# Patient Record
Sex: MALE | Race: BLACK OR AFRICAN AMERICAN | Employment: OTHER | URBAN - METROPOLITAN AREA
[De-identification: names, ages, dates, MRNs, and addresses within clinical notes are randomized per-mention and may not be internally consistent; named-entity substitution may affect disease eponyms.]

---

## 2021-01-01 ENCOUNTER — OFFICE VISIT (OUTPATIENT)
Dept: ONCOLOGY | Age: 66
End: 2021-01-01
Payer: MEDICARE

## 2021-01-01 ENCOUNTER — HOSPITAL ENCOUNTER (EMERGENCY)
Age: 66
Discharge: HOME | End: 2021-12-06
Attending: EMERGENCY MEDICINE
Payer: MEDICARE

## 2021-01-01 ENCOUNTER — APPOINTMENT (OUTPATIENT)
Dept: INFUSION THERAPY | Age: 66
End: 2021-01-01

## 2021-01-01 ENCOUNTER — TELEPHONE (OUTPATIENT)
Dept: ONCOLOGY | Age: 66
End: 2021-01-01

## 2021-01-01 ENCOUNTER — HOSPITAL ENCOUNTER (OUTPATIENT)
Dept: INFUSION THERAPY | Age: 66
Discharge: HOME | End: 2021-11-19
Payer: MEDICARE

## 2021-01-01 VITALS
SYSTOLIC BLOOD PRESSURE: 180 MMHG | DIASTOLIC BLOOD PRESSURE: 98 MMHG | OXYGEN SATURATION: 97 % | HEART RATE: 73 BPM | WEIGHT: 270 LBS | TEMPERATURE: 98.3 F | BODY MASS INDEX: 40.92 KG/M2 | RESPIRATION RATE: 16 BRPM | HEIGHT: 68 IN

## 2021-01-01 VITALS
BODY MASS INDEX: 42.28 KG/M2 | OXYGEN SATURATION: 97 % | HEART RATE: 75 BPM | DIASTOLIC BLOOD PRESSURE: 60 MMHG | TEMPERATURE: 98.1 F | HEIGHT: 68 IN | SYSTOLIC BLOOD PRESSURE: 131 MMHG | RESPIRATION RATE: 18 BRPM | WEIGHT: 279 LBS

## 2021-01-01 VITALS
BODY MASS INDEX: 42.39 KG/M2 | WEIGHT: 279.7 LBS | DIASTOLIC BLOOD PRESSURE: 60 MMHG | OXYGEN SATURATION: 97 % | RESPIRATION RATE: 18 BRPM | TEMPERATURE: 98.1 F | SYSTOLIC BLOOD PRESSURE: 131 MMHG | HEIGHT: 68 IN | HEART RATE: 75 BPM

## 2021-01-01 VITALS
HEIGHT: 68 IN | WEIGHT: 273 LBS | BODY MASS INDEX: 41.37 KG/M2 | DIASTOLIC BLOOD PRESSURE: 64 MMHG | SYSTOLIC BLOOD PRESSURE: 146 MMHG | TEMPERATURE: 98 F | OXYGEN SATURATION: 96 % | RESPIRATION RATE: 16 BRPM | HEART RATE: 93 BPM

## 2021-01-01 DIAGNOSIS — C61 PROSTATE CANCER (HCC): Primary | ICD-10-CM

## 2021-01-01 DIAGNOSIS — Z79.52 LONG TERM (CURRENT) USE OF SYSTEMIC STEROIDS: ICD-10-CM

## 2021-01-01 DIAGNOSIS — C79.51 METASTASIS TO BONE (HCC): Primary | ICD-10-CM

## 2021-01-01 DIAGNOSIS — C79.51 METASTASIS TO BONE (HCC): ICD-10-CM

## 2021-01-01 DIAGNOSIS — R33.9 URINARY RETENTION: ICD-10-CM

## 2021-01-01 DIAGNOSIS — R33.9 URINARY RETENTION: Primary | ICD-10-CM

## 2021-01-01 DIAGNOSIS — C61 PROSTATE CANCER (HCC): ICD-10-CM

## 2021-01-01 DIAGNOSIS — D64.9 ANEMIA, UNSPECIFIED TYPE: ICD-10-CM

## 2021-01-01 LAB
ALBUMIN SERPL-MCNC: 3 G/DL (ref 3.5–5)
ALBUMIN SERPL-MCNC: 3.2 G/DL (ref 3.5–5)
ALBUMIN/GLOB SERPL: 0.8 {RATIO} (ref 1.1–2.2)
ALBUMIN/GLOB SERPL: 0.8 {RATIO} (ref 1.1–2.2)
ALP SERPL-CCNC: 115 U/L (ref 45–117)
ALP SERPL-CCNC: 122 U/L (ref 45–117)
ALT SERPL-CCNC: 33 U/L (ref 12–78)
ALT SERPL-CCNC: 33 U/L (ref 12–78)
ANION GAP SERPL CALC-SCNC: 4 MMOL/L (ref 5–15)
ANION GAP SERPL CALC-SCNC: 6 MMOL/L (ref 5–15)
APPEARANCE UR: CLEAR
AST SERPL-CCNC: 19 U/L (ref 15–37)
AST SERPL-CCNC: 22 U/L (ref 15–37)
BACTERIA URNS QL MICRO: NEGATIVE /HPF
BASOPHILS # BLD: 0 K/UL (ref 0–0.1)
BASOPHILS # BLD: 0.1 K/UL (ref 0–0.1)
BASOPHILS NFR BLD: 0 % (ref 0–1)
BASOPHILS NFR BLD: 0 % (ref 0–1)
BILIRUB SERPL-MCNC: 0.3 MG/DL (ref 0.2–1)
BILIRUB SERPL-MCNC: 0.5 MG/DL (ref 0.2–1)
BILIRUB UR QL: NEGATIVE
BUN SERPL-MCNC: 31 MG/DL (ref 6–20)
BUN SERPL-MCNC: 37 MG/DL (ref 6–20)
BUN/CREAT SERPL: 19 (ref 12–20)
BUN/CREAT SERPL: 20 (ref 12–20)
CALCIUM SERPL-MCNC: 8.9 MG/DL (ref 8.5–10.1)
CALCIUM SERPL-MCNC: 9.3 MG/DL (ref 8.5–10.1)
CHLORIDE SERPL-SCNC: 109 MMOL/L (ref 97–108)
CHLORIDE SERPL-SCNC: 112 MMOL/L (ref 97–108)
CO2 SERPL-SCNC: 22 MMOL/L (ref 21–32)
CO2 SERPL-SCNC: 26 MMOL/L (ref 21–32)
COLOR UR: ABNORMAL
CREAT SERPL-MCNC: 1.6 MG/DL (ref 0.7–1.3)
CREAT SERPL-MCNC: 1.82 MG/DL (ref 0.7–1.3)
DIFFERENTIAL METHOD BLD: ABNORMAL
DIFFERENTIAL METHOD BLD: ABNORMAL
EOSINOPHIL # BLD: 0 K/UL (ref 0–0.4)
EOSINOPHIL # BLD: 0.1 K/UL (ref 0–0.4)
EOSINOPHIL NFR BLD: 0 % (ref 0–7)
EOSINOPHIL NFR BLD: 1 % (ref 0–7)
EPITH CASTS URNS QL MICRO: ABNORMAL /LPF
ERYTHROCYTE [DISTWIDTH] IN BLOOD BY AUTOMATED COUNT: 15.6 % (ref 11.5–14.5)
ERYTHROCYTE [DISTWIDTH] IN BLOOD BY AUTOMATED COUNT: 16.2 % (ref 11.5–14.5)
GLOBULIN SER CALC-MCNC: 4 G/DL (ref 2–4)
GLOBULIN SER CALC-MCNC: 4.1 G/DL (ref 2–4)
GLUCOSE SERPL-MCNC: 215 MG/DL (ref 65–100)
GLUCOSE SERPL-MCNC: 217 MG/DL (ref 65–100)
GLUCOSE UR STRIP.AUTO-MCNC: NEGATIVE MG/DL
HCT VFR BLD AUTO: 32.6 % (ref 36.6–50.3)
HCT VFR BLD AUTO: 34.8 % (ref 36.6–50.3)
HGB BLD-MCNC: 10.5 G/DL (ref 12.1–17)
HGB BLD-MCNC: 11 G/DL (ref 12.1–17)
HGB UR QL STRIP: ABNORMAL
HYALINE CASTS URNS QL MICRO: ABNORMAL /LPF (ref 0–5)
IMM GRANULOCYTES # BLD AUTO: 0.1 K/UL (ref 0–0.04)
IMM GRANULOCYTES # BLD AUTO: 0.2 K/UL (ref 0–0.04)
IMM GRANULOCYTES NFR BLD AUTO: 1 % (ref 0–0.5)
IMM GRANULOCYTES NFR BLD AUTO: 1 % (ref 0–0.5)
KETONES UR QL STRIP.AUTO: NEGATIVE MG/DL
LEUKOCYTE ESTERASE UR QL STRIP.AUTO: NEGATIVE
LYMPHOCYTES # BLD: 0.8 K/UL (ref 0.8–3.5)
LYMPHOCYTES # BLD: 0.8 K/UL (ref 0.8–3.5)
LYMPHOCYTES NFR BLD: 7 % (ref 12–49)
LYMPHOCYTES NFR BLD: 8 % (ref 12–49)
MCH RBC QN AUTO: 23.9 PG (ref 26–34)
MCH RBC QN AUTO: 24.3 PG (ref 26–34)
MCHC RBC AUTO-ENTMCNC: 31.6 G/DL (ref 30–36.5)
MCHC RBC AUTO-ENTMCNC: 32.2 G/DL (ref 30–36.5)
MCV RBC AUTO: 75.5 FL (ref 80–99)
MCV RBC AUTO: 75.7 FL (ref 80–99)
MONOCYTES # BLD: 0.6 K/UL (ref 0–1)
MONOCYTES # BLD: 0.6 K/UL (ref 0–1)
MONOCYTES NFR BLD: 5 % (ref 5–13)
MONOCYTES NFR BLD: 6 % (ref 5–13)
NEUTS SEG # BLD: 10.8 K/UL (ref 1.8–8)
NEUTS SEG # BLD: 8.7 K/UL (ref 1.8–8)
NEUTS SEG NFR BLD: 84 % (ref 32–75)
NEUTS SEG NFR BLD: 87 % (ref 32–75)
NITRITE UR QL STRIP.AUTO: NEGATIVE
NRBC # BLD: 0 K/UL (ref 0–0.01)
NRBC # BLD: 0 K/UL (ref 0–0.01)
NRBC BLD-RTO: 0 PER 100 WBC
NRBC BLD-RTO: 0 PER 100 WBC
PH UR STRIP: 6 [PH] (ref 5–8)
PLATELET # BLD AUTO: 297 K/UL (ref 150–400)
PLATELET # BLD AUTO: 321 K/UL (ref 150–400)
PMV BLD AUTO: 10.1 FL (ref 8.9–12.9)
PMV BLD AUTO: 10.8 FL (ref 8.9–12.9)
POTASSIUM SERPL-SCNC: 4.3 MMOL/L (ref 3.5–5.1)
POTASSIUM SERPL-SCNC: 4.4 MMOL/L (ref 3.5–5.1)
PROT SERPL-MCNC: 7 G/DL (ref 6.4–8.2)
PROT SERPL-MCNC: 7.3 G/DL (ref 6.4–8.2)
PROT UR STRIP-MCNC: ABNORMAL MG/DL
PSA SERPL-MCNC: 0.5 NG/ML (ref 0.01–4)
RBC # BLD AUTO: 4.32 M/UL (ref 4.1–5.7)
RBC # BLD AUTO: 4.6 M/UL (ref 4.1–5.7)
RBC #/AREA URNS HPF: ABNORMAL /HPF (ref 0–5)
SODIUM SERPL-SCNC: 139 MMOL/L (ref 136–145)
SODIUM SERPL-SCNC: 140 MMOL/L (ref 136–145)
SP GR UR REFRACTOMETRY: 1.01 (ref 1–1.03)
UA: UC IF INDICATED,UAUC: ABNORMAL
UROBILINOGEN UR QL STRIP.AUTO: 0.2 EU/DL (ref 0.2–1)
WBC # BLD AUTO: 10.3 K/UL (ref 4.1–11.1)
WBC # BLD AUTO: 12.4 K/UL (ref 4.1–11.1)
WBC URNS QL MICRO: ABNORMAL /HPF (ref 0–4)

## 2021-01-01 PROCEDURE — G0463 HOSPITAL OUTPT CLINIC VISIT: HCPCS | Performed by: NURSE PRACTITIONER

## 2021-01-01 PROCEDURE — 84153 ASSAY OF PSA TOTAL: CPT

## 2021-01-01 PROCEDURE — G8754 DIAS BP LESS 90: HCPCS | Performed by: INTERNAL MEDICINE

## 2021-01-01 PROCEDURE — 36415 COLL VENOUS BLD VENIPUNCTURE: CPT

## 2021-01-01 PROCEDURE — 96402 CHEMO HORMON ANTINEOPL SQ/IM: CPT

## 2021-01-01 PROCEDURE — G8427 DOCREV CUR MEDS BY ELIG CLIN: HCPCS | Performed by: INTERNAL MEDICINE

## 2021-01-01 PROCEDURE — G8753 SYS BP > OR = 140: HCPCS | Performed by: INTERNAL MEDICINE

## 2021-01-01 PROCEDURE — 3017F COLORECTAL CA SCREEN DOC REV: CPT | Performed by: INTERNAL MEDICINE

## 2021-01-01 PROCEDURE — 74011250636 HC RX REV CODE- 250/636: Performed by: NURSE PRACTITIONER

## 2021-01-01 PROCEDURE — 99215 OFFICE O/P EST HI 40 MIN: CPT | Performed by: INTERNAL MEDICINE

## 2021-01-01 PROCEDURE — G8417 CALC BMI ABV UP PARAM F/U: HCPCS | Performed by: INTERNAL MEDICINE

## 2021-01-01 PROCEDURE — G8432 DEP SCR NOT DOC, RNG: HCPCS | Performed by: INTERNAL MEDICINE

## 2021-01-01 PROCEDURE — 85025 COMPLETE CBC W/AUTO DIFF WBC: CPT

## 2021-01-01 PROCEDURE — 99282 EMERGENCY DEPT VISIT SF MDM: CPT

## 2021-01-01 PROCEDURE — G8752 SYS BP LESS 140: HCPCS | Performed by: INTERNAL MEDICINE

## 2021-01-01 PROCEDURE — G8536 NO DOC ELDER MAL SCRN: HCPCS | Performed by: INTERNAL MEDICINE

## 2021-01-01 PROCEDURE — 51702 INSERT TEMP BLADDER CATH: CPT

## 2021-01-01 PROCEDURE — 80053 COMPREHEN METABOLIC PANEL: CPT

## 2021-01-01 PROCEDURE — 1101F PT FALLS ASSESS-DOCD LE1/YR: CPT | Performed by: INTERNAL MEDICINE

## 2021-01-01 PROCEDURE — 81001 URINALYSIS AUTO W/SCOPE: CPT

## 2021-01-01 RX ORDER — INSULIN LISPRO 100 [IU]/ML
INJECTION, SOLUTION INTRAVENOUS; SUBCUTANEOUS
COMMUNITY
Start: 2021-01-01 | End: 2022-01-01

## 2021-01-01 RX ORDER — SITAGLIPTIN 100 MG/1
100 TABLET, FILM COATED ORAL DAILY
COMMUNITY
Start: 2021-01-01 | End: 2022-01-01

## 2021-01-01 RX ORDER — ABIRATERONE ACETATE 250 MG/1
1000 TABLET ORAL DAILY
Qty: 120 TABLET | Refills: 5 | Status: SHIPPED | OUTPATIENT
Start: 2021-01-01 | End: 2022-01-01

## 2021-01-01 RX ORDER — INSULIN GLARGINE 100 [IU]/ML
23 INJECTION, SOLUTION SUBCUTANEOUS 2 TIMES DAILY
Status: ON HOLD | COMMUNITY
Start: 2021-01-01 | End: 2022-01-01 | Stop reason: SDUPTHER

## 2021-01-01 RX ADMIN — LEUPROLIDE ACETATE 22.5 MG: KIT at 10:44

## 2021-04-16 ENCOUNTER — APPOINTMENT (OUTPATIENT)
Dept: CT IMAGING | Age: 66
DRG: 477 | End: 2021-04-16
Attending: EMERGENCY MEDICINE
Payer: MEDICARE

## 2021-04-16 ENCOUNTER — APPOINTMENT (OUTPATIENT)
Dept: CT IMAGING | Age: 66
DRG: 477 | End: 2021-04-16
Attending: INTERNAL MEDICINE
Payer: MEDICARE

## 2021-04-16 ENCOUNTER — HOSPITAL ENCOUNTER (INPATIENT)
Age: 66
LOS: 6 days | Discharge: HOME | DRG: 477 | End: 2021-04-22
Attending: EMERGENCY MEDICINE | Admitting: INTERNAL MEDICINE
Payer: MEDICARE

## 2021-04-16 ENCOUNTER — APPOINTMENT (OUTPATIENT)
Dept: GENERAL RADIOLOGY | Age: 66
DRG: 477 | End: 2021-04-16
Attending: EMERGENCY MEDICINE
Payer: MEDICARE

## 2021-04-16 DIAGNOSIS — C79.51 MALIGNANT NEOPLASM METASTATIC TO BONE (HCC): ICD-10-CM

## 2021-04-16 DIAGNOSIS — N17.9 AKI (ACUTE KIDNEY INJURY) (HCC): ICD-10-CM

## 2021-04-16 DIAGNOSIS — R73.9 HYPERGLYCEMIA: Primary | ICD-10-CM

## 2021-04-16 DIAGNOSIS — R25.2 JERKING MOVEMENTS OF EXTREMITIES: ICD-10-CM

## 2021-04-16 DIAGNOSIS — E11.00 TYPE 2 DIABETES MELLITUS WITH HYPEROSMOLAR NONKETOTIC HYPERGLYCEMIA (HCC): ICD-10-CM

## 2021-04-16 PROBLEM — E11.65 SEVERE HYPERGLYCEMIA DUE TO DIABETES MELLITUS (HCC): Status: ACTIVE | Noted: 2021-04-16

## 2021-04-16 PROBLEM — R74.8 ELEVATED ALKALINE PHOSPHATASE LEVEL: Status: ACTIVE | Noted: 2021-04-16

## 2021-04-16 PROBLEM — R82.81 PYURIA: Status: ACTIVE | Noted: 2021-04-16

## 2021-04-16 PROBLEM — J01.90 ACUTE SINUSITIS: Status: ACTIVE | Noted: 2021-04-16

## 2021-04-16 LAB
ADMINISTERED INITIALS, ADMINIT: NORMAL
ALBUMIN SERPL-MCNC: 3.3 G/DL (ref 3.5–5)
ALBUMIN/GLOB SERPL: 0.6 {RATIO} (ref 1.1–2.2)
ALP SERPL-CCNC: 774 U/L (ref 45–117)
ALT SERPL-CCNC: 24 U/L (ref 12–78)
ANION GAP SERPL CALC-SCNC: 12 MMOL/L (ref 5–15)
ANION GAP SERPL CALC-SCNC: 7 MMOL/L (ref 5–15)
APPEARANCE UR: CLEAR
AST SERPL-CCNC: 17 U/L (ref 15–37)
BACTERIA URNS QL MICRO: ABNORMAL /HPF
BASOPHILS # BLD: 0.1 K/UL (ref 0–0.1)
BASOPHILS NFR BLD: 1 % (ref 0–1)
BILIRUB SERPL-MCNC: 0.3 MG/DL (ref 0.2–1)
BILIRUB UR QL: NEGATIVE
BUN SERPL-MCNC: 24 MG/DL (ref 6–20)
BUN SERPL-MCNC: 25 MG/DL (ref 6–20)
BUN/CREAT SERPL: 11 (ref 12–20)
BUN/CREAT SERPL: 12 (ref 12–20)
CALCIUM SERPL-MCNC: 8.6 MG/DL (ref 8.5–10.1)
CALCIUM SERPL-MCNC: 8.9 MG/DL (ref 8.5–10.1)
CHLORIDE SERPL-SCNC: 105 MMOL/L (ref 97–108)
CHLORIDE SERPL-SCNC: 94 MMOL/L (ref 97–108)
CO2 SERPL-SCNC: 18 MMOL/L (ref 21–32)
CO2 SERPL-SCNC: 23 MMOL/L (ref 21–32)
COLOR UR: ABNORMAL
COMMENT, HOLDF: NORMAL
CREAT SERPL-MCNC: 1.93 MG/DL (ref 0.7–1.3)
CREAT SERPL-MCNC: 2.35 MG/DL (ref 0.7–1.3)
D50 ADMINISTERED, D50ADM: 0 ML
D50 ORDER, D50ORD: 0 ML
DIFFERENTIAL METHOD BLD: ABNORMAL
EOSINOPHIL # BLD: 0.4 K/UL (ref 0–0.4)
EOSINOPHIL NFR BLD: 3 % (ref 0–7)
EPITH CASTS URNS QL MICRO: ABNORMAL /LPF
ERYTHROCYTE [DISTWIDTH] IN BLOOD BY AUTOMATED COUNT: 13.2 % (ref 11.5–14.5)
EST. AVERAGE GLUCOSE BLD GHB EST-MCNC: 286 MG/DL
GLOBULIN SER CALC-MCNC: 5.9 G/DL (ref 2–4)
GLSCOM COMMENTS: NORMAL
GLUCOSE BLD STRIP.AUTO-MCNC: 231 MG/DL (ref 65–100)
GLUCOSE BLD STRIP.AUTO-MCNC: 285 MG/DL (ref 65–100)
GLUCOSE BLD STRIP.AUTO-MCNC: 464 MG/DL (ref 65–100)
GLUCOSE BLD STRIP.AUTO-MCNC: 559 MG/DL (ref 65–100)
GLUCOSE BLD STRIP.AUTO-MCNC: >600 MG/DL (ref 65–100)
GLUCOSE SERPL-MCNC: 535 MG/DL (ref 65–100)
GLUCOSE SERPL-MCNC: 997 MG/DL (ref 65–100)
GLUCOSE UR STRIP.AUTO-MCNC: >1000 MG/DL
GLUCOSE, GLC: 231 MG/DL
GLUCOSE, GLC: 285 MG/DL
GLUCOSE, GLC: 464 MG/DL
GLUCOSE, GLC: 559 MG/DL
HBA1C MFR BLD: 11.6 % (ref 4–5.6)
HCT VFR BLD AUTO: 37.3 % (ref 36.6–50.3)
HGB BLD-MCNC: 11.5 G/DL (ref 12.1–17)
HGB UR QL STRIP: ABNORMAL
HIGH TARGET, HITG: 300 MG/DL
HYALINE CASTS URNS QL MICRO: ABNORMAL /LPF (ref 0–5)
IMM GRANULOCYTES # BLD AUTO: 0 K/UL (ref 0–0.04)
IMM GRANULOCYTES NFR BLD AUTO: 0 % (ref 0–0.5)
INSULIN ADMINSTERED, INSADM: 10 UNITS/HOUR
INSULIN ADMINSTERED, INSADM: 12.1 UNITS/HOUR
INSULIN ADMINSTERED, INSADM: 5.1 UNITS/HOUR
INSULIN ADMINSTERED, INSADM: 6.8 UNITS/HOUR
INSULIN ORDER, INSORD: 10 UNITS/HOUR
INSULIN ORDER, INSORD: 12.1 UNITS/HOUR
INSULIN ORDER, INSORD: 5.1 UNITS/HOUR
INSULIN ORDER, INSORD: 6.8 UNITS/HOUR
KETONES UR QL STRIP.AUTO: 15 MG/DL
LEUKOCYTE ESTERASE UR QL STRIP.AUTO: NEGATIVE
LOW TARGET, LOT: 200 MG/DL
LYMPHOCYTES # BLD: 1.3 K/UL (ref 0.8–3.5)
LYMPHOCYTES NFR BLD: 12 % (ref 12–49)
MAGNESIUM SERPL-MCNC: 2.3 MG/DL (ref 1.6–2.4)
MCH RBC QN AUTO: 22.8 PG (ref 26–34)
MCHC RBC AUTO-ENTMCNC: 30.8 G/DL (ref 30–36.5)
MCV RBC AUTO: 74 FL (ref 80–99)
MINUTES UNTIL NEXT BG, NBG: 60 MIN
MONOCYTES # BLD: 0.7 K/UL (ref 0–1)
MONOCYTES NFR BLD: 6 % (ref 5–13)
MULTIPLIER, MUL: 0.02
MULTIPLIER, MUL: 0.03
NEUTS SEG # BLD: 8.7 K/UL (ref 1.8–8)
NEUTS SEG NFR BLD: 78 % (ref 32–75)
NITRITE UR QL STRIP.AUTO: NEGATIVE
NRBC # BLD: 0 K/UL (ref 0–0.01)
NRBC BLD-RTO: 0 PER 100 WBC
ORDER INITIALS, ORDINIT: NORMAL
PH UR STRIP: 5.5 [PH] (ref 5–8)
PHOSPHATE SERPL-MCNC: 2.2 MG/DL (ref 2.6–4.7)
PLATELET # BLD AUTO: 456 K/UL (ref 150–400)
PMV BLD AUTO: 11.1 FL (ref 8.9–12.9)
POTASSIUM SERPL-SCNC: 4 MMOL/L (ref 3.5–5.1)
POTASSIUM SERPL-SCNC: 5.1 MMOL/L (ref 3.5–5.1)
PROT SERPL-MCNC: 9.2 G/DL (ref 6.4–8.2)
PROT UR STRIP-MCNC: NEGATIVE MG/DL
RBC # BLD AUTO: 5.04 M/UL (ref 4.1–5.7)
RBC #/AREA URNS HPF: ABNORMAL /HPF (ref 0–5)
SAMPLES BEING HELD,HOLD: NORMAL
SERVICE CMNT-IMP: ABNORMAL
SODIUM SERPL-SCNC: 124 MMOL/L (ref 136–145)
SODIUM SERPL-SCNC: 135 MMOL/L (ref 136–145)
SP GR UR REFRACTOMETRY: 1.03 (ref 1–1.03)
TROPONIN I SERPL-MCNC: <0.05 NG/ML
UROBILINOGEN UR QL STRIP.AUTO: 0.2 EU/DL (ref 0.2–1)
WBC # BLD AUTO: 11.3 K/UL (ref 4.1–11.1)
WBC URNS QL MICRO: ABNORMAL /HPF (ref 0–4)

## 2021-04-16 PROCEDURE — 87086 URINE CULTURE/COLONY COUNT: CPT

## 2021-04-16 PROCEDURE — 36415 COLL VENOUS BLD VENIPUNCTURE: CPT

## 2021-04-16 PROCEDURE — 74011250636 HC RX REV CODE- 250/636: Performed by: EMERGENCY MEDICINE

## 2021-04-16 PROCEDURE — 82962 GLUCOSE BLOOD TEST: CPT

## 2021-04-16 PROCEDURE — 99285 EMERGENCY DEPT VISIT HI MDM: CPT

## 2021-04-16 PROCEDURE — 81001 URINALYSIS AUTO W/SCOPE: CPT

## 2021-04-16 PROCEDURE — 74011250636 HC RX REV CODE- 250/636: Performed by: INTERNAL MEDICINE

## 2021-04-16 PROCEDURE — 87077 CULTURE AEROBIC IDENTIFY: CPT

## 2021-04-16 PROCEDURE — 65660000000 HC RM CCU STEPDOWN

## 2021-04-16 PROCEDURE — 87186 SC STD MICRODIL/AGAR DIL: CPT

## 2021-04-16 PROCEDURE — 71045 X-RAY EXAM CHEST 1 VIEW: CPT

## 2021-04-16 PROCEDURE — 84100 ASSAY OF PHOSPHORUS: CPT

## 2021-04-16 PROCEDURE — 72128 CT CHEST SPINE W/O DYE: CPT

## 2021-04-16 PROCEDURE — 83735 ASSAY OF MAGNESIUM: CPT

## 2021-04-16 PROCEDURE — 70450 CT HEAD/BRAIN W/O DYE: CPT

## 2021-04-16 PROCEDURE — 84484 ASSAY OF TROPONIN QUANT: CPT

## 2021-04-16 PROCEDURE — 74011000258 HC RX REV CODE- 258: Performed by: INTERNAL MEDICINE

## 2021-04-16 PROCEDURE — 74011636637 HC RX REV CODE- 636/637: Performed by: EMERGENCY MEDICINE

## 2021-04-16 PROCEDURE — 96374 THER/PROPH/DIAG INJ IV PUSH: CPT

## 2021-04-16 PROCEDURE — 74011636637 HC RX REV CODE- 636/637: Performed by: INTERNAL MEDICINE

## 2021-04-16 PROCEDURE — 72131 CT LUMBAR SPINE W/O DYE: CPT

## 2021-04-16 PROCEDURE — 83036 HEMOGLOBIN GLYCOSYLATED A1C: CPT

## 2021-04-16 PROCEDURE — 80053 COMPREHEN METABOLIC PANEL: CPT

## 2021-04-16 PROCEDURE — 85025 COMPLETE CBC W/AUTO DIFF WBC: CPT

## 2021-04-16 PROCEDURE — 74011000250 HC RX REV CODE- 250: Performed by: INTERNAL MEDICINE

## 2021-04-16 RX ORDER — ATORVASTATIN CALCIUM 10 MG/1
10 TABLET, FILM COATED ORAL
COMMUNITY
End: 2022-01-01

## 2021-04-16 RX ORDER — SODIUM CHLORIDE 0.9 % (FLUSH) 0.9 %
5-40 SYRINGE (ML) INJECTION EVERY 8 HOURS
Status: DISCONTINUED | OUTPATIENT
Start: 2021-04-16 | End: 2021-04-22 | Stop reason: HOSPADM

## 2021-04-16 RX ORDER — MAGNESIUM SULFATE 100 %
4 CRYSTALS MISCELLANEOUS AS NEEDED
Status: DISCONTINUED | OUTPATIENT
Start: 2021-04-16 | End: 2021-04-18

## 2021-04-16 RX ORDER — ATENOLOL 50 MG/1
50 TABLET ORAL
Status: DISCONTINUED | OUTPATIENT
Start: 2021-04-17 | End: 2021-04-22 | Stop reason: HOSPADM

## 2021-04-16 RX ORDER — POLYETHYLENE GLYCOL 3350 17 G/17G
17 POWDER, FOR SOLUTION ORAL DAILY PRN
Status: DISCONTINUED | OUTPATIENT
Start: 2021-04-16 | End: 2021-04-22 | Stop reason: HOSPADM

## 2021-04-16 RX ORDER — DEXTROSE 50 % IN WATER (D50W) INTRAVENOUS SYRINGE
25-50 AS NEEDED
Status: DISCONTINUED | OUTPATIENT
Start: 2021-04-16 | End: 2021-04-18

## 2021-04-16 RX ORDER — BENAZEPRIL HYDROCHLORIDE 40 MG/1
40 TABLET ORAL DAILY
COMMUNITY
End: 2022-01-01

## 2021-04-16 RX ORDER — GUAIFENESIN 100 MG/5ML
81 LIQUID (ML) ORAL DAILY
Status: DISCONTINUED | OUTPATIENT
Start: 2021-04-17 | End: 2021-04-22 | Stop reason: HOSPADM

## 2021-04-16 RX ORDER — SODIUM CHLORIDE 0.9 % (FLUSH) 0.9 %
5-40 SYRINGE (ML) INJECTION AS NEEDED
Status: DISCONTINUED | OUTPATIENT
Start: 2021-04-16 | End: 2021-04-22 | Stop reason: HOSPADM

## 2021-04-16 RX ORDER — ACETAMINOPHEN 650 MG/1
650 SUPPOSITORY RECTAL
Status: DISCONTINUED | OUTPATIENT
Start: 2021-04-16 | End: 2021-04-22 | Stop reason: HOSPADM

## 2021-04-16 RX ORDER — ACETAMINOPHEN 325 MG/1
650 TABLET ORAL
Status: DISCONTINUED | OUTPATIENT
Start: 2021-04-16 | End: 2021-04-22 | Stop reason: HOSPADM

## 2021-04-16 RX ORDER — HEPARIN SODIUM 5000 [USP'U]/ML
5000 INJECTION, SOLUTION INTRAVENOUS; SUBCUTANEOUS EVERY 8 HOURS
Status: DISCONTINUED | OUTPATIENT
Start: 2021-04-17 | End: 2021-04-19

## 2021-04-16 RX ORDER — INSULIN LISPRO 100 [IU]/ML
INJECTION, SOLUTION INTRAVENOUS; SUBCUTANEOUS
Status: DISCONTINUED | OUTPATIENT
Start: 2021-04-17 | End: 2021-04-18

## 2021-04-16 RX ORDER — METFORMIN HYDROCHLORIDE 500 MG/1
1000 TABLET ORAL
COMMUNITY
End: 2022-01-01

## 2021-04-16 RX ORDER — GLYBURIDE 6 MG/1
6 TABLET ORAL 2 TIMES DAILY WITH MEALS
COMMUNITY
End: 2021-04-22

## 2021-04-16 RX ORDER — TRIAMTERENE/HYDROCHLOROTHIAZID 37.5-25 MG
1 TABLET ORAL DAILY
COMMUNITY
End: 2021-04-22

## 2021-04-16 RX ORDER — PIOGLITAZONEHYDROCHLORIDE 45 MG/1
45 TABLET ORAL DAILY
COMMUNITY
End: 2022-01-01

## 2021-04-16 RX ORDER — SODIUM CHLORIDE 9 MG/ML
150 INJECTION, SOLUTION INTRAVENOUS CONTINUOUS
Status: DISCONTINUED | OUTPATIENT
Start: 2021-04-16 | End: 2021-04-18

## 2021-04-16 RX ORDER — METFORMIN HYDROCHLORIDE 500 MG/1
1500 TABLET ORAL
COMMUNITY
End: 2022-01-01

## 2021-04-16 RX ORDER — ATORVASTATIN CALCIUM 10 MG/1
10 TABLET, FILM COATED ORAL
Status: DISCONTINUED | OUTPATIENT
Start: 2021-04-16 | End: 2021-04-22 | Stop reason: HOSPADM

## 2021-04-16 RX ORDER — GUAIFENESIN 100 MG/5ML
81 LIQUID (ML) ORAL DAILY
COMMUNITY
End: 2022-01-01

## 2021-04-16 RX ORDER — ATENOLOL 50 MG/1
50 TABLET ORAL
COMMUNITY
End: 2022-01-01

## 2021-04-16 RX ADMIN — SODIUM CHLORIDE 150 ML/HR: 9 INJECTION, SOLUTION INTRAVENOUS at 23:37

## 2021-04-16 RX ADMIN — INSULIN HUMAN 10 UNITS: 100 INJECTION, SOLUTION PARENTERAL at 17:30

## 2021-04-16 RX ADMIN — INSULIN HUMAN 10 UNITS: 100 INJECTION, SOLUTION PARENTERAL at 19:42

## 2021-04-16 RX ADMIN — Medication 10 ML: at 23:37

## 2021-04-16 RX ADMIN — SODIUM CHLORIDE 1000 ML: 9 INJECTION, SOLUTION INTRAVENOUS at 16:41

## 2021-04-16 RX ADMIN — SODIUM CHLORIDE 1000 ML: 9 INJECTION, SOLUTION INTRAVENOUS at 17:31

## 2021-04-16 RX ADMIN — WATER 1 G: 1 INJECTION INTRAMUSCULAR; INTRAVENOUS; SUBCUTANEOUS at 23:37

## 2021-04-16 RX ADMIN — SODIUM CHLORIDE 10 UNITS/HR: 9 INJECTION, SOLUTION INTRAVENOUS at 20:39

## 2021-04-16 NOTE — H&P
[facility]  [address], [place] [address]  [phone]    Hospitalist Admission Note      NAME:  Jcarlos Crews   :   1955   MRN:  [id number]     PCP:  Cynthia Khan MD     Date of Service/Time:  2021 7:57 PM         Assessment / Plan:       73 y.o. male with hx of DM, HTN, HLD presenting with back pain, tremors, fatigue, found to have severe hyperglycemia / HHS      Severe hyperglycemia due to diabetes mellitus / HHS: start IV insulin gtt, frequent BMP, monitor K closely. Back pain:  Elevated alkaline phosphatase level concerning for bony process. CT T/L spine checked, found to have T10 compression fracture possibly neoplastic. Check PSA. Ortho consult. Will likely need bone scan and biopsy, etc      MAMADOU (acute kidney injury): presumed IVVD but will check urine 'lytes. Renal US. Follow BMP. Hold diuretics and ACEi      Pyuria: possible UTI. Start IV CTX pending UCx      Acute sinusitis: CTX as above      Hypertension: BP controlled. Resume atenolol tomorrow but hold other antihypertensives. Check EKG for baseline      HLD (hyperlipidemia): resume statin      Code Status: FULL     Surrogate decision maker: daughter    ED notes, lab results, and imaging studies reviewed. Total time spent with patient: 50 Minutes CC  Time spent in the care of this patient included reviewing records, discussing with nursing, obtaining history and examining the patient, and discussing treatment plans, with >50% time spent counseling/coordinating care  Critical Care:  I personally spent 50minutes in providing critical care. The reason for providing this level of medical care for this critically ill patient was due to a critical illness (HHS) that impaired one or more vital organ systems such that there was a high probability of imminent or life threatening deterioration in the patient's condition.  This care involved high complexity decision making to assess, manipulate, and support vital system functions. Risk of deterioration: High                 Care Plan discussed with: ED provider, Patient, Nursing Staff and >50% of time spent in counseling and coordination of care    Discussed:  Care Plan and D/C Planning    Prophylaxis:  Hep SQ    Disposition:   PT, OT, RN                 Subjective:     CHIEF COMPLAINT:     HISTORY OF PRESENT ILLNESS:     Mr. Titi Art is a 73 y.o. male w/ hx of DM, HTN, HLD who presents with constellation of symptoms. Has had back pain for several days, lower back, feels \"like a knot\". Also noting some L arm jerking. Further, since his J&J vaccine on 3/18, has had increased urinary frequency and poor appetite. When I saw the pt in ED, he reported he was feeling better, with resolution of arm jerking however still having back pain. ED workup showed severe hyperglycemia and elevated alk phos. Head CT showed extensive sinus disease. Mr. Titi Art is admitted for further evaluation and management. Past Medical History:   Diagnosis Date    Diabetes ([facility] [address]     Hypertension         No past surgical history on file. Social History     Tobacco Use    Smoking status: No   Substance Use Topics    Alcohol use: No        Family History: family history of DM    Allergies   Allergen Reactions    Erythromycin Other (comments)     lethargic        Prior to Admission medications    Medication Sig Start Date End Date Taking? Authorizing Provider   aspirin 81 mg chewable tablet Take 81 mg by mouth daily. Yes Provider, Historical   atenoloL (TENORMIN) 50 mg tablet Take 50 mg by mouth nightly. Yes Provider, Historical   atorvastatin (LIPITOR) 10 mg tablet Take 10 mg by mouth nightly. Yes Provider, Historical   benazepriL (LOTENSIN) 40 mg tablet Take 40 mg by mouth daily. Yes Provider, Historical   glyBURIDE micronized (GLYNASE) 6 mg tab Take 6 mg by mouth two (2) times daily (with meals).    Yes Provider, Historical   metFORMIN (GLUCOPHAGE) 500 mg tablet Take 1,500 mg by mouth daily (with breakfast). Yes Provider, Historical   metFORMIN (GLUCOPHAGE) 500 mg tablet Take 1,000 mg by mouth daily (with dinner). Yes Provider, Historical   pioglitazone (Actos) 45 mg tablet Take 45 mg by mouth daily. Yes Provider, Historical   triamterene-hydroCHLOROthiazide (MAXZIDE) 37.5-25 mg per tablet Take 1 Tab by mouth daily. Yes Provider, Historical       Review of Systems:  (bold if positive, if negative)    Gen:  fatigueEyes:  ENT:  CVS:  Pulm:  dyspneaGI:  :  ncreased frequencyMS:  Pain, weakness,Skin:  Psych:  Endo:  Hem:  Renal:  Neuro:  tremors          Objective:      VITALS:    Vital signs reviewed; most recent are:    Visit Vitals  /66   Pulse 91   Temp 97.1 °F (36.2 °C)   Resp 23   Ht 5' 9\" (1.753 m)   Wt 117.9 kg (260 lb)   SpO2 95%   BMI 38.40 kg/m²     SpO2 Readings from Last 6 Encounters:   04/16/21 95%        No intake or output data in the 24 hours ending 04/16/21 1957         Exam:     Physical Exam:    Gen:  Chronically ill-appearing. NAD  HEENT:  No scleral icterus, PERRL, hearing intact to voice, moist mucous membranes  Neck:  Supple, without masses. Thyroid non-tender  Resp:  No accessory muscle use. CTAB without wheezing, rales, rhonchi  Card: RRR. Normal S1 and S2 without murmurs, rubs, or gallops. No peripheral lower extremity edema. No JVD. Peripheral pulses in tact. Abd:  Normoactive bowel sounds. Soft, non-tender, non-distended. No rebound, no guarding. No appreciable hepatosplenomegaly   Musc:  No cyanosis or clubbing. Mild paraspinal tenderness low back  Skin:  No rashes or ulcers; turgor intact. Neuro:  Cranial nerves 3-12 in tact, no focal motor weakness, follows commands appropriately  Psych:  Fair insight, normal affect. Alert, oriented x 3.  Answers questions appropriately       Labs:    Recent Labs     04/16/21  1418   WBC 11.3*   HGB 11.5*   HCT 37.3   *     Recent Labs     04/16/21  1418   *   K 5.1 CL 94*   CO2 18*   *   BUN 25*   CREA 2.35*   CA 8.9   ALB 3.3*   ALT 24     No components found for: GLPOC  No results for input(s): PH, PCO2, PO2, HCO3, FIO2 in the last 72 hours. No results for input(s): INR, INREXT in the last 72 hours. No results found for: SDES  No results found for: CULT  All other current labs reviewed in the computer. Imaging/Studies:    Ct Head Wo Cont    Result Date: 4/16/2021  No acute intracranial process identified. Extensive sinus disease as described. Right maxillary air-fluid level. Please clinically exclude acute sinusitis. Xr Chest Port    Result Date: 4/16/2021  1. No radiographic evidence of acute cardiopulmonary disease. Imaging personally reviewed.     ___________________________________________________    Attending Physician: Arelis Byers MD

## 2021-04-16 NOTE — ED TRIAGE NOTES
Pt reports that he has what feels like a knot in his back and approx 2 hours ago his left arm started uncontrollably jerking. Also reports he is having urinary frequency and poor appetite since he had J&J vaccine on 3/18.

## 2021-04-16 NOTE — ED NOTES
Pt seen in triage. Noticed occasional tremor and jerking of left arm which is new and started a few hours ago. Denies numbness or weakness. 2 week hx of back pain, no new pain today, no acute worsening. 2 day hx of dec appetite and fatigue. Has not taken his meds in 2 days- on no psychotropics.     Amanda Lu MD

## 2021-04-16 NOTE — PROGRESS NOTES
[facility]: MED RECONCILIATION      Medications added:   All- no medications were listed prior to admission    Information obtained from: Medication bottles, patient (alert, oriented, reliable), RxQuery (data available)    Allergies: Erythromycin    Prior to Admission Medications:     Medication Documentation Review Audit       Reviewed by William Yang, PHARMD (Pharmacist) on 04/16/21 at 1800      Medication Sig Documenting Provider Last Dose Status Taking?   aspirin 81 mg chewable tablet Take 81 mg by mouth daily. Provider, Historical  Active Yes   atenoloL (TENORMIN) 50 mg tablet Take 50 mg by mouth nightly. Provider, Historical  Active Yes   atorvastatin (LIPITOR) 10 mg tablet Take 10 mg by mouth nightly. Provider, Historical  Active Yes   benazepriL (LOTENSIN) 40 mg tablet Take 40 mg by mouth daily. Provider, Historical  Active Yes   glyBURIDE micronized (GLYNASE) 6 mg tab Take 6 mg by mouth two (2) times daily (with meals). Provider, Historical  Active Yes   metFORMIN (GLUCOPHAGE) 500 mg tablet Take 1,500 mg by mouth daily (with breakfast). Provider, Historical  Active Yes   metFORMIN (GLUCOPHAGE) 500 mg tablet Take 1,000 mg by mouth daily (with dinner). Provider, Historical  Active Yes   pioglitazone (Actos) 45 mg tablet Take 45 mg by mouth daily. Provider, Historical  Active Yes   triamterene-hydroCHLOROthiazide (MAXZIDE) 37.5-25 mg per tablet Take 1 Tab by mouth daily. Provider, Historical  Active Yes                      Karine Garza.  Leeann Rivas

## 2021-04-16 NOTE — ED PROVIDER NOTES
Pt is a 74 yo male with hx of DM (non insulin dependent), HTN who presents with dec appetite and not feeling well since he took his COVID vaccine last month. Pt notes that in the last 2-3 days he has not been taking his meds because he has not been eating. Reports last night, at approx 1 AM he began having spasms, tremor or his left arm. Notes they are intermittent and no changes in sensation. Denies weakness. Writhing motion with spasms witnessed in triage. Pt notes no prior episodes. Past Medical History:   Diagnosis Date    Diabetes ([facility] [address]     Hypertension        No past surgical history on file. No family history on file.     Social History     Socioeconomic History    Marital status:      Spouse name: Not on file    Number of children: Not on file    Years of education: Not on file    Highest education level: Not on file   Occupational History    Not on file   Social Needs    Financial resource strain: Not on file    Food insecurity     Worry: Not on file     Inability: Not on file    Transportation needs     Medical: Not on file     Non-medical: Not on file   Tobacco Use    Smoking status: Not on file   Substance and Sexual Activity    Alcohol use: Not on file    Drug use: Not on file    Sexual activity: Not on file   Lifestyle    Physical activity     Days per week: Not on file     Minutes per session: Not on file    Stress: Not on file   Relationships    Social connections     Talks on phone: Not on file     Gets together: Not on file     Attends Mu-ism service: Not on file     Active member of club or organization: Not on file     Attends meetings of clubs or organizations: Not on file     Relationship status: Not on file    Intimate partner violence     Fear of current or ex partner: Not on file     Emotionally abused: Not on file     Physically abused: Not on file     Forced sexual activity: Not on file   Other Topics Concern    Not on file   Social History Narrative    Not on file         ALLERGIES: Erythromycin    Review of Systems   Constitutional: Positive for appetite change and fatigue. Negative for chills and fever. HENT: Negative for drooling and nosebleeds. Eyes: Negative for pain and itching. Respiratory: Negative for choking and stridor. Cardiovascular: Negative for leg swelling. Gastrointestinal: Negative for abdominal pain and rectal pain. Endocrine: Negative for heat intolerance and polyphagia. Genitourinary: Negative for enuresis and genital sores. Musculoskeletal: Negative for arthralgias and joint swelling. Skin: Negative for color change. Allergic/Immunologic: Negative for immunocompromised state. Neurological: Negative for tremors and speech difficulty. Hematological: Negative for adenopathy. Psychiatric/Behavioral: Negative for dysphoric mood and sleep disturbance. Vitals:    04/16/21 1301 04/16/21 1900   BP: (!) 156/82 122/66   Pulse: 100 91   Resp: 18 23   Temp: 97.1 °F (36.2 °C)    SpO2: 97% 95%   Weight: 117.9 kg (260 lb)    Height: 5' 9\" (1.753 m)             Physical Exam  Vitals signs and nursing note reviewed. Constitutional:       General: He is not in acute distress. Appearance: He is well-developed. He is not ill-appearing, toxic-appearing or diaphoretic. HENT:      Head: Normocephalic. Nose: Nose normal.   Eyes:      Conjunctiva/sclera: Conjunctivae normal.   Neck:      Musculoskeletal: Normal range of motion and neck supple. Cardiovascular:      Rate and Rhythm: Regular rhythm. Heart sounds: Normal heart sounds. Pulmonary:      Effort: Pulmonary effort is normal. No respiratory distress. Breath sounds: Normal breath sounds. Abdominal:      General: There is no distension. Palpations: Abdomen is soft. Tenderness: There is no abdominal tenderness. Musculoskeletal: Normal range of motion. General: No deformity. Skin:     General: Skin is warm and dry. Neurological:      Mental Status: He is alert and oriented to person, place, and time. Cranial Nerves: No cranial nerve deficit. Sensory: No sensory deficit. Motor: No weakness. Coordination: Coordination normal.      Gait: Gait normal.   Psychiatric:         Behavior: Behavior normal.          MDM  Number of Diagnoses or Management Options  MAMADOU (acute kidney injury) ([facility] [address]  Hyperglycemia  Jerking movements of extremities  Diagnosis management comments: No criteria for stroke alert activation. No weakness of extremity, but pt noted to be having jerking movement of left arm. Pt talking through episode which lasts a few seconds. ?partial seizure and will need further work up. Also hyperglycemia, with no evidence of occult infection at this time. Will admit for further management. Procedures    Perfect [facility] Consult for Admission  7:40 PM    ED Room Number: ER12/12  Patient Name and age:  Halle Thibodeaux 73 y.o.  male  Working Diagnosis:   1. Hyperglycemia    2. MAMADOU (acute kidney injury) ([facility] [address]    3. Jerking movements of extremities        COVID-19 Suspicion:  no  Sepsis present:  no  Reassessment needed: no  Code Status:  Full Code  Readmission: no  Isolation Requirements:  no  Recommended Level of Care:  telemetry  Department:[facility] ED - [phone]  Other:  Pt with hyperglycemia, has not taken meds. Non insulin dependent DM. MAMADOU noted which is new per PCP notes. Patient is being admitted to the hospital.  The results of their tests and reasons for their admission have been discussed with them and/or available family. They convey agreement and understanding for the need to be admitted and for their admission diagnosis.

## 2021-04-16 NOTE — ED NOTES
Assumed care of patient at this time. Patient found to be resting comfortably in bed in no apparent distress. Bed in low position with wheels locked and call bell within reach. Patient verbalizes understanding on current plan of care. Will continue to monitor.

## 2021-04-16 NOTE — ED NOTES
Bedside and Verbal shift change report given to [facility] [address] [address] (oncoming nurse) by Lucretia Cruz (offgoing nurse). Report included the following information SBAR, Kardex, ED Summary and MAR.

## 2021-04-17 ENCOUNTER — APPOINTMENT (OUTPATIENT)
Dept: MRI IMAGING | Age: 66
DRG: 477 | End: 2021-04-17
Attending: PHYSICIAN ASSISTANT
Payer: MEDICARE

## 2021-04-17 ENCOUNTER — APPOINTMENT (OUTPATIENT)
Dept: ULTRASOUND IMAGING | Age: 66
DRG: 477 | End: 2021-04-17
Attending: INTERNAL MEDICINE
Payer: MEDICARE

## 2021-04-17 LAB
ADMINISTERED INITIALS, ADMINIT: AC
ADMINISTERED INITIALS, ADMINIT: NORMAL
ALBUMIN SERPL-MCNC: 2.9 G/DL (ref 3.5–5)
ALBUMIN/GLOB SERPL: 0.5 {RATIO} (ref 1.1–2.2)
ALP SERPL-CCNC: 670 U/L (ref 45–117)
ALT SERPL-CCNC: 21 U/L (ref 12–78)
ANION GAP SERPL CALC-SCNC: 5 MMOL/L (ref 5–15)
ANION GAP SERPL CALC-SCNC: 5 MMOL/L (ref 5–15)
ANION GAP SERPL CALC-SCNC: 6 MMOL/L (ref 5–15)
ANION GAP SERPL CALC-SCNC: 7 MMOL/L (ref 5–15)
AST SERPL-CCNC: 16 U/L (ref 15–37)
ATRIAL RATE: 82 BPM
BASOPHILS # BLD: 0.2 K/UL (ref 0–0.1)
BASOPHILS NFR BLD: 1 % (ref 0–1)
BILIRUB SERPL-MCNC: 0.2 MG/DL (ref 0.2–1)
BUN SERPL-MCNC: 16 MG/DL (ref 6–20)
BUN SERPL-MCNC: 19 MG/DL (ref 6–20)
BUN SERPL-MCNC: 20 MG/DL (ref 6–20)
BUN SERPL-MCNC: 22 MG/DL (ref 6–20)
BUN/CREAT SERPL: 11 (ref 12–20)
BUN/CREAT SERPL: 13 (ref 12–20)
BUN/CREAT SERPL: 13 (ref 12–20)
BUN/CREAT SERPL: 14 (ref 12–20)
CALCIUM SERPL-MCNC: 8.5 MG/DL (ref 8.5–10.1)
CALCIUM SERPL-MCNC: 8.9 MG/DL (ref 8.5–10.1)
CALCIUM SERPL-MCNC: 9 MG/DL (ref 8.5–10.1)
CALCIUM SERPL-MCNC: 9.2 MG/DL (ref 8.5–10.1)
CALCULATED P AXIS, ECG09: 63 DEGREES
CALCULATED R AXIS, ECG10: 17 DEGREES
CALCULATED T AXIS, ECG11: 15 DEGREES
CHLORIDE SERPL-SCNC: 109 MMOL/L (ref 97–108)
CHLORIDE SERPL-SCNC: 111 MMOL/L (ref 97–108)
CHLORIDE SERPL-SCNC: 112 MMOL/L (ref 97–108)
CHLORIDE SERPL-SCNC: 114 MMOL/L (ref 97–108)
CO2 SERPL-SCNC: 24 MMOL/L (ref 21–32)
COMMENT, HOLDF: NORMAL
COMMENT, HOLDF: NORMAL
CREAT SERPL-MCNC: 1.42 MG/DL (ref 0.7–1.3)
CREAT SERPL-MCNC: 1.44 MG/DL (ref 0.7–1.3)
CREAT SERPL-MCNC: 1.45 MG/DL (ref 0.7–1.3)
CREAT SERPL-MCNC: 1.64 MG/DL (ref 0.7–1.3)
CREAT UR-MCNC: 39 MG/DL
D50 ADMINISTERED, D50ADM: 0 ML
D50 ORDER, D50ORD: 0 ML
DIAGNOSIS, 93000: NORMAL
DIFFERENTIAL METHOD BLD: ABNORMAL
EOSINOPHIL # BLD: 1 K/UL (ref 0–0.4)
EOSINOPHIL NFR BLD: 9 % (ref 0–7)
ERYTHROCYTE [DISTWIDTH] IN BLOOD BY AUTOMATED COUNT: 12.5 % (ref 11.5–14.5)
GLOBULIN SER CALC-MCNC: 5.3 G/DL (ref 2–4)
GLSCOM COMMENTS: NORMAL
GLUCOSE BLD STRIP.AUTO-MCNC: 128 MG/DL (ref 65–100)
GLUCOSE BLD STRIP.AUTO-MCNC: 150 MG/DL (ref 65–100)
GLUCOSE BLD STRIP.AUTO-MCNC: 171 MG/DL (ref 65–100)
GLUCOSE BLD STRIP.AUTO-MCNC: 186 MG/DL (ref 65–100)
GLUCOSE BLD STRIP.AUTO-MCNC: 195 MG/DL (ref 65–100)
GLUCOSE BLD STRIP.AUTO-MCNC: 198 MG/DL (ref 65–100)
GLUCOSE BLD STRIP.AUTO-MCNC: 199 MG/DL (ref 65–100)
GLUCOSE BLD STRIP.AUTO-MCNC: 200 MG/DL (ref 65–100)
GLUCOSE BLD STRIP.AUTO-MCNC: 204 MG/DL (ref 65–100)
GLUCOSE BLD STRIP.AUTO-MCNC: 228 MG/DL (ref 65–100)
GLUCOSE BLD STRIP.AUTO-MCNC: 263 MG/DL (ref 65–100)
GLUCOSE BLD STRIP.AUTO-MCNC: 270 MG/DL (ref 65–100)
GLUCOSE BLD STRIP.AUTO-MCNC: 281 MG/DL (ref 65–100)
GLUCOSE BLD STRIP.AUTO-MCNC: 290 MG/DL (ref 65–100)
GLUCOSE BLD STRIP.AUTO-MCNC: 295 MG/DL (ref 65–100)
GLUCOSE BLD STRIP.AUTO-MCNC: 304 MG/DL (ref 65–100)
GLUCOSE BLD STRIP.AUTO-MCNC: 320 MG/DL (ref 65–100)
GLUCOSE BLD STRIP.AUTO-MCNC: 320 MG/DL (ref 65–100)
GLUCOSE BLD STRIP.AUTO-MCNC: 354 MG/DL (ref 65–100)
GLUCOSE BLD STRIP.AUTO-MCNC: 366 MG/DL (ref 65–100)
GLUCOSE BLD STRIP.AUTO-MCNC: 519 MG/DL (ref 65–100)
GLUCOSE SERPL-MCNC: 144 MG/DL (ref 65–100)
GLUCOSE SERPL-MCNC: 214 MG/DL (ref 65–100)
GLUCOSE SERPL-MCNC: 264 MG/DL (ref 65–100)
GLUCOSE SERPL-MCNC: 336 MG/DL (ref 65–100)
GLUCOSE, GLC: 128 MG/DL
GLUCOSE, GLC: 150 MG/DL
GLUCOSE, GLC: 171 MG/DL
GLUCOSE, GLC: 186 MG/DL
GLUCOSE, GLC: 195 MG/DL
GLUCOSE, GLC: 198 MG/DL
GLUCOSE, GLC: 199 MG/DL
GLUCOSE, GLC: 200 MG/DL
GLUCOSE, GLC: 204 MG/DL
GLUCOSE, GLC: 228 MG/DL
GLUCOSE, GLC: 263 MG/DL
GLUCOSE, GLC: 270 MG/DL
GLUCOSE, GLC: 281 MG/DL
GLUCOSE, GLC: 290 MG/DL
GLUCOSE, GLC: 295 MG/DL
GLUCOSE, GLC: 304 MG/DL
GLUCOSE, GLC: 320 MG/DL
GLUCOSE, GLC: 320 MG/DL
GLUCOSE, GLC: 354 MG/DL
GLUCOSE, GLC: 366 MG/DL
GLUCOSE, GLC: 519 MG/DL
HCT VFR BLD AUTO: 35.6 % (ref 36.6–50.3)
HGB BLD-MCNC: 10.8 G/DL (ref 12.1–17)
HIGH TARGET, HITG: 300 MG/DL
IMM GRANULOCYTES # BLD AUTO: 0.1 K/UL (ref 0–0.04)
IMM GRANULOCYTES NFR BLD AUTO: 1 % (ref 0–0.5)
INSULIN ADMINSTERED, INSADM: 1.4 UNITS/HOUR
INSULIN ADMINSTERED, INSADM: 10.4 UNITS/HOUR
INSULIN ADMINSTERED, INSADM: 11.1 UNITS/HOUR
INSULIN ADMINSTERED, INSADM: 11.5 UNITS/HOUR
INSULIN ADMINSTERED, INSADM: 13.8 UNITS/HOUR
INSULIN ADMINSTERED, INSADM: 14.7 UNITS/HOUR
INSULIN ADMINSTERED, INSADM: 2 UNITS/HOUR
INSULIN ADMINSTERED, INSADM: 2.1 UNITS/HOUR
INSULIN ADMINSTERED, INSADM: 2.2 UNITS/HOUR
INSULIN ADMINSTERED, INSADM: 2.7 UNITS/HOUR
INSULIN ADMINSTERED, INSADM: 3.8 UNITS/HOUR
INSULIN ADMINSTERED, INSADM: 4.3 UNITS/HOUR
INSULIN ADMINSTERED, INSADM: 5 UNITS/HOUR
INSULIN ADMINSTERED, INSADM: 5.2 UNITS/HOUR
INSULIN ADMINSTERED, INSADM: 5.5 UNITS/HOUR
INSULIN ADMINSTERED, INSADM: 7.1 UNITS/HOUR
INSULIN ADMINSTERED, INSADM: 9.2 UNITS/HOUR
INSULIN ADMINSTERED, INSADM: 9.8 UNITS/HOUR
INSULIN ORDER, INSORD: 1.4 UNITS/HOUR
INSULIN ORDER, INSORD: 10.4 UNITS/HOUR
INSULIN ORDER, INSORD: 11.1 UNITS/HOUR
INSULIN ORDER, INSORD: 11.5 UNITS/HOUR
INSULIN ORDER, INSORD: 13.8 UNITS/HOUR
INSULIN ORDER, INSORD: 14.7 UNITS/HOUR
INSULIN ORDER, INSORD: 2 UNITS/HOUR
INSULIN ORDER, INSORD: 2.1 UNITS/HOUR
INSULIN ORDER, INSORD: 2.2 UNITS/HOUR
INSULIN ORDER, INSORD: 2.7 UNITS/HOUR
INSULIN ORDER, INSORD: 3.8 UNITS/HOUR
INSULIN ORDER, INSORD: 4.3 UNITS/HOUR
INSULIN ORDER, INSORD: 5 UNITS/HOUR
INSULIN ORDER, INSORD: 5.2 UNITS/HOUR
INSULIN ORDER, INSORD: 5.5 UNITS/HOUR
INSULIN ORDER, INSORD: 7.1 UNITS/HOUR
INSULIN ORDER, INSORD: 9.2 UNITS/HOUR
INSULIN ORDER, INSORD: 9.8 UNITS/HOUR
LOW TARGET, LOT: 200 MG/DL
LYMPHOCYTES # BLD: 2.1 K/UL (ref 0.8–3.5)
LYMPHOCYTES NFR BLD: 19 % (ref 12–49)
MAGNESIUM SERPL-MCNC: 1.9 MG/DL (ref 1.6–2.4)
MAGNESIUM SERPL-MCNC: 2.1 MG/DL (ref 1.6–2.4)
MAGNESIUM SERPL-MCNC: 2.4 MG/DL (ref 1.6–2.4)
MAGNESIUM SERPL-MCNC: 2.4 MG/DL (ref 1.6–2.4)
MCH RBC QN AUTO: 22.5 PG (ref 26–34)
MCHC RBC AUTO-ENTMCNC: 30.3 G/DL (ref 30–36.5)
MCV RBC AUTO: 74.3 FL (ref 80–99)
MINUTES UNTIL NEXT BG, NBG: 60 MIN
MONOCYTES # BLD: 1 K/UL (ref 0–1)
MONOCYTES NFR BLD: 8 % (ref 5–13)
MULTIPLIER, MUL: 0.01
MULTIPLIER, MUL: 0.02
MULTIPLIER, MUL: 0.03
MULTIPLIER, MUL: 0.04
MULTIPLIER, MUL: 0.05
NEUTS SEG # BLD: 7 K/UL (ref 1.8–8)
NEUTS SEG NFR BLD: 62 % (ref 32–75)
NRBC # BLD: 0 K/UL (ref 0–0.01)
NRBC BLD-RTO: 0 PER 100 WBC
ORDER INITIALS, ORDINIT: AC
ORDER INITIALS, ORDINIT: NORMAL
P-R INTERVAL, ECG05: 180 MS
PHOSPHATE SERPL-MCNC: 2.9 MG/DL (ref 2.6–4.7)
PLATELET # BLD AUTO: 411 K/UL (ref 150–400)
PMV BLD AUTO: 10.6 FL (ref 8.9–12.9)
POTASSIUM SERPL-SCNC: 3.6 MMOL/L (ref 3.5–5.1)
POTASSIUM SERPL-SCNC: 3.7 MMOL/L (ref 3.5–5.1)
POTASSIUM SERPL-SCNC: 3.9 MMOL/L (ref 3.5–5.1)
POTASSIUM SERPL-SCNC: 4.4 MMOL/L (ref 3.5–5.1)
PROT SERPL-MCNC: 8.2 G/DL (ref 6.4–8.2)
PSA SERPL-MCNC: 153 NG/ML (ref 0.01–4)
Q-T INTERVAL, ECG07: 412 MS
QRS DURATION, ECG06: 72 MS
QTC CALCULATION (BEZET), ECG08: 481 MS
RBC # BLD AUTO: 4.79 M/UL (ref 4.1–5.7)
SAMPLES BEING HELD,HOLD: NORMAL
SAMPLES BEING HELD,HOLD: NORMAL
SERVICE CMNT-IMP: ABNORMAL
SODIUM SERPL-SCNC: 139 MMOL/L (ref 136–145)
SODIUM SERPL-SCNC: 141 MMOL/L (ref 136–145)
SODIUM SERPL-SCNC: 142 MMOL/L (ref 136–145)
SODIUM SERPL-SCNC: 143 MMOL/L (ref 136–145)
SODIUM UR-SCNC: 36 MMOL/L
VENTRICULAR RATE, ECG03: 82 BPM
WBC # BLD AUTO: 11.3 K/UL (ref 4.1–11.1)

## 2021-04-17 PROCEDURE — A9575 INJ GADOTERATE MEGLUMI 0.1ML: HCPCS | Performed by: FAMILY MEDICINE

## 2021-04-17 PROCEDURE — 82962 GLUCOSE BLOOD TEST: CPT

## 2021-04-17 PROCEDURE — 65660000000 HC RM CCU STEPDOWN

## 2021-04-17 PROCEDURE — 83735 ASSAY OF MAGNESIUM: CPT

## 2021-04-17 PROCEDURE — 84100 ASSAY OF PHOSPHORUS: CPT

## 2021-04-17 PROCEDURE — 72157 MRI CHEST SPINE W/O & W/DYE: CPT

## 2021-04-17 PROCEDURE — 93005 ELECTROCARDIOGRAM TRACING: CPT

## 2021-04-17 PROCEDURE — 80048 BASIC METABOLIC PNL TOTAL CA: CPT

## 2021-04-17 PROCEDURE — 74011250636 HC RX REV CODE- 250/636: Performed by: FAMILY MEDICINE

## 2021-04-17 PROCEDURE — 74011636637 HC RX REV CODE- 636/637: Performed by: INTERNAL MEDICINE

## 2021-04-17 PROCEDURE — 72156 MRI NECK SPINE W/O & W/DYE: CPT

## 2021-04-17 PROCEDURE — 82570 ASSAY OF URINE CREATININE: CPT

## 2021-04-17 PROCEDURE — 74011250637 HC RX REV CODE- 250/637: Performed by: INTERNAL MEDICINE

## 2021-04-17 PROCEDURE — 85025 COMPLETE CBC W/AUTO DIFF WBC: CPT

## 2021-04-17 PROCEDURE — 76770 US EXAM ABDO BACK WALL COMP: CPT

## 2021-04-17 PROCEDURE — 74011000258 HC RX REV CODE- 258: Performed by: INTERNAL MEDICINE

## 2021-04-17 PROCEDURE — 76775 US EXAM ABDO BACK WALL LIM: CPT

## 2021-04-17 PROCEDURE — 84300 ASSAY OF URINE SODIUM: CPT

## 2021-04-17 PROCEDURE — 80053 COMPREHEN METABOLIC PANEL: CPT

## 2021-04-17 PROCEDURE — 72158 MRI LUMBAR SPINE W/O & W/DYE: CPT

## 2021-04-17 PROCEDURE — 36415 COLL VENOUS BLD VENIPUNCTURE: CPT

## 2021-04-17 PROCEDURE — 84153 ASSAY OF PSA TOTAL: CPT

## 2021-04-17 PROCEDURE — 74011000250 HC RX REV CODE- 250: Performed by: INTERNAL MEDICINE

## 2021-04-17 PROCEDURE — 74011250636 HC RX REV CODE- 250/636: Performed by: INTERNAL MEDICINE

## 2021-04-17 RX ORDER — GADOTERATE MEGLUMINE 376.9 MG/ML
20 INJECTION INTRAVENOUS
Status: COMPLETED | OUTPATIENT
Start: 2021-04-17 | End: 2021-04-17

## 2021-04-17 RX ORDER — DEXTROSE MONOHYDRATE AND SODIUM CHLORIDE 5; .9 G/100ML; G/100ML
150 INJECTION, SOLUTION INTRAVENOUS CONTINUOUS
Status: DISCONTINUED | OUTPATIENT
Start: 2021-04-17 | End: 2021-04-18

## 2021-04-17 RX ADMIN — INSULIN LISPRO 3 UNITS: 100 INJECTION, SOLUTION INTRAVENOUS; SUBCUTANEOUS at 14:20

## 2021-04-17 RX ADMIN — HEPARIN SODIUM 5000 UNITS: 5000 INJECTION INTRAVENOUS; SUBCUTANEOUS at 17:18

## 2021-04-17 RX ADMIN — HEPARIN SODIUM 5000 UNITS: 5000 INJECTION INTRAVENOUS; SUBCUTANEOUS at 02:45

## 2021-04-17 RX ADMIN — ATORVASTATIN CALCIUM 10 MG: 20 TABLET, FILM COATED ORAL at 20:50

## 2021-04-17 RX ADMIN — SODIUM CHLORIDE 11.1 UNITS/HR: 9 INJECTION, SOLUTION INTRAVENOUS at 17:31

## 2021-04-17 RX ADMIN — INSULIN LISPRO 4 UNITS: 100 INJECTION, SOLUTION INTRAVENOUS; SUBCUTANEOUS at 17:18

## 2021-04-17 RX ADMIN — ATENOLOL 50 MG: 50 TABLET ORAL at 20:50

## 2021-04-17 RX ADMIN — SODIUM CHLORIDE 5.2 UNITS/HR: 9 INJECTION, SOLUTION INTRAVENOUS at 09:16

## 2021-04-17 RX ADMIN — ASPIRIN 81 MG: 81 TABLET, CHEWABLE ORAL at 08:15

## 2021-04-17 RX ADMIN — ATORVASTATIN CALCIUM 10 MG: 20 TABLET, FILM COATED ORAL at 02:45

## 2021-04-17 RX ADMIN — SODIUM CHLORIDE 150 ML/HR: 9 INJECTION, SOLUTION INTRAVENOUS at 15:26

## 2021-04-17 RX ADMIN — Medication 10 ML: at 15:24

## 2021-04-17 RX ADMIN — HEPARIN SODIUM 5000 UNITS: 5000 INJECTION INTRAVENOUS; SUBCUTANEOUS at 08:16

## 2021-04-17 RX ADMIN — WATER 1 G: 1 INJECTION INTRAMUSCULAR; INTRAVENOUS; SUBCUTANEOUS at 20:50

## 2021-04-17 RX ADMIN — GADOTERATE MEGLUMINE 20 ML: 376.9 INJECTION INTRAVENOUS at 13:19

## 2021-04-17 RX ADMIN — DEXTROSE AND SODIUM CHLORIDE 150 ML/HR: 5; 900 INJECTION, SOLUTION INTRAVENOUS at 07:27

## 2021-04-17 RX ADMIN — INSULIN LISPRO 2 UNITS: 100 INJECTION, SOLUTION INTRAVENOUS; SUBCUTANEOUS at 08:43

## 2021-04-17 RX ADMIN — Medication 10 ML: at 05:07

## 2021-04-17 RX ADMIN — DEXTROSE AND SODIUM CHLORIDE 150 ML/HR: 5; 900 INJECTION, SOLUTION INTRAVENOUS at 00:08
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
## 2021-04-17 NOTE — PROGRESS NOTES
Please complete MRI History & Screening Sheet on this patient. Tube ORIGINAL SIGNED form to radiology # [id number]. Thank you.

## 2021-04-17 NOTE — ED NOTES
Bedside and Verbal shift change report given to Darlene Matt (oncoming nurse) by [facility] (offgoing nurse). Report included the following information SBAR, Kardex, ED Summary, Intake/Output, MAR, Recent Results and Cardiac Rhythm NSR.

## 2021-04-17 NOTE — PROGRESS NOTES
4/17/2021  Case Management Note    8:31 AM  Noted consult for discharge planning, will follow for assessment as soon as able.      MELLO Dee

## 2021-04-17 NOTE — PROGRESS NOTES
TRANSFER - IN REPORT:    Verbal report received from [place] RN(name) on Flori Llamas  being received from ED(unit) for routine progression of care      Report consisted of patients Situation, Background, Assessment and   Recommendations(SBAR). Information from the following report(s) SBAR, Kardex, ED Summary, Intake/Output, MAR and Recent Results was reviewed with the receiving nurse. Opportunity for questions and clarification was provided. Assessment completed upon patients arrival to unit and care assumed. 0700: Bedside and Verbal shift change report given to Jami Gu RN (oncoming nurse) by Bishop Lefty MAURICIO (offgoing nurse). Report included the following information SBAR, Kardex, Intake/Output, MAR and Recent Results.

## 2021-04-17 NOTE — PROGRESS NOTES
[address] Adult  Hospitalist Group                                                                                          Hospitalist Progress Note  Salome Parks MD        Date of Service:  2021  NAME:  Brisa Hernandez  :  1955  MRN:  [id number]      Admission Summary:   73 y.o. male with hx of DM, HTN, HLD presenting with back pain, tremors, fatigue, found to have severe hyperglycemia / HHS    Interval history / Subjective:   No complaints     Assessment & Plan:     Severe hyperglycemia due to diabetes mellitus / HHS: start IV insulin gtt, frequent BMP, monitor K closely.      Back pain:  Elevated alkaline phosphatase level concerning for bony process. CT T/L spine checked, found to have T10 compression fracture possibly neoplastic. PSA significantly elevated. Ortho consult. MRI shows metastatic dz at T11, T12, retroperitoneal LAD,     Malignancy: likely prostate. Consult oncology     MAMADOU (acute kidney injury): presumed IVVD but will check urine 'lytes. Renal US ok. Follow BMP. Hold diuretics and ACEi     Pyuria: possible UTI. Start IV CTX pending UCx     Acute sinusitis: CTX as above     Hypertension: BP controlled. Resume atenolol but hold other antihypertensives.     HLD (hyperlipidemia): resume statin    Code status: full  DVT prophylaxis: heparin       Hospital Problems  Date Reviewed: 2021          Codes Class Noted POA    Acute sinusitis ICD-10-CM: J01.90  ICD-9-CM: 461.9  2021 Yes        Pyuria ICD-10-CM: R82.81  ICD-9-CM: 791.9  2021 Yes        Diabetes ([facility] [address] ICD-10-CM: E11.9  ICD-9-CM: 250.00  Unknown Yes        Hypertension ICD-10-CM: I10  ICD-9-CM: 401.9  Unknown Yes        MAMADOU (acute kidney injury) ([facility] [address] ICD-10-CM: N17.9  ICD-9-CM: 275. 9  Unknown Yes        HLD (hyperlipidemia) ICD-10-CM: E78.5  ICD-9-CM: 272.4  Unknown Yes        Severe hyperglycemia due to diabetes mellitus ([facility] [address] ICD-10-CM: E11.65  ICD-9-CM: 250.00  2021 Unknown        Elevated alkaline phosphatase level ICD-10-CM: R74.8  ICD-9-CM: 790.5  4/16/2021 Yes                Review of Systems:   A comprehensive review of systems was negative except for that written in the HPI. Vital Signs:    Last 24hrs VS reviewed since prior progress note. Most recent are:  Visit Vitals  BP (!) 141/71 (BP 1 Location: Left arm, BP Patient Position: At rest)   Pulse 92   Temp 98.4 °F (36.9 °C)   Resp 18   Ht 5' 9\" (1.753 m)   Wt 117.9 kg (260 lb)   SpO2 95%   BMI 38.40 kg/m²         Intake/Output Summary (Last 24 hours) at 4/17/2021 1417  Last data filed at 4/17/2021 1135  Gross per 24 hour   Intake --   Output 150 ml   Net -150 ml        Physical Examination:             Constitutional:  No acute distress, cooperative, pleasant    ENT:  Oral mucosa moist, oropharynx benign. Resp:  CTA bilaterally. No wheezing/rhonchi/rales. No accessory muscle use   CV:  Regular rhythm, normal rate, no murmurs, gallops, rubs    GI:  Soft, non distended, non tender. normoactive bowel sounds, no hepatosplenomegaly     Musculoskeletal:  No edema, warm, 2+ pulses throughout    Neurologic:  Moves all extremities. AAOx3, CN II-XII reviewed     Psych:  Good insight, Not anxious nor agitated. Data Review:    Review and/or order of clinical lab test      Labs:     Recent Labs     04/17/21  0517 04/16/21  1418   WBC 11.3* 11.3*   HGB 10.8* 11.5*   HCT 35.6* 37.3   * 456*     Recent Labs     04/17/21  0923 04/17/21  0517 04/17/21  0125 04/16/21 2015    143 142 135*   K 4.4 3.7 3.6 4.0   * 114* 111* 105   CO2 24 24 24 23   BUN 20 19 22* 24*   CREA 1.44* 1.42* 1.64* 1.93*   * 144* 214* 535*   CA 8.5 9.2 9.0 8.6   MG 2.1 2.4 2.4 2.3   PHOS  --  2.9  --  2.2*     Recent Labs     04/17/21  0517 04/16/21  1418   ALT 21 24   * 774*   TBILI 0.2 0.3   TP 8.2 9.2*   ALB 2.9* 3.3*   GLOB 5.3* 5.9*     No results for input(s): INR, PTP, APTT, INREXT in the last 72 hours.    No results for input(s): FE, TIBC, PSAT, FERR in the last 72 hours. No results found for: FOL, RBCF   No results for input(s): PH, PCO2, PO2 in the last 72 hours.   Recent Labs     04/16/21  1418   TROIQ <0.05     No results found for: CHOL, CHOLX, CHLST, CHOLV, HDL, HDLP, LDL, LDLC, DLDLP, TGLX, TRIGL, TRIGP, CHHD, CHHDX  Lab Results   Component Value Date/Time    Glucose (POC) 320 (H) 04/17/2021 02:01 PM    Glucose (POC) 366 (H) 04/17/2021 11:31 AM    Glucose (POC) 519 (H) 04/17/2021 10:16 AM    Glucose (POC) 320 (H) 04/17/2021 09:14 AM    Glucose (POC) 270 (H) 04/17/2021 08:12 AM     Lab Results   Component Value Date/Time    Color YELLOW/STRAW 04/16/2021 07:01 PM    Appearance CLEAR 04/16/2021 07:01 PM    Specific gravity 1.030 04/16/2021 07:01 PM    pH (UA) 5.5 04/16/2021 07:01 PM    Protein Negative 04/16/2021 07:01 PM    Glucose >1,000 (A) 04/16/2021 07:01 PM    Ketone 15 (A) 04/16/2021 07:01 PM    Bilirubin Negative 04/16/2021 07:01 PM    Urobilinogen 0.2 04/16/2021 07:01 PM    Nitrites Negative 04/16/2021 07:01 PM    Leukocyte Esterase Negative 04/16/2021 07:01 PM    Epithelial cells FEW 04/16/2021 07:01 PM    Bacteria 4+ (A) 04/16/2021 07:01 PM    WBC 20-50 04/16/2021 07:01 PM    RBC 0-5 04/16/2021 07:01 PM         Medications Reviewed:     Current Facility-Administered Medications   Medication Dose Route Frequency    dextrose 5% and 0.9% NaCl infusion  150 mL/hr IntraVENous CONTINUOUS    cefTRIAXone (ROCEPHIN) 1 g in sterile water (preservative free) 10 mL IV syringe  1 g IntraVENous Q24H    sodium chloride (NS) flush 5-40 mL  5-40 mL IntraVENous Q8H    sodium chloride (NS) flush 5-40 mL  5-40 mL IntraVENous PRN    acetaminophen (TYLENOL) tablet 650 mg  650 mg Oral Q6H PRN    Or    acetaminophen (TYLENOL) suppository 650 mg  650 mg Rectal Q6H PRN    polyethylene glycol (MIRALAX) packet 17 g  17 g Oral DAILY PRN    insulin regular (NOVOLIN R, HUMULIN R) 100 Units in 0.9% sodium chloride 100 mL infusion  0-50 Units/hr IntraVENous TITRATE  insulin lispro (HUMALOG) injection   SubCUTAneous TIDAC    glucose chewable tablet 16 g  4 Tab Oral PRN    dextrose (D50W) injection syrg 12.5-25 g  25-50 mL IntraVENous PRN    glucagon (GLUCAGEN) injection 1 mg  1 mg IntraMUSCular PRN    0.9% sodium chloride infusion  150 mL/hr IntraVENous CONTINUOUS    heparin (porcine) injection 5,000 Units  5,000 Units SubCUTAneous Q8H    aspirin chewable tablet 81 mg  81 mg Oral DAILY    atenoloL (TENORMIN) tablet 50 mg  50 mg Oral QHS    atorvastatin (LIPITOR) tablet 10 mg  10 mg Oral QHS     ______________________________________________________________________  EXPECTED LENGTH OF STAY: - - -  ACTUAL LENGTH OF STAY:          1                 Rodolfo Watson MD

## 2021-04-17 NOTE — PROGRESS NOTES
1900 Shift change:     Bedside and Verbal shift change report given to [address] [place] [address] (oncoming nurse) by Kosta Romano and Leticia Renteria (offgoing nurse). Report included the following information SBAR, Kardex, Intake/Output, MAR, and Recent Results. Shift Summary: This patient was assisted with Intentional Toileting every 2 hours during this shift. Documentation of ambulation and output reflected on Flowsheet. 0730 Shift change:     Bedside and Verbal shift change report given to [place] (oncoming nurse) by [address] [place] [address] (offgoing nurse). Report included the following information SBAR, Kardex, Intake/Output, MAR, and Recent Results.

## 2021-04-17 NOTE — PROGRESS NOTES
Bedside shift change report given to Radha Brar RN (oncoming nurse) by Aleksandra Diaz RN (offgoing nurse). Report included the following information SBAR, Kardex, ED Summary, MAR, Recent Results and Cardiac Rhythm NSR. This patient was assisted with Intentional Toileting every 2 hours during this shift. Documentation of ambulation and output reflected on Flowsheet. Bedside shift change report given to Aleksandra Diaz RN (oncoming nurse) by Radha Brar RN (offgoing nurse). Report included the following information SBAR, Kardex, ED Summary, MAR, Recent Results and Cardiac Rhythm NSR.

## 2021-04-17 NOTE — PROGRESS NOTES
1230-Patient in MRI. Insulin drip continued on MRI IV pump. Glucose check delayed due to testing. 1400-Patient returned to unit from MRI.

## 2021-04-17 NOTE — ED NOTES
TRANSFER - OUT REPORT:    Verbal report given to Renuka(name) on Modesta Nissen  being transferred to [facility](unit) for routine progression of care       Report consisted of patients Situation, Background, Assessment and   Recommendations(SBAR). Information from the following report(s) SBAR, Kardex, ED Summary, Intake/Output, MAR, Recent Results and Cardiac Rhythm NS was reviewed with the receiving nurse. Lines:   Peripheral IV 04/16/21 Right Antecubital (Active)   Site Assessment Clean, dry, & intact 04/16/21 1419   Phlebitis Assessment 0 04/16/21 1419   Infiltration Assessment 0 04/16/21 1419   Dressing Status Clean, dry, & intact 04/16/21 1419   Dressing Type Tape;Transparent 04/16/21 1419   Hub Color/Line Status Pink;Flushed;Patent 04/16/21 1419   Action Taken Blood drawn 04/16/21 1419        Opportunity for questions and clarification was provided.       Patient transported with:   Monitor  Registered Nurse

## 2021-04-17 NOTE — PROGRESS NOTES
Physical Therapy  2nd attempt: 1:31 pm  Patient still off the floor. We will re-attempt tomorrow. 11:45 am  Orders received and Chart reviewed. Patient currently off the floor for MRI. We will continue to follow and re-attempt later as able. Thank you.   Theresa Aragon PT,DPT,NCS

## 2021-04-18 LAB
ADMINISTERED INITIALS, ADMINIT: NORMAL
ANION GAP SERPL CALC-SCNC: 3 MMOL/L (ref 5–15)
ANION GAP SERPL CALC-SCNC: 6 MMOL/L (ref 5–15)
BASOPHILS # BLD: 0.1 K/UL (ref 0–0.1)
BASOPHILS NFR BLD: 1 % (ref 0–1)
BUN SERPL-MCNC: 10 MG/DL (ref 6–20)
BUN SERPL-MCNC: 11 MG/DL (ref 6–20)
BUN/CREAT SERPL: 8 (ref 12–20)
BUN/CREAT SERPL: 9 (ref 12–20)
CALCIUM SERPL-MCNC: 8.3 MG/DL (ref 8.5–10.1)
CALCIUM SERPL-MCNC: 8.5 MG/DL (ref 8.5–10.1)
CHLORIDE SERPL-SCNC: 111 MMOL/L (ref 97–108)
CHLORIDE SERPL-SCNC: 114 MMOL/L (ref 97–108)
CO2 SERPL-SCNC: 23 MMOL/L (ref 21–32)
CO2 SERPL-SCNC: 24 MMOL/L (ref 21–32)
CREAT SERPL-MCNC: 1.19 MG/DL (ref 0.7–1.3)
CREAT SERPL-MCNC: 1.21 MG/DL (ref 0.7–1.3)
D50 ADMINISTERED, D50ADM: 0 ML
D50 ORDER, D50ORD: 0 ML
DIFFERENTIAL METHOD BLD: ABNORMAL
EOSINOPHIL # BLD: 1 K/UL (ref 0–0.4)
EOSINOPHIL NFR BLD: 10 % (ref 0–7)
ERYTHROCYTE [DISTWIDTH] IN BLOOD BY AUTOMATED COUNT: 12.9 % (ref 11.5–14.5)
GLSCOM COMMENTS: NORMAL
GLUCOSE BLD STRIP.AUTO-MCNC: 217 MG/DL (ref 65–100)
GLUCOSE BLD STRIP.AUTO-MCNC: 240 MG/DL (ref 65–100)
GLUCOSE BLD STRIP.AUTO-MCNC: 251 MG/DL (ref 65–100)
GLUCOSE BLD STRIP.AUTO-MCNC: 252 MG/DL (ref 65–100)
GLUCOSE BLD STRIP.AUTO-MCNC: 264 MG/DL (ref 65–100)
GLUCOSE BLD STRIP.AUTO-MCNC: 284 MG/DL (ref 65–100)
GLUCOSE BLD STRIP.AUTO-MCNC: 294 MG/DL (ref 65–100)
GLUCOSE BLD STRIP.AUTO-MCNC: 319 MG/DL (ref 65–100)
GLUCOSE BLD STRIP.AUTO-MCNC: 332 MG/DL (ref 65–100)
GLUCOSE BLD STRIP.AUTO-MCNC: 347 MG/DL (ref 65–100)
GLUCOSE BLD STRIP.AUTO-MCNC: 374 MG/DL (ref 65–100)
GLUCOSE BLD STRIP.AUTO-MCNC: 398 MG/DL (ref 65–100)
GLUCOSE SERPL-MCNC: 283 MG/DL (ref 65–100)
GLUCOSE SERPL-MCNC: 286 MG/DL (ref 65–100)
GLUCOSE, GLC: 217 MG/DL
GLUCOSE, GLC: 240 MG/DL
GLUCOSE, GLC: 251 MG/DL
GLUCOSE, GLC: 252 MG/DL
GLUCOSE, GLC: 264 MG/DL
GLUCOSE, GLC: 284 MG/DL
GLUCOSE, GLC: 294 MG/DL
GLUCOSE, GLC: 374 MG/DL
GLUCOSE, GLC: 398 MG/DL
HCT VFR BLD AUTO: 31.4 % (ref 36.6–50.3)
HGB BLD-MCNC: 9.6 G/DL (ref 12.1–17)
HIGH TARGET, HITG: 300 MG/DL
IMM GRANULOCYTES # BLD AUTO: 0.1 K/UL (ref 0–0.04)
IMM GRANULOCYTES NFR BLD AUTO: 1 % (ref 0–0.5)
INSULIN ADMINSTERED, INSADM: 1.9 UNITS/HOUR
INSULIN ADMINSTERED, INSADM: 3.1 UNITS/HOUR
INSULIN ADMINSTERED, INSADM: 3.6 UNITS/HOUR
INSULIN ADMINSTERED, INSADM: 3.8 UNITS/HOUR
INSULIN ADMINSTERED, INSADM: 4.1 UNITS/HOUR
INSULIN ADMINSTERED, INSADM: 4.5 UNITS/HOUR
INSULIN ADMINSTERED, INSADM: 4.7 UNITS/HOUR
INSULIN ADMINSTERED, INSADM: 6.3 UNITS/HOUR
INSULIN ADMINSTERED, INSADM: 6.8 UNITS/HOUR
INSULIN ORDER, INSORD: 1.9 UNITS/HOUR
INSULIN ORDER, INSORD: 3.1 UNITS/HOUR
INSULIN ORDER, INSORD: 3.6 UNITS/HOUR
INSULIN ORDER, INSORD: 3.8 UNITS/HOUR
INSULIN ORDER, INSORD: 4.1 UNITS/HOUR
INSULIN ORDER, INSORD: 4.5 UNITS/HOUR
INSULIN ORDER, INSORD: 4.7 UNITS/HOUR
INSULIN ORDER, INSORD: 6.3 UNITS/HOUR
INSULIN ORDER, INSORD: 6.8 UNITS/HOUR
LOW TARGET, LOT: 200 MG/DL
LYMPHOCYTES # BLD: 2.2 K/UL (ref 0.8–3.5)
LYMPHOCYTES NFR BLD: 21 % (ref 12–49)
MAGNESIUM SERPL-MCNC: 1.7 MG/DL (ref 1.6–2.4)
MAGNESIUM SERPL-MCNC: 1.7 MG/DL (ref 1.6–2.4)
MCH RBC QN AUTO: 23.2 PG (ref 26–34)
MCHC RBC AUTO-ENTMCNC: 30.6 G/DL (ref 30–36.5)
MCV RBC AUTO: 75.8 FL (ref 80–99)
MINUTES UNTIL NEXT BG, NBG: 120 MIN
MINUTES UNTIL NEXT BG, NBG: 60 MIN
MONOCYTES # BLD: 0.6 K/UL (ref 0–1)
MONOCYTES NFR BLD: 5 % (ref 5–13)
MULTIPLIER, MUL: 0.01
MULTIPLIER, MUL: 0.02
NEUTS SEG # BLD: 6.5 K/UL (ref 1.8–8)
NEUTS SEG NFR BLD: 62 % (ref 32–75)
NRBC # BLD: 0 K/UL (ref 0–0.01)
NRBC BLD-RTO: 0 PER 100 WBC
ORDER INITIALS, ORDINIT: AC
ORDER INITIALS, ORDINIT: NORMAL
PLATELET # BLD AUTO: 361 K/UL (ref 150–400)
PMV BLD AUTO: 11.4 FL (ref 8.9–12.9)
POTASSIUM SERPL-SCNC: 3.7 MMOL/L (ref 3.5–5.1)
POTASSIUM SERPL-SCNC: 4 MMOL/L (ref 3.5–5.1)
RBC # BLD AUTO: 4.14 M/UL (ref 4.1–5.7)
SERVICE CMNT-IMP: ABNORMAL
SODIUM SERPL-SCNC: 140 MMOL/L (ref 136–145)
SODIUM SERPL-SCNC: 141 MMOL/L (ref 136–145)
WBC # BLD AUTO: 10.3 K/UL (ref 4.1–11.1)

## 2021-04-18 PROCEDURE — 74011250637 HC RX REV CODE- 250/637: Performed by: INTERNAL MEDICINE

## 2021-04-18 PROCEDURE — 83735 ASSAY OF MAGNESIUM: CPT

## 2021-04-18 PROCEDURE — 97530 THERAPEUTIC ACTIVITIES: CPT

## 2021-04-18 PROCEDURE — 97116 GAIT TRAINING THERAPY: CPT

## 2021-04-18 PROCEDURE — 74011636637 HC RX REV CODE- 636/637: Performed by: FAMILY MEDICINE

## 2021-04-18 PROCEDURE — 85025 COMPLETE CBC W/AUTO DIFF WBC: CPT

## 2021-04-18 PROCEDURE — 74011250637 HC RX REV CODE- 250/637: Performed by: FAMILY MEDICINE

## 2021-04-18 PROCEDURE — 74011636637 HC RX REV CODE- 636/637: Performed by: INTERNAL MEDICINE

## 2021-04-18 PROCEDURE — 97161 PT EVAL LOW COMPLEX 20 MIN: CPT

## 2021-04-18 PROCEDURE — 74011000250 HC RX REV CODE- 250: Performed by: INTERNAL MEDICINE

## 2021-04-18 PROCEDURE — 65660000000 HC RM CCU STEPDOWN

## 2021-04-18 PROCEDURE — 36415 COLL VENOUS BLD VENIPUNCTURE: CPT

## 2021-04-18 PROCEDURE — 74011250636 HC RX REV CODE- 250/636: Performed by: INTERNAL MEDICINE

## 2021-04-18 PROCEDURE — 80048 BASIC METABOLIC PNL TOTAL CA: CPT

## 2021-04-18 PROCEDURE — 99223 1ST HOSP IP/OBS HIGH 75: CPT | Performed by: STUDENT IN AN ORGANIZED HEALTH CARE EDUCATION/TRAINING PROGRAM

## 2021-04-18 PROCEDURE — 74011000258 HC RX REV CODE- 258: Performed by: INTERNAL MEDICINE

## 2021-04-18 PROCEDURE — 82962 GLUCOSE BLOOD TEST: CPT

## 2021-04-18 RX ORDER — GLYBURIDE 6 MG/1
6 TABLET ORAL 2 TIMES DAILY WITH MEALS
Status: DISCONTINUED | OUTPATIENT
Start: 2021-04-18 | End: 2021-04-21

## 2021-04-18 RX ORDER — DEXTROSE 50 % IN WATER (D50W) INTRAVENOUS SYRINGE
25-50 AS NEEDED
Status: DISCONTINUED | OUTPATIENT
Start: 2021-04-18 | End: 2021-04-22 | Stop reason: SDUPTHER

## 2021-04-18 RX ORDER — METFORMIN HYDROCHLORIDE 500 MG/1
1000 TABLET ORAL
Status: DISCONTINUED | OUTPATIENT
Start: 2021-04-18 | End: 2021-04-19

## 2021-04-18 RX ORDER — MAGNESIUM SULFATE 100 %
4 CRYSTALS MISCELLANEOUS AS NEEDED
Status: DISCONTINUED | OUTPATIENT
Start: 2021-04-18 | End: 2021-04-22 | Stop reason: SDUPTHER

## 2021-04-18 RX ORDER — INSULIN LISPRO 100 [IU]/ML
INJECTION, SOLUTION INTRAVENOUS; SUBCUTANEOUS
Status: DISCONTINUED | OUTPATIENT
Start: 2021-04-18 | End: 2021-04-21

## 2021-04-18 RX ORDER — METFORMIN HYDROCHLORIDE 500 MG/1
1500 TABLET ORAL
Status: DISCONTINUED | OUTPATIENT
Start: 2021-04-19 | End: 2021-04-19

## 2021-04-18 RX ADMIN — PIOGLITAZONE 45 MG: 15 TABLET ORAL at 12:35

## 2021-04-18 RX ADMIN — ATENOLOL 50 MG: 50 TABLET ORAL at 21:39

## 2021-04-18 RX ADMIN — WATER 1 G: 1 INJECTION INTRAMUSCULAR; INTRAVENOUS; SUBCUTANEOUS at 21:39

## 2021-04-18 RX ADMIN — INSULIN LISPRO 3 UNITS: 100 INJECTION, SOLUTION INTRAVENOUS; SUBCUTANEOUS at 09:16

## 2021-04-18 RX ADMIN — ASPIRIN 81 MG: 81 TABLET, CHEWABLE ORAL at 08:17

## 2021-04-18 RX ADMIN — METFORMIN HYDROCHLORIDE 1000 MG: 500 TABLET ORAL at 16:38

## 2021-04-18 RX ADMIN — INSULIN LISPRO 4 UNITS: 100 INJECTION, SOLUTION INTRAVENOUS; SUBCUTANEOUS at 12:35

## 2021-04-18 RX ADMIN — HEPARIN SODIUM 5000 UNITS: 5000 INJECTION INTRAVENOUS; SUBCUTANEOUS at 16:38

## 2021-04-18 RX ADMIN — INSULIN LISPRO 4 UNITS: 100 INJECTION, SOLUTION INTRAVENOUS; SUBCUTANEOUS at 21:39

## 2021-04-18 RX ADMIN — HEPARIN SODIUM 5000 UNITS: 5000 INJECTION INTRAVENOUS; SUBCUTANEOUS at 08:17

## 2021-04-18 RX ADMIN — DEXTROSE AND SODIUM CHLORIDE 150 ML/HR: 5; 900 INJECTION, SOLUTION INTRAVENOUS at 01:32

## 2021-04-18 RX ADMIN — ATORVASTATIN CALCIUM 10 MG: 20 TABLET, FILM COATED ORAL at 21:39

## 2021-04-18 RX ADMIN — DEXTROSE AND SODIUM CHLORIDE 150 ML/HR: 5; 900 INJECTION, SOLUTION INTRAVENOUS at 10:09

## 2021-04-18 RX ADMIN — HEPARIN SODIUM 5000 UNITS: 5000 INJECTION INTRAVENOUS; SUBCUTANEOUS at 00:54

## 2021-04-18 RX ADMIN — Medication 10 ML: at 21:39

## 2021-04-18 RX ADMIN — GLYBURIDE 6 MG: 6 TABLET ORAL at 17:00

## 2021-04-18 RX ADMIN — INSULIN LISPRO 7 UNITS: 100 INJECTION, SOLUTION INTRAVENOUS; SUBCUTANEOUS at 16:37

## 2021-04-18 RX ADMIN — Medication 10 ML: at 14:46

## 2021-04-18 NOTE — PROGRESS NOTES
Problem: Mobility Impaired (Adult and Pediatric)  Goal: *Acute Goals and Plan of Care (Insert Text)  Description: FUNCTIONAL STATUS PRIOR TO ADMISSION: Patient was modified independent using a rolling walker and single point cane for functional mobility. HOME SUPPORT PRIOR TO ADMISSION: The patient lived with family but did not require assist.    Physical Therapy Goals  Initiated 4/18/2021  1. Patient will move from supine to sit and sit to supine , scoot up and down, and roll side to side in bed with independence within 7 day(s). 2.  Patient will transfer from bed to chair and chair to bed with modified independence using the least restrictive device within 7 day(s). 3.  Patient will perform sit to stand with modified independence within 7 day(s). 4.  Patient will ambulate with independence for 200 feet with the least restrictive device within 7 day(s). 5  Outcome: Progressing Towards Goal   PHYSICAL THERAPY EVALUATION  Patient: Evie Parker (18 y.o. male)  Date: 4/18/2021  Primary Diagnosis: Severe hyperglycemia due to diabetes mellitus ([facility] [address] [E11.65]        Precautions: TLSO brace  Log roll, Back brace when OOB, may be up to bathroom without the back brace. ASSESSMENT  Based on the objective data described below, the patient presents with generalized weakness. Acknowledge new order to Fit with TLSO when OOB to chair or during ambulation. Communicated with nurse cleared for therapy. Fitted and reviewed back precautions and donning of back brace verbalized understanding. Sit on the edge of bed SBA, sit to stand SBA, ambulate in the room and around the bed multiple times. OOB to chair as tolerated performed some active range of motion exercise on both LE all planes. Educate and instructed patient to continue active range of motion exercise on both legs while up on chair or on bed multiple times. Recommend patient to be up on the chair at least 3 times  day every meal times as tolerated. Communicated with nurse who agreed to monitor patient. Current Level of Function Impacting Discharge (mobility/balance): SBA with transfers and ambulation without assistive device    Functional Outcome Measure: The patient scored 65/100 on the barthel index outcome measure . Other factors to consider for discharge: none     Patient will benefit from skilled therapy intervention to address the above noted impairments. PLAN :  Recommendations and Planned Interventions: bed mobility training, transfer training, gait training, therapeutic exercises, neuromuscular re-education, orthotic/prosthetic training, patient and family training/education, and therapeutic activities      Frequency/Duration: Patient will be followed by physical therapy:  5 times a week to address goals. Recommendation for discharge: (in order for the patient to meet his/her long term goals)  No skilled physical therapy/ follow up rehabilitation needs identified at this time. This discharge recommendation:  Has been made in collaboration with the attending provider and/or case management    IF patient discharges home will need the following DME: brace/splint and none         SUBJECTIVE:   Patient stated ok.     OBJECTIVE DATA SUMMARY:   HISTORY:    Past Medical History:   Diagnosis Date    Diabetes ([facility] [address]     Hypertension    No past surgical history on file.     Personal factors and/or comorbidities impacting plan of care:     Home Situation  Home Environment: Private residence  One/Two Story Residence: One story  Living Alone: Yes  Support Systems: Family member(s)  Patient Expects to be Discharged to[de-identified] Private residence  Current DME Used/Available at Home: None    EXAMINATION/PRESENTATION/DECISION MAKING:   Critical Behavior:  Neurologic State: Alert  Orientation Level: Oriented X4  Cognition: Appropriate decision making, Appropriate for age attention/concentration, Appropriate safety awareness, Follows commands Hearing: Auditory  Auditory Impairment: None    Range Of Motion:  AROM: Within functional limits           PROM: Within functional limits           Strength:    Strength: Within functional limits                    Tone & Sensation:                                  Coordination:  Coordination: Within functional limits  Vision:      Functional Mobility:  Bed Mobility:  Rolling: Stand-by assistance  Supine to Sit: Stand-by assistance  Sit to Supine: Stand-by assistance  Scooting: Stand-by assistance  Transfers:  Sit to Stand: Stand-by assistance  Stand to Sit: Stand-by assistance  Stand Pivot Transfers: Stand-by assistance     Bed to Chair: Stand-by assistance              Balance:   Sitting: Intact  Standing: Intact; Without support  Ambulation/Gait Training:  Distance (ft): 50 Feet (ft)     Ambulation - Level of Assistance: Stand-by assistance     Gait Description (WDL): Exceptions to WDL  Gait Abnormalities: Antalgic        Base of Support: Widened     Speed/Graciela: Pace decreased (<100 feet/min)  Step Length: Right shortened;Left shortened                      Therapeutic Exercises:   Educate and instructed patient to continue active range of motion exercise on both legs while up on chair or on bed multiple times. Recommend patient to be up on the chair at least 3 times  day every meal times as tolerated. Functional Measure:  Barthel Index:    Bathin  Bladder: 0  Bowels: 10  Groomin  Dressing: 10  Feeding: 10  Mobility: 10  Stairs: 0  Toilet Use: 5  Transfer (Bed to Chair and Back): 10  Total: 65/100       The Barthel ADL Index: Guidelines  1. The index should be used as a record of what a patient does, not as a record of what a patient could do. 2. The main aim is to establish degree of independence from any help, physical or verbal, however minor and for whatever reason. 3. The need for supervision renders the patient not independent.   4. A patient's performance should be established using the best available evidence. Asking the patient, friends/relatives and nurses are the usual sources, but direct observation and common sense are also important. However direct testing is not needed. 5. Usually the patient's performance over the preceding 24-48 hours is important, but occasionally longer periods will be relevant. 6. Middle categories imply that the patient supplies over 50 per cent of the effort. 7. Use of aids to be independent is allowed. Jaquelyn Der., Barthel, D.W. ([id number]). Functional evaluation: the Barthel Index. [address] (14)2. Jenlindsey Evangelista gio ANDREW Mcdermott, Sera Mccloud., Cyndi Ennis., [place], [facility] (). Measuring the change indisability after inpatient rehabilitation; comparison of the responsiveness of the Barthel Index and Functional [place] Measure. Journal of Neurology, Neurosurgery, and Psychiatry, 66(4), 131-262. MATILDE Curry, LOUISE Benson, & Lisa Richards M.A. (2004.) Assessment of post-stroke quality of life in cost-effectiveness studies: The usefulness of the Barthel Index and the EuroQoL-5D. Quality of Life Research, 15, 126-58           Physical Therapy Evaluation Charge Determination   History Examination Presentation Decision-Making   LOW Complexity : Zero comorbidities / personal factors that will impact the outcome / POC LOW Complexity : 1-2 Standardized tests and measures addressing body structure, function, activity limitation and / or participation in recreation  LOW Complexity : Stable, uncomplicated  Other outcome measures barthel   LOW       Based on the above components, the patient evaluation is determined to be of the following complexity level: LOW     Pain Ratin/10    Activity Tolerance:   Good    After treatment patient left in no apparent distress:   Sitting in chair, Heels elevated for pressure relief, and Call bell within reach    COMMUNICATION/EDUCATION:   The patients plan of care was discussed with: Registered nurse. Fall prevention education was provided and the patient/caregiver indicated understanding. Thank you for this referral.  Sharda Pritchard PT,WCC.    Time Calculation: 28 mins

## 2021-04-18 NOTE — PROGRESS NOTES
[address] Adult  Hospitalist Group                                                                                          Hospitalist Progress Note  Mavis An MD        Date of Service:  2021  NAME:  Graeme Cifuentes  :  1955  MRN:  [id number]      Admission Summary:   73 y.o. male with hx of DM, HTN, HLD presenting with back pain, tremors, fatigue, found to have severe hyperglycemia / HHS    Interval history / Subjective:   No complaints     Assessment & Plan:     Severe hyperglycemia due to diabetes mellitus / HHS: still on insulin gtt, frequent BMP, monitor K closely.      Back pain:  Elevated alkaline phosphatase level concerning for bony process. CT T/L spine checked, found to have T10 compression fracture possibly neoplastic. PSA significantly elevated. Ortho consult. MRI shows metastatic dz at T10, T11, T12, retroperitoneal LAD     Malignancy: likely prostate. Consult oncology     MAMADOU (acute kidney injury): presumed IVVD but will check urine 'lytes. Renal US ok. Follow BMP. Hold diuretics and ACEi     Pyuria: possible UTI. Start IV CTX pending UCx     Acute sinusitis: CTX as above     Hypertension: BP controlled. Resume atenolol but hold other antihypertensives.     HLD (hyperlipidemia): resume statin    Code status: full  DVT prophylaxis: heparin       Hospital Problems  Date Reviewed: 2021          Codes Class Noted POA    Acute sinusitis ICD-10-CM: J01.90  ICD-9-CM: 461.9  2021 Yes        Pyuria ICD-10-CM: R82.81  ICD-9-CM: 791.9  2021 Yes        Diabetes ([facility] ICD-10-CM: E11.9  ICD-9-CM: 250.00  Unknown Yes        Hypertension ICD-10-CM: I10  ICD-9-CM: 401.9  Unknown Yes        MAMADOU (acute kidney injury) ([facility] ICD-10-CM: N17.9  ICD-9-CM: 988. 9  Unknown Yes        HLD (hyperlipidemia) ICD-10-CM: E78.5  ICD-9-CM: 272.4  Unknown Yes        Severe hyperglycemia due to diabetes mellitus ([facility] ICD-10-CM: E11.65  ICD-9-CM: 250.00  2021 Unknown        Elevated alkaline phosphatase level ICD-10-CM: R74.8  ICD-9-CM: 790.5  4/16/2021 Yes                Review of Systems:   A comprehensive review of systems was negative except for that written in the HPI. Vital Signs:    Last 24hrs VS reviewed since prior progress note. Most recent are:  Visit Vitals  BP (!) 116/53   Pulse 68   Temp 98.2 °F (36.8 °C)   Resp 20   Ht 5' 9\" (1.753 m)   Wt 117.9 kg (260 lb)   SpO2 98%   BMI 38.40 kg/m²         Intake/Output Summary (Last 24 hours) at 4/18/2021 1035  Last data filed at 4/18/2021 0916  Gross per 24 hour   Intake 840 ml   Output 980 ml   Net -140 ml        Physical Examination:             Constitutional:  No acute distress, cooperative, pleasant    ENT:  Oral mucosa moist, oropharynx benign. Resp:  CTA bilaterally. No wheezing/rhonchi/rales. No accessory muscle use   CV:  Regular rhythm, normal rate, no murmurs, gallops, rubs    GI:  Soft, non distended, non tender. normoactive bowel sounds, no hepatosplenomegaly     Musculoskeletal:  No edema, warm, 2+ pulses throughout    Neurologic:  Moves all extremities. AAOx3, CN II-XII reviewed     Psych:  Good insight, Not anxious nor agitated. Data Review:    Review and/or order of clinical lab test      Labs:     Recent Labs     04/18/21 0245 04/17/21  0517   WBC 10.3 11.3*   HGB 9.6* 10.8*   HCT 31.4* 35.6*    411*     Recent Labs     04/18/21  0939 04/18/21  0245 04/17/21  1653 04/17/21  0517 04/17/21  0517 04/16/21 2015 04/16/21 2015    141 141   < > 143   < > 135*   K 4.0 3.7 3.9   < > 3.7   < > 4.0   * 114* 112*   < > 114*   < > 105   CO2 23 24 24   < > 24   < > 23   BUN 10 11 16   < > 19   < > 24*   CREA 1.19 1.21 1.45*   < > 1.42*   < > 1.93*   * 283* 264*   < > 144*   < > 535*   CA 8.3* 8.5 8.9   < > 9.2   < > 8.6   MG 1.7 1.7 1.9   < > 2.4   < > 2.3   PHOS  --   --   --   --  2.9  --  2.2*    < > = values in this interval not displayed.      Recent Labs     04/17/21  0517 04/16/21  1418 ALT 21 24   * 774*   TBILI 0.2 0.3   TP 8.2 9.2*   ALB 2.9* 3.3*   GLOB 5.3* 5.9*     No results for input(s): INR, PTP, APTT, INREXT, INREXT in the last 72 hours. No results for input(s): FE, TIBC, PSAT, FERR in the last 72 hours. No results found for: FOL, RBCF   No results for input(s): PH, PCO2, PO2 in the last 72 hours.   Recent Labs     04/16/21  1418   TROIQ <0.05     No results found for: CHOL, CHOLX, CHLST, CHOLV, HDL, HDLP, LDL, LDLC, DLDLP, TGLX, TRIGL, TRIGP, CHHD, CHHDX  Lab Results   Component Value Date/Time    Glucose (POC) 294 (H) 04/18/2021 10:16 AM    Glucose (POC) 240 (H) 04/18/2021 07:47 AM    Glucose (POC) 217 (H) 04/18/2021 05:48 AM    Glucose (POC) 284 (H) 04/18/2021 04:55 AM    Glucose (POC) 252 (H) 04/18/2021 03:48 AM     Lab Results   Component Value Date/Time    Color YELLOW/STRAW 04/16/2021 07:01 PM    Appearance CLEAR 04/16/2021 07:01 PM    Specific gravity 1.030 04/16/2021 07:01 PM    pH (UA) 5.5 04/16/2021 07:01 PM    Protein Negative 04/16/2021 07:01 PM    Glucose >1,000 (A) 04/16/2021 07:01 PM    Ketone 15 (A) 04/16/2021 07:01 PM    Bilirubin Negative 04/16/2021 07:01 PM    Urobilinogen 0.2 04/16/2021 07:01 PM    Nitrites Negative 04/16/2021 07:01 PM    Leukocyte Esterase Negative 04/16/2021 07:01 PM    Epithelial cells FEW 04/16/2021 07:01 PM    Bacteria 4+ (A) 04/16/2021 07:01 PM    WBC 20-50 04/16/2021 07:01 PM    RBC 0-5 04/16/2021 07:01 PM         Medications Reviewed:     Current Facility-Administered Medications   Medication Dose Route Frequency    dextrose 5% and 0.9% NaCl infusion  150 mL/hr IntraVENous CONTINUOUS    cefTRIAXone (ROCEPHIN) 1 g in sterile water (preservative free) 10 mL IV syringe  1 g IntraVENous Q24H    sodium chloride (NS) flush 5-40 mL  5-40 mL IntraVENous Q8H    sodium chloride (NS) flush 5-40 mL  5-40 mL IntraVENous PRN    acetaminophen (TYLENOL) tablet 650 mg  650 mg Oral Q6H PRN    Or    acetaminophen (TYLENOL) suppository 650 mg 650 mg Rectal Q6H PRN    polyethylene glycol (MIRALAX) packet 17 g  17 g Oral DAILY PRN    insulin regular (NOVOLIN R, HUMULIN R) 100 Units in 0.9% sodium chloride 100 mL infusion  0-50 Units/hr IntraVENous TITRATE    insulin lispro (HUMALOG) injection   SubCUTAneous TIDAC    glucose chewable tablet 16 g  4 Tab Oral PRN    dextrose (D50W) injection syrg 12.5-25 g  25-50 mL IntraVENous PRN    glucagon (GLUCAGEN) injection 1 mg  1 mg IntraMUSCular PRN    0.9% sodium chloride infusion  150 mL/hr IntraVENous CONTINUOUS    heparin (porcine) injection 5,000 Units  5,000 Units SubCUTAneous Q8H    aspirin chewable tablet 81 mg  81 mg Oral DAILY    atenoloL (TENORMIN) tablet 50 mg  50 mg Oral QHS    atorvastatin (LIPITOR) tablet 10 mg  10 mg Oral QHS     ______________________________________________________________________  EXPECTED LENGTH OF STAY: - - -  ACTUAL LENGTH OF STAY:          2                 Kalpana Fair MD

## 2021-04-18 NOTE — PROGRESS NOTES
Bedside shift change report given to Padmini Horton RN (oncoming nurse) by Yaron Fletcher RN (offgoing nurse). Report included the following information SBAR, Kardex, ED Summary, MAR, Recent Results and Cardiac Rhythm NSR. This patient was assisted with Intentional Toileting every 2 hours during this shift. Documentation of ambulation and output reflected on Flowsheet. Bedside shift change report given to Yaron Fletcher RN (oncoming nurse) by Padmini Horton RN (offgoing nurse). Report included the following information SBAR, Kardex, ED Summary, MAR, Recent Results and Cardiac Rhythm NSR.

## 2021-04-18 NOTE — PROGRESS NOTES
Orthopaedic Progress Note    April 18, 2021 10:54 AM     Patient: Jocelyn Bruno MRN: [id number]  SSN: [id number]    YOB: 1955  Age: 73 y.o. Sex: male      Admit date:  4/16/2021  Admitting Physician:  Raffy Watkins MD   Consulting Physician(s): Treatment Team: Attending Provider: Henry Rose MD; Consulting Provider: Greta Cortes MD; Consulting Provider: NEDRA Jean; Utilization Review: Farnaz Childress RN; Consulting Provider: Robles Lester MD    SUBJECTIVE:     Jocelyn Bruno is a 73 y.o. male is resting in bed. He is in his TLSO. He reports he understands his diagnosis and is willing to continue workup while inpatient. Plan for T10 biopsy tomorrow. No complaints of nausea, vomiting, dizziness, lightheadedness, chest pain, or shortness of breath. OBJECTIVE:       Physical Exam:  General: Alert, cooperative, no distress. Respiratory: Respirations unlabored  Neurological:  Neurovascular exam within normal limits. Motor: + DF/PF. Musculoskeletal: Calves soft, supple, non-tender upon palpation. LLE:   PF: 5/5  DF: 5/5  EFL: 5/5  EHL: 5/5  QS: 5/5  HS: 5/5  Negative ankle clonus  Knee jerk 3+     RLE:   LLE:   PF: 5/5  DF: 5/5  EFL: 5/5  EHL: 5/5  QS: 5/5  HS: 5/5  Negative ankle clonus  Knee jerk 3+    LUE:   : 5/5  WF/WE: 5/5  Bicep: 4/5  Tricep: 4/5  Bicep reflex 2+  Negative Hoffmans.     RUE:  : 5/5  WF/WE: 5/5  Bicep: 4/5  Tricep: 4/5  Bicep reflex 2+  Negative Hoffmans. FINDINGS:  Acute vertebral compression deformity at T10 with approximately 50% loss  vertebral body height. There is mild cortical retropulsion. There is mild cord  compression posterior to the T10 vertebral body. Minimal cord edema. Marrow  signal abnormality extends to the posterior elements at T10. There is abnormal  marrow signal intensity and mild compression deformity along the superior  endplate of M29.  There is a small focus of abnormal marrow signal intensity  inferiorly at T12 without associated fracture. . Mild reversal of cervical  lordosis. There is no pleural effusion. Thoracic aorta is normal in caliber. .  Anterior paraspinal soft tissue mass is demonstrated. .      There is moderate to severe canal stenosis of the thoracic spine posterior to  T10. There is moderate bilateral foraminal stenosis at T10.     IMPRESSION  Aging findings are most consistent with osseous metastatic disease. Pathologic T10 vertebral compression deformity is associated with marrow signal  abnormality . There is approximately 50% loss vertebral body height at T10 with  cortical retropulsion at T10. Mild compression deformity at T11.     There is moderate to severe canal stenosis at T10 with mild cord compression  posterior to the T10 vertebral body. Minimal cord edema without cord  myelomalacia  Abnormal marrow signal intensity at T12 and T11. Lesion at T10 is amenable to percutaneous sampling. Contrast-enhanced CT scan of the chest, abdomen and pelvis is recommended. FINDINGS:     There is sphenoid sinus disease. There is moderate to severe congenital  narrowing of the cervical canal. There is mild chronic cord thinning at C3-C4. Minimal cord malacia at C3-4. Reversal of cervical lordosis. Vertebral body  heights are maintained. There is no Chiari or syrinx.     The craniocervical junction is intact. There is no fracture. There is no  pathologic intrathecal enhancement.     The paraspinal soft tissues are within normal limits.     C2-C3: No herniation or stenosis.     C3-C4: Moderate protrusion/osteophyte asymmetric to the right. Severe canal and  right foraminal stenosis. Mild cord compression. Chronic cord thinning. Minimal  cord myelomalacia.     C4-C5: Desiccation. Moderate to severe facet arthropathy greater on the left. Disc bulge/osteophyte. Moderate canal stenosis. Foramina are patent.     C5-C6: Facet arthropathy and hypertrophy is greater on the left. Disc  desiccation. Disc bulge/osteophyte. Mild canal and moderate to severe left  foraminal stenosis. Mild right foraminal stenosis.     C6-C7: Disc desiccation. Facet arthropathy. Mild canal stenosis. Mild bilateral  foraminal stenoses. C7-T1: Related protrusion/osteophyte extensors the foramina. Mild canal  stenosis. Moderate to severe bilateral foraminal stenoses. There is no abnormal  intrathecal enhancement. Asymmetry at the base of the tongue is not fully  delineated.     IMPRESSION  No evidence of osseous metastatic disease at the level of the cervical spine.     Extensive multilevel degenerative change of the cervical spine with chronic  severe canal stenosis and right foraminal stenosis at C3-C4. Mild cord  compression at C3-C4 is likely chronic in nature.     Moderate canal stenosis at C4-5. Sphenoid sinus disease. Additional, less severe degenerative findings are as described in detail above. FINDINGS:  T1 hypointense focus in the T12 vertebral body is most likely related to osseous  metastatic disease. T11-intensity also most likely limited to neoplastic  process. Mild T11 vertebral compression. The lumbar spine is without evidence of  fracture or dislocation. . Abdominal aorta is normal in caliber. Proximal common  iliac vessels normal in caliber. Retroperitoneal adenopathy on the left. .   Intervertebral discs are normal. The conus medullaris terminates at L1/L2. Epidural lipomatosis.     T12/L1:  The spinal canal and neuroforamina are widely patent.     L1/2:  The spinal canal and neural foramina are widely patent.     L2/3:  Mild facet arthropathy. Lobulated protrusion/osteophyte. Mild canal  stenosis. Foramina are patent     L3/4:  Mild facet arthropathy. Disc desiccation. Disc bulge/osteophyte. Mild  canal stenosis. Foramina are patent.     L4/5: Mild facet arthropathy. Mild left lateral recess and left foraminal  protrusion/osteophyte. Mild canal stenosis.  Moderate left foraminal stenosis  mild right foraminal stenosis.     L5/S1:  Facet arthropathy is moderate greater on the right. Disc  bulge/osteophyte asymmetric to the right. Canal is patent. Moderate right  foraminal stenosis.     IMPRESSION  Imaging findings with of osseous metastatic disease at the T11 and T12. There is  loss of vertebral body height at T11. Likely retroperitoneal lymphadenopathy on the left. Otherwise multilevel disc and facet degenerative change with congenital  narrowing of the lumbar canal and epidural lipomatosis. Mild to moderate canal stenosis at L3-4. Mild canal moderate left foraminal stenosis at L4-5. Moderate right foraminal stenosis at L5-S1.   Additional, less severe degenerative findings are as described in detail above.       Vital Signs:        Patient Vitals for the past 8 hrs:   BP Temp Pulse Resp SpO2   21 1056 127/60 98.2 °F (36.8 °C) 70 18 96 %   21 0845 -- -- -- -- 99 %   21 0821 (!) 116/53 -- -- -- --   21 0739 (!) 143/69 98.2 °F (36.8 °C) 68 20 98 %   21 0700 -- -- 65 -- --                                          Temp (24hrs), Av.2 °F (36.8 °C), Min:98.1 °F (36.7 °C), Max:98.2 °F (36.8 °C)      Labs:        Recent Labs     21  0245   HCT 31.4*   HGB 9.6*     Lab Results   Component Value Date/Time    Sodium 140 2021 09:39 AM    Potassium 4.0 2021 09:39 AM    Chloride 111 (H) 2021 09:39 AM    CO2 23 2021 09:39 AM    Glucose 286 (H) 2021 09:39 AM    BUN 10 2021 09:39 AM    Creatinine 1.19 2021 09:39 AM    Calcium 8.3 (L) 2021 09:39 AM       PT/OT:                Patient mobility                         ASSESSMENT / PLAN:   Active Problems:    Acute sinusitis (2021)      Pyuria (2021)      Diabetes (HCC) ()      Hypertension ()      MAMADOU (acute kidney injury) ([facility] ()      HLD (hyperlipidemia) ()      Severe hyperglycemia due to diabetes mellitus ([facility] (2021)      Elevated alkaline phosphatase level (4/16/2021)         A: 1. Pathologic T10 compression fracture  2. T10, T11, T12 metastatic disease without epidural involvement    P: 1. Will have Dr. Darshana Mills with spine surgery review imaging tomorrow. Continue with TLSO. No plan for surgical intervention currently. Placed orders for biopsy of T10 by IR tomorrow. Pending Oncology review, but patient will need CT of chest, abdomen, and pelvis with contrast tomorrow. Orthopedics to follow.          Signed By:  Shane Plata, [address] [place] [address]

## 2021-04-18 NOTE — PROGRESS NOTES
1900 Shift change:     Bedside and Verbal shift change report given to [address] [place] [address] (oncoming nurse) by Sameer Barth RN (offgoing nurse). Report included the following information SBAR, Kardex, Intake/Output, MAR, and Recent Results. Shift Summary: This patient was assisted with Intentional Toileting every 2 hours during this shift. Documentation of ambulation and output reflected on Flowsheet. 0730 Shift change:     Bedside and Verbal shift change report given to [place] (oncoming nurse) by [address] [place] [address] (offgoing nurse). Report included the following information SBAR, Kardex, Intake/Output, MAR, and Recent Results.

## 2021-04-18 NOTE — PROGRESS NOTES
1130-Discussed plan to D/C insulin gtt with Dr. Kurt Castano. Dr. Kurt Castano added patient Metformin, Actos, and glyburide meds for glucose control. Patient not on Lantus at home. 1200-Actos was given. 1430-Insulin gtt DC by provider.

## 2021-04-18 NOTE — CONSULTS
[address] [facility] [address] [facility]  W: [phone] F: [phone]      Reason for Visit:   Caitlin Mccurdy is a 73 y.o. male who is seen in consultation at the request of Dr. Alicia Farley for evaluation of suspected metastatic prostate cancer. Hematology / Oncology Treatment History:     Hematological/Oncological Diagnosis: Bone lesions with elevated PSA    Treatment course:   1) Patient presented to ED with hyperglycemia, back pain, UTI, found to have T10 compression fracture with evidence of osseous mets at T11, T12, with a compression fracture at T10 with associated mild cord compression. Lumbar MRI raises concern for retroperitoneal lymphadenopathy on left. Pre-treatment PSA of 153      History of Present Illness:     Very pleasant 73year old  [place] male with medical history significant for poorly controlled DM2, HTN, presented with symptoms of urinary tract infection and back pain. The patient has had about 15 pound unintentional weight loss over the last 3 months. He has some focal bone pain over the lower thoracic spine and in ribs bilaterally. He reports having had a fall x once in November. The patient reports no focal neurological deficits. No leg weakness, no changes in sensation in legs, groin abdomen. He describes his back pain as a \"coal poker\" to his spine. No other sites of focal bone pain reported. The patient reports he is otherwise quite functional, ambulating independently and completing various tasks in his yard and garden. More recently he is attempting to train a very active puppy, which was the cause of his last fall in November. Prior to admission, the patient endorses a high level of functional activity. Family history reviewed, no family history of colon, breast or lung cancers. No personal history of cancer, last colonoscopy 5 years ago and negative per patient. Social history reviewed, non-contributory. Positive ROS findings include: back pain, weight loss. Review of Systems: A complete review of systems was obtained, negative except as described above. Past Medical History:   Diagnosis Date    Diabetes ([address]     Hypertension       No past surgical history on file. Social History     Tobacco Use    Smoking status: Not on file   Substance Use Topics    Alcohol use: Not on file      No family history on file.   Current Facility-Administered Medications   Medication Dose Route Frequency    glyBURIDE micronized (GLYNASE) tablet 6 mg (Patient Supplied)  6 mg Oral BID WITH MEALS    [START ON 4/19/2021] metFORMIN (GLUCOPHAGE) tablet 1,500 mg  1,500 mg Oral DAILY WITH BREAKFAST    metFORMIN (GLUCOPHAGE) tablet 1,000 mg  1,000 mg Oral DAILY WITH DINNER    pioglitazone (ACTOS) tablet 45 mg  45 mg Oral DAILY    dextrose 5% and 0.9% NaCl infusion  150 mL/hr IntraVENous CONTINUOUS    cefTRIAXone (ROCEPHIN) 1 g in sterile water (preservative free) 10 mL IV syringe  1 g IntraVENous Q24H    sodium chloride (NS) flush 5-40 mL  5-40 mL IntraVENous Q8H    sodium chloride (NS) flush 5-40 mL  5-40 mL IntraVENous PRN    acetaminophen (TYLENOL) tablet 650 mg  650 mg Oral Q6H PRN    Or    acetaminophen (TYLENOL) suppository 650 mg  650 mg Rectal Q6H PRN    polyethylene glycol (MIRALAX) packet 17 g  17 g Oral DAILY PRN    insulin regular (NOVOLIN R, HUMULIN R) 100 Units in 0.9% sodium chloride 100 mL infusion  0-50 Units/hr IntraVENous TITRATE    insulin lispro (HUMALOG) injection   SubCUTAneous TIDAC    glucose chewable tablet 16 g  4 Tab Oral PRN    dextrose (D50W) injection syrg 12.5-25 g  25-50 mL IntraVENous PRN    glucagon (GLUCAGEN) injection 1 mg  1 mg IntraMUSCular PRN    0.9% sodium chloride infusion  150 mL/hr IntraVENous CONTINUOUS    heparin (porcine) injection 5,000 Units  5,000 Units SubCUTAneous Q8H    aspirin chewable tablet 81 mg  81 mg Oral DAILY    atenoloL (TENORMIN) tablet 50 mg  50 mg Oral QHS    atorvastatin (LIPITOR) tablet 10 mg  10 mg Oral QHS      Allergies   Allergen Reactions    Erythromycin Other (comments)     lethargic            Physical Exam:     Visit Vitals  /60 (BP 1 Location: Left upper arm, BP Patient Position: At rest)   Pulse 70   Temp 98.2 °F (36.8 °C)   Resp 18   Ht 5' 9\" (1.753 m)   Wt 260 lb (117.9 kg)   SpO2 96%   BMI 38.40 kg/m²     ECOG PS: 1 as a consequence of back pain  General: No distress  Eyes: PERRLA, anicteric sclerae  HENT: Atraumatic with normal appearance of ears and nose; OP clear  Neck: Supple; no thyromegaly   Lymphatic: No cervical or supraclavicular lymphadenopathy  Respiratory: CTAB, normal respiratory effort  CV: Normal rate, regular rhythm, no murmurs, no peripheral edema  GI: Soft, nontender, nondistended bowel sounds intact. MS: Patient wearing TLSO brace Digits without clubbing or cyanosis. Skin: No rashes, ecchymoses, or petechiae. Normal temperature, turgor, and texture. Neuro/Psych: Alert, oriented, appropriate affect, normal judgment/insight      Results:     Lab Results   Component Value Date/Time    WBC 10.3 04/18/2021 02:45 AM    HGB 9.6 (L) 04/18/2021 02:45 AM    HCT 31.4 (L) 04/18/2021 02:45 AM    PLATELET 937 86/68/0599 02:45 AM    MCV 75.8 (L) 04/18/2021 02:45 AM    ABS.  NEUTROPHILS 6.5 04/18/2021 02:45 AM     Lab Results   Component Value Date/Time    Sodium 140 04/18/2021 09:39 AM    Potassium 4.0 04/18/2021 09:39 AM    Chloride 111 (H) 04/18/2021 09:39 AM    CO2 23 04/18/2021 09:39 AM    Glucose 286 (H) 04/18/2021 09:39 AM    BUN 10 04/18/2021 09:39 AM    Creatinine 1.19 04/18/2021 09:39 AM    GFR est AA >60 04/18/2021 09:39 AM    GFR est non-AA >60 04/18/2021 09:39 AM    Calcium 8.3 (L) 04/18/2021 09:39 AM    Glucose (POC) 398 (H) 04/18/2021 12:34 PM     Lab Results   Component Value Date/Time    Bilirubin, total 0.2 04/17/2021 05:17 AM    ALT (SGPT) 21 04/17/2021 05:17 AM Alk. phosphatase 670 (H) 04/17/2021 05:17 AM    Protein, total 8.2 04/17/2021 05:17 AM    Albumin 2.9 (L) 04/17/2021 05:17 AM    Globulin 5.3 (H) 04/17/2021 05:17 AM     4/17/21: MRI of C/T/L spine shows evidence of likely disease at T11, T12, concerning fracture at T10 with chronic appearing mild cord compression, mild canal stenosis at L3/4/5. Assessment and Recommendations:     1. Multiple bone lesions in spine with elevated PSA highly concerning for Prostate Adenocarcinoma  - pretreatment   - Today I discussed the patient's imaging with the patient and his daughter who was at bedside. Clinically, the patient's presentation is consistent with metastatic prostate adenocarcinoma. He appears to have had a good performance status prior to hospitalization and would be a good candidate for aggressive treatment. For now, the greatest concern lays with the disease/compression fracture at T10. Orthopedic surgery is on board and indicate that there is no role for emergent surgical intervention. For now, the patient does not have lower extremity weakness or focal neurological deficit. - Agree with plan for biopsy tomorrow. Tissue biopsy required prior to further actionable treatment planning.   - Please obtain staging imaging when kidney function allows for contrast.  He will need CT thorax/abd/pelvis and Bone Scan. - We did briefly discuss treatment options for metastatic prostate cancer, and reviewed that molecular testing would also guide treatment options. The patient and his family were understanding of the current plan and in agreement with treatment course. - The patient does wish to follow with Megha Rivas and wishes to keep his care here at [place] if possible. 2. Microcytic Anemia, unclear etiology   - patient reports normal colonoscopy 6 months ago  - please check iron profile with ferritin to rule out iron deficiency as contributor.    - calcium is 8.3.   - transfuse only for Hg<7 g/dl. Please call with questions or concerns.        Signed By: Annita Pollock MD      Attending Medical Oncologist   [facility]

## 2021-04-19 ENCOUNTER — HOSPITAL ENCOUNTER (OUTPATIENT)
Dept: NUCLEAR MEDICINE | Age: 66
Discharge: HOME | DRG: 477 | End: 2021-04-19
Attending: FAMILY MEDICINE
Payer: MEDICARE

## 2021-04-19 ENCOUNTER — APPOINTMENT (OUTPATIENT)
Dept: NUCLEAR MEDICINE | Age: 66
DRG: 477 | End: 2021-04-19
Attending: FAMILY MEDICINE
Payer: MEDICARE

## 2021-04-19 ENCOUNTER — APPOINTMENT (OUTPATIENT)
Dept: CT IMAGING | Age: 66
DRG: 477 | End: 2021-04-19
Attending: FAMILY MEDICINE
Payer: MEDICARE

## 2021-04-19 LAB
BACTERIA SPEC CULT: ABNORMAL
BASOPHILS # BLD: 0.1 K/UL (ref 0–0.1)
BASOPHILS NFR BLD: 1 % (ref 0–1)
CC UR VC: ABNORMAL
DIFFERENTIAL METHOD BLD: ABNORMAL
EOSINOPHIL # BLD: 0.9 K/UL (ref 0–0.4)
EOSINOPHIL NFR BLD: 8 % (ref 0–7)
ERYTHROCYTE [DISTWIDTH] IN BLOOD BY AUTOMATED COUNT: 13 % (ref 11.5–14.5)
GLUCOSE BLD STRIP.AUTO-MCNC: 336 MG/DL (ref 65–100)
GLUCOSE BLD STRIP.AUTO-MCNC: 361 MG/DL (ref 65–100)
GLUCOSE BLD STRIP.AUTO-MCNC: 438 MG/DL (ref 65–100)
GLUCOSE BLD STRIP.AUTO-MCNC: 462 MG/DL (ref 65–100)
GLUCOSE BLD STRIP.AUTO-MCNC: 467 MG/DL (ref 65–100)
HCT VFR BLD AUTO: 31.1 % (ref 36.6–50.3)
HGB BLD-MCNC: 9.4 G/DL (ref 12.1–17)
IMM GRANULOCYTES # BLD AUTO: 0.1 K/UL (ref 0–0.04)
IMM GRANULOCYTES NFR BLD AUTO: 1 % (ref 0–0.5)
LYMPHOCYTES # BLD: 2.2 K/UL (ref 0.8–3.5)
LYMPHOCYTES NFR BLD: 19 % (ref 12–49)
MCH RBC QN AUTO: 22.8 PG (ref 26–34)
MCHC RBC AUTO-ENTMCNC: 30.2 G/DL (ref 30–36.5)
MCV RBC AUTO: 75.3 FL (ref 80–99)
MONOCYTES # BLD: 1.1 K/UL (ref 0–1)
MONOCYTES NFR BLD: 9 % (ref 5–13)
NEUTS SEG # BLD: 7 K/UL (ref 1.8–8)
NEUTS SEG NFR BLD: 62 % (ref 32–75)
NRBC # BLD: 0 K/UL (ref 0–0.01)
NRBC BLD-RTO: 0 PER 100 WBC
PLATELET # BLD AUTO: 317 K/UL (ref 150–400)
PMV BLD AUTO: 11.3 FL (ref 8.9–12.9)
RBC # BLD AUTO: 4.13 M/UL (ref 4.1–5.7)
SERVICE CMNT-IMP: ABNORMAL
WBC # BLD AUTO: 11.4 K/UL (ref 4.1–11.1)

## 2021-04-19 PROCEDURE — 74011250636 HC RX REV CODE- 250/636: Performed by: PHYSICIAN ASSISTANT

## 2021-04-19 PROCEDURE — 36415 COLL VENOUS BLD VENIPUNCTURE: CPT

## 2021-04-19 PROCEDURE — 78306 BONE IMAGING WHOLE BODY: CPT

## 2021-04-19 PROCEDURE — 74011250636 HC RX REV CODE- 250/636: Performed by: NURSE PRACTITIONER

## 2021-04-19 PROCEDURE — 74011000250 HC RX REV CODE- 250: Performed by: INTERNAL MEDICINE

## 2021-04-19 PROCEDURE — 82962 GLUCOSE BLOOD TEST: CPT

## 2021-04-19 PROCEDURE — 85025 COMPLETE CBC W/AUTO DIFF WBC: CPT

## 2021-04-19 PROCEDURE — 99232 SBSQ HOSP IP/OBS MODERATE 35: CPT | Performed by: INTERNAL MEDICINE

## 2021-04-19 PROCEDURE — 74011636637 HC RX REV CODE- 636/637: Performed by: FAMILY MEDICINE

## 2021-04-19 PROCEDURE — 74011000636 HC RX REV CODE- 636: Performed by: RADIOLOGY

## 2021-04-19 PROCEDURE — 74011250636 HC RX REV CODE- 250/636: Performed by: INTERNAL MEDICINE

## 2021-04-19 PROCEDURE — 74011250637 HC RX REV CODE- 250/637: Performed by: FAMILY MEDICINE

## 2021-04-19 PROCEDURE — 74011250637 HC RX REV CODE- 250/637: Performed by: INTERNAL MEDICINE

## 2021-04-19 PROCEDURE — 71260 CT THORAX DX C+: CPT

## 2021-04-19 PROCEDURE — 74011250636 HC RX REV CODE- 250/636: Performed by: FAMILY MEDICINE

## 2021-04-19 PROCEDURE — 65270000029 HC RM PRIVATE

## 2021-04-19 RX ORDER — DEXAMETHASONE 4 MG/1
4 TABLET ORAL EVERY 6 HOURS
Status: DISCONTINUED | OUTPATIENT
Start: 2021-04-19 | End: 2021-04-22 | Stop reason: HOSPADM

## 2021-04-19 RX ORDER — SODIUM CHLORIDE 9 MG/ML
75 INJECTION, SOLUTION INTRAVENOUS CONTINUOUS
Status: DISCONTINUED | OUTPATIENT
Start: 2021-04-19 | End: 2021-04-22

## 2021-04-19 RX ORDER — PIOGLITAZONEHYDROCHLORIDE 15 MG/1
45 TABLET ORAL DAILY
Status: DISCONTINUED | OUTPATIENT
Start: 2021-04-20 | End: 2021-04-22 | Stop reason: HOSPADM

## 2021-04-19 RX ORDER — HEPARIN SODIUM 5000 [USP'U]/ML
5000 INJECTION, SOLUTION INTRAVENOUS; SUBCUTANEOUS ONCE
Status: COMPLETED | OUTPATIENT
Start: 2021-04-19 | End: 2021-04-19

## 2021-04-19 RX ORDER — INSULIN GLARGINE 100 [IU]/ML
10 INJECTION, SOLUTION SUBCUTANEOUS
Status: DISCONTINUED | OUTPATIENT
Start: 2021-04-19 | End: 2021-04-20

## 2021-04-19 RX ADMIN — ASPIRIN 81 MG: 81 TABLET, CHEWABLE ORAL at 09:18

## 2021-04-19 RX ADMIN — GLYBURIDE 6 MG: 6 TABLET ORAL at 18:29

## 2021-04-19 RX ADMIN — DEXAMETHASONE 4 MG: 4 TABLET ORAL at 18:29

## 2021-04-19 RX ADMIN — INSULIN LISPRO 10 UNITS: 100 INJECTION, SOLUTION INTRAVENOUS; SUBCUTANEOUS at 11:38

## 2021-04-19 RX ADMIN — IOPAMIDOL 100 ML: 755 INJECTION, SOLUTION INTRAVENOUS at 14:20

## 2021-04-19 RX ADMIN — INSULIN LISPRO 7 UNITS: 100 INJECTION, SOLUTION INTRAVENOUS; SUBCUTANEOUS at 18:29

## 2021-04-19 RX ADMIN — DEXAMETHASONE 4 MG: 4 TABLET ORAL at 11:39

## 2021-04-19 RX ADMIN — PIOGLITAZONE 45 MG: 15 TABLET ORAL at 09:18

## 2021-04-19 RX ADMIN — INSULIN LISPRO 10 UNITS: 100 INJECTION, SOLUTION INTRAVENOUS; SUBCUTANEOUS at 09:17

## 2021-04-19 RX ADMIN — METFORMIN HYDROCHLORIDE 1500 MG: 500 TABLET ORAL at 09:17

## 2021-04-19 RX ADMIN — Medication 10 ML: at 22:08

## 2021-04-19 RX ADMIN — WATER 1 G: 1 INJECTION INTRAMUSCULAR; INTRAVENOUS; SUBCUTANEOUS at 19:54

## 2021-04-19 RX ADMIN — ATENOLOL 50 MG: 50 TABLET ORAL at 20:00

## 2021-04-19 RX ADMIN — DEXAMETHASONE 10 MG: 4 TABLET ORAL at 09:18

## 2021-04-19 RX ADMIN — HEPARIN SODIUM 5000 UNITS: 5000 INJECTION INTRAVENOUS; SUBCUTANEOUS at 00:25

## 2021-04-19 RX ADMIN — SODIUM CHLORIDE 75 ML/HR: 9 INJECTION, SOLUTION INTRAVENOUS at 09:28

## 2021-04-19 RX ADMIN — Medication 10 ML: at 05:58

## 2021-04-19 RX ADMIN — IOHEXOL 50 ML: 240 INJECTION, SOLUTION INTRATHECAL; INTRAVASCULAR; INTRAVENOUS; ORAL at 11:51

## 2021-04-19 RX ADMIN — INSULIN LISPRO 15 UNITS: 100 INJECTION, SOLUTION INTRAVENOUS; SUBCUTANEOUS at 21:44

## 2021-04-19 RX ADMIN — ATORVASTATIN CALCIUM 10 MG: 20 TABLET, FILM COATED ORAL at 21:44

## 2021-04-19 RX ADMIN — HEPARIN SODIUM 5000 UNITS: 5000 INJECTION INTRAVENOUS; SUBCUTANEOUS at 15:31

## 2021-04-19 RX ADMIN — HEPARIN SODIUM 5000 UNITS: 5000 INJECTION INTRAVENOUS; SUBCUTANEOUS at 22:09

## 2021-04-19 RX ADMIN — GLYBURIDE 6 MG: 6 TABLET ORAL at 09:17

## 2021-04-19 RX ADMIN — SODIUM CHLORIDE 75 ML/HR: 9 INJECTION, SOLUTION INTRAVENOUS at 22:09

## 2021-04-19 RX ADMIN — INSULIN GLARGINE 10 UNITS: 100 INJECTION, SOLUTION SUBCUTANEOUS at 21:45

## 2021-04-19 NOTE — ROUTINE PROCESS
Spoke with Dr. Chai Pena, plan is for Dr. Christin Byrne to perform T10 and T11 bone biopsy, kyphoplasty with ablation tomorrow, 4/20/21 at 0915.  Dr. Chai Pena to place NPO orders and discontinue heparin SQ.

## 2021-04-19 NOTE — ROUTINE PROCESS
Received order for IR bone biopsy. Dr. Reinhold Hodgkin will investigate to see if bone biopsy warrented. Patient will need to be NPO and all blood thinners held.

## 2021-04-19 NOTE — PROGRESS NOTES
[address] Adult  Hospitalist Group                                                                                          Hospitalist Progress Note  Rodolfo Watson MD        Date of Service:  2021  NAME:  Geno Dolan  :  1955  MRN:  [id number]      Admission Summary:   73 y.o. male with hx of DM, HTN, HLD presenting with back pain, tremors, fatigue, found to have severe hyperglycemia / HHS    Interval history / Subjective:   No complaints     Assessment & Plan:     Severe hyperglycemia due to diabetes mellitus / HHS:   -off insulin gtt  -cont home PO meds, hold metformin today d/t CT with contrast  -will need basal lantus d/t elevated BS     Back pain:     -PSA significantly elevated. MRI shows metastatic dz at T10, T11, T12, retroperitoneal LAD   -planning biopsy tomorrow  -will get CT C/A/P, bone scan for staging  -oncology and ortho following    Malignancy:   -workup as above  -onc following     MAMADOU (acute kidney injury):   -improved with hydration  -monitor after contrast  -cont hydration     Pyuria:   -cont abx  -cx with klebsiella  -plan transition to PO cipro on discharge to complete a 5 day course     Acute sinusitis: CTX as above     Hypertension:   -BP controlled. Resume atenolol but hold other antihypertensives.     HLD (hyperlipidemia): resume statin    Code status: full  DVT prophylaxis: heparin       Hospital Problems  Date Reviewed: 2021          Codes Class Noted POA    Acute sinusitis ICD-10-CM: J01.90  ICD-9-CM: 461.9  2021 Yes        Pyuria ICD-10-CM: R82.81  ICD-9-CM: 791.9  2021 Yes        Diabetes ([facility] [address] ICD-10-CM: E11.9  ICD-9-CM: 250.00  Unknown Yes        Hypertension ICD-10-CM: I10  ICD-9-CM: 401.9  Unknown Yes        MAMADOU (acute kidney injury) ([facility] [address] ICD-10-CM: N17.9  ICD-9-CM: 628. 9  Unknown Yes        HLD (hyperlipidemia) ICD-10-CM: E78.5  ICD-9-CM: 272.4  Unknown Yes        Severe hyperglycemia due to diabetes mellitus ([facility] [address] ICD-10-CM: E11.65  ICD-9-CM: 250.00  4/16/2021 Unknown        Elevated alkaline phosphatase level ICD-10-CM: R74.8  ICD-9-CM: 790.5  4/16/2021 Yes                Review of Systems:   A comprehensive review of systems was negative except for that written in the HPI. Vital Signs:    Last 24hrs VS reviewed since prior progress note. Most recent are:  Visit Vitals  /68 (BP 1 Location: Left upper arm, BP Patient Position: At rest)   Pulse 74   Temp 97.8 °F (36.6 °C)   Resp 20   Ht 5' 9\" (1.753 m)   Wt 117.9 kg (260 lb)   SpO2 97%   BMI 38.40 kg/m²         Intake/Output Summary (Last 24 hours) at 4/19/2021 1413  Last data filed at 4/19/2021 1316  Gross per 24 hour   Intake 480 ml   Output 805 ml   Net -325 ml        Physical Examination:             Constitutional:  No acute distress, cooperative, pleasant    ENT:  Oral mucosa moist, oropharynx benign. Resp:  CTA bilaterally. No wheezing/rhonchi/rales. No accessory muscle use   CV:  Regular rhythm, normal rate, no murmurs, gallops, rubs    GI:  Soft, non distended, non tender. normoactive bowel sounds, no hepatosplenomegaly     Musculoskeletal:  No edema, warm, 2+ pulses throughout    Neurologic:  Moves all extremities. AAOx3, CN II-XII reviewed     Psych:  Good insight, Not anxious nor agitated.        Data Review:    Review and/or order of clinical lab test      Labs:     Recent Labs     04/19/21  0527 04/18/21  0245   WBC 11.4* 10.3   HGB 9.4* 9.6*   HCT 31.1* 31.4*    361     Recent Labs     04/18/21  0939 04/18/21  0245 04/17/21  1653 04/17/21  0517 04/17/21  0517 04/16/21 2015 04/16/21 2015    141 141   < > 143   < > 135*   K 4.0 3.7 3.9   < > 3.7   < > 4.0   * 114* 112*   < > 114*   < > 105   CO2 23 24 24   < > 24   < > 23   BUN 10 11 16   < > 19   < > 24*   CREA 1.19 1.21 1.45*   < > 1.42*   < > 1.93*   * 283* 264*   < > 144*   < > 535*   CA 8.3* 8.5 8.9   < > 9.2   < > 8.6   MG 1.7 1.7 1.9   < > 2.4   < > 2.3   PHOS  --   --   -- --  2.9  --  2.2*    < > = values in this interval not displayed. Recent Labs     04/17/21  0517 04/16/21  1418   ALT 21 24   * 774*   TBILI 0.2 0.3   TP 8.2 9.2*   ALB 2.9* 3.3*   GLOB 5.3* 5.9*     No results for input(s): INR, PTP, APTT, INREXT, INREXT in the last 72 hours. No results for input(s): FE, TIBC, PSAT, FERR in the last 72 hours. No results found for: FOL, RBCF   No results for input(s): PH, PCO2, PO2 in the last 72 hours.   Recent Labs     04/16/21  1418   TROIQ <0.05     No results found for: CHOL, CHOLX, CHLST, CHOLV, HDL, HDLP, LDL, LDLC, DLDLP, TGLX, TRIGL, TRIGP, CHHD, CHHDX  Lab Results   Component Value Date/Time    Glucose (POC) 361 (H) 04/19/2021 11:20 AM    Glucose (POC) 438 (H) 04/19/2021 08:03 AM    Glucose (POC) 347 (H) 04/18/2021 09:19 PM    Glucose (POC) 332 (H) 04/18/2021 03:43 PM    Glucose (POC) 398 (H) 04/18/2021 12:34 PM     Lab Results   Component Value Date/Time    Color YELLOW/STRAW 04/16/2021 07:01 PM    Appearance CLEAR 04/16/2021 07:01 PM    Specific gravity 1.030 04/16/2021 07:01 PM    pH (UA) 5.5 04/16/2021 07:01 PM    Protein Negative 04/16/2021 07:01 PM    Glucose >1,000 (A) 04/16/2021 07:01 PM    Ketone 15 (A) 04/16/2021 07:01 PM    Bilirubin Negative 04/16/2021 07:01 PM    Urobilinogen 0.2 04/16/2021 07:01 PM    Nitrites Negative 04/16/2021 07:01 PM    Leukocyte Esterase Negative 04/16/2021 07:01 PM    Epithelial cells FEW 04/16/2021 07:01 PM    Bacteria 4+ (A) 04/16/2021 07:01 PM    WBC 20-50 04/16/2021 07:01 PM    RBC 0-5 04/16/2021 07:01 PM         Medications Reviewed:     Current Facility-Administered Medications   Medication Dose Route Frequency    dexAMETHasone (DECADRON) tablet 4 mg  4 mg Oral Q6H    0.9% sodium chloride infusion  75 mL/hr IntraVENous CONTINUOUS    iopamidoL (ISOVUE-370) 76 % injection 100 mL  100 mL IntraVENous RAD ONCE    [START ON 4/20/2021] pioglitazone (ACTOS) tablet 45 mg  45 mg Oral DAILY    glyBURIDE micronized (GLYNASE) tablet 6 mg (Patient Supplied)  6 mg Oral BID WITH MEALS    insulin lispro (HUMALOG) injection   SubCUTAneous AC&HS    glucose chewable tablet 16 g  4 Tab Oral PRN    dextrose (D50W) injection syrg 12.5-25 g  25-50 mL IntraVENous PRN    glucagon (GLUCAGEN) injection 1 mg  1 mg IntraMUSCular PRN    cefTRIAXone (ROCEPHIN) 1 g in sterile water (preservative free) 10 mL IV syringe  1 g IntraVENous Q24H    sodium chloride (NS) flush 5-40 mL  5-40 mL IntraVENous Q8H    sodium chloride (NS) flush 5-40 mL  5-40 mL IntraVENous PRN    acetaminophen (TYLENOL) tablet 650 mg  650 mg Oral Q6H PRN    Or    acetaminophen (TYLENOL) suppository 650 mg  650 mg Rectal Q6H PRN    polyethylene glycol (MIRALAX) packet 17 g  17 g Oral DAILY PRN    heparin (porcine) injection 5,000 Units  5,000 Units SubCUTAneous Q8H    aspirin chewable tablet 81 mg  81 mg Oral DAILY    atenoloL (TENORMIN) tablet 50 mg  50 mg Oral QHS    atorvastatin (LIPITOR) tablet 10 mg  10 mg Oral QHS     ______________________________________________________________________  EXPECTED LENGTH OF STAY: 5d 2h  ACTUAL LENGTH OF STAY:          3                 Chely Caro MD

## 2021-04-19 NOTE — ROUTINE PROCESS
Per Dr. Lyubov Talavera who discussed with Dr. Johnson Asencio, patient will need T10 and T11, bone biopsy, kyphoplasty with ablation. Can be performed tomorrow with Dr. Conrado Garza. Patient will need to be NPO and blood thinners held. Will discuss above to Dr. Emi Joya, attending.

## 2021-04-19 NOTE — CONSULTS
RADIATION ONCOLOGY NOTE    Chart review shows 72 yo M with likely metastatic prostate cancer given elevated PSA and several bone lesions and possible RP adenopathy, most prominent being T10 compression fracture with mild cord compression. Has plans for T10/11 biopsy tomorrow along with kyphoplasty and ablation by IR. I will evaluate once tissue confirms metastatic disease for discussion of palliative radiation to the area for further local control. Full consult note to follow at time of evaluation. Thank you.       Denise Fiore MD  [facility]  Radiation Oncology Department  [address] [place] [address] Isi  [phone]

## 2021-04-19 NOTE — PROGRESS NOTES
Bedside shift change report given to Blane Bonilla RN (oncoming nurse) by LULY Chisholm (offgoing nurse). Report included the following information SBAR, Kardex, ED Summary, MAR, Recent Results and Cardiac Rhythm NSR. This patient was assisted with Intentional Toileting every 2 hours during this shift. Documentation of ambulation and output reflected on Flowsheet. 0800- BS was 438 and MD notified. Ordered to give 10 units of insulin. 1100- BS was 361 and MD notified. Ordered to give 10 units of insulin. TRANSFER - OUT REPORT:    Verbal report given to LULY Esparza(name) on Rosalinda López  being transferred to 5th floor(unit) for routine progression of care       Report consisted of patients Situation, Background, Assessment and   Recommendations(SBAR). Information from the following report(s) SBAR, Kardex, ED Summary, MAR, Recent Results and Cardiac Rhythm nsr was reviewed with the receiving nurse. Lines:   Peripheral IV 04/16/21 Right Antecubital (Active)   Site Assessment Clean, dry, & intact 04/19/21 0759   Phlebitis Assessment 0 04/19/21 0759   Infiltration Assessment 0 04/19/21 0759   Dressing Status Clean, dry, & intact 04/19/21 0759   Dressing Type Transparent;Tape 04/19/21 0759   Hub Color/Line Status Pink;Flushed;Capped 04/19/21 0759   Action Taken Open ports on tubing capped 04/19/21 0759   Alcohol Cap Used Yes 04/19/21 0759       Peripheral IV 04/17/21 Anterior;Left;Proximal Forearm (Active)   Site Assessment Clean, dry, & intact 04/19/21 0759   Phlebitis Assessment 0 04/19/21 0759   Infiltration Assessment 0 04/19/21 0759   Dressing Status Clean, dry, & intact 04/19/21 0759   Dressing Type Transparent;Tape 04/19/21 0759   Hub Color/Line Status Pink;Flushed;Capped 04/19/21 0759   Action Taken Open ports on tubing capped 04/19/21 0759   Alcohol Cap Used Yes 04/19/21 0759        Opportunity for questions and clarification was provided.       Patient transported with:   Patient's medications from home  Registered Nurse

## 2021-04-19 NOTE — PROGRESS NOTES
ORTHO PROGRESS NOTE      SUBJECTIVE:  Noman Najera states his back pain is controlled at rest.  Denies arm/leg pain/weakness. Denies loss bowel/bladder control. He is able to ambulate. OBJECTIVE:  Patient Vitals for the past 24 hrs:   Temp Pulse BP   04/19/21 1339 97.8 °F (36.6 °C) 74 129/68   04/19/21 1116 97.6 °F (36.4 °C) 71 133/63   04/19/21 0759 98.1 °F (36.7 °C) 81 135/64   04/19/21 0700 -- 66 --   04/19/21 0535 98.1 °F (36.7 °C) 76 (!) 133/52   04/19/21 0026 98.6 °F (37 °C) 77 (!) 130/50   04/18/21 2313 -- 68 --   04/18/21 1926 98.3 °F (36.8 °C) 80 138/73   04/18/21 1540 98.4 °F (36.9 °C) 70 130/62   04/18/21 1500 -- 72 --       Alert, no distress. Lying in bed. No family present. Respirations unlabored. RUE str 4/5 resisted elbow motion. 5/5 resisted wrist motion and . SILT. Neg hoffmans. LUE str 5/5 resisted motion elbow, wrist, and . SILT. Neg Hoffmans. RLE str 5/5 resisted hip, knee, ankle, and toe motion. SILT. No clonus. LLE str 5/5 resisted hip, knee, ankle, and toe motion. SILT. No clonus. Recent Labs     04/19/21  0527 04/18/21  0939   HGB 9.4*  --    HCT 31.1*  --      --    BUN  --  10   CREA  --  1.19   GFRAA  --  >60   GFRNA  --  >60       PT/OT:   Gait:  Gait  Base of Support: Widened  Speed/Graciela: Pace decreased (<100 feet/min)  Step Length: Right shortened, Left shortened  Gait Abnormalities: Antalgic  Ambulation - Level of Assistance: Stand-by assistance  Distance (ft): 50 Feet (ft)                 ASSESSMENT:  Pathologic compression fracture T10  Thoracic spine metastatic disease    PLAN:  I held heparin and made him NPO this am per IR notes but appears biopsy rescheduled for 4/20. OK to eat now, NPO p MN tonight. OK Heparin today and d/c 01:00 tomorrow. Resume post-procedure heparin/diet per IR protocols. TLSO when OOB. No plans for Ortho intervention at this time. Onc and Rad Onc following.       NEDRA Mendoza  Orthopedic Trauma Service  [facility]

## 2021-04-19 NOTE — ROUTINE PROCESS
Per Dr. Denver Urbina, Dr Virk Persons will reach out to Dr. Jeramy Ambrose to discuss what is best for patient. To discuss possible ablation with bone biopsy.

## 2021-04-19 NOTE — PROGRESS NOTES
[address] [facility] [address], [facility] [address] [facility]  W: [phone] F: [phone]      Reason for Visit:   Chandler Rhodes is a 73 y.o. male who is seen in consultation at the request of Dr. Amrita Palma for evaluation of suspected metastatic prostate cancer. Hematology / Oncology Treatment History:     Hematological/Oncological Diagnosis: Bone lesions with elevated PSA    Treatment course:   1) Patient presented to ED with hyperglycemia, back pain, UTI, found to have T10 compression fracture with evidence of osseous mets at T11, T12, with a compression fracture at T10 with associated mild cord compression. Lumbar MRI raises concern for retroperitoneal lymphadenopathy on left. Pre-treatment PSA of 153      History of Present Illness:     Very pleasant 73year old  [place] male with medical history significant for poorly controlled DM2, HTN, presented with symptoms of urinary tract infection and back pain. The patient has had about 15 pound unintentional weight loss over the last 3 months. He has some focal bone pain over the lower thoracic spine and in ribs bilaterally. He reports having had a fall x once in November. The patient reports no focal neurological deficits. No leg weakness, no changes in sensation in legs, groin abdomen. He describes his back pain as a \"coal poker\" to his spine. No other sites of focal bone pain reported. The patient reports he is otherwise quite functional, ambulating independently and completing various tasks in his yard and garden. More recently he is attempting to train a very active puppy, which was the cause of his last fall in November. Prior to admission, the patient endorses a high level of functional activity. Family history reviewed, no family history of colon, breast or lung cancers. No personal history of cancer, last colonoscopy 5 years ago and negative per patient. Social history reviewed, non-contributory. Positive ROS findings include: back pain, weight loss. Review of Systems: A complete review of systems was obtained, negative except as described above. Interval History:   Feeling pretty good; denies any incontinence of bowel or urine or numbness or tingling to LE. Descibes pain as \"burning\". Has been using the back brace provided. Reports after getting vaccine in March began to lose his appetite. Had Jennifer Michaels and Jennifer Michaels vaccine on 3/18/21    Happily ; 2 children; 1 boy and 1 girl; both live in [place]; 5 grandchildren    Retired from the the [employer] (27 yrs)    Past Medical History:   Diagnosis Date    Diabetes ([facility]     Hypertension       No past surgical history on file. Social History     Tobacco Use    Smoking status: Not on file   Substance Use Topics    Alcohol use: Not on file      No family history on file.   Current Facility-Administered Medications   Medication Dose Route Frequency    dexAMETHasone (DECADRON) tablet 4 mg  4 mg Oral Q6H    0.9% sodium chloride infusion  75 mL/hr IntraVENous CONTINUOUS    [START ON 4/20/2021] pioglitazone (ACTOS) tablet 45 mg  45 mg Oral DAILY    insulin glargine (LANTUS) injection 10 Units  10 Units SubCUTAneous QHS    glyBURIDE micronized (GLYNASE) tablet 6 mg (Patient Supplied)  6 mg Oral BID WITH MEALS    insulin lispro (HUMALOG) injection   SubCUTAneous AC&HS    glucose chewable tablet 16 g  4 Tab Oral PRN    dextrose (D50W) injection syrg 12.5-25 g  25-50 mL IntraVENous PRN    glucagon (GLUCAGEN) injection 1 mg  1 mg IntraMUSCular PRN    cefTRIAXone (ROCEPHIN) 1 g in sterile water (preservative free) 10 mL IV syringe  1 g IntraVENous Q24H    sodium chloride (NS) flush 5-40 mL  5-40 mL IntraVENous Q8H    sodium chloride (NS) flush 5-40 mL  5-40 mL IntraVENous PRN    acetaminophen (TYLENOL) tablet 650 mg  650 mg Oral Q6H PRN    Or    acetaminophen (TYLENOL) suppository 650 mg  650 mg Rectal Q6H PRN    polyethylene glycol (MIRALAX) packet 17 g  17 g Oral DAILY PRN    heparin (porcine) injection 5,000 Units  5,000 Units SubCUTAneous Q8H    aspirin chewable tablet 81 mg  81 mg Oral DAILY    atenoloL (TENORMIN) tablet 50 mg  50 mg Oral QHS    atorvastatin (LIPITOR) tablet 10 mg  10 mg Oral QHS      Allergies   Allergen Reactions    Erythromycin Other (comments)     lethargic            Physical Exam:     Visit Vitals  /68 (BP 1 Location: Left upper arm, BP Patient Position: At rest)   Pulse 74   Temp 97.8 °F (36.6 °C)   Resp 20   Ht 5' 9\" (1.753 m)   Wt 117.9 kg (260 lb)   SpO2 97%   BMI 38.40 kg/m²     ECOG PS: 1 as a consequence of back pain  General: no distress  Eyes: anicteric sclerae  HENT: atraumatic  Neck: supple  Respiratory: normal respiratory effort  CV: no peripheral edema  GI: soft, nontender, nondistended, no masses, no hepatomegaly, no splenomegaly  Skin: no rashes; no ecchymoses; no petechiae  Psych: alert, oriented, appropriate affect, normal judgment/insight      Results:     Lab Results   Component Value Date/Time    WBC 11.4 (H) 04/19/2021 05:27 AM    HGB 9.4 (L) 04/19/2021 05:27 AM    HCT 31.1 (L) 04/19/2021 05:27 AM    PLATELET 931 62/61/7515 05:27 AM    MCV 75.3 (L) 04/19/2021 05:27 AM    ABS. NEUTROPHILS 7.0 04/19/2021 05:27 AM     Lab Results   Component Value Date/Time    Sodium 140 04/18/2021 09:39 AM    Potassium 4.0 04/18/2021 09:39 AM    Chloride 111 (H) 04/18/2021 09:39 AM    CO2 23 04/18/2021 09:39 AM    Glucose 286 (H) 04/18/2021 09:39 AM    BUN 10 04/18/2021 09:39 AM    Creatinine 1.19 04/18/2021 09:39 AM    GFR est AA >60 04/18/2021 09:39 AM    GFR est non-AA >60 04/18/2021 09:39 AM    Calcium 8.3 (L) 04/18/2021 09:39 AM    Glucose (POC) 361 (H) 04/19/2021 11:20 AM     Lab Results   Component Value Date/Time    Bilirubin, total 0.2 04/17/2021 05:17 AM    ALT (SGPT) 21 04/17/2021 05:17 AM    Alk.  phosphatase 670 (H) 04/17/2021 05:17 AM Protein, total 8.2 04/17/2021 05:17 AM    Albumin 2.9 (L) 04/17/2021 05:17 AM    Globulin 5.3 (H) 04/17/2021 05:17 AM     4/17/21: MRI of C/T/L spine shows evidence of likely disease at T11, T12, concerning fracture at T10 with chronic appearing mild cord compression, mild canal stenosis at L3/4/5. Assessment and Recommendations:     1. Multiple bone lesions in spine with elevated PSA highly concerning for Prostate Adenocarcinoma  - pretreatment    Clinically, the patient's presentation is consistent with metastatic prostate adenocarcinoma. He appears to have had a good performance status prior to hospitalization and would be a good candidate for aggressive treatment. For now, the greatest concern lays with the disease/compression fracture at T10. Orthopedic surgery following and indicate that there is no role for emergent surgical intervention. - 4/20 plan for biopsy tomorrow with kyphoplasty and ablation. - 4/19 obtaining staging imaging with  CT chest/abd/pelvis and bone scan  - The patient does wish to follow with Megha Rivas and wishes to keep his care here at [facility] if possible. Will establish follow up in our clinic closer to discharge      2. Microcytic Anemia,   - patient reports normal colonoscopy 6 months ago  - will add iron profile with ferritin ; Vit B12 and folate to eval and reversible cause. - continue to monitor and transfuse only for Hgb <7 g/dl. 3. Compression fx T10  Ortho following  Plan for kyphoplasty and ablation with IR tomorrow    4. Mild cord compression T10  -consult to Rad/Onc  -started on steroids; Dex 10 mg po x1 then Dex 4 every 6 hours.      Plan reviewed with Dr Mynor Bowden      Signed By: Oksana Tipton NP

## 2021-04-19 NOTE — PROGRESS NOTES
Bedside and Verbal shift change report given to Ramakrishna Sequeira RN and Susan Ding RN (oncoming nurse) by Alla Mays RN (offgoing nurse). Report included the following information SBAR, Kardex, Intake/Output, MAR, Accordion and Recent Results.

## 2021-04-20 ENCOUNTER — APPOINTMENT (OUTPATIENT)
Dept: INTERVENTIONAL RADIOLOGY/VASCULAR | Age: 66
DRG: 477 | End: 2021-04-20
Attending: PHYSICIAN ASSISTANT
Payer: MEDICARE

## 2021-04-20 LAB
ANION GAP SERPL CALC-SCNC: 10 MMOL/L (ref 5–15)
BASOPHILS # BLD: 0 K/UL (ref 0–0.1)
BASOPHILS NFR BLD: 0 % (ref 0–1)
BUN SERPL-MCNC: 18 MG/DL (ref 6–20)
BUN/CREAT SERPL: 13 (ref 12–20)
CALCIUM SERPL-MCNC: 9.2 MG/DL (ref 8.5–10.1)
CHLORIDE SERPL-SCNC: 106 MMOL/L (ref 97–108)
CO2 SERPL-SCNC: 18 MMOL/L (ref 21–32)
COMMENT, HOLDF: NORMAL
CREAT SERPL-MCNC: 1.34 MG/DL (ref 0.7–1.3)
DIFFERENTIAL METHOD BLD: ABNORMAL
EOSINOPHIL # BLD: 0 K/UL (ref 0–0.4)
EOSINOPHIL NFR BLD: 0 % (ref 0–7)
ERYTHROCYTE [DISTWIDTH] IN BLOOD BY AUTOMATED COUNT: 13.3 % (ref 11.5–14.5)
FERRITIN SERPL-MCNC: 368 NG/ML (ref 26–388)
FOLATE SERPL-MCNC: 16.8 NG/ML (ref 5–21)
GLUCOSE BLD STRIP.AUTO-MCNC: 347 MG/DL (ref 65–100)
GLUCOSE BLD STRIP.AUTO-MCNC: 351 MG/DL (ref 65–100)
GLUCOSE BLD STRIP.AUTO-MCNC: 371 MG/DL (ref 65–100)
GLUCOSE BLD STRIP.AUTO-MCNC: 381 MG/DL (ref 65–100)
GLUCOSE BLD STRIP.AUTO-MCNC: 388 MG/DL (ref 65–100)
GLUCOSE BLD STRIP.AUTO-MCNC: 389 MG/DL (ref 65–100)
GLUCOSE BLD STRIP.AUTO-MCNC: 399 MG/DL (ref 65–100)
GLUCOSE SERPL-MCNC: 343 MG/DL (ref 65–100)
HCT VFR BLD AUTO: 35.2 % (ref 36.6–50.3)
HGB BLD-MCNC: 10.8 G/DL (ref 12.1–17)
IMM GRANULOCYTES # BLD AUTO: 0.1 K/UL (ref 0–0.04)
IMM GRANULOCYTES NFR BLD AUTO: 1 % (ref 0–0.5)
IRON SATN MFR SERPL: 20 % (ref 20–50)
IRON SERPL-MCNC: 45 UG/DL (ref 35–150)
LYMPHOCYTES # BLD: 1.8 K/UL (ref 0.8–3.5)
LYMPHOCYTES NFR BLD: 10 % (ref 12–49)
MCH RBC QN AUTO: 22.9 PG (ref 26–34)
MCHC RBC AUTO-ENTMCNC: 30.7 G/DL (ref 30–36.5)
MCV RBC AUTO: 74.7 FL (ref 80–99)
MONOCYTES # BLD: 0.4 K/UL (ref 0–1)
MONOCYTES NFR BLD: 2 % (ref 5–13)
NEUTS SEG # BLD: 16.5 K/UL (ref 1.8–8)
NEUTS SEG NFR BLD: 87 % (ref 32–75)
NRBC # BLD: 0 K/UL (ref 0–0.01)
NRBC BLD-RTO: 0 PER 100 WBC
PLATELET # BLD AUTO: 356 K/UL (ref 150–400)
PMV BLD AUTO: 12 FL (ref 8.9–12.9)
POTASSIUM SERPL-SCNC: 4.6 MMOL/L (ref 3.5–5.1)
RBC # BLD AUTO: 4.71 M/UL (ref 4.1–5.7)
SAMPLES BEING HELD,HOLD: NORMAL
SERVICE CMNT-IMP: ABNORMAL
SODIUM SERPL-SCNC: 134 MMOL/L (ref 136–145)
TIBC SERPL-MCNC: 221 UG/DL (ref 250–450)
VIT B12 SERPL-MCNC: 1356 PG/ML (ref 193–986)
WBC # BLD AUTO: 18.8 K/UL (ref 4.1–11.1)

## 2021-04-20 PROCEDURE — 99152 MOD SED SAME PHYS/QHP 5/>YRS: CPT

## 2021-04-20 PROCEDURE — 77030029065 HC DRSG HEMO QCLOT ZMED -B

## 2021-04-20 PROCEDURE — 20220 BONE BIOPSY TROCAR/NDL SUPFC: CPT

## 2021-04-20 PROCEDURE — 65270000029 HC RM PRIVATE

## 2021-04-20 PROCEDURE — 0P543ZZ DESTRUCTION OF THORACIC VERTEBRA, PERCUTANEOUS APPROACH: ICD-10-PCS | Performed by: STUDENT IN AN ORGANIZED HEALTH CARE EDUCATION/TRAINING PROGRAM

## 2021-04-20 PROCEDURE — 20982 ABLATE BONE TUMOR(S) PERQ: CPT

## 2021-04-20 PROCEDURE — 85025 COMPLETE CBC W/AUTO DIFF WBC: CPT

## 2021-04-20 PROCEDURE — 0PU43JZ SUPPLEMENT THORACIC VERTEBRA WITH SYNTHETIC SUBSTITUTE, PERCUTANEOUS APPROACH: ICD-10-PCS | Performed by: STUDENT IN AN ORGANIZED HEALTH CARE EDUCATION/TRAINING PROGRAM

## 2021-04-20 PROCEDURE — 22513 PERQ VERTEBRAL AUGMENTATION: CPT

## 2021-04-20 PROCEDURE — 77030021783 HC SYS CEM DEL MEDT -D

## 2021-04-20 PROCEDURE — 36415 COLL VENOUS BLD VENIPUNCTURE: CPT

## 2021-04-20 PROCEDURE — 74011636637 HC RX REV CODE- 636/637: Performed by: FAMILY MEDICINE

## 2021-04-20 PROCEDURE — 77030021782 HC SYS CEM CART DEL KYPH -C

## 2021-04-20 PROCEDURE — C1713 ANCHOR/SCREW BN/BN,TIS/BN: HCPCS

## 2021-04-20 PROCEDURE — 77030003666 HC NDL SPINAL BD -A

## 2021-04-20 PROCEDURE — 0PB43ZX EXCISION OF THORACIC VERTEBRA, PERCUTANEOUS APPROACH, DIAGNOSTIC: ICD-10-PCS | Performed by: STUDENT IN AN ORGANIZED HEALTH CARE EDUCATION/TRAINING PROGRAM

## 2021-04-20 PROCEDURE — 77030018831 HC SOL IRR H20 BAXT -A

## 2021-04-20 PROCEDURE — 80048 BASIC METABOLIC PNL TOTAL CA: CPT

## 2021-04-20 PROCEDURE — 77030037492 HC KT BN ACCS OSTEOCOOL MEDT -E

## 2021-04-20 PROCEDURE — 74011250636 HC RX REV CODE- 250/636: Performed by: STUDENT IN AN ORGANIZED HEALTH CARE EDUCATION/TRAINING PROGRAM

## 2021-04-20 PROCEDURE — 82746 ASSAY OF FOLIC ACID SERUM: CPT

## 2021-04-20 PROCEDURE — 88341 IMHCHEM/IMCYTCHM EA ADD ANTB: CPT

## 2021-04-20 PROCEDURE — 88311 DECALCIFY TISSUE: CPT

## 2021-04-20 PROCEDURE — 77030034842 HC TAMP SPN BN INFL EXP II KYPH -I

## 2021-04-20 PROCEDURE — 88342 IMHCHEM/IMCYTCHM 1ST ANTB: CPT

## 2021-04-20 PROCEDURE — 74011250636 HC RX REV CODE- 250/636: Performed by: NURSE PRACTITIONER

## 2021-04-20 PROCEDURE — 74011250636 HC RX REV CODE- 250/636: Performed by: INTERNAL MEDICINE

## 2021-04-20 PROCEDURE — 82962 GLUCOSE BLOOD TEST: CPT

## 2021-04-20 PROCEDURE — 74011000250 HC RX REV CODE- 250: Performed by: STUDENT IN AN ORGANIZED HEALTH CARE EDUCATION/TRAINING PROGRAM

## 2021-04-20 PROCEDURE — 99153 MOD SED SAME PHYS/QHP EA: CPT

## 2021-04-20 PROCEDURE — 74011000250 HC RX REV CODE- 250: Performed by: INTERNAL MEDICINE

## 2021-04-20 PROCEDURE — 22515 PERQ VERTEBRAL AUGMENTATION: CPT

## 2021-04-20 PROCEDURE — 83540 ASSAY OF IRON: CPT

## 2021-04-20 PROCEDURE — C1886 CATHETER, ABLATION: HCPCS

## 2021-04-20 PROCEDURE — 88307 TISSUE EXAM BY PATHOLOGIST: CPT

## 2021-04-20 PROCEDURE — 82607 VITAMIN B-12: CPT

## 2021-04-20 PROCEDURE — 0PS43ZZ REPOSITION THORACIC VERTEBRA, PERCUTANEOUS APPROACH: ICD-10-PCS | Performed by: STUDENT IN AN ORGANIZED HEALTH CARE EDUCATION/TRAINING PROGRAM

## 2021-04-20 PROCEDURE — 74011250637 HC RX REV CODE- 250/637: Performed by: INTERNAL MEDICINE

## 2021-04-20 PROCEDURE — 77030034843 HC TAMP SPN BN INFL EXP II KYPH -H

## 2021-04-20 PROCEDURE — 82728 ASSAY OF FERRITIN: CPT

## 2021-04-20 PROCEDURE — 74011250637 HC RX REV CODE- 250/637: Performed by: FAMILY MEDICINE

## 2021-04-20 PROCEDURE — 74011000636 HC RX REV CODE- 636: Performed by: STUDENT IN AN ORGANIZED HEALTH CARE EDUCATION/TRAINING PROGRAM

## 2021-04-20 RX ORDER — INSULIN GLARGINE 100 [IU]/ML
20 INJECTION, SOLUTION SUBCUTANEOUS
Status: DISCONTINUED | OUTPATIENT
Start: 2021-04-20 | End: 2021-04-21

## 2021-04-20 RX ORDER — FENTANYL CITRATE 50 UG/ML
25-100 INJECTION, SOLUTION INTRAMUSCULAR; INTRAVENOUS
Status: DISCONTINUED | OUTPATIENT
Start: 2021-04-20 | End: 2021-04-20 | Stop reason: HOSPADM

## 2021-04-20 RX ORDER — MIDAZOLAM HYDROCHLORIDE 1 MG/ML
1-5 INJECTION, SOLUTION INTRAMUSCULAR; INTRAVENOUS
Status: DISCONTINUED | OUTPATIENT
Start: 2021-04-20 | End: 2021-04-20 | Stop reason: HOSPADM

## 2021-04-20 RX ORDER — BUPIVACAINE HYDROCHLORIDE 7.5 MG/ML
.5-1 INJECTION, SOLUTION EPIDURAL; RETROBULBAR
Status: DISCONTINUED | OUTPATIENT
Start: 2021-04-20 | End: 2021-04-20 | Stop reason: HOSPADM

## 2021-04-20 RX ORDER — INSULIN PUMP SYRINGE, 3 ML
EACH MISCELLANEOUS
Qty: 1 KIT | Refills: 0 | Status: SHIPPED | OUTPATIENT
Start: 2021-04-20 | End: 2021-04-22 | Stop reason: SDUPTHER

## 2021-04-20 RX ORDER — LIDOCAINE HYDROCHLORIDE 10 MG/ML
10-20 INJECTION INFILTRATION; PERINEURAL
Status: DISCONTINUED | OUTPATIENT
Start: 2021-04-20 | End: 2021-04-20 | Stop reason: HOSPADM

## 2021-04-20 RX ADMIN — LIDOCAINE HYDROCHLORIDE 10 ML: 10 INJECTION, SOLUTION INFILTRATION; PERINEURAL at 12:26

## 2021-04-20 RX ADMIN — INSULIN LISPRO 7 UNITS: 100 INJECTION, SOLUTION INTRAVENOUS; SUBCUTANEOUS at 14:48

## 2021-04-20 RX ADMIN — INSULIN LISPRO 12 UNITS: 100 INJECTION, SOLUTION INTRAVENOUS; SUBCUTANEOUS at 23:12

## 2021-04-20 RX ADMIN — BUPIVACAINE HYDROCHLORIDE 75 MG: 7.5 INJECTION, SOLUTION EPIDURAL; RETROBULBAR at 12:31

## 2021-04-20 RX ADMIN — BUPIVACAINE HYDROCHLORIDE 75 MG: 7.5 INJECTION, SOLUTION EPIDURAL; RETROBULBAR at 12:27

## 2021-04-20 RX ADMIN — ACETAMINOPHEN 650 MG: 325 TABLET ORAL at 16:13

## 2021-04-20 RX ADMIN — MIDAZOLAM HYDROCHLORIDE 1 MG: 1 INJECTION, SOLUTION INTRAMUSCULAR; INTRAVENOUS at 12:46

## 2021-04-20 RX ADMIN — FENTANYL CITRATE 25 MCG: 50 INJECTION, SOLUTION INTRAMUSCULAR; INTRAVENOUS at 13:42

## 2021-04-20 RX ADMIN — FENTANYL CITRATE 25 MCG: 50 INJECTION, SOLUTION INTRAMUSCULAR; INTRAVENOUS at 13:12

## 2021-04-20 RX ADMIN — FENTANYL CITRATE 25 MCG: 50 INJECTION, SOLUTION INTRAMUSCULAR; INTRAVENOUS at 12:31

## 2021-04-20 RX ADMIN — Medication 10 ML: at 05:02

## 2021-04-20 RX ADMIN — DEXAMETHASONE 4 MG: 4 TABLET ORAL at 05:02

## 2021-04-20 RX ADMIN — MIDAZOLAM HYDROCHLORIDE 1 MG: 1 INJECTION, SOLUTION INTRAMUSCULAR; INTRAVENOUS at 13:44

## 2021-04-20 RX ADMIN — GLYBURIDE 6 MG: 6 TABLET ORAL at 17:23

## 2021-04-20 RX ADMIN — INSULIN LISPRO 10 UNITS: 100 INJECTION, SOLUTION INTRAVENOUS; SUBCUTANEOUS at 08:02

## 2021-04-20 RX ADMIN — FENTANYL CITRATE 25 MCG: 50 INJECTION, SOLUTION INTRAMUSCULAR; INTRAVENOUS at 12:52

## 2021-04-20 RX ADMIN — MIDAZOLAM HYDROCHLORIDE 1 MG: 1 INJECTION, SOLUTION INTRAMUSCULAR; INTRAVENOUS at 13:05

## 2021-04-20 RX ADMIN — INSULIN LISPRO 12 UNITS: 100 INJECTION, SOLUTION INTRAVENOUS; SUBCUTANEOUS at 17:22

## 2021-04-20 RX ADMIN — MIDAZOLAM HYDROCHLORIDE 1 MG: 1 INJECTION, SOLUTION INTRAMUSCULAR; INTRAVENOUS at 12:26

## 2021-04-20 RX ADMIN — INSULIN GLARGINE 20 UNITS: 100 INJECTION, SOLUTION SUBCUTANEOUS at 23:11

## 2021-04-20 RX ADMIN — MIDAZOLAM HYDROCHLORIDE 1 MG: 1 INJECTION, SOLUTION INTRAMUSCULAR; INTRAVENOUS at 12:23

## 2021-04-20 RX ADMIN — DEXAMETHASONE 4 MG: 4 TABLET ORAL at 17:22

## 2021-04-20 RX ADMIN — DEXAMETHASONE 4 MG: 4 TABLET ORAL at 00:43

## 2021-04-20 RX ADMIN — WATER 1 G: 1 INJECTION INTRAMUSCULAR; INTRAVENOUS; SUBCUTANEOUS at 21:20

## 2021-04-20 RX ADMIN — MIDAZOLAM HYDROCHLORIDE 1 MG: 1 INJECTION, SOLUTION INTRAMUSCULAR; INTRAVENOUS at 12:38

## 2021-04-20 RX ADMIN — DEXAMETHASONE 4 MG: 4 TABLET ORAL at 23:12

## 2021-04-20 RX ADMIN — IOPAMIDOL 30 ML: 408 INJECTION, SOLUTION INTRATHECAL at 13:31

## 2021-04-20 RX ADMIN — ATORVASTATIN CALCIUM 10 MG: 20 TABLET, FILM COATED ORAL at 21:20

## 2021-04-20 RX ADMIN — CEFAZOLIN 2 G: 1 INJECTION, POWDER, FOR SOLUTION INTRAMUSCULAR; INTRAVENOUS at 12:10

## 2021-04-20 RX ADMIN — Medication 10 ML: at 21:20

## 2021-04-20 RX ADMIN — ACETAMINOPHEN 650 MG: 325 TABLET ORAL at 23:12

## 2021-04-20 RX ADMIN — Medication 10 ML: at 16:13

## 2021-04-20 RX ADMIN — PIOGLITAZONE 45 MG: 15 TABLET ORAL at 08:04

## 2021-04-20 RX ADMIN — ATENOLOL 50 MG: 50 TABLET ORAL at 21:20

## 2021-04-20 RX ADMIN — FENTANYL CITRATE 25 MCG: 50 INJECTION, SOLUTION INTRAMUSCULAR; INTRAVENOUS at 12:35

## 2021-04-20 RX ADMIN — MIDAZOLAM HYDROCHLORIDE 1 MG: 1 INJECTION, SOLUTION INTRAMUSCULAR; INTRAVENOUS at 12:31

## 2021-04-20 RX ADMIN — FENTANYL CITRATE 25 MCG: 50 INJECTION, SOLUTION INTRAMUSCULAR; INTRAVENOUS at 12:38

## 2021-04-20 RX ADMIN — ASPIRIN 81 MG: 81 TABLET, CHEWABLE ORAL at 08:02

## 2021-04-20 RX ADMIN — MIDAZOLAM HYDROCHLORIDE 1 MG: 1 INJECTION, SOLUTION INTRAMUSCULAR; INTRAVENOUS at 12:35

## 2021-04-20 RX ADMIN — FENTANYL CITRATE 50 MCG: 50 INJECTION, SOLUTION INTRAMUSCULAR; INTRAVENOUS at 12:23

## 2021-04-20 NOTE — PROGRESS NOTES
TRANSFER - OUT REPORT:    Verbal report given to be given at the bedside(name) on [facility]       Report consisted of patient's Situation, Background, Assessment and   Recommendations(SBAR). Information from the following report(s) Procedure Summary was reviewed with the receiving nurse. Opportunity for questions and clarification was provided.

## 2021-04-20 NOTE — PROGRESS NOTES
Bedside and Verbal shift change report given to  and LULY Foreman and TrinidadRN (oncoming nurse) by Sidney Torres (offgoing nurse). Report included the following information SBAR, Kardex, Intake/Output, MAR, Accordion and Recent Results.

## 2021-04-20 NOTE — PROGRESS NOTES
Notified doctor on call about Patient Blood sugar being 462. Patient is asymptomatic  No new orders at this moment.  Will continue to monitor

## 2021-04-20 NOTE — H&P
Radiology History and Physical    Patient: Tangela Muñoz 73 y.o. male       Chief Complaint: Spasms      History of Present Illness: 73year old male presents with T10 and T11 lesions, with T10 compression fracture. Patient reports some chronic mild back pain, without numbness/weakness of the lower extremities. Recently has \"not been feeling good\", described as a general malaise. Came to the ED, imaging showed these spine lesions. Denies chest pain, abdominal pain, shortness of breath. History:    Past Medical History:   Diagnosis Date    Diabetes ([facility]     Hypertension      No family history on file.   Social History     Socioeconomic History    Marital status:      Spouse name: Not on file    Number of children: Not on file    Years of education: Not on file    Highest education level: Not on file   Occupational History    Not on file   Social Needs    Financial resource strain: Not on file    Food insecurity     Worry: Not on file     Inability: Not on file    Transportation needs     Medical: Not on file     Non-medical: Not on file   Tobacco Use    Smoking status: Not on file   Substance and Sexual Activity    Alcohol use: Not on file    Drug use: Not on file    Sexual activity: Not on file   Lifestyle    Physical activity     Days per week: Not on file     Minutes per session: Not on file    Stress: Not on file   Relationships    Social connections     Talks on phone: Not on file     Gets together: Not on file     Attends [employer] service: Not on file     Active member of club or organization: Not on file     Attends meetings of clubs or organizations: Not on file     Relationship status: Not on file    Intimate partner violence     Fear of current or ex partner: Not on file     Emotionally abused: Not on file     Physically abused: Not on file     Forced sexual activity: Not on file   Other Topics Concern    Not on file   Social History Narrative    Not on file       Allergies: Allergies   Allergen Reactions    Erythromycin Other (comments)     lethargic       Current Medications:  Current Facility-Administered Medications   Medication Dose Route Frequency    dexAMETHasone (DECADRON) tablet 4 mg  4 mg Oral Q6H    0.9% sodium chloride infusion  75 mL/hr IntraVENous CONTINUOUS    pioglitazone (ACTOS) tablet 45 mg  45 mg Oral DAILY    insulin glargine (LANTUS) injection 10 Units  10 Units SubCUTAneous QHS    glyBURIDE micronized (GLYNASE) tablet 6 mg (Patient Supplied)  6 mg Oral BID WITH MEALS    insulin lispro (HUMALOG) injection   SubCUTAneous AC&HS    glucose chewable tablet 16 g  4 Tab Oral PRN    dextrose (D50W) injection syrg 12.5-25 g  25-50 mL IntraVENous PRN    glucagon (GLUCAGEN) injection 1 mg  1 mg IntraMUSCular PRN    cefTRIAXone (ROCEPHIN) 1 g in sterile water (preservative free) 10 mL IV syringe  1 g IntraVENous Q24H    sodium chloride (NS) flush 5-40 mL  5-40 mL IntraVENous Q8H    sodium chloride (NS) flush 5-40 mL  5-40 mL IntraVENous PRN    acetaminophen (TYLENOL) tablet 650 mg  650 mg Oral Q6H PRN    Or    acetaminophen (TYLENOL) suppository 650 mg  650 mg Rectal Q6H PRN    polyethylene glycol (MIRALAX) packet 17 g  17 g Oral DAILY PRN    aspirin chewable tablet 81 mg  81 mg Oral DAILY    atenoloL (TENORMIN) tablet 50 mg  50 mg Oral QHS    atorvastatin (LIPITOR) tablet 10 mg  10 mg Oral QHS        Physical Exam:  Blood pressure 139/60, pulse 66, temperature 98.7 °F (37.1 °C), resp. rate 14, height 5' 9\" (1.753 m), weight 117.9 kg (260 lb), SpO2 98 %.   GENERAL: alert, cooperative, no distress, appears stated age,   LUNG: Nonlabored respiration on room air  HEART: regular rate and rhythm    Alerts:    Hospital Problems  Date Reviewed: 4/16/2021          Codes Class Noted POA    Acute sinusitis ICD-10-CM: J01.90  ICD-9-CM: 461.9  4/16/2021 Yes        Pyuria ICD-10-CM: R82.81  ICD-9-CM: 791.9  4/16/2021 Yes        Diabetes ([facility] ICD-10-CM: E11.9  ICD-9-CM: 250.00  Unknown Yes        Hypertension ICD-10-CM: I10  ICD-9-CM: 401.9  Unknown Yes        MAMADOU (acute kidney injury) ([facility] ICD-10-CM: N17.9  ICD-9-CM: 258. 9  Unknown Yes        HLD (hyperlipidemia) ICD-10-CM: E78.5  ICD-9-CM: 272.4  Unknown Yes        Severe hyperglycemia due to diabetes mellitus ([facility] ICD-10-CM: E11.65  ICD-9-CM: 250.00  4/16/2021 Unknown        Elevated alkaline phosphatase level ICD-10-CM: R74.8  ICD-9-CM: 790.5  4/16/2021 Yes              Laboratory:      Recent Labs     04/20/21  0425   HGB 10.8*   HCT 35.2*   WBC 18.8*      BUN 18   CREA 1.34*   K 4.6         Plan of Care/Planned Procedure:  Risks, benefits, and alternatives reviewed with patient and he agrees to proceed with the procedure. T10 & T11 osteocool ablation with kyphoplasty, and T10 biopsy    Deemed appropriate for moderate sedation with versed and fentanyl.     Isabella Stinson MD  Interventional Radiology  [facility]  8:49 AM, 4/20/2021

## 2021-04-20 NOTE — PROGRESS NOTES
Bedside shift change report given to Eusebia Almazan and Fritz Boateng (oncoming nurse) by Choe Neo (offgoing nurse). Report included the following information SBAR, Kardex, MAR and Recent Results.

## 2021-04-20 NOTE — PROGRESS NOTES
[address] Adult  Hospitalist Group                                                                                          Hospitalist Progress Note  Rodriguez Albarado MD        Date of Service:  2021  NAME:  Modesta Nissen  :  1955  MRN:  [id number]      Admission Summary:   73 y.o. male with hx of DM, HTN, HLD presenting with back pain, tremors, fatigue, found to have severe hyperglycemia / HHS    Interval history / Subjective:   Seen after biopsy and kyphyplasty. C/o back pain. Refused pain meds but will take tylenol. Assessment & Plan:     Severe hyperglycemia due to diabetes mellitus / HHS:   -off insulin gtt  -cont home PO meds, hold metformin today d/t CT with contrast  -increased lantus to 20 units qhs  -uncontrolled BS due to steroids     Back pain:     -PSA significantly elevated. MRI shows metastatic dz at T10, T11, T12, retroperitoneal LAD   -s/p bone biopsy and kyphoplasty with IR  -CT C/A/P, bone scan done for staging  -oncology and ortho following    Malignancy:   -workup as above  -onc following     MAMADOU (acute kidney injury):   -improved with hydration  -monitor after contrast  -cont hydration     Pyuria:   -cont abx  -cx with klebsiella  -plan transition to PO cipro on discharge to complete a 5 day course     Acute sinusitis: CTX as above     Hypertension:   -BP controlled. Resume atenolol but hold other antihypertensives.     HLD (hyperlipidemia): resume statin    Code status: full  DVT prophylaxis: heparin       Hospital Problems  Date Reviewed: 2021          Codes Class Noted POA    Acute sinusitis ICD-10-CM: J01.90  ICD-9-CM: 461.9  2021 Yes        Pyuria ICD-10-CM: R82.81  ICD-9-CM: 791.9  2021 Yes        Diabetes ([facility] ICD-10-CM: E11.9  ICD-9-CM: 250.00  Unknown Yes        Hypertension ICD-10-CM: I10  ICD-9-CM: 401.9  Unknown Yes        MAMADOU (acute kidney injury) ([facility] ICD-10-CM: N17.9  ICD-9-CM: 245. 9  Unknown Yes        HLD (hyperlipidemia) ICD-10-CM: E78.5  ICD-9-CM: 272.4  Unknown Yes        Severe hyperglycemia due to diabetes mellitus ([facility] ICD-10-CM: E11.65  ICD-9-CM: 250.00  4/16/2021 Unknown        Elevated alkaline phosphatase level ICD-10-CM: R74.8  ICD-9-CM: 790.5  4/16/2021 Yes                Review of Systems:   A comprehensive review of systems was negative except for that written in the HPI. Vital Signs:    Last 24hrs VS reviewed since prior progress note. Most recent are:  Visit Vitals  /72 (BP 1 Location: Right upper arm, BP Patient Position: At rest)   Pulse 81   Temp 97.6 °F (36.4 °C)   Resp 20   Ht 5' 9\" (1.753 m)   Wt 117.9 kg (260 lb)   SpO2 100%   BMI 38.40 kg/m²         Intake/Output Summary (Last 24 hours) at 4/20/2021 1721  Last data filed at 4/20/2021 0804  Gross per 24 hour   Intake 998.75 ml   Output 1130 ml   Net -131.25 ml        Physical Examination:             Constitutional:  No acute distress, cooperative, pleasant    ENT:  Oral mucosa moist, oropharynx benign. Resp:  CTA bilaterally. No wheezing/rhonchi/rales. No accessory muscle use   CV:  Regular rhythm, normal rate, no murmurs, gallops, rubs    GI:  Soft, non distended, non tender. normoactive bowel sounds, no hepatosplenomegaly     Musculoskeletal:  No edema, warm, 2+ pulses throughout    Neurologic:  Moves all extremities. AAOx3, CN II-XII reviewed     Psych:  Good insight, Not anxious nor agitated. Data Review:    Review and/or order of clinical lab test      Labs:     Recent Labs     04/20/21 0425 04/19/21  0527   WBC 18.8* 11.4*   HGB 10.8* 9.4*   HCT 35.2* 31.1*    317     Recent Labs     04/20/21  0425 04/18/21  0939 04/18/21  0245   * 140 141   K 4.6 4.0 3.7    111* 114*   CO2 18* 23 24   BUN 18 10 11   CREA 1.34* 1.19 1.21   * 286* 283*   CA 9.2 8.3* 8.5   MG  --  1.7 1.7     No results for input(s): ALT, AP, TBIL, TBILI, TP, ALB, GLOB, GGT, AML, LPSE in the last 72 hours.     No lab exists for component: SGOT, GPT, AMYP, HLPSE  No results for input(s): INR, PTP, APTT, INREXT, INREXT in the last 72 hours. Recent Labs     04/20/21  0545   TIBC 221*   PSAT 20   FERR 368      Lab Results   Component Value Date/Time    Folate 16.8 04/20/2021 05:45 AM      No results for input(s): PH, PCO2, PO2 in the last 72 hours. No results for input(s): CPK, CKNDX, TROIQ in the last 72 hours.     No lab exists for component: CPKMB  No results found for: CHOL, CHOLX, CHLST, CHOLV, HDL, HDLP, LDL, LDLC, DLDLP, TGLX, TRIGL, TRIGP, CHHD, CHHDX  Lab Results   Component Value Date/Time    Glucose (POC) 399 (H) 04/20/2021 04:03 PM    Glucose (POC) 351 (H) 04/20/2021 02:09 PM    Glucose (POC) 371 (H) 04/20/2021 11:50 AM    Glucose (POC) 347 (H) 04/20/2021 10:02 AM    Glucose (POC) 388 (H) 04/20/2021 09:04 AM     Lab Results   Component Value Date/Time    Color YELLOW/STRAW 04/16/2021 07:01 PM    Appearance CLEAR 04/16/2021 07:01 PM    Specific gravity 1.030 04/16/2021 07:01 PM    pH (UA) 5.5 04/16/2021 07:01 PM    Protein Negative 04/16/2021 07:01 PM    Glucose >1,000 (A) 04/16/2021 07:01 PM    Ketone 15 (A) 04/16/2021 07:01 PM    Bilirubin Negative 04/16/2021 07:01 PM    Urobilinogen 0.2 04/16/2021 07:01 PM    Nitrites Negative 04/16/2021 07:01 PM    Leukocyte Esterase Negative 04/16/2021 07:01 PM    Epithelial cells FEW 04/16/2021 07:01 PM    Bacteria 4+ (A) 04/16/2021 07:01 PM    WBC 20-50 04/16/2021 07:01 PM    RBC 0-5 04/16/2021 07:01 PM         Medications Reviewed:     Current Facility-Administered Medications   Medication Dose Route Frequency    insulin glargine (LANTUS) injection 20 Units  20 Units SubCUTAneous QHS    dexAMETHasone (DECADRON) tablet 4 mg  4 mg Oral Q6H    0.9% sodium chloride infusion  75 mL/hr IntraVENous CONTINUOUS    pioglitazone (ACTOS) tablet 45 mg  45 mg Oral DAILY    glyBURIDE micronized (GLYNASE) tablet 6 mg (Patient Supplied)  6 mg Oral BID WITH MEALS    insulin lispro (HUMALOG) injection SubCUTAneous AC&HS    glucose chewable tablet 16 g  4 Tab Oral PRN    dextrose (D50W) injection syrg 12.5-25 g  25-50 mL IntraVENous PRN    glucagon (GLUCAGEN) injection 1 mg  1 mg IntraMUSCular PRN    cefTRIAXone (ROCEPHIN) 1 g in sterile water (preservative free) 10 mL IV syringe  1 g IntraVENous Q24H    sodium chloride (NS) flush 5-40 mL  5-40 mL IntraVENous Q8H    sodium chloride (NS) flush 5-40 mL  5-40 mL IntraVENous PRN    acetaminophen (TYLENOL) tablet 650 mg  650 mg Oral Q6H PRN    Or    acetaminophen (TYLENOL) suppository 650 mg  650 mg Rectal Q6H PRN    polyethylene glycol (MIRALAX) packet 17 g  17 g Oral DAILY PRN    aspirin chewable tablet 81 mg  81 mg Oral DAILY    atenoloL (TENORMIN) tablet 50 mg  50 mg Oral QHS    atorvastatin (LIPITOR) tablet 10 mg  10 mg Oral QHS     ______________________________________________________________________  EXPECTED LENGTH OF STAY: 8d 9h  ACTUAL LENGTH OF STAY:          4                 Christian Kang MD

## 2021-04-20 NOTE — ROUTINE PROCESS
Attempted to call hospitalist, no answer. pts blood sugar 351, giving 7 units insulin with snack until dinner tray gets here, been NPO all day. In procedure area post kypho. Dr. Alta Olivas was aware of blood sugar pre procedure. 3:04 PM  Pt transported back to room. sbar given to charge nurse.

## 2021-04-20 NOTE — PROCEDURES
Interventional Radiology  Procedure Note        4/20/2021 2:25 PM    Patient: Evie Parker     Informed consent obtained    Diagnosis: T10 compression fracture; T10 and T11 osseous lesions    Procedure(s): Fluoro guided T10 bone biopsy; T10 and T11 osteocool RF ablation; T10 and T11 kyphoplasty    Specimens removed:  2x core samples from G59    Complications: Cement extravasated into T10-11 disc space, and anterior paravertebral space, through the anterior wall of T10; no vascular embolism    Primary Physician: Dagoberto Keating MD    Recommendations: N/A    Discharge Disposition: stable; return to floor    Full dictated report to follow    Dagoberto Keating MD  Interventional Radiology  [facility]  2:25 PM, 4/20/2021

## 2021-04-20 NOTE — ROUTINE PROCESS
8:35 AM  Patient arrived. ID and allergies verified verbally with patient. Pt voices understanding of procedure to be performed. Consent obtained. Pt prepped for procedure.

## 2021-04-20 NOTE — PROGRESS NOTES
Problem: Diabetes Self-Management  Goal: *Monitoring blood glucose, interpreting and using results  Description: Identify recommended blood glucose targets  and personal targets. Outcome: Progressing Towards Goal     Problem: Falls - Risk of  Goal: *Absence of Falls  Description: Document Jon Arriaza Fall Risk and appropriate interventions in the flowsheet.   Outcome: Progressing Towards Goal  Note: Fall Risk Interventions:  Mobility Interventions: Bed/chair exit alarm         Medication Interventions: Assess postural VS orthostatic hypotension, Bed/chair exit alarm, Patient to call before getting OOB, Teach patient to arise slowly    Elimination Interventions: Bed/chair exit alarm

## 2021-04-21 PROBLEM — C61 PROSTATE CANCER (HCC): Status: ACTIVE | Noted: 2021-04-21

## 2021-04-21 LAB
ANION GAP SERPL CALC-SCNC: 9 MMOL/L (ref 5–15)
BASOPHILS # BLD: 0 K/UL (ref 0–0.1)
BASOPHILS NFR BLD: 0 % (ref 0–1)
BUN SERPL-MCNC: 26 MG/DL (ref 6–20)
BUN/CREAT SERPL: 22 (ref 12–20)
CALCIUM SERPL-MCNC: 8.4 MG/DL (ref 8.5–10.1)
CHLORIDE SERPL-SCNC: 105 MMOL/L (ref 97–108)
CO2 SERPL-SCNC: 22 MMOL/L (ref 21–32)
CREAT SERPL-MCNC: 1.19 MG/DL (ref 0.7–1.3)
DIFFERENTIAL METHOD BLD: ABNORMAL
EOSINOPHIL # BLD: 0 K/UL (ref 0–0.4)
EOSINOPHIL NFR BLD: 0 % (ref 0–7)
ERYTHROCYTE [DISTWIDTH] IN BLOOD BY AUTOMATED COUNT: 13.3 % (ref 11.5–14.5)
GLUCOSE BLD STRIP.AUTO-MCNC: 348 MG/DL (ref 65–100)
GLUCOSE BLD STRIP.AUTO-MCNC: 384 MG/DL (ref 65–100)
GLUCOSE BLD STRIP.AUTO-MCNC: 422 MG/DL (ref 65–100)
GLUCOSE BLD STRIP.AUTO-MCNC: 466 MG/DL (ref 65–100)
GLUCOSE BLD STRIP.AUTO-MCNC: 468 MG/DL (ref 65–100)
GLUCOSE BLD STRIP.AUTO-MCNC: 478 MG/DL (ref 65–100)
GLUCOSE SERPL-MCNC: 329 MG/DL (ref 65–100)
HCT VFR BLD AUTO: 31 % (ref 36.6–50.3)
HGB BLD-MCNC: 9.8 G/DL (ref 12.1–17)
IMM GRANULOCYTES # BLD AUTO: 0.2 K/UL (ref 0–0.04)
IMM GRANULOCYTES NFR BLD AUTO: 1 % (ref 0–0.5)
LYMPHOCYTES # BLD: 0.9 K/UL (ref 0.8–3.5)
LYMPHOCYTES NFR BLD: 5 % (ref 12–49)
MCH RBC QN AUTO: 23 PG (ref 26–34)
MCHC RBC AUTO-ENTMCNC: 31.6 G/DL (ref 30–36.5)
MCV RBC AUTO: 72.8 FL (ref 80–99)
MONOCYTES # BLD: 0.6 K/UL (ref 0–1)
MONOCYTES NFR BLD: 3 % (ref 5–13)
NEUTS SEG # BLD: 17 K/UL (ref 1.8–8)
NEUTS SEG NFR BLD: 91 % (ref 32–75)
NRBC # BLD: 0 K/UL (ref 0–0.01)
NRBC BLD-RTO: 0 PER 100 WBC
PLATELET # BLD AUTO: 366 K/UL (ref 150–400)
PMV BLD AUTO: 11.7 FL (ref 8.9–12.9)
POTASSIUM SERPL-SCNC: 4.4 MMOL/L (ref 3.5–5.1)
RBC # BLD AUTO: 4.26 M/UL (ref 4.1–5.7)
RBC MORPH BLD: ABNORMAL
SERVICE CMNT-IMP: ABNORMAL
SODIUM SERPL-SCNC: 136 MMOL/L (ref 136–145)
WBC # BLD AUTO: 18.7 K/UL (ref 4.1–11.1)

## 2021-04-21 PROCEDURE — 80048 BASIC METABOLIC PNL TOTAL CA: CPT

## 2021-04-21 PROCEDURE — 85025 COMPLETE CBC W/AUTO DIFF WBC: CPT

## 2021-04-21 PROCEDURE — 65270000029 HC RM PRIVATE

## 2021-04-21 PROCEDURE — 36415 COLL VENOUS BLD VENIPUNCTURE: CPT

## 2021-04-21 PROCEDURE — 74011636637 HC RX REV CODE- 636/637: Performed by: INTERNAL MEDICINE

## 2021-04-21 PROCEDURE — 74011250636 HC RX REV CODE- 250/636: Performed by: INTERNAL MEDICINE

## 2021-04-21 PROCEDURE — 97116 GAIT TRAINING THERAPY: CPT

## 2021-04-21 PROCEDURE — 74011250636 HC RX REV CODE- 250/636: Performed by: FAMILY MEDICINE

## 2021-04-21 PROCEDURE — 99232 SBSQ HOSP IP/OBS MODERATE 35: CPT | Performed by: INTERNAL MEDICINE

## 2021-04-21 PROCEDURE — 74011250637 HC RX REV CODE- 250/637: Performed by: INTERNAL MEDICINE

## 2021-04-21 PROCEDURE — 97530 THERAPEUTIC ACTIVITIES: CPT

## 2021-04-21 PROCEDURE — 74011250636 HC RX REV CODE- 250/636: Performed by: NURSE PRACTITIONER

## 2021-04-21 PROCEDURE — 82962 GLUCOSE BLOOD TEST: CPT

## 2021-04-21 PROCEDURE — 74011250637 HC RX REV CODE- 250/637: Performed by: FAMILY MEDICINE

## 2021-04-21 PROCEDURE — 74011636637 HC RX REV CODE- 636/637: Performed by: FAMILY MEDICINE

## 2021-04-21 RX ORDER — INSULIN GLARGINE 100 [IU]/ML
32 INJECTION, SOLUTION SUBCUTANEOUS
Status: DISCONTINUED | OUTPATIENT
Start: 2021-04-21 | End: 2021-04-21

## 2021-04-21 RX ORDER — DIPHENHYDRAMINE HYDROCHLORIDE 50 MG/ML
25 INJECTION, SOLUTION INTRAMUSCULAR; INTRAVENOUS AS NEEDED
Status: CANCELLED
Start: 2021-04-27

## 2021-04-21 RX ORDER — DEXTROSE 50 % IN WATER (D50W) INTRAVENOUS SYRINGE
12.5-25 AS NEEDED
Status: DISCONTINUED | OUTPATIENT
Start: 2021-04-21 | End: 2021-04-22 | Stop reason: HOSPADM

## 2021-04-21 RX ORDER — ACETAMINOPHEN 325 MG/1
650 TABLET ORAL AS NEEDED
Status: CANCELLED
Start: 2021-04-27

## 2021-04-21 RX ORDER — INSULIN LISPRO 100 [IU]/ML
INJECTION, SOLUTION INTRAVENOUS; SUBCUTANEOUS
Status: DISCONTINUED | OUTPATIENT
Start: 2021-04-21 | End: 2021-04-22 | Stop reason: HOSPADM

## 2021-04-21 RX ORDER — ONDANSETRON 2 MG/ML
8 INJECTION INTRAMUSCULAR; INTRAVENOUS AS NEEDED
Status: CANCELLED | OUTPATIENT
Start: 2021-04-27

## 2021-04-21 RX ORDER — ALBUTEROL SULFATE 0.83 MG/ML
2.5 SOLUTION RESPIRATORY (INHALATION) AS NEEDED
Status: CANCELLED
Start: 2021-04-27

## 2021-04-21 RX ORDER — INSULIN GLARGINE 100 [IU]/ML
35 INJECTION, SOLUTION SUBCUTANEOUS
Status: DISCONTINUED | OUTPATIENT
Start: 2021-04-21 | End: 2021-04-22

## 2021-04-21 RX ORDER — EPINEPHRINE 1 MG/ML
0.3 INJECTION, SOLUTION, CONCENTRATE INTRAVENOUS AS NEEDED
Status: CANCELLED | OUTPATIENT
Start: 2021-04-27

## 2021-04-21 RX ORDER — DIPHENHYDRAMINE HYDROCHLORIDE 50 MG/ML
50 INJECTION, SOLUTION INTRAMUSCULAR; INTRAVENOUS AS NEEDED
Status: CANCELLED
Start: 2021-04-27

## 2021-04-21 RX ORDER — MAGNESIUM SULFATE 100 %
4 CRYSTALS MISCELLANEOUS AS NEEDED
Status: DISCONTINUED | OUTPATIENT
Start: 2021-04-21 | End: 2021-04-22 | Stop reason: HOSPADM

## 2021-04-21 RX ORDER — HEPARIN SODIUM 5000 [USP'U]/ML
5000 INJECTION, SOLUTION INTRAVENOUS; SUBCUTANEOUS EVERY 8 HOURS
Status: DISCONTINUED | OUTPATIENT
Start: 2021-04-21 | End: 2021-04-22 | Stop reason: HOSPADM

## 2021-04-21 RX ORDER — HYDROCORTISONE SODIUM SUCCINATE 100 MG/2ML
100 INJECTION, POWDER, FOR SOLUTION INTRAMUSCULAR; INTRAVENOUS AS NEEDED
Status: CANCELLED | OUTPATIENT
Start: 2021-04-27

## 2021-04-21 RX ADMIN — ATENOLOL 50 MG: 50 TABLET ORAL at 21:46

## 2021-04-21 RX ADMIN — ASPIRIN 81 MG: 81 TABLET, CHEWABLE ORAL at 08:25

## 2021-04-21 RX ADMIN — POLYETHYLENE GLYCOL 3350 17 G: 17 POWDER, FOR SOLUTION ORAL at 21:47

## 2021-04-21 RX ADMIN — DEXAMETHASONE 4 MG: 4 TABLET ORAL at 17:57

## 2021-04-21 RX ADMIN — ACETAMINOPHEN 650 MG: 325 TABLET ORAL at 08:46

## 2021-04-21 RX ADMIN — PIOGLITAZONE 45 MG: 15 TABLET ORAL at 08:25

## 2021-04-21 RX ADMIN — GLYBURIDE 6 MG: 6 TABLET ORAL at 08:25

## 2021-04-21 RX ADMIN — Medication 10 ML: at 15:02

## 2021-04-21 RX ADMIN — INSULIN LISPRO 10 UNITS: 100 INJECTION, SOLUTION INTRAVENOUS; SUBCUTANEOUS at 12:42

## 2021-04-21 RX ADMIN — INSULIN LISPRO 10 UNITS: 100 INJECTION, SOLUTION INTRAVENOUS; SUBCUTANEOUS at 16:54

## 2021-04-21 RX ADMIN — DEXAMETHASONE 4 MG: 4 TABLET ORAL at 23:35

## 2021-04-21 RX ADMIN — Medication 10 ML: at 21:47

## 2021-04-21 RX ADMIN — INSULIN LISPRO 10 UNITS: 100 INJECTION, SOLUTION INTRAVENOUS; SUBCUTANEOUS at 21:47

## 2021-04-21 RX ADMIN — DEXAMETHASONE 4 MG: 4 TABLET ORAL at 06:04

## 2021-04-21 RX ADMIN — HEPARIN SODIUM 5000 UNITS: 5000 INJECTION INTRAVENOUS; SUBCUTANEOUS at 21:47

## 2021-04-21 RX ADMIN — INSULIN LISPRO 7 UNITS: 100 INJECTION, SOLUTION INTRAVENOUS; SUBCUTANEOUS at 08:25

## 2021-04-21 RX ADMIN — DEXAMETHASONE 4 MG: 4 TABLET ORAL at 12:41

## 2021-04-21 RX ADMIN — SODIUM CHLORIDE 75 ML/HR: 9 INJECTION, SOLUTION INTRAVENOUS at 14:59

## 2021-04-21 RX ADMIN — ATORVASTATIN CALCIUM 10 MG: 20 TABLET, FILM COATED ORAL at 21:46

## 2021-04-21 RX ADMIN — Medication 10 ML: at 06:05

## 2021-04-21 RX ADMIN — INSULIN HUMAN 10 UNITS: 100 INJECTION, SUSPENSION SUBCUTANEOUS at 12:42

## 2021-04-21 RX ADMIN — SODIUM CHLORIDE 75 ML/HR: 9 INJECTION, SOLUTION INTRAVENOUS at 14:38

## 2021-04-21 RX ADMIN — INSULIN GLARGINE 35 UNITS: 100 INJECTION, SOLUTION SUBCUTANEOUS at 19:08

## 2021-04-21 NOTE — PROGRESS NOTES
[facility]  [address] [facility], [address] [place] [address]  [phone]      Medical Progress Note      NAME: Justen Zhu   :  1955  MRM:  [id number]    Date/Time of service: 2021  5:29 PM       Subjective:     Chief Complaint:  Patient was personally seen and examined by me during this time period. Chart reviewed. Hyperglycemic. Patient endorses some back pain with movement, no n/v; all other systems reviewed negative. Objective:       Vitals:       Last 24hrs VS reviewed since prior progress note.  Most recent are:    Visit Vitals  /64 (BP 1 Location: Right upper arm, BP Patient Position: At rest)   Pulse (!) 53   Temp 97.2 °F (36.2 °C)   Resp 18   Ht 5' 9\" (1.753 m)   Wt 117.9 kg (260 lb)   SpO2 98%   BMI 38.40 kg/m²     SpO2 Readings from Last 6 Encounters:   21 98%    O2 Flow Rate (L/min): 2 l/min       Intake/Output Summary (Last 24 hours) at 2021 1729  Last data filed at 2021 1612  Gross per 24 hour   Intake 840 ml   Output 670 ml   Net 170 ml        Exam:     Physical Exam:    Gen:   in no acute distress  HEENT:  Pink conjunctivae, PERRL, hearing intact to voice  Resp:  No accessory muscle use, clear breath sounds without wheezes rales or rhonchi  Card:  RRR, No murmurs, normal S1, S2  Abd:  Soft, non-tender, non-distended, normoactive bowel sounds are present  Musc:  No cyanosis or clubbing  Skin:  skin turgor is good  Neuro:  Cranial nerves 3-12 are grossly intact, moves all extremities, follows commands appropriately  Psych:  Oriented to person, place, and time, Alert with good insight      Medications Reviewed: (see below)    Lab Data Reviewed: (see below)    ______________________________________________________________________    Medications:     Current Facility-Administered Medications   Medication Dose Route Frequency    heparin (porcine) injection 5,000 Units  5,000 Units SubCUTAneous Q8H    insulin glargine (LANTUS) injection 35 Units  35 Units SubCUTAneous QHS    insulin lispro (HUMALOG) injection   SubCUTAneous AC&HS    glucose chewable tablet 16 g  4 Tab Oral PRN    dextrose (D50W) injection syrg 12.5-25 g  12.5-25 g IntraVENous PRN    glucagon (GLUCAGEN) injection 1 mg  1 mg IntraMUSCular PRN    dexAMETHasone (DECADRON) tablet 4 mg  4 mg Oral Q6H    0.9% sodium chloride infusion  75 mL/hr IntraVENous CONTINUOUS    pioglitazone (ACTOS) tablet 45 mg  45 mg Oral DAILY    glucose chewable tablet 16 g  4 Tab Oral PRN    dextrose (D50W) injection syrg 12.5-25 g  25-50 mL IntraVENous PRN    glucagon (GLUCAGEN) injection 1 mg  1 mg IntraMUSCular PRN    sodium chloride (NS) flush 5-40 mL  5-40 mL IntraVENous Q8H    sodium chloride (NS) flush 5-40 mL  5-40 mL IntraVENous PRN    acetaminophen (TYLENOL) tablet 650 mg  650 mg Oral Q6H PRN    Or    acetaminophen (TYLENOL) suppository 650 mg  650 mg Rectal Q6H PRN    polyethylene glycol (MIRALAX) packet 17 g  17 g Oral DAILY PRN    aspirin chewable tablet 81 mg  81 mg Oral DAILY    atenoloL (TENORMIN) tablet 50 mg  50 mg Oral QHS    atorvastatin (LIPITOR) tablet 10 mg  10 mg Oral QHS          Lab Review:     Recent Labs     04/21/21  0543 04/20/21  0425 04/19/21  0527   WBC 18.7* 18.8* 11.4*   HGB 9.8* 10.8* 9.4*   HCT 31.0* 35.2* 31.1*    356 317     Recent Labs     04/21/21  0543 04/20/21  0425    134*   K 4.4 4.6    106   CO2 22 18*   * 343*   BUN 26* 18   CREA 1.19 1.34*   CA 8.4* 9.2     Lab Results   Component Value Date/Time    Glucose (POC) 468 (H) 04/21/2021 04:13 PM    Glucose (POC) 466 (H) 04/21/2021 02:36 PM    Glucose (POC) 478 (H) 04/21/2021 11:06 AM    Glucose (POC) 384 (H) 04/21/2021 07:31 AM    Glucose (POC) 389 (H) 04/20/2021 09:26 PM          Assessment / Plan:     Severe hyperglycemia due to diabetes mellitus:   -uncontrolled BS due to steroids  -off insulin gtt but remains difficult to control   -NPHx1, titrate up lantus to 35 units   -increase sliding scale  -resume home metformin in morning   - diabetes education      Back pain:     -PSA significantly elevated. MRI shows metastatic dz at T10, T11, T12, retroperitoneal LAD   -s/p bone biopsy and kyphoplasty with IR  -CT C/A/P, bone scan done for staging  -oncology and ortho following     Malignancy:   -workup as above  -onc following     MAMADOU (acute kidney injury):   -improved with hydration  -monitor after contrast  -cont hydration     Pyuria:   -cont abx  -cx with klebsiella  -plan transition to PO cipro on discharge to complete a 5 day course     Acute sinusitis: CTX as above     Hypertension:   -BP controlled.  Resume atenolol but hold other antihypertensives.     HLD (hyperlipidemia): continue statin    Total time spent with patient: 35 minutes **I personally saw and examined the patient during this time period**                 Care Plan discussed with: Patient and Nursing Staff    Discussed:  Care Plan    Prophylaxis:  Hep SQ    Disposition:  Home w/Family           ___________________________________________________    Attending Physician: Aliyah Razo DO

## 2021-04-21 NOTE — PROGRESS NOTES
Bedside and Verbal shift change report given to Jey[address] [place] (oncoming nurse) by Abbe David (offgoing nurse).  Report included the following information SBAR, Kardex, Intake/Output, MAR, Accordion and Recent Results

## 2021-04-21 NOTE — PROGRESS NOTES
Reason for Admission:                       RUR Score:   15% (low risk)                  Plan for utilizing home health:     Home health ordered, patient has declined     PCP: First and Last name:  Yonatan Weiss MD   Name of Practice: [facility]   Are you a current patient: Yes/No: yes   Approximate date of last visit: 3/18/21   Can you participate in a virtual visit with your PCP: yes                    Current Advanced Directive/Advance Care Plan: Full Code    Healthcare Decision Maker:  subhash Valderrama- [phone] and Ruth Caputo- [phone]                       Transition of Care Plan:    Patient lives alone in a one story house with no entry steps in [facility]. Patient reports having a good support system of family (children, siblings, niece and nephew) that all live nearby and are able to provide assistance as needed. Patient has no prior home health history. Home health order noted and discussed with patient who has declined at this time. Patient lives outside the [facility]. He does not own/use any DME. Patient plans on purchasing a glucometer on his way home from the hospital.  His preferred pharmacy is [facility] or [address] [place] [address] in [place]. 1. Patient to discharge home with family assistance. He has declined home health. 2. Outpatient follow-up  3. Patient's daughter will transport at d/c. Care Management Interventions  PCP Verified by CM:  Yes  Transition of Care Consult (CM Consult): Home Health, Discharge Gautam Juarez [phone] [address]: No  Reason Outside [place]: Patient declined ordered home care/hospice services  Physical Therapy Consult: Yes  Occupational Therapy Consult: Yes  Speech Therapy Consult: No  Current Support Network: Lives Alone, Family Lives Nearby  Confirm Follow Up Transport: Family  The Plan for Transition of Care is Related to the Following Treatment Goals : Severe hyperglycemia due to diabetes mellitus  Discharge Location  Discharge Placement: Home with family assistance     Maciej Duran LCSW

## 2021-04-21 NOTE — PROGRESS NOTES
Problem: Mobility Impaired (Adult and Pediatric)  Goal: *Acute Goals and Plan of Care (Insert Text)  Description: FUNCTIONAL STATUS PRIOR TO ADMISSION: Patient was modified independent using a rolling walker and single point cane for functional mobility. HOME SUPPORT PRIOR TO ADMISSION: The patient lived with family but did not require assist.    Physical Therapy Goals  Initiated 4/18/2021  1. Patient will move from supine to sit and sit to supine , scoot up and down, and roll side to side in bed with independence within 7 day(s). 2.  Patient will transfer from bed to chair and chair to bed with modified independence using the least restrictive device within 7 day(s). 3.  Patient will perform sit to stand with modified independence within 7 day(s). 4.  Patient will ambulate with independence for 200 feet with the least restrictive device within 7 day(s). 5  Note:   PHYSICAL THERAPY TREATMENT  Patient: Ning Eller (89 y.o. male)  Date: 4/21/2021  Diagnosis: Severe hyperglycemia due to diabetes mellitus ([facility] [address] [E11.65] <principal problem not specified>       Precautions:    Chart, physical therapy assessment, plan of care and goals were reviewed. ASSESSMENT  Patient continues with skilled PT services. Pt is SBA supine to sit to stand. Pt donned back brace with min assist and cues. Pt ambulated 140ft with no device SBA. Pt progressing well with mobility. Pt declined follow up PT. Pt is SBA level and should do well at home. Current Level of Function Impacting Discharge (mobility/balance):          PLAN :  Patient continues to benefit from skilled intervention to address the above impairments. Continue treatment per established plan of care. to address goals. Recommendation for discharge: (in order for the patient to meet his/her long term goals)  Pt declined home health PT but should be fine at SBA for mobility.     This discharge recommendation:  Has been made in collaboration with the attending provider and/or case management    IF patient discharges home will need the following DME: none       SUBJECTIVE:     OBJECTIVE DATA SUMMARY:   Critical Behavior:  Neurologic State: Alert  Orientation Level: Appropriate for age, Oriented X4  Cognition: Appropriate decision making, Appropriate for age attention/concentration, Appropriate safety awareness, Decreased attention/concentration, Decreased command following     Functional Mobility Training:  Bed Mobility:     Supine to Sit: Stand-by assistance  Sit to Supine: Stand-by assistance           Transfers:  Sit to Stand: Stand-by assistance  Stand to Sit: Stand-by assistance                             Balance:     Ambulation/Gait Training:  Distance (ft): 140 Feet (ft) N/D     Ambulation - Level of Assistance: Stand-by assistance        Gait Abnormalities: Decreased step clearance        Base of Support: Narrowed     Speed/Graciela: Pace decreased (<100 feet/min)  Step Length: Right shortened;Left shortened                Activity Tolerance:   Good    After treatment patient left in no apparent distress:   Supine in bed    COMMUNICATION/COLLABORATION:   The patients plan of care was discussed with: Physical therapist.     Paulie Santos PTA   Time Calculation: 23 mins

## 2021-04-21 NOTE — PROGRESS NOTES
[address] [facility] at [address] [facility] [address] [facility]  W: [phone] F: [phone]      Reason for Visit:   Franklin Pisano is a 73 y.o. male who is seen in consultation at the request of Dr. Wendie Nugent for evaluation of suspected metastatic prostate cancer. Hematology / Oncology Treatment History:     Hematological/Oncological Diagnosis: Bone lesions with elevated PSA    Treatment course:   1) Patient presented to ED with hyperglycemia, back pain, UTI, found to have T10 compression fracture with evidence of osseous mets at T11, T12, with a compression fracture at T10 with associated mild cord compression. Lumbar MRI raises concern for retroperitoneal lymphadenopathy on left. Pre-treatment PSA of 153  - 4/20  biopsy of T10 ; ablation and kyphoplasty  of T10 and T11    History of Present Illness:     Very pleasant 73year old  [place] male with medical history significant for poorly controlled DM2, HTN, presented with symptoms of urinary tract infection and back pain. The patient has had about 15 pound unintentional weight loss over the last 3 months. He has some focal bone pain over the lower thoracic spine and in ribs bilaterally. He reports having had a fall x once in November. The patient reports no focal neurological deficits. No leg weakness, no changes in sensation in legs, groin abdomen. He describes his back pain as a \"coal poker\" to his spine. No other sites of focal bone pain reported. The patient reports he is otherwise quite functional, ambulating independently and completing various tasks in his yard and garden. More recently he is attempting to train a very active puppy, which was the cause of his last fall in November. Prior to admission, the patient endorses a high level of functional activity. Family history reviewed, no family history of colon, breast or lung cancers.   No personal history of cancer, last colonoscopy 5 years ago and negative per patient. Social history reviewed, non-contributory. Positive ROS findings include: back pain, weight loss. Henry and Jason Lapping vaccine on 3/18/21    Happily ; 2 children; 1 boy and 1 girl; both live in [place]; 5 grandchildren    Retired from the the [employer] (30 yrs)    Request for no blood transfusions is a personal decision; not based on Christian beliefs. Review of Systems: A complete review of systems was obtained, negative except as described above. Interval History:   Feeling well; was up in the chair this am for several hours with the back brace in place. Tylenol is controlling his pain. Eating well; BMs normal. No complaints this am.         Past Medical History:   Diagnosis Date    Diabetes ([facility] [address]     Hypertension       Past Surgical History:   Procedure Laterality Date    IR KYPHOPLASTY THORACIC  4/20/2021      Social History     Tobacco Use    Smoking status: Not on file   Substance Use Topics    Alcohol use: Not on file      No family history on file.   Current Facility-Administered Medications   Medication Dose Route Frequency    insulin glargine (LANTUS) injection 20 Units  20 Units SubCUTAneous QHS    dexAMETHasone (DECADRON) tablet 4 mg  4 mg Oral Q6H    0.9% sodium chloride infusion  75 mL/hr IntraVENous CONTINUOUS    pioglitazone (ACTOS) tablet 45 mg  45 mg Oral DAILY    glyBURIDE micronized (GLYNASE) tablet 6 mg (Patient Supplied)  6 mg Oral BID WITH MEALS    insulin lispro (HUMALOG) injection   SubCUTAneous AC&HS    glucose chewable tablet 16 g  4 Tab Oral PRN    dextrose (D50W) injection syrg 12.5-25 g  25-50 mL IntraVENous PRN    glucagon (GLUCAGEN) injection 1 mg  1 mg IntraMUSCular PRN    sodium chloride (NS) flush 5-40 mL  5-40 mL IntraVENous Q8H    sodium chloride (NS) flush 5-40 mL  5-40 mL IntraVENous PRN    acetaminophen (TYLENOL) tablet 650 mg  650 mg Oral Q6H PRN    Or    acetaminophen (TYLENOL) suppository 650 mg  650 mg Rectal Q6H PRN    polyethylene glycol (MIRALAX) packet 17 g  17 g Oral DAILY PRN    aspirin chewable tablet 81 mg  81 mg Oral DAILY    atenoloL (TENORMIN) tablet 50 mg  50 mg Oral QHS    atorvastatin (LIPITOR) tablet 10 mg  10 mg Oral QHS      Allergies   Allergen Reactions    Erythromycin Other (comments)     lethargic            Physical Exam:     Visit Vitals  /62 (BP 1 Location: Right upper arm, BP Patient Position: Sitting)   Pulse 71   Temp 97.4 °F (36.3 °C)   Resp 19   Ht 5' 9\" (1.753 m)   Wt 117.9 kg (260 lb)   SpO2 96%   BMI 38.40 kg/m²     ECOG PS: 1 as a consequence of back pain  General: no distress  Eyes: anicteric sclerae  HENT: atraumatic  Neck: supple  Respiratory: normal respiratory effort  CV: no peripheral edema  GI: soft, nontender, nondistended, no masses, no hepatomegaly, no splenomegaly  Skin: no rashes; no ecchymoses; no petechiae  Psych: alert, oriented, appropriate affect, normal judgment/insight      Results:     Lab Results   Component Value Date/Time    WBC 18.7 (H) 04/21/2021 05:43 AM    HGB 9.8 (L) 04/21/2021 05:43 AM    HCT 31.0 (L) 04/21/2021 05:43 AM    PLATELET 271 36/39/7016 05:43 AM    MCV 72.8 (L) 04/21/2021 05:43 AM    ABS. NEUTROPHILS 17.0 (H) 04/21/2021 05:43 AM     Lab Results   Component Value Date/Time    Sodium 136 04/21/2021 05:43 AM    Potassium 4.4 04/21/2021 05:43 AM    Chloride 105 04/21/2021 05:43 AM    CO2 22 04/21/2021 05:43 AM    Glucose 329 (H) 04/21/2021 05:43 AM    BUN 26 (H) 04/21/2021 05:43 AM    Creatinine 1.19 04/21/2021 05:43 AM    GFR est AA >60 04/21/2021 05:43 AM    GFR est non-AA >60 04/21/2021 05:43 AM    Calcium 8.4 (L) 04/21/2021 05:43 AM    Glucose (POC) 384 (H) 04/21/2021 07:31 AM     Lab Results   Component Value Date/Time    Bilirubin, total 0.2 04/17/2021 05:17 AM    ALT (SGPT) 21 04/17/2021 05:17 AM    Alk.  phosphatase 670 (H) 04/17/2021 05:17 AM    Protein, total 8.2 04/17/2021 05:17 AM    Albumin 2.9 (L) 04/17/2021 05:17 AM    Globulin 5.3 (H) 04/17/2021 05:17 AM     4/17/21: MRI of C/T/L spine shows evidence of likely disease at T11, T12, concerning fracture at T10 with chronic appearing mild cord compression, mild canal stenosis at L3/4/5.     4/19/21 CT C/A/P w cont  IMPRESSION  1. Sclerosis and lysis in the left 2nd rib. 2. Sclerosis and lysis with expansion of the anterior, right 6th rib with an  associated pathologic fracture laterally. 3. Metastatic disease in T10 with slight compression and extension into the  anterior, superior endplate of K27. There is soft tissue attenuation in the fat  surrounding the T10 vertebral body concerning for soft tissue extension. 4. Lysis in the inferior endplate of N16 is concerning for metastatic disease. 5. Adenopathy in the retroperitoneum, adjacent to the aorta and in the mesentery  in the left lower quadrant. 6. Prostatomegaly with some heterogeneity. Recommend clinical correlation with  PSA.    4/19/21 NM Bone scan  FINDINGS:   Foci of increased tracer activity are seen in the ribs bilaterally, the sternum,  T10-11, right intertrochanteric hip, and in the left distal femur. Degenerative  changes are seen in the ribs bilaterally. Incidental renal imaging shows minimal uptake suggesting possible SuperScan.    IMPRESSION  Osseous metastatic disease as described above. Assessment and Recommendations:     1. Multiple bone lesions in spine with elevated PSA highly concerning for Prostate Adenocarcinoma  - pretreatment    Clinically, the patient's presentation is consistent with metastatic prostate adenocarcinoma. He appears to have had a good performance status prior to hospitalization and would be a good candidate for aggressive treatment.       --Orthopedic surgery consulted; no role for emergent surgical intervention; recommended back brace     - 4/20  biopsy of T10 ; ablation and kyphoplasty  of T10 and T11  -  follow up established  in our clinic and placed in the discharge summary      2. Microcytic Anemia,   - patient reports normal colonoscopy 6 months ago  - No Fe,  Vit B12 or folate def noted  - continue to monitor and transfuse only for Hgb <7 g/dl. 3. Compression fx T10  Ortho following  4/20 T10 and T11 kyphoplasty and ablation    4. Mild cord compression T10  - Rad/Onc consulted: to follow outpatient; appt placed in discharge summary; 4/27 @ 12:00 pm  -need to continue with  Dex 4 every 6 hours at discharge    5.  Hyperglycemia  Likely 2/2 to steroids  Diabetic specialist consulted    Plan reviewed with Dr Ezequiel Agee      Signed By: Madhu Stout NP

## 2021-04-21 NOTE — PROGRESS NOTES
ORTHO PROGRESS NOTE      SUBJECTIVE:  Noman Najera states his back pain is improved after the procedure yesterday. He denies arm/leg pain/weakness. He ambulated to bathroom this am to void and BM. OBJECTIVE:  Patient Vitals for the past 24 hrs:   Temp Pulse BP   04/21/21 0819 97.4 °F (36.3 °C) 71 114/62   04/21/21 0732 98.1 °F (36.7 °C) (!) 59 (!) 147/79   04/21/21 0410 97.4 °F (36.3 °C) (!) 50 121/61   04/20/21 2310 97.8 °F (36.6 °C) 60 (!) 158/83   04/20/21 2030 97.2 °F (36.2 °C) 65 139/69   04/20/21 1543 97.6 °F (36.4 °C) 81 129/72   04/20/21 1445 -- 61 (!) 141/73   04/20/21 1430 -- (!) 59 (!) 153/88   04/20/21 1415 -- (!) 59 (!) 118/57   04/20/21 1405 -- (!) 59 115/61       Alert, no distress. Lying in bed. No family present. Respirations unlabored. Moves BUE spontaneously with str 5/5 resisted elbow, wrist, and . SILT. Neg Hoffmans. Moves BLE spontaneously with str 5/5 resisted hip, knee, ankle, and toe motion. SILT. No clonus. Recent Labs     04/21/21  0543   HGB 9.8*   HCT 31.0*      BUN 26*   CREA 1.19   GFRAA >60   GFRNA >60       PT/OT:   Gait:  Gait  Base of Support: Widened  Speed/Graciela: Pace decreased (<100 feet/min)  Step Length: Right shortened, Left shortened  Gait Abnormalities: Antalgic  Ambulation - Level of Assistance: Stand-by assistance  Distance (ft): 50 Feet (ft)                 ASSESSMENT:  Pathologic compression fracture T10  Thoracic spine metastatic disease  S/p biopsy/kyphoplasty 4/20    PLAN:  Cont. brace when OOB. Remainder of plan per medicine, onc, and rad onc. I discussed DVT proph with medicine yesterday and again today. Defer to primary team to manage. Please call again if Ortho questions. He doesn't need to FU with Ortho unless new complaints.     NEDRA Mendoza  Orthopedic Trauma Service  [facility]

## 2021-04-21 NOTE — PROGRESS NOTES
Comprehensive Nutrition Assessment    Type and Reason for Visit: Initial, RD nutrition re-screen/LOS    Nutrition Recommendations/Plan:   1. Continue consistent carb diet. Nutrition Assessment:      4/21: 72 y/o male admitted with severe hyperglycemia. PMH includes DM. HTN. Per MD note, MRI shows metastatic dz at T10, T11, T12, retroperitoneal LAD. S/p bone biopsy and kyphoplasty 4/20. Pt reports good appetite. Breakfast tray in room 100% consumed. Recorded intakes good, mostly % meals. A1c 11.6. Hgb 9.8. Pt reports his A1c is checked every 3 months and is usually around 6.1. says he checks his BG at home once every 2 weeks or so using his family member's monitor, but says he is going to get his own monitor. He says he when checks his BG it is usually 100-140mg/dL. BG elevated since admission. On steroid. Says he limits sugar and salt in diet at home. Eats 2-3 meals/day. Rarely snacks. Uses sugar substitutes/diet soda instead of sugar or sugar-sweetened beverages. Pt reports ~20# intentional weight loss over the past year. No weight hx in chart to confirm. Dicussed consistent carb diet with pt. BG likely so elevated d/t current situation, will continue to monitor. Malnutrition Assessment:  Malnutrition Status: none    Estimated Daily Nutrient Needs:  Energy (kcal): 7038(5525 x 1.3 AF); Weight Used for Energy Requirements: Current  Protein (g): 117(1-1.2 g/kg);  Weight Used for Protein Requirements: Current  Fluid (ml/day): 2540; Method Used for Fluid Requirements: 1 ml/kcal      Nutrition Related Findings:  last BM not documented      Wounds:    Surgical incision       Current Nutrition Therapies:  DIET DIABETIC CONSISTENT CARB Regular  DIET ONE TIME MESSAGE    Anthropometric Measures:  · Height:  5' 9\" (175.3 cm)  · Current Body Wt:  117.9 kg (260 lb)   · Admission Body Wt:       · Usual Body Wt:        · Ideal Body Wt:  160 lbs:  162.5 %   · Adjusted Body Weight:   ; Weight Adjustment for: No adjustment · Adjusted BMI:       · BMI Category:  Obese class 2 (BMI 35.0-39. 9)       Nutrition Diagnosis:   · Altered nutrition-related lab values related to endocrine dysfunction as evidenced by lab values(A1c 11.6, -[phone])      Nutrition Interventions:   Food and/or Nutrient Delivery: Continue current diet  Nutrition Education and Counseling: Education initiated  Coordination of Nutrition Care: Continue to monitor while inpatient    Goals:  PO intake >75% meals with BG <180mg/dL next 5-7 days       Nutrition Monitoring and Evaluation:   Behavioral-Environmental Outcomes: None identified  Food/Nutrient Intake Outcomes: Food and nutrient intake  Physical Signs/Symptoms Outcomes: Weight, Biochemical data    Discharge Planning:    Continue current diet     Electronically signed by Kevin Valadez RDN on 4/21/2021 at 11:04 AM    Contact: [phone]

## 2021-04-22 LAB
ANION GAP SERPL CALC-SCNC: 6 MMOL/L (ref 5–15)
BASOPHILS # BLD: 0 K/UL (ref 0–0.1)
BASOPHILS NFR BLD: 0 % (ref 0–1)
BUN SERPL-MCNC: 22 MG/DL (ref 6–20)
BUN/CREAT SERPL: 22 (ref 12–20)
CALCIUM SERPL-MCNC: 9 MG/DL (ref 8.5–10.1)
CHLORIDE SERPL-SCNC: 107 MMOL/L (ref 97–108)
CO2 SERPL-SCNC: 24 MMOL/L (ref 21–32)
CREAT SERPL-MCNC: 1.01 MG/DL (ref 0.7–1.3)
DIFFERENTIAL METHOD BLD: ABNORMAL
EOSINOPHIL # BLD: 0 K/UL (ref 0–0.4)
EOSINOPHIL NFR BLD: 0 % (ref 0–7)
ERYTHROCYTE [DISTWIDTH] IN BLOOD BY AUTOMATED COUNT: 13.1 % (ref 11.5–14.5)
GLUCOSE BLD STRIP.AUTO-MCNC: 289 MG/DL (ref 65–100)
GLUCOSE BLD STRIP.AUTO-MCNC: 302 MG/DL (ref 65–100)
GLUCOSE SERPL-MCNC: 266 MG/DL (ref 65–100)
HCT VFR BLD AUTO: 31.1 % (ref 36.6–50.3)
HGB BLD-MCNC: 9.6 G/DL (ref 12.1–17)
IMM GRANULOCYTES # BLD AUTO: 0.1 K/UL (ref 0–0.04)
IMM GRANULOCYTES NFR BLD AUTO: 1 % (ref 0–0.5)
LYMPHOCYTES # BLD: 1.2 K/UL (ref 0.8–3.5)
LYMPHOCYTES NFR BLD: 8 % (ref 12–49)
MCH RBC QN AUTO: 22.7 PG (ref 26–34)
MCHC RBC AUTO-ENTMCNC: 30.9 G/DL (ref 30–36.5)
MCV RBC AUTO: 73.5 FL (ref 80–99)
MONOCYTES # BLD: 0.7 K/UL (ref 0–1)
MONOCYTES NFR BLD: 5 % (ref 5–13)
NEUTS SEG # BLD: 12.6 K/UL (ref 1.8–8)
NEUTS SEG NFR BLD: 86 % (ref 32–75)
NRBC # BLD: 0.03 K/UL (ref 0–0.01)
NRBC BLD-RTO: 0.2 PER 100 WBC
PLATELET # BLD AUTO: 376 K/UL (ref 150–400)
PMV BLD AUTO: 11.6 FL (ref 8.9–12.9)
POTASSIUM SERPL-SCNC: 4.4 MMOL/L (ref 3.5–5.1)
RBC # BLD AUTO: 4.23 M/UL (ref 4.1–5.7)
SERVICE CMNT-IMP: ABNORMAL
SERVICE CMNT-IMP: ABNORMAL
SODIUM SERPL-SCNC: 137 MMOL/L (ref 136–145)
WBC # BLD AUTO: 14.7 K/UL (ref 4.1–11.1)

## 2021-04-22 PROCEDURE — 99233 SBSQ HOSP IP/OBS HIGH 50: CPT | Performed by: CLINICAL NURSE SPECIALIST

## 2021-04-22 PROCEDURE — 74011250637 HC RX REV CODE- 250/637: Performed by: FAMILY MEDICINE

## 2021-04-22 PROCEDURE — 74011250636 HC RX REV CODE- 250/636: Performed by: INTERNAL MEDICINE

## 2021-04-22 PROCEDURE — 85025 COMPLETE CBC W/AUTO DIFF WBC: CPT

## 2021-04-22 PROCEDURE — 74011250636 HC RX REV CODE- 250/636: Performed by: NURSE PRACTITIONER

## 2021-04-22 PROCEDURE — 82962 GLUCOSE BLOOD TEST: CPT

## 2021-04-22 PROCEDURE — 74011250637 HC RX REV CODE- 250/637: Performed by: INTERNAL MEDICINE

## 2021-04-22 PROCEDURE — 80048 BASIC METABOLIC PNL TOTAL CA: CPT

## 2021-04-22 PROCEDURE — 74011636637 HC RX REV CODE- 636/637: Performed by: INTERNAL MEDICINE

## 2021-04-22 RX ORDER — LISINOPRIL 20 MG/1
40 TABLET ORAL DAILY
Status: DISCONTINUED | OUTPATIENT
Start: 2021-04-22 | End: 2021-04-22 | Stop reason: HOSPADM

## 2021-04-22 RX ORDER — IBUPROFEN 200 MG
CAPSULE ORAL
Qty: 90 STRIP | Refills: 0 | Status: SHIPPED | OUTPATIENT
Start: 2021-04-22 | End: 2021-04-22 | Stop reason: SDUPTHER

## 2021-04-22 RX ORDER — INSULIN GLARGINE 100 [IU]/ML
23 INJECTION, SOLUTION SUBCUTANEOUS 2 TIMES DAILY
Qty: 1 VIAL | Refills: 0 | Status: SHIPPED | OUTPATIENT
Start: 2021-04-22 | End: 2021-05-22

## 2021-04-22 RX ORDER — LANCETS
EACH MISCELLANEOUS
Qty: 180 LANCET | Refills: 11 | Status: SHIPPED | OUTPATIENT
Start: 2021-04-22 | End: 2021-04-22 | Stop reason: SDUPTHER

## 2021-04-22 RX ORDER — LANCETS
EACH MISCELLANEOUS
Qty: 200 LANCET | Refills: 11 | Status: SHIPPED | OUTPATIENT
Start: 2021-04-22 | End: 2021-04-22 | Stop reason: SDUPTHER

## 2021-04-22 RX ORDER — INSULIN LISPRO 100 [IU]/ML
INJECTION, SOLUTION INTRAVENOUS; SUBCUTANEOUS
Qty: 1 CARTRIDGE | Refills: 0 | Status: SHIPPED | OUTPATIENT
Start: 2021-04-22 | End: 2021-05-22

## 2021-04-22 RX ORDER — INSULIN GLARGINE 100 [IU]/ML
47 INJECTION, SOLUTION SUBCUTANEOUS
Status: DISCONTINUED | OUTPATIENT
Start: 2021-04-22 | End: 2021-04-22 | Stop reason: HOSPADM

## 2021-04-22 RX ORDER — DEXAMETHASONE 4 MG/1
TABLET ORAL
Qty: 60 TAB | Refills: 0 | Status: SHIPPED | OUTPATIENT
Start: 2021-04-22 | End: 2021-05-26 | Stop reason: ALTCHOICE

## 2021-04-22 RX ORDER — IBUPROFEN 200 MG
CAPSULE ORAL
Qty: 100 STRIP | Refills: 0 | Status: SHIPPED | OUTPATIENT
Start: 2021-04-22 | End: 2021-04-22 | Stop reason: SDUPTHER

## 2021-04-22 RX ORDER — ACETAMINOPHEN 325 MG/1
650 TABLET ORAL
Qty: 60 TAB | Refills: 0 | Status: SHIPPED | OUTPATIENT
Start: 2021-04-22 | End: 2022-01-01

## 2021-04-22 RX ORDER — LANCETS
EACH MISCELLANEOUS
Qty: 200 LANCET | Refills: 11 | Status: SHIPPED | OUTPATIENT
Start: 2021-04-22 | End: 2022-01-01

## 2021-04-22 RX ORDER — INSULIN LISPRO 100 [IU]/ML
INJECTION, SOLUTION INTRAVENOUS; SUBCUTANEOUS
Qty: 1 CARTRIDGE | Refills: 0 | Status: SHIPPED | OUTPATIENT
Start: 2021-04-22 | End: 2021-04-22

## 2021-04-22 RX ORDER — INSULIN PUMP SYRINGE, 3 ML
EACH MISCELLANEOUS
Qty: 1 KIT | Refills: 0 | Status: SHIPPED | OUTPATIENT
Start: 2021-04-22 | End: 2022-01-01

## 2021-04-22 RX ORDER — IBUPROFEN 200 MG
CAPSULE ORAL
Qty: 100 STRIP | Refills: 0 | Status: SHIPPED | OUTPATIENT
Start: 2021-04-22 | End: 2022-01-01

## 2021-04-22 RX ADMIN — DEXAMETHASONE 4 MG: 4 TABLET ORAL at 05:57

## 2021-04-22 RX ADMIN — HEPARIN SODIUM 5000 UNITS: 5000 INJECTION INTRAVENOUS; SUBCUTANEOUS at 05:57

## 2021-04-22 RX ADMIN — INSULIN LISPRO 7 UNITS: 100 INJECTION, SOLUTION INTRAVENOUS; SUBCUTANEOUS at 12:33

## 2021-04-22 RX ADMIN — ASPIRIN 81 MG: 81 TABLET, CHEWABLE ORAL at 08:09

## 2021-04-22 RX ADMIN — DEXAMETHASONE 4 MG: 4 TABLET ORAL at 12:33

## 2021-04-22 RX ADMIN — PIOGLITAZONE 45 MG: 15 TABLET ORAL at 08:09

## 2021-04-22 RX ADMIN — INSULIN LISPRO 10 UNITS: 100 INJECTION, SOLUTION INTRAVENOUS; SUBCUTANEOUS at 08:09

## 2021-04-22 RX ADMIN — Medication 10 ML: at 06:00

## 2021-04-22 NOTE — PROGRESS NOTES
Discharge instructions reviewed with Patient verbalized understanding. Discharge medications reviewed with Patient and prescriptions given to patient. Patient made aware of follow up MD visits. An After Visit Summary was printed and given to the patient. Peripheral IVs were removed. Patient to be transported home with family member.

## 2021-04-22 NOTE — DISCHARGE SUMMARY
Alfredo Ponce [facility] [address]  [address] [facility], [address] [place] [address]  [phone]    Physician Discharge Summary     Patient ID:  Franklin Pisano  [id number]  73 y.o.  1955    Admit date: 4/16/2021    Discharge date and time: 4/22/2021 2:16 PM    Admission Diagnoses: Severe hyperglycemia due to diabetes mellitus ([facility] [E11.65]    Discharge Diagnoses:  Principal Diagnosis <principal problem not specified>                                            Active Problems:    Acute sinusitis (4/16/2021)      Pyuria (4/16/2021)      Diabetes (HCC) ()      Hypertension ()      MAMADOU (acute kidney injury) ([facility] ()      HLD (hyperlipidemia) ()      Severe hyperglycemia due to diabetes mellitus ([facility] (4/16/2021)      Elevated alkaline phosphatase level (4/16/2021)       Hospital Course:     Severe hyperglycemia due to diabetes mellitus:   - present on admission and now worse due to steroids fr cord compression   - on discharge lantus 23 units BID, sliding scale insulin, metformin,actos, stopped glyburide   - received diabete education   - f/u PCP      Back pain:     - likely due to prostate cancer with mets   -PSA significantly elevated and MRI shows metastatic dz at T10, T11, T12, retroperitoneal LAD   -s/p bone biopsy and kyphoplasty with IR  -CT C/A/P, bone scan done for staging  - f/u oncology, radiation oncology and ortho OP      Cord Compression   -steroids on discharge   - f/u radiation oncology and oncology OP      Malignancy:   -workup as above  -onc f/u OP      MAMADOU (acute kidney injury):   -improved with hydration  - ACE resumed     Pyuria:   -cont abx  -cx with klebsiella  -s/p ceftriaxone 5 day course      Acute sinusitis: CTX as above     Hypertension:   -Resume atenolol and ACE on discharge      HLD (hyperlipidemia): continue statin    PCP: Jimmie Calzada MD     Consults: Hematology/Oncology and Orthopedic Surgery    Significant Diagnostic Studies:     CT chest abd pelvis w   .   IMPRESSION  1. Sclerosis and lysis in the left 2nd rib. 2. Sclerosis and lysis with expansion of the anterior, right 6th rib with an  associated pathologic fracture laterally. 3. Metastatic disease in T10 with slight compression and extension into the  anterior, superior endplate of S62. There is soft tissue attenuation in the fat  surrounding the T10 vertebral body concerning for soft tissue extension. 4. Lysis in the inferior endplate of J78 is concerning for metastatic disease. 5. Adenopathy in the retroperitoneum, adjacent to the aorta and in the mesentery  in the left lower quadrant. 6. Prostatomegaly with some heterogeneity. Recommend clinical correlation with  PSA. 7. Incidental findings as above. Discharge Exam:  Physical Exam:    Gen: Well-developed, well-nourished, in no acute distress  HEENT:  Pink conjunctivae, PERRL, hearing intact to voice, moist mucous membranes  Resp: No accessory muscle use, clear breath sounds without wheezes rales or rhonchi  Card: No murmurs, normal S1, S2 without thrills, bruits or peripheral edema  Abd:  Soft, non-tender, non-distended, normoactive bowel sounds are present, no palpable organomegaly and no detectable hernias  Lymph:  No cervical or inguinal adenopathy  Musc: No cyanosis or clubbing  Skin: No rashes or ulcers, skin turgor is good  Neuro:  Cranial nerves are grossly intact, no focal motor weakness, follows commands appropriately  Psych:  Good insight, oriented to person, place and time, alert    Disposition: home  Discharge Condition: Stable    Patient Instructions:   Current Discharge Medication List      START taking these medications    Details   acetaminophen (TYLENOL) 325 mg tablet Take 2 Tabs by mouth every six (6) hours as needed for Pain. Qty: 60 Tab, Refills: 0      dexAMETHasone (DECADRON) 4 mg tablet Please take 1 tablet every 6 hours.   Qty: 60 Tab, Refills: 0      insulin glargine (LANTUS) 100 unit/mL injection 23 Units by SubCUTAneous route two (2) times a day for 30 days. Qty: 1 Vial, Refills: 0      lancets misc Please use as directed. 4 times daily with meals and at bedtime. E11.9  Qty: 200 Lancet, Refills: 11      glucose blood VI test strips (blood glucose test) strip Please check your glucose 4 times daily with meals and at bedtime. E11.9  Qty: 100 Strip, Refills: 0      insulin lispro (HumaLOG U-100 Insulin) 100 unit/mL cartridge SLIDING SCALE WITH MEALS, USE 3 times daily with meals  200-249- 2units Humalog  [id number] Humalog  [id number] Humalog  [id number] Humalog  Over 400- 10units Humalog  Qty: 1 Cartridge, Refills: 0      Blood-Glucose Meter monitoring kit Use as directed by your PCP  Qty: 1 Kit, Refills: 0         CONTINUE these medications which have NOT CHANGED    Details   aspirin 81 mg chewable tablet Take 81 mg by mouth daily. atenoloL (TENORMIN) 50 mg tablet Take 50 mg by mouth nightly. atorvastatin (LIPITOR) 10 mg tablet Take 10 mg by mouth nightly. benazepriL (LOTENSIN) 40 mg tablet Take 40 mg by mouth daily. !! metFORMIN (GLUCOPHAGE) 500 mg tablet Take 1,500 mg by mouth daily (with breakfast). !! metFORMIN (GLUCOPHAGE) 500 mg tablet Take 1,000 mg by mouth daily (with dinner). pioglitazone (Actos) 45 mg tablet Take 45 mg by mouth daily. !! - Potential duplicate medications found. Please discuss with provider. STOP taking these medications       glyBURIDE micronized (GLYNASE) 6 mg tab Comments:   Reason for Stopping:         triamterene-hydroCHLOROthiazide (MAXZIDE) 37.5-25 mg per tablet Comments:   Reason for Stopping:             Activity: Activity as tolerated  Diet: Diabetic Diet  Wound Care: None needed    Follow-up with  Follow-up Information     Follow up With Specialties Details Why Contact Info    Glyburide - Pt's own med : Please send home with the patient upon discharge.  Thank you!!!         Jeana Sheehan MD Radiation Oncology Go on 4/27/2021 Appt at 12:00 pm to meet with radiation oncologist [address] [facility]  [address] [phone]      Ade Luna MD Hematology and Oncology, Internal Medicine, Hematology, Oncology Go on 4/27/2021 appt at 3 pm with oncology provider appt and then initation of first treatment  [address]  [address] [facility]  [phone]      Devon Valdovinos MD Internal Medicine In 3 days [facility] Follow Up  [address] [facility] [address] [facility]  [phone]      Andrew Moyer MD Orthopedic Surgery In 1 week [facility] Follow Up  [address] [facility]  [address]  [address] [facility]  [phone]            Follow-up tests/labs as above.      Signed:  Valdez Greenfield DO  4/22/2021  2:16 PM  **I personally spent 35 min on discharge**

## 2021-04-22 NOTE — PROGRESS NOTES
Occupational therapy note:  Orders received, chart reviewed. Patient received in bed eating lunch. Patient reports increased no current OT needs. He reports he will be discharged home later today. Patient reports up in room, managing brace without difficulty and will have assistance of multiple family members at home if needed. Patient currently reports no acute OT needs, but appreciative of therapist attempt to assist. Will complete orders. Akua Nance MS OTR/L

## 2021-04-22 NOTE — DIABETES MGMT
[facility] SPECIALIST CONSULT NOTE    Presentation   Chandler Rhodes is a 73 y.o. male admitted 4/16/21 with decreased appetite, general malaise. Patient reports symptoms started after getting the COVID vaccine. Patient found to be in [facility] in ED by labs 4/16/21 at 1418 with BG of 997, AG 12, + ketones in urine. HX:   Past Medical History:   Diagnosis Date    Diabetes ([address]     Hypertension        Current clinical course has been complicated by high dose steroids. Diabetes: Patient has known Type 2 diabetes, diagnosed approximately 20 years ago. Home diabetes medication regimen is Glyburide 6 mg twice daily, Metformin 1500 mg with breakfast and 1000 mg with dinner, and Actos 45 mg daily. Admission  and A1c 11.6% (4/16/21) indicate poor diabetes control. Consulted by Provider for advanced diabetes nursing assessment and care, specifically related to     [x] Home management assessment      Diabetes-related medical history  Acute complications  HHNK  Neurological complications  denies  Microvascular disease  denies  Macrovascular disease  denies  Other associated conditions     denies    Diabetes medication history  Drug class Currently in use Discontinued Never used   Biguanide Metformin 1500 mg breakfast  1000 mg dinner     DPP-4 inhibitor       Sulfonylurea Glyburide 6 mg BID     Thiazolidinedione Actos 45 mg daily     GLP-1 RA      SGLT-2 inhibitors      Basal insulin      Bolus insulin      Fixed Dose  Combinations        Subjective   Patient laying in bed, alert and oriented. Patient without complaints at this time. Patient reports that he got the [place] vaccine 3/18/21 and after getting vaccine began to have decreased appetite and general fatigue. Patient reports that he stopped taking diabetes medications because \"I just wasn't eating so I didn't think I needed to take them. \"      This CNS contacted patient's PCP office to request the last 2 A1c results as patient reports \"my A1c is checked every 3 months and it is usually around 6. 1. \"  Awaiting call back from patient's PCP. Patient reports the following home diabetes self-care practices:  Eating pattern  Patient reports that he eats 4-5 small meals per day prior to the decreased appetite started. Patient reports that he will \"open a can of [employer] beans and it will last me a couple days\"  Patient reports \"I stay away from sugar and starch, I try to eat 4-5 small meals a day, I eat when I am hungry, and I sometimes eat a little red meat. \"  Examples patient provided of meals:    Breakfast - eggs, or oatmeal or cream of wheat or lima beans  Lunch - lima beans or sandwich  Dinner - baked chicken    Patient reports that he primarily drinks water but will have 2 diet pepsi's a day. Physical activity pattern  Patient reports that he is \"very active\" with the exception of the last month. Patient reports his exercise is from splitting wood, hunting, walks. Monitoring pattern  Patient reports that he has been \"waiting for a meter. \"  Wasn't able to speak to how long his meter hasn't been working but reports he checks his BG \"about every 2 weeks\" on his sister's meter. Taking medications pattern  [x] Inconsistent administration - patient has not taken diabetes medications over the last month due to decreased appetite and not eating so patient reports he didn't think he still needed to take them if he's not eating. [x] Affordable    Social determinants of health impacting diabetes self-management practices   Patient denies any issues or concerns at this time. Objective   Physical exam  General Alert, oriented and in no acute distress. Conversant and cooperative.    Vital Signs   Visit Vitals  BP (!) 149/71 (BP 1 Location: Left arm, BP Patient Position: At rest)   Pulse (!) 57   Temp 98.3 °F (36.8 °C)   Resp 18   Ht 5' 9\" (1.753 m)   Wt 117.9 kg (260 lb)   SpO2 97%   BMI 38.40 kg/m²     Skin  Warm and dry. No Acanthosis noted along neckline. No lipohypertrophy or lipoatrophy noted at injection sites   Heart   Regular rate and rhythm. No murmurs, rubs or gallops  Lungs  Clear to auscultation without rales or rhonchi  Extremities No foot wounds         Laboratory  Lab Results   Component Value Date/Time    Hemoglobin A1c 11.6 (H) 04/16/2021 02:18 PM     No results found for: LDL, LDLC, DLDLP  Lab Results   Component Value Date/Time    Creatinine 1.01 04/22/2021 06:33 AM     Lab Results   Component Value Date/Time    Sodium 137 04/22/2021 06:33 AM    Potassium 4.4 04/22/2021 06:33 AM    Chloride 107 04/22/2021 06:33 AM    CO2 24 04/22/2021 06:33 AM    Anion gap 6 04/22/2021 06:33 AM    Glucose 266 (H) 04/22/2021 06:33 AM    BUN 22 (H) 04/22/2021 06:33 AM    Creatinine 1.01 04/22/2021 06:33 AM    BUN/Creatinine ratio 22 (H) 04/22/2021 06:33 AM    GFR est AA >60 04/22/2021 06:33 AM    GFR est non-AA >60 04/22/2021 06:33 AM    Calcium 9.0 04/22/2021 06:33 AM    Bilirubin, total 0.2 04/17/2021 05:17 AM    Alk. phosphatase 670 (H) 04/17/2021 05:17 AM    Protein, total 8.2 04/17/2021 05:17 AM    Albumin 2.9 (L) 04/17/2021 05:17 AM    Globulin 5.3 (H) 04/17/2021 05:17 AM    A-G Ratio 0.5 (L) 04/17/2021 05:17 AM    ALT (SGPT) 21 04/17/2021 05:17 AM     Lab Results   Component Value Date/Time    ALT (SGPT) 21 04/17/2021 05:17 AM         Factors affecting BG management  Factor Dose Comments   Nutrition:  Carb-controlled meals   60 grams/meal    Drugs:  Vasopressor load  Steroids    HIV   Epogen  Blood transfusion(s)  Other: n/a   n/a  Decadron 4 mg every 6 hours  n/a  n/a  n/a         A1cs inaccurate  A1cs inaccurate   Pain 0/10    Infection n/a    Other: n/a n/a      Blood glucose pattern          Evaluation   This 73year old male, with Type 2 diabetes, did not achieve diabetes control prior to admission (PTA), as evidenced by admission BG of 997 and A1c of 11.6%.  Based on assessment of diabetes self-care practices, interventions that warrant action while hospitalized include:   [x] Healthy Eating   [x] Problem Solving  [x] Being Active   [x] Healthy Coping  [x] Monitoring   [x] Reducing Risks  [x] Taking Medications    The patient would also benefit from diabetes self-management education and support PALLAV[facility] BRIZUELA [facility]) after discharge. During this hospitalization, the patient has not achieved inpatient blood glucose target of 100-180mg/dl. BG's have ranged 266-468 over the past 24 hours. Factors that have played a role include:    [x] Compromised insulin absorption or delivery  [x] Glucocorticoid use      Basal insulin is in use. Lantus 35 units daily currently ordered. Bolus insulin isn't in use. Patient is eating meals. Corrective insulin is in use. Patient has required 40 units corrective insulin over the past 24 hours. Patient received a one time dose of NPH 10 units 4/21/21 at 1242. Actos 45 mg daily currently ordered. To optimize BG control and support a positive health outcome, would utilize the Subcutaneous Insulin Order set (1786). Impression: It is suspected that the current diabetes medication regimen both during admission and at home is not sufficient to obtain and maintain glycemic control. Patient had an A1c of 11.6% on admission indicating poor control on home regimen. With the addition of high dose steroids, BG's have consistently been over 300 mg/dL. It is likely that patient will require adjustments to basal insulin and also the addition of bolus insulin during admission. For discharge, it is likely that patient will require adjustments in medication regimen to include insulin. Patient has been getting basal insulin and Actos during admission and BG's remain above target. It is suspected that continuing Actos at discharge with the addition of basal insulin will provide adequate BG control and hopefully decrease risk of hypoglycemic events.   It is suspected that if patient were to also continue Glyburide at discharge along with Actos and the initiation of insulin, he could experience hypoglycemia. Assessment and Plan   Nursing Diagnosis Risk for unstable blood glucose pattern   Nursing Intervention Domain 4053 Decision-making Support   Nursing Interventions Examined current inpatient diabetes control   Explored factors facilitating and impeding inpatient management  Identified self-management practices impeding diabetes control  Explored corrective strategies with patient and responsible inpatient provider   Informed patient of rational for insulin strategy while hospitalized       Recommendations   Recommend:    [x] Use of Subcutaneous Insulin Order set (1935)  Insulin Use Recommendation   Basal                                      (Based on weight, BMI & GFR) Addresses basic metabolic needs Increase Lantus to 47 units (0.4 units/kg/day) daily   Nutritional                                      (Based on CHO load) Addresses nutrition interventions Start Humalog 6 units with meals, hold if patient eats < 50% CHO on meal tray. Corrective                                       (Offset gaps in dosing) Useful in adjusting insulin dosing Correctional Scale for Normal Sensitivity    200-249- 2units Humalog  [id number] Humalog  [id number] Humalog  [phone] Humalog  Over 400- 10units Humalog    Do NOT hold for NPO; give in addition to meal time insulin dose. If patient does not eat, -give correction dose only. [x] Discharge Recommendations -    Resume Metformin    Start Lantus 47 units daily (if NPH is needed for discharge vs Lantus dose would be 23 units twice daily.)   Continue Actos 45 mg   Stop Glyburide      [x] Referral to    [x] Diabetes Self-Management Training through [facility] (Phone [phone] to schedule appointment)    Billing Code(s)   Total time spent with patient: 35 Minutes.   I personally reviewed medical record, including notes, data and current medications, and examined the patient at bedside before making care recommendations.        [x] [id number] IP subsequent hospital care - 35 minutes    Sea Lincoln DNP, APRN-BC, [address] [place] [address], [facility]  Diabetes Clinical Nurse Specialist  [facility]  Access via [address] [place] [address]

## 2021-04-22 NOTE — PROGRESS NOTES
CM Note:  Pt to d/c to home today transported by his family. No CM needs. Has an appointment to f/u with his PCP and is to schedule f/u appointments with specialy providers.   LULY Wilson

## 2021-04-22 NOTE — PROGRESS NOTES
Physician Progress Note      PATIENT:               Hailee Morgan  CSN #:                  [id number]  :                       1955  ADMIT DATE:       2021 1:11 PM  [address] DATE:  RESPONDING  PROVIDER #:        LINDA BARBOSA DO          QUERY TEXT:    Dear Hospitalist Team,  Pt admitted with DM2 with HHS hyperglycemia and new found metastatic disease. Pt noted to have Pyuria per progress note on  with positive UC. If possible, please document in the progress notes and discharge summary if you are evaluating and/or treating any of the following: The medical record reflects the following:    Risk Factors: 73 Yr M admitted with DM2 with HHS/hyperglycemia; new found metastatic disease    Clinical Indicators: Patient arrived to the ED with c/o knot in back now with uncontrolled jerking of left arm. Work up in the ED included a UA which revealed glucose: >1,000 Ketone: 15 Blood: trace Bacteria: 4+. A urine culture was sent from UA which returned back positive for KLEBSIELLA PNEUMONIAE. Patient has been receiving NS IVF at 75 ML/HR and Rocephin 1 G IV Q 24 hrs. Treatment: Daily CBC, UA/UC, frequent monitoring/vital signs, NS IVF at 75 ML/HR and Rocephin 1 G IV Q 24 hrs. Thank you,  Asaf Hook RN, [place], [address] [place] [address]  Options provided:  -- Urinary Tract Infection (UTI)  -- Bacteriuria  -- Pyuria  -- Other - I will add my own diagnosis  -- Disagree - Not applicable / Not valid  -- Disagree - Clinically unable to determine / Unknown  -- Refer to Clinical Documentation Reviewer    PROVIDER RESPONSE TEXT:    This patient has a UTI. Query created by:  Peggy Boykin on 2021 3:23 PM      Electronically signed by:  Jordyn King DO 2021 1:44 PM

## 2021-04-22 NOTE — PROGRESS NOTES
Problem: Diabetes Self-Management  Goal: *Monitoring blood glucose, interpreting and using results  Description: Identify recommended blood glucose targets  and personal targets. 4/22/2021 1039 by Nilsa Suh RN  Outcome: Progressing Towards Goal  4/22/2021 1039 by Nilsa Suh, RN  Outcome: Progressing Towards Goal     Problem: Falls - Risk of  Goal: *Absence of Falls  Description: Document Liz Whittington Fall Risk and appropriate interventions in the flowsheet.   Outcome: Progressing Towards Goal  Note: Fall Risk Interventions:  Mobility Interventions: Bed/chair exit alarm         Medication Interventions: Bed/chair exit alarm, Patient to call before getting OOB, Teach patient to arise slowly    Elimination Interventions: Bed/chair exit alarm, Call light in reach, Urinal in reach

## 2021-04-22 NOTE — DISCHARGE INSTRUCTIONS
HOSPITALIST DISCHARGE INSTRUCTIONS  NAME: Alcides Bundy   :  1955   MRN:  [id number]     Date/Time:  2021 1:48 PM    ADMIT DATE: 2021     DISCHARGE DATE: 2021     ADMITTING DIAGNOSIS:  Hyperglycemia    DISCHARGE DIAGNOSIS:  Hyperglycemia  Metastatic bone lesions    Patient Education        Learning About High Blood Sugar  What is high blood sugar? Your body turns the food you eat into glucose (sugar), which it uses for energy. But if your body isn't able to use the sugar right away, it can build up in your blood and lead to high blood sugar. When the amount of sugar in your blood stays too high for too much of the time, you may have diabetes. Diabetes is a disease that can cause serious health problems. The good news is that lifestyle changes may help you get your blood sugar back to normal and avoid or delay diabetes. What causes high blood sugar? Sugar (glucose) can build up in your blood if you:  · Have insulin resistance. · Don't take enough insulin or miss a dose of your diabetes medicine. · Take certain medicines, such as steroids. What are the symptoms? Having high blood sugar may not cause any symptoms at all. Or it may make you feel very thirsty or very hungry. You may also urinate more often than usual, have blurry vision, or lose weight without trying. How is high blood sugar treated? You can take steps to lower your blood sugar level if you understand what makes it get higher. Your doctor may want you to learn how to test your blood sugar level at home. Then you can see how illness, stress, or different kinds of food or medicine raise or lower your blood sugar level. Other tests may be needed to see if you have diabetes. How can you prevent high blood sugar? · Watch your weight. If you're overweight, losing just a small amount of weight may help. Reducing fat around your waist is most important. · Limit the amount of calories, sweets, and unhealthy fat you eat.  Ask your doctor if a dietitian can help you. A registered dietitian can help you create meal plans that fit your lifestyle. · Get at least 30 minutes of exercise on most days of the week. Exercise helps control your blood sugar. It also helps you maintain a healthy weight. Walking is a good choice. You also may want to do other activities, such as running, swimming, cycling, or playing tennis or team sports. · If your doctor prescribed medicines, take them exactly as prescribed. Call your doctor if you think you are having a problem with your medicine. You will get more details on the specific medicines your doctor prescribes. Follow-up care is a key part of your treatment and safety. Be sure to make and go to all appointments, and call your doctor if you are having problems. It's also a good idea to know your test results and keep a list of the medicines you take. Where can you learn more? Go to http://www.gray.com/  Enter O108 in the search box to learn more about \"Learning About High Blood Sugar. \"  Current as of: August 31, 2020               Content Version: 12.8  © 1570-8555 [employer]. Care instructions adapted under license by [employer] (which disclaims liability or warranty for this information). If you have questions about a medical condition or this instruction, always ask your healthcare professional. [address] any warranty or liability for your use of this information. MEDICATIONS:    · It is important that you take the medication exactly as they are prescribed. · Keep your medication in the bottles provided by the pharmacist and keep a list of the medication names, dosages, and times to be taken in your wallet.    · Do not take other medications without consulting your doctor     Pain Management: per above medications    What to do at Home    Recommended diet:  Diabetic Diet    Recommended activity: Activity as tolerated    1) Return to the hospital if you feel worse    2) If you experience any of the following symptoms then please call your primary care physician or return to the emergency room if you cannot get hold of your doctor:  Fever, chills, nausea, vomiting, diarrhea, change in mentation, falling, bleeding, shortness of breath, chest pain, severe headache, severe abdominal pain. Follow Up: Follow-up Information     Follow up With Specialties Details Why Contact Info    Glyburide - Pt's own med : Please send home with the patient upon discharge. Thank you!!!         Angelita Cerrato MD Radiation Oncology Go on 4/27/2021 Appt at 12:00 pm to meet with radiation oncologist [address] [place], [place] [address]r. [phone]      Bryan Webb MD Hematology and Oncology, Internal Medicine, Hematology, Oncology Go on 4/27/2021 appt at 3 pm with oncology provider appt and then initation of first treatment  [address] [place] [address]  [phone]              Information obtained by :  I understand that if any problems occur once I am at home I am to contact my physician. I understand and acknowledge receipt of the instructions indicated above.                                                                                                                                            Physician's or R.N.'s Signature                                                                  Date/Time                                                                                                                                              Patient or Representative Signature                                                          Date/Time

## 2021-04-22 NOTE — PROGRESS NOTES
Pharmacist Discharge Medication Reconciliation    Discharge Provider:  Dr. Andrea Barroso close monitoring of insulin dose while taking dexamethasone. When steroid is discontinued, patient may require significant insulin dose reduction to avoid hypoglycemia. Discharge Medications:      My Medications        START taking these medications        Instructions Each Dose to Equal Morning Noon Evening Bedtime   acetaminophen 325 mg tablet  Commonly known as: TYLENOL      Take 2 Tabs by mouth every six (6) hours as needed for Pain. 650 mg         blood glucose test strip  Generic drug: glucose blood VI test strips      Please check your glucose 4 times daily with meals and at bedtime. E11.9          Blood-Glucose Meter monitoring kit      Please use as directed 40 times a day. E11.9          dexAMETHasone 4 mg tablet  Commonly known as: DECADRON      Please take 1 tablet every 6 hours. HumaLOG U-100 Insulin 100 unit/mL cartridge  Generic drug: insulin lispro      SLIDING SCALE WITH MEALS, USE 3 times daily with meals 200-249- 2units Humalog [id number] Humalog [id number] Humalog [id number] Humalog Over 400- 10units Humalog          insulin glargine 100 unit/mL injection  Commonly known as: LANTUS      23 Units by SubCUTAneous route two (2) times a day for 30 days. 23 Units         lancets Misc      Please use as directed. 4 times daily with meals and at bedtime. E11.9                 CONTINUE taking these medications        Instructions Each Dose to Equal Morning Noon Evening Bedtime   Actos 45 mg tablet  Generic drug: pioglitazone      Take 45 mg by mouth daily. 45 mg         aspirin 81 mg chewable tablet      Take 81 mg by mouth daily. 81 mg         atenoloL 50 mg tablet  Commonly known as: TENORMIN      Take 50 mg by mouth nightly. 50 mg         atorvastatin 10 mg tablet  Commonly known as: LIPITOR      Take 10 mg by mouth nightly.    10 mg         benazepriL 40 mg tablet  Commonly known as: LOTENSIN      Take 40 mg by mouth daily. 40 mg         * metFORMIN 500 mg tablet  Commonly known as: GLUCOPHAGE      Take 1,500 mg by mouth daily (with breakfast). 1,500 mg         * metFORMIN 500 mg tablet  Commonly known as: GLUCOPHAGE      Take 1,000 mg by mouth daily (with dinner). 1,000 mg               * This list has 2 medication(s) that are the same as other medications prescribed for you. Read the directions carefully, and ask your doctor or other care provider to review them with you.                 STOP taking these medications      glyBURIDE micronized 6 mg Tab  Commonly known as: GLYNASE        triamterene-hydroCHLOROthiazide 37.5-25 mg per tablet  Commonly known as: Laurie Arriaza                  Where to Get Your Medications        These medications were sent to [address] [place] [address], [address] [place] [address]      Phone: [phone]   acetaminophen 325 mg tablet  blood glucose test strip  Blood-Glucose Meter monitoring kit  dexAMETHasone 4 mg tablet  HumaLOG U-100 Insulin 100 unit/mL cartridge  insulin glargine 100 unit/mL injection  lancets Misc       The patient's chart, MAR, and AVS were reviewed by   BERNARDA Mckeon Celso Emilio Ferre[address] 23: [phone]

## 2021-04-27 ENCOUNTER — HOSPITAL ENCOUNTER (OUTPATIENT)
Dept: INFUSION THERAPY | Age: 66
Discharge: HOME | End: 2021-04-27
Payer: MEDICARE

## 2021-04-27 ENCOUNTER — HOSPITAL ENCOUNTER (OUTPATIENT)
Dept: RADIATION THERAPY | Age: 66
Discharge: HOME | End: 2021-04-27

## 2021-04-27 ENCOUNTER — OFFICE VISIT (OUTPATIENT)
Dept: ONCOLOGY | Age: 66
End: 2021-04-27
Payer: MEDICARE

## 2021-04-27 VITALS
DIASTOLIC BLOOD PRESSURE: 62 MMHG | OXYGEN SATURATION: 98 % | SYSTOLIC BLOOD PRESSURE: 135 MMHG | TEMPERATURE: 97 F | HEART RATE: 57 BPM | RESPIRATION RATE: 18 BRPM

## 2021-04-27 VITALS — WEIGHT: 259 LBS | BODY MASS INDEX: 39.25 KG/M2 | HEIGHT: 68 IN

## 2021-04-27 VITALS
SYSTOLIC BLOOD PRESSURE: 143 MMHG | RESPIRATION RATE: 16 BRPM | HEIGHT: 68 IN | TEMPERATURE: 96.6 F | HEART RATE: 55 BPM | DIASTOLIC BLOOD PRESSURE: 58 MMHG | WEIGHT: 260 LBS | OXYGEN SATURATION: 99 % | BODY MASS INDEX: 39.4 KG/M2

## 2021-04-27 DIAGNOSIS — C61 PROSTATE CANCER (HCC): Primary | ICD-10-CM

## 2021-04-27 DIAGNOSIS — C61 PROSTATE CANCER (HCC): ICD-10-CM

## 2021-04-27 DIAGNOSIS — C79.51 METASTASIS TO BONE (HCC): ICD-10-CM

## 2021-04-27 DIAGNOSIS — C79.51 METASTASIS TO BONE (HCC): Primary | ICD-10-CM

## 2021-04-27 LAB
ALBUMIN SERPL-MCNC: 2.7 G/DL (ref 3.5–5)
ALBUMIN/GLOB SERPL: 0.6 {RATIO} (ref 1.1–2.2)
ALP SERPL-CCNC: 662 U/L (ref 45–117)
ALT SERPL-CCNC: 21 U/L (ref 12–78)
ANION GAP SERPL CALC-SCNC: 7 MMOL/L (ref 5–15)
AST SERPL-CCNC: 12 U/L (ref 15–37)
BASOPHILS # BLD: 0 K/UL (ref 0–0.1)
BASOPHILS NFR BLD: 0 % (ref 0–1)
BILIRUB SERPL-MCNC: 0.3 MG/DL (ref 0.2–1)
BUN SERPL-MCNC: 52 MG/DL (ref 6–20)
BUN/CREAT SERPL: 31 (ref 12–20)
CALCIUM SERPL-MCNC: 8.7 MG/DL (ref 8.5–10.1)
CHLORIDE SERPL-SCNC: 108 MMOL/L (ref 97–108)
CO2 SERPL-SCNC: 22 MMOL/L (ref 21–32)
CREAT SERPL-MCNC: 1.68 MG/DL (ref 0.7–1.3)
DIFFERENTIAL METHOD BLD: ABNORMAL
EOSINOPHIL # BLD: 0 K/UL (ref 0–0.4)
EOSINOPHIL NFR BLD: 0 % (ref 0–7)
ERYTHROCYTE [DISTWIDTH] IN BLOOD BY AUTOMATED COUNT: 13.9 % (ref 11.5–14.5)
GLOBULIN SER CALC-MCNC: 4.5 G/DL (ref 2–4)
GLUCOSE SERPL-MCNC: 303 MG/DL (ref 65–100)
HCT VFR BLD AUTO: 31.9 % (ref 36.6–50.3)
HGB BLD-MCNC: 10 G/DL (ref 12.1–17)
IMM GRANULOCYTES # BLD AUTO: 0.1 K/UL (ref 0–0.04)
IMM GRANULOCYTES NFR BLD AUTO: 1 % (ref 0–0.5)
LYMPHOCYTES # BLD: 0.8 K/UL (ref 0.8–3.5)
LYMPHOCYTES NFR BLD: 6 % (ref 12–49)
MCH RBC QN AUTO: 22.9 PG (ref 26–34)
MCHC RBC AUTO-ENTMCNC: 31.3 G/DL (ref 30–36.5)
MCV RBC AUTO: 73.2 FL (ref 80–99)
MONOCYTES # BLD: 0.8 K/UL (ref 0–1)
MONOCYTES NFR BLD: 6 % (ref 5–13)
NEUTS SEG # BLD: 11.5 K/UL (ref 1.8–8)
NEUTS SEG NFR BLD: 87 % (ref 32–75)
NRBC # BLD: 0 K/UL (ref 0–0.01)
NRBC BLD-RTO: 0 PER 100 WBC
PLATELET # BLD AUTO: 389 K/UL (ref 150–400)
PMV BLD AUTO: 10.5 FL (ref 8.9–12.9)
POTASSIUM SERPL-SCNC: 4.4 MMOL/L (ref 3.5–5.1)
PROT SERPL-MCNC: 7.2 G/DL (ref 6.4–8.2)
PSA SERPL-MCNC: 104 NG/ML (ref 0.01–4)
RBC # BLD AUTO: 4.36 M/UL (ref 4.1–5.7)
SODIUM SERPL-SCNC: 137 MMOL/L (ref 136–145)
WBC # BLD AUTO: 13.3 K/UL (ref 4.1–11.1)

## 2021-04-27 PROCEDURE — 80053 COMPREHEN METABOLIC PANEL: CPT

## 2021-04-27 PROCEDURE — G8417 CALC BMI ABV UP PARAM F/U: HCPCS | Performed by: INTERNAL MEDICINE

## 2021-04-27 PROCEDURE — 3017F COLORECTAL CA SCREEN DOC REV: CPT | Performed by: INTERNAL MEDICINE

## 2021-04-27 PROCEDURE — 85025 COMPLETE CBC W/AUTO DIFF WBC: CPT

## 2021-04-27 PROCEDURE — 1101F PT FALLS ASSESS-DOCD LE1/YR: CPT | Performed by: INTERNAL MEDICINE

## 2021-04-27 PROCEDURE — G8754 DIAS BP LESS 90: HCPCS | Performed by: INTERNAL MEDICINE

## 2021-04-27 PROCEDURE — 1111F DSCHRG MED/CURRENT MED MERGE: CPT | Performed by: INTERNAL MEDICINE

## 2021-04-27 PROCEDURE — G0463 HOSPITAL OUTPT CLINIC VISIT: HCPCS | Performed by: INTERNAL MEDICINE

## 2021-04-27 PROCEDURE — 84153 ASSAY OF PSA TOTAL: CPT

## 2021-04-27 PROCEDURE — 96402 CHEMO HORMON ANTINEOPL SQ/IM: CPT

## 2021-04-27 PROCEDURE — 36415 COLL VENOUS BLD VENIPUNCTURE: CPT

## 2021-04-27 PROCEDURE — 74011250636 HC RX REV CODE- 250/636: Performed by: NURSE PRACTITIONER

## 2021-04-27 PROCEDURE — 84403 ASSAY OF TOTAL TESTOSTERONE: CPT

## 2021-04-27 PROCEDURE — G8427 DOCREV CUR MEDS BY ELIG CLIN: HCPCS | Performed by: INTERNAL MEDICINE

## 2021-04-27 PROCEDURE — G8432 DEP SCR NOT DOC, RNG: HCPCS | Performed by: INTERNAL MEDICINE

## 2021-04-27 PROCEDURE — G8752 SYS BP LESS 140: HCPCS | Performed by: INTERNAL MEDICINE

## 2021-04-27 PROCEDURE — G8536 NO DOC ELDER MAL SCRN: HCPCS | Performed by: INTERNAL MEDICINE

## 2021-04-27 PROCEDURE — 99215 OFFICE O/P EST HI 40 MIN: CPT | Performed by: INTERNAL MEDICINE

## 2021-04-27 RX ORDER — PREDNISONE 5 MG/1
5 TABLET ORAL DAILY
Qty: 30 TAB | Refills: 5 | Status: SHIPPED | OUTPATIENT
Start: 2021-04-27 | End: 2022-01-01

## 2021-04-27 RX ORDER — ABIRATERONE ACETATE 250 MG/1
1000 TABLET ORAL DAILY
Qty: 120 TAB | Refills: 5 | Status: SHIPPED | OUTPATIENT
Start: 2021-04-27 | End: 2021-01-01 | Stop reason: SDUPTHER

## 2021-04-27 RX ADMIN — DEGARELIX 240 MG: KIT at 16:23

## 2021-04-27 NOTE — PROGRESS NOTES
[facility] at [address] [facility]  Norris Nam: [phone]  F: [phone]      Reason for Visit:   Halle Thibodeaux is a 73 y.o. male who is seen for follow up of prostate cancer. Hematology/Oncology Treatment History:   · CT C/A/P 4/19/2021: Sclerosis and lysis in the left 2nd rib. Sclerosis and lysis with expansion of the anterior, right 6th rib with an associated pathologic fracture laterally. Metastatic disease in T10 with slight compression and extension into the anterior, superior endplate of D50. There is soft tissue attenuation in the fat surrounding the T10 vertebral body concerning for soft tissue extension. Lysis in the inferior endplate of E38 is concerning for metastatic disease. Adenopathy in the retroperitoneum, adjacent to the aorta and in the mesentery in the left lower quadrant. Prostatomegaly with some heterogeneity. · Bone scan 4/19/2021: Osseous metastatic disease   · Biopsy, ablation, and kyphoplasty of T10/11 4/20/2021: metastatic carcinoma, compatible with metastatic adenocarcinoma of prostatic primary  · Stage IV Prostate Cancer    History of Present Illness:   Halle Thibodeaux is a pleasant 73 y.o. male who presents today for follow up of prostate cancer. He was seen by Dr. Cynthia Bustillo in the hospital last week, see inpatient notes for details. His pain is well controlled on tylenol. He met with Dr. Mira Kapoor earlier today and is planning radiation next week. He remains on dexamethasone, tolerating these well so far. Review of systems was obtained and pertinent findings reviewed above. Past medical history, social history, family history, medications, and allergies are located in the electronic medical record.     Physical Exam:     Visit Vitals  BP (!) 143/58 (BP 1 Location: Right arm, BP Patient Position: Sitting)   Pulse (!) 55   Temp (!) 96.6 °F (35.9 °C) (Oral)   Resp 16   Ht 5' 8\" (1.727 m)   Wt 260 lb (117.9 kg)   SpO2 99% BMI 39.53 kg/m²     ECOG PS: 1  General: no distress  Eyes: anicteric sclerae  HENT: oropharynx clear  Neck: supple  Lymphatic: no cervical, supraclavicular, or inguinal adenopathy  Respiratory: normal respiratory effort  CV: no peripheral edema  GI: soft, nontender, nondistended, no masses  Skin: no rashes, no ecchymoses, no petechiae  Wearing a back brace    Results:     Lab Results   Component Value Date/Time    WBC 14.7 (H) 04/22/2021 06:33 AM    HGB 9.6 (L) 04/22/2021 06:33 AM    HCT 31.1 (L) 04/22/2021 06:33 AM    PLATELET 081 23/39/3940 06:33 AM    MCV 73.5 (L) 04/22/2021 06:33 AM    ABS. NEUTROPHILS 12.6 (H) 04/22/2021 06:33 AM     Lab Results   Component Value Date/Time    Sodium 137 04/22/2021 06:33 AM    Potassium 4.4 04/22/2021 06:33 AM    Chloride 107 04/22/2021 06:33 AM    CO2 24 04/22/2021 06:33 AM    Glucose 266 (H) 04/22/2021 06:33 AM    BUN 22 (H) 04/22/2021 06:33 AM    Creatinine 1.01 04/22/2021 06:33 AM    GFR est AA >60 04/22/2021 06:33 AM    GFR est non-AA >60 04/22/2021 06:33 AM    Calcium 9.0 04/22/2021 06:33 AM    Glucose (POC) 289 (H) 04/22/2021 11:37 AM     Lab Results   Component Value Date/Time    Bilirubin, total 0.2 04/17/2021 05:17 AM    ALT (SGPT) 21 04/17/2021 05:17 AM    Alk. phosphatase 670 (H) 04/17/2021 05:17 AM    Protein, total 8.2 04/17/2021 05:17 AM    Albumin 2.9 (L) 04/17/2021 05:17 AM    Globulin 5.3 (H) 04/17/2021 05:17 AM     Lab Results   Component Value Date/Time    Iron % saturation 20 04/20/2021 05:45 AM    TIBC 221 (L) 04/20/2021 05:45 AM    Ferritin 368 04/20/2021 05:45 AM    Vitamin B12 1,356 (H) 04/20/2021 05:45 AM    Folate 16.8 04/20/2021 05:45 AM     Recent Labs     04/17/21  0517   .0*       Date  PSA  04/17/2021 153    Test results above have been reviewed in treatment summary. Assessment:   1) Metastatic castrate sensitive prostate cancer  He has extensive osseous metastatic disease.   Pathology results from bone biopsy, along with elevated PSA, confirm a diagnosis of metastatic prostate cancer. We reviewed his diagnosis, prognosis, and management options at length. His cancer is not curable and management is with palliative intent. I recommend initiation of androgen deprivation therapy. Given his cord compression, we will start today with Nidhi Brandt in order to get a rapid lowering of his testosterone. We will plan to transition to lupron at his next visit. We additionally discussed the option of upfront chemotherapy or antiandrogen therapy. He is not interested in chemotherapy at this time, but he is agreeable to taking abiraterone and prednisone. I will send in this prescription, though I asked him to wait and start after his radiation. The risks and benefits of therapy were discussed today and the patient has consented to receiving treatment. I recommend we obtain [id number] testing today to assess for somatic mutations, and Invitae testing to assess for germline mutations. 2) Osseous metastases, Thoracic cord compression  S/p ablation and kyphoplasty of T10/11 on 4/20/2021. Continue steroids for now, taper after radiation. Proceed with radiation with Dr. Caroline Miller. Consider Xgeva when he becomes castrate resistant. May obtain DEXA once we have him on ADT. 3) Cancer pain  Improving after kyphoplasty/ablation. Hopefully will improve further with radiation and systemic therapy. Currently just needing tylenol. 4) Anemia, microcytic  Uncertain etiology. No iron deficiency. Perhaps anemia of chronic disease? Check hemoglobin fractionation. 5) DM  Managed by his PCP. Of note, abiraterone interacts with his Actos, needs to monitor glucose closely. Prednisone may worsen DM as well.     Plan:     · Guardant 360  · Invitae  · Firmagon today, followed by Lupron next month  · Proceed with radiation with Dr. Caroline Miller  · Continue dexamethasone, taper after radiation  · Begin abiraterone 1000mg PO daily once done with radiation  · Begin prednisone 5mg PO daily once off dexamethasone  ·  to contact patient and provide resources and counseling. · Return to see me in 4 weeks    Signed By: Cesario Lewis MD        Prognosis: Intermediate     This is our best current assessment. Cancers respond differently to treatment. Overall prognosis depends on many factors including other conditions, cancer stage, side effects, and other unforeseen events. Goal of therapy: Palliative    Expected response to treatment:  Good: Anticipate prolonged, long-term control of cancer    Treatment benefits and harms:  We discussed potential short term side effects to include: hot flashes, fatigue, hair loss, gynecomastia, and other side effects listed in provided teaching materials. Long term side effects of treatment:  reproductive/fertility effects and cardiotoxicity and bone thinning, and other side effects listed in provided teaching materials. Quality of life: Quality of life concerns have been addressed. Treatment as outlined is expected to have minimal impact on patients quality of life.

## 2021-04-27 NOTE — PROGRESS NOTES
Outpatient Infusion Center Short Visit Progress Note    4269 Patient admitted to [facility] for labs/Firmagon ambulatory in stable condition. Assessment completed. No new concerns voiced. Covid Screening      1. Do you have any symptoms of COVID-19? SOB, coughing, fever, or generally not feeling well ? no  2. Have you been exposed to COVID-19 recently? no  3. Have you had any recent contact with family/friend that has a pending COVID test? no    Vital Signs:  Visit Vitals  /62   Pulse (!) 57   Temp 97 °F (36.1 °C)   Resp 18   SpO2 98%     Pt had just left MD office-medication reviewed with him and no questions for RN. Lab Results:  Lab results to be reported in [facility] sampling labs obtained and MD office RN picked up. Medications:  Medications Administered     degarelix [facility]) injection 240 mg     Admin Date  04/27/2021 Action  Given Dose  240 mg Route  SubCUTAneous Administered By  Adria Merchant 77 Patient tolerated treatment well. Patient discharged from [facility] 58 ambulatory in no distress at 1645. Patient aware of next appointment.     Future Appointments   Date Time Provider Alhaji Mckeon   5/26/2021 11:00 AM [facility]   5/26/2021 11:15 AM Adrianna Lucero NP [facility] AMB

## 2021-04-28 ENCOUNTER — TELEPHONE (OUTPATIENT)
Dept: ONCOLOGY | Age: 66
End: 2021-04-28

## 2021-04-28 NOTE — TELEPHONE ENCOUNTER
[facility]  [phone]    04/28/21- Invitae detect and [id number] CDx completed in office. Blood samples and test requisitions mailed via [employer], confirmation [id number].

## 2021-04-29 LAB
COMMENT, TESC2: ABNORMAL
TESTOST SERPL-MCNC: 136 NG/DL (ref 264–916)

## 2021-05-07 NOTE — TELEPHONE ENCOUNTER
Aster Calix from [facility] left a voicemail stating that there will be a delay in getting the results, quality checks are being ran. Expect report to be released end next week.  Any questions  [phone] option 1

## 2021-05-11 NOTE — TELEPHONE ENCOUNTER
[facility] at [facility]  [phone]    05/11/21- Phone call placed to [phone] spoke to Brittni Loza [phone]- she reported additional testing needed to be done. ETA 5/18-5/19.

## 2021-05-13 NOTE — TELEPHONE ENCOUNTER
[address] Navya Kurtz at [place]  [phone]    05/13/21- Returned call to [facility] client services, spoke to [place] who requested additional medical information. She questioned if patient has received a blood transfusion in the last 30 days, or has history of an organ or bone marrow transplant. Upon chart review, it does not appear patient has required blood transfusion in last 30 day, and no record of transplants in our system. Domenic roque verbalized understanding and stated we should have results back in 1-2 days. No further needs from our office.

## 2021-05-13 NOTE — TELEPHONE ENCOUNTER
Dony Bruce from [place] [address] called asking if the patient has had any blood transfusion. Please advise and call Dony Bruce back.     # [phone] option 1

## 2021-05-26 ENCOUNTER — HOSPITAL ENCOUNTER (OUTPATIENT)
Dept: INFUSION THERAPY | Age: 66
Discharge: HOME | End: 2021-05-26
Payer: MEDICARE

## 2021-05-26 ENCOUNTER — OFFICE VISIT (OUTPATIENT)
Dept: ONCOLOGY | Age: 66
End: 2021-05-26
Payer: MEDICARE

## 2021-05-26 VITALS
HEIGHT: 69 IN | OXYGEN SATURATION: 96 % | HEART RATE: 61 BPM | BODY MASS INDEX: 38.51 KG/M2 | SYSTOLIC BLOOD PRESSURE: 140 MMHG | RESPIRATION RATE: 18 BRPM | DIASTOLIC BLOOD PRESSURE: 71 MMHG | WEIGHT: 260 LBS | TEMPERATURE: 98 F

## 2021-05-26 VITALS
HEIGHT: 68 IN | TEMPERATURE: 98.1 F | DIASTOLIC BLOOD PRESSURE: 63 MMHG | RESPIRATION RATE: 20 BRPM | HEART RATE: 82 BPM | SYSTOLIC BLOOD PRESSURE: 130 MMHG | BODY MASS INDEX: 40.77 KG/M2 | WEIGHT: 269 LBS | OXYGEN SATURATION: 95 %

## 2021-05-26 DIAGNOSIS — C79.51 METASTASIS TO BONE (HCC): ICD-10-CM

## 2021-05-26 DIAGNOSIS — C79.51 METASTASIS TO BONE (HCC): Primary | ICD-10-CM

## 2021-05-26 DIAGNOSIS — C61 PROSTATE CANCER (HCC): ICD-10-CM

## 2021-05-26 DIAGNOSIS — Z79.52 LONG TERM (CURRENT) USE OF SYSTEMIC STEROIDS: ICD-10-CM

## 2021-05-26 DIAGNOSIS — C61 PROSTATE CANCER (HCC): Primary | ICD-10-CM

## 2021-05-26 LAB
ALBUMIN SERPL-MCNC: 3.1 G/DL (ref 3.5–5)
ALBUMIN/GLOB SERPL: 0.8 {RATIO} (ref 1.1–2.2)
ALP SERPL-CCNC: 220 U/L (ref 45–117)
ALT SERPL-CCNC: 26 U/L (ref 12–78)
ANION GAP SERPL CALC-SCNC: 8 MMOL/L (ref 5–15)
AST SERPL-CCNC: 12 U/L (ref 15–37)
BASOPHILS # BLD: 0.1 K/UL (ref 0–0.1)
BASOPHILS NFR BLD: 1 % (ref 0–1)
BILIRUB SERPL-MCNC: 0.3 MG/DL (ref 0.2–1)
BUN SERPL-MCNC: 21 MG/DL (ref 6–20)
BUN/CREAT SERPL: 19 (ref 12–20)
CALCIUM SERPL-MCNC: 8.6 MG/DL (ref 8.5–10.1)
CHLORIDE SERPL-SCNC: 113 MMOL/L (ref 97–108)
CO2 SERPL-SCNC: 21 MMOL/L (ref 21–32)
CREAT SERPL-MCNC: 1.09 MG/DL (ref 0.7–1.3)
DIFFERENTIAL METHOD BLD: ABNORMAL
EOSINOPHIL # BLD: 0.2 K/UL (ref 0–0.4)
EOSINOPHIL NFR BLD: 2 % (ref 0–7)
ERYTHROCYTE [DISTWIDTH] IN BLOOD BY AUTOMATED COUNT: 17.4 % (ref 11.5–14.5)
GLOBULIN SER CALC-MCNC: 3.7 G/DL (ref 2–4)
GLUCOSE SERPL-MCNC: 163 MG/DL (ref 65–100)
HCT VFR BLD AUTO: 32.3 % (ref 36.6–50.3)
HGB BLD-MCNC: 9.9 G/DL (ref 12.1–17)
IMM GRANULOCYTES # BLD AUTO: 0.2 K/UL (ref 0–0.04)
IMM GRANULOCYTES NFR BLD AUTO: 2 % (ref 0–0.5)
LYMPHOCYTES # BLD: 0.7 K/UL (ref 0.8–3.5)
LYMPHOCYTES NFR BLD: 9 % (ref 12–49)
MCH RBC QN AUTO: 23.7 PG (ref 26–34)
MCHC RBC AUTO-ENTMCNC: 30.7 G/DL (ref 30–36.5)
MCV RBC AUTO: 77.3 FL (ref 80–99)
MONOCYTES # BLD: 0.8 K/UL (ref 0–1)
MONOCYTES NFR BLD: 11 % (ref 5–13)
NEUTS SEG # BLD: 5.6 K/UL (ref 1.8–8)
NEUTS SEG NFR BLD: 75 % (ref 32–75)
NRBC # BLD: 0 K/UL (ref 0–0.01)
NRBC BLD-RTO: 0 PER 100 WBC
PLATELET # BLD AUTO: 338 K/UL (ref 150–400)
PMV BLD AUTO: 9.9 FL (ref 8.9–12.9)
POTASSIUM SERPL-SCNC: 4.3 MMOL/L (ref 3.5–5.1)
PROT SERPL-MCNC: 6.8 G/DL (ref 6.4–8.2)
PSA SERPL-MCNC: 5.3 NG/ML (ref 0.01–4)
RBC # BLD AUTO: 4.18 M/UL (ref 4.1–5.7)
RBC MORPH BLD: ABNORMAL
SODIUM SERPL-SCNC: 142 MMOL/L (ref 136–145)
WBC # BLD AUTO: 7.6 K/UL (ref 4.1–11.1)

## 2021-05-26 PROCEDURE — G8417 CALC BMI ABV UP PARAM F/U: HCPCS | Performed by: INTERNAL MEDICINE

## 2021-05-26 PROCEDURE — G8427 DOCREV CUR MEDS BY ELIG CLIN: HCPCS | Performed by: INTERNAL MEDICINE

## 2021-05-26 PROCEDURE — 1101F PT FALLS ASSESS-DOCD LE1/YR: CPT | Performed by: INTERNAL MEDICINE

## 2021-05-26 PROCEDURE — 80053 COMPREHEN METABOLIC PANEL: CPT

## 2021-05-26 PROCEDURE — G8752 SYS BP LESS 140: HCPCS | Performed by: INTERNAL MEDICINE

## 2021-05-26 PROCEDURE — G8536 NO DOC ELDER MAL SCRN: HCPCS | Performed by: INTERNAL MEDICINE

## 2021-05-26 PROCEDURE — 3017F COLORECTAL CA SCREEN DOC REV: CPT | Performed by: INTERNAL MEDICINE

## 2021-05-26 PROCEDURE — 74011250636 HC RX REV CODE- 250/636: Performed by: NURSE PRACTITIONER

## 2021-05-26 PROCEDURE — 96402 CHEMO HORMON ANTINEOPL SQ/IM: CPT

## 2021-05-26 PROCEDURE — G8754 DIAS BP LESS 90: HCPCS | Performed by: INTERNAL MEDICINE

## 2021-05-26 PROCEDURE — 99215 OFFICE O/P EST HI 40 MIN: CPT | Performed by: INTERNAL MEDICINE

## 2021-05-26 PROCEDURE — 36415 COLL VENOUS BLD VENIPUNCTURE: CPT

## 2021-05-26 PROCEDURE — 85025 COMPLETE CBC W/AUTO DIFF WBC: CPT

## 2021-05-26 PROCEDURE — G8432 DEP SCR NOT DOC, RNG: HCPCS | Performed by: INTERNAL MEDICINE

## 2021-05-26 PROCEDURE — 84153 ASSAY OF PSA TOTAL: CPT

## 2021-05-26 PROCEDURE — 83021 HEMOGLOBIN CHROMOTOGRAPHY: CPT

## 2021-05-26 PROCEDURE — 83020 HEMOGLOBIN ELECTROPHORESIS: CPT

## 2021-05-26 PROCEDURE — G0463 HOSPITAL OUTPT CLINIC VISIT: HCPCS | Performed by: NURSE PRACTITIONER

## 2021-05-26 RX ADMIN — LEUPROLIDE ACETATE 22.5 MG: KIT at 12:49

## 2021-05-26 NOTE — PROGRESS NOTES
Cassandrabravo Eller is a 73 y.o. male follow up for prostate cancer. 1. Have you been to the ER, urgent care clinic since your last visit? Hospitalized since your last visit?no     2. Have you seen or consulted any other health care providers outside of the [address] [place] [address] since your last visit? Include any pap smears or colon screening.  no

## 2021-05-26 NOTE — PROGRESS NOTES
[facility] Progress Note    Date: May 26, 2021    Name: Chandler Rhodes    MRN: [id number]         : 1955    1225:  Mr. Susy English Arrived ambulatory and in no distress for C1D1 Lupron Injection. Assessment was completed, no acute issues at this time, no new complaints voiced. Patient wearing brace for multiple rib fractures. Recent Results (from the past 24 hour(s))   CBC WITH AUTOMATED DIFF    Collection Time: 21 12:34 PM   Result Value Ref Range    WBC 7.6 4.1 - 11.1 K/uL    RBC 4.18 4.10 - 5.70 M/uL    HGB 9.9 (L) 12.1 - 17.0 g/dL    HCT 32.3 (L) 36.6 - 50.3 %    MCV 77.3 (L) 80.0 - 99.0 FL    MCH 23.7 (L) 26.0 - 34.0 PG    MCHC 30.7 30.0 - 36.5 g/dL    RDW 17.4 (H) 11.5 - 14.5 %    PLATELET 299 823 - 602 K/uL    MPV 9.9 8.9 - 12.9 FL    NRBC 0.0 0  WBC    ABSOLUTE NRBC 0.00 0.00 - 0.01 K/uL    NEUTROPHILS 75 32 - 75 %    LYMPHOCYTES 9 (L) 12 - 49 %    MONOCYTES 11 5 - 13 %    EOSINOPHILS 2 0 - 7 %    BASOPHILS 1 0 - 1 %    IMMATURE GRANULOCYTES 2 (H) 0.0 - 0.5 %    ABS. NEUTROPHILS 5.6 1.8 - 8.0 K/UL    ABS. LYMPHOCYTES 0.7 (L) 0.8 - 3.5 K/UL    ABS. MONOCYTES 0.8 0.0 - 1.0 K/UL    ABS. EOSINOPHILS 0.2 0.0 - 0.4 K/UL    ABS. BASOPHILS 0.1 0.0 - 0.1 K/UL    ABS. IMM.  GRANS. 0.2 (H) 0.00 - 0.04 K/UL    DF SMEAR SCANNED      RBC COMMENTS ANISOCYTOSIS  1+        RBC COMMENTS HYPOCHROMIA  1+        RBC COMMENTS MICROCYTOSIS  1+       METABOLIC PANEL, COMPREHENSIVE    Collection Time: 21 12:34 PM   Result Value Ref Range    Sodium 142 136 - 145 mmol/L    Potassium 4.3 3.5 - 5.1 mmol/L    Chloride 113 (H) 97 - 108 mmol/L    CO2 21 21 - 32 mmol/L    Anion gap 8 5 - 15 mmol/L    Glucose 163 (H) 65 - 100 mg/dL    BUN 21 (H) 6 - 20 MG/DL    Creatinine 1.09 0.70 - 1.30 MG/DL    BUN/Creatinine ratio 19 12 - 20      GFR est AA >60 >60 ml/min/1.73m2    GFR est non-AA >60 >60 ml/min/1.73m2    Calcium 8.6 8.5 - 10.1 MG/DL    Bilirubin, total 0.3 0.2 - 1.0 MG/DL    ALT (SGPT) 26 12 - 78 U/L    AST (SGOT) 12 (L) 15 - 37 U/L    Alk. phosphatase 220 (H) 45 - 117 U/L    Protein, total 6.8 6.4 - 8.2 g/dL    Albumin 3.1 (L) 3.5 - 5.0 g/dL    Globulin 3.7 2.0 - 4.0 g/dL    A-G Ratio 0.8 (L) 1.1 - 2.2       Additional labs processing. Medications:  Medications Administered     leuprolide depot (LUPRON 3 MONTH) injection sykt 22.5 mg     Admin Date  05/26/2021 Action  Given Dose  22.5 mg Route  IntraMUSCular Administered By  Alicia Valera RN                Mr. Forrest Rivera tolerated treatment well and was discharged from [place] [address] in stable condition. He is to return on August 25 at 1100 for his next appointment.     Yamile Jackson RN  May 26, 2021

## 2021-05-26 NOTE — PROGRESS NOTES
[facility] [place] [address]  [address] [facility], [address] [facility] 100[address] [facility]  Rema Barrow: [phone]  F: [phone]      Reason for Visit:   Rosalinda López is a 73 y.o. male who is seen for follow up of prostate cancer. Hematology/Oncology Treatment History:   · CT C/A/P 4/19/2021: Sclerosis and lysis in the left 2nd rib. Sclerosis and lysis with expansion of the anterior, right 6th rib with an associated pathologic fracture laterally. Metastatic disease in T10 with slight compression and extension into the anterior, superior endplate of S03. There is soft tissue attenuation in the fat surrounding the T10 vertebral body concerning for soft tissue extension. Lysis in the inferior endplate of W41 is concerning for metastatic disease. Adenopathy in the retroperitoneum, adjacent to the aorta and in the mesentery in the left lower quadrant. Prostatomegaly with some heterogeneity. · Bone scan 4/19/2021: Osseous metastatic disease   · Biopsy, ablation, and kyphoplasty of T10/11 4/20/2021: metastatic carcinoma, compatible with metastatic adenocarcinoma of prostatic primary  · Stage IV Prostate Cancer  · Initiated Nicolas Moulton on 4/27/2021 x 1 dose  · Radiation with Dr. Camelia Canas completed 5/7/2021, x 5 fractions  · Initiated Sofi Lentz with Prednisone on 5/10/2021    History of Present Illness:   Started Firmagon dosing since last visit. Started Zytiga and Prednisone since last visit. No hot flashes, no night sweats. No nausea or vomiting. No change in bowels. No change in urination. Denies pain. Taking Tylenol PRN. Using only when pain persists. Review of systems was obtained and pertinent findings reviewed above. Past medical history, social history, family history, medications, and allergies are located in the electronic medical record.     Physical Exam:     Visit Vitals  /63 (BP 1 Location: Left upper arm, BP Patient Position: Sitting, BP Cuff Size: Large adult)   Pulse 82   Temp 98.1 °F (36.7 °C) (Oral)   Resp 20   Ht 5' 8\" (1.727 m)   Wt 269 lb (122 kg)   SpO2 95%   BMI 40.90 kg/m²     ECOG PS: 1  General: no distress  Eyes: anicteric sclerae  HENT: oropharynx clear  Neck: supple  Lymphatic: no cervical, supraclavicular, or inguinal adenopathy  Respiratory: normal respiratory effort  CV: no peripheral edema  GI: soft, nontender, nondistended, no masses  Skin: no rashes, no ecchymoses, no petechiae  Wearing a back brace    Results:     Lab Results   Component Value Date/Time    WBC 13.3 (H) 04/27/2021 03:32 PM    HGB 10.0 (L) 04/27/2021 03:32 PM    HCT 31.9 (L) 04/27/2021 03:32 PM    PLATELET 514 80/43/2843 03:32 PM    MCV 73.2 (L) 04/27/2021 03:32 PM    ABS. NEUTROPHILS 11.5 (H) 04/27/2021 03:32 PM     Lab Results   Component Value Date/Time    Sodium 137 04/27/2021 03:32 PM    Potassium 4.4 04/27/2021 03:32 PM    Chloride 108 04/27/2021 03:32 PM    CO2 22 04/27/2021 03:32 PM    Glucose 303 (H) 04/27/2021 03:32 PM    BUN 52 (H) 04/27/2021 03:32 PM    Creatinine 1.68 (H) 04/27/2021 03:32 PM    GFR est AA 50 (L) 04/27/2021 03:32 PM    GFR est non-AA 41 (L) 04/27/2021 03:32 PM    Calcium 8.7 04/27/2021 03:32 PM    Glucose (POC) 289 (H) 04/22/2021 11:37 AM     Lab Results   Component Value Date/Time    Bilirubin, total 0.3 04/27/2021 03:32 PM    ALT (SGPT) 21 04/27/2021 03:32 PM    Alk.  phosphatase 662 (H) 04/27/2021 03:32 PM    Protein, total 7.2 04/27/2021 03:32 PM    Albumin 2.7 (L) 04/27/2021 03:32 PM    Globulin 4.5 (H) 04/27/2021 03:32 PM     Lab Results   Component Value Date/Time    Iron % saturation 20 04/20/2021 05:45 AM    TIBC 221 (L) 04/20/2021 05:45 AM    Ferritin 368 04/20/2021 05:45 AM    Vitamin B12 1,356 (H) 04/20/2021 05:45 AM    Folate 16.8 04/20/2021 05:45 AM     Recent Labs     04/27/21  1532 04/17/21  0517   .0* 153.0*       Date  PSA  05/26/2021  First dose Lupron  05/10/2021  Initiated Zytiga and Prednisone  04/27/2021 104 Firmagon first dose  04/17/2021 153    Test results above have been reviewed in treatment summary. Assessment:   1) Metastatic castrate sensitive prostate cancer  He has extensive osseous metastatic disease. Pathology results from bone biopsy, along with elevated PSA, confirm a diagnosis of metastatic prostate cancer. We reviewed his diagnosis, prognosis, and management options at length. His cancer is not curable and management is with palliative intent. I recommend initiation of androgen deprivation therapy. Given his cord compression, we will start today with Kirill Reagin in order to get a rapid lowering of his testosterone. We will plan to transition to lupron at his next visit. We additionally discussed the option of upfront chemotherapy or antiandrogen therapy. He is not interested in chemotherapy at this time, but he is agreeable to taking abiraterone and prednisone. The risks and benefits of therapy were discussed today and the patient has consented to receiving treatment. Tolerating Zytiga and Prednisone as prescribed and tolerating well. Will proceed, checking labs today. The patient will be seen monthly with each cycle of therapy, and labs will be monitored to assess for toxicity from therapy. [id number] to assess for somatic mutations resulted in multiple mutations including MSI-high. [facility] testing for germline mutations negative. 2) Osseous metastases, Thoracic cord compression  S/p ablation and kyphoplasty of T10/11 on 4/20/2021. Completed radiation with Dr. Rios. Consider Xgeva when he becomes castrate resistant. Obtain non-urgent DEXA to evaluate for baseline bone health due to long term steroid therapy. 3) Cancer pain  Improving after kyphoplasty/ablation and radiation. Continue tylenol PRN. 4) Anemia, microcytic  Uncertain etiology. No iron deficiency. Perhaps anemia of chronic disease? Check hemoglobin fractionation today. 5) DM  Managed by his PCP.  Of note, abiraterone interacts with his Actos, needs to monitor glucose closely. Prednisone may worsen DM as well. Reports BS have been stable. Plan:     · Lupron first dose today, then every 12 weeks  · Labs monthly: CBC, CMP, PSA  · Hgb fractionation today  · Continue abiraterone 1000mg PO daily  · Continue prednisone 5mg PO daily  · DEXA  · Return to see me in 4 weeks    I have personally seen and evaluated the patient in conjunction with Belinda Myers NP. I find the patient's history and physical exam are consistent with the NP's documentation. I agree with the above assessment and plan, which I have modified as needed. He is tolerating systemic therapy with manageable toxicity, so we will proceed with treatment. Transition to Lupron today.     Signed By: Mireya Sarmiento MD

## 2021-06-01 LAB
DEPRECATED HGB OTHER BLD-IMP: ABNORMAL %
HEMOGLOBIN A1,HGBE1: 58.7 % (ref 96.4–98.8)
HEMOGLOBIN A2,HGBE2: ABNORMAL % (ref 1.8–3.2)
HEMOGLOBIN C,HGBE5: 0 %
HEMOGLOBIN E,HGBE6: 0 %
HEMOGLOBIN F,HGBE3: 0.3 % (ref 0–2)
HEMOGLOBIN S,HGBE4: 0 %
HGB A MFR BLD: ABNORMAL % (ref 96.4–98.8)
HGB A2 MFR BLD COLUMN CHROM: 1.4 % (ref 1.8–3.2)
HGB C MFR BLD: ABNORMAL %
HGB F MFR BLD: ABNORMAL % (ref 0–2)
HGB FRACT BLD-IMP: ABNORMAL
HGB S BLD QL SOLY: ABNORMAL
HGB S MFR BLD: ABNORMAL %
HGB VARIANT, 121448: 39.6 %
INTERPRETATION, RHGBE9: ABNORMAL

## 2021-06-01 NOTE — PROGRESS NOTES
06/01/21- Attempted to call patient again, did not leave a detailed message because there's no voicemail to indicate this is the patient's number. Requested a return call to review results.

## 2021-06-21 NOTE — PROGRESS NOTES
[facility]  [address] [facility], [address]  Masoud Nailer: [phone]  F: [phone]      Reason for Visit:   Odin Lakhani is a 73 y.o. male who is seen for follow up of prostate cancer. Hematology/Oncology Treatment History:   · CT C/A/P 4/19/2021: Sclerosis and lysis in the left 2nd rib. Sclerosis and lysis with expansion of the anterior, right 6th rib with an associated pathologic fracture laterally. Metastatic disease in T10 with slight compression and extension into the anterior, superior endplate of V55. There is soft tissue attenuation in the fat surrounding the T10 vertebral body concerning for soft tissue extension. Lysis in the inferior endplate of L80 is concerning for metastatic disease. Adenopathy in the retroperitoneum, adjacent to the aorta and in the mesentery in the left lower quadrant. Prostatomegaly with some heterogeneity. · Bone scan 4/19/2021: Osseous metastatic disease   · Biopsy, ablation, and kyphoplasty of T10/11 4/20/2021: metastatic carcinoma, compatible with metastatic adenocarcinoma of prostatic primary  · Stage IV Prostate Cancer  · Nidhi rBandt on 4/27/2021 x 1 dose  · Radiation with Dr. Caroline Miller completed 5/7/2021, x 5 fractions  · Initiated Robbin Route with Prednisone on 5/10/2021  · Initiated Lupron on 5/26/2021    History of Present Illness:   He reports feeling fairly well. Mild pain in his back, relieved with NSAIDs. He reports compliance with abiraterone and prednisone. When he first started, he had some burning in his gums, but this has resolved. No hot flashes. Moving bowels ok. Urinating normally. Review of systems was obtained and pertinent findings reviewed above. Past medical history, social history, family history, medications, and allergies are located in the electronic medical record.     Physical Exam:     Visit Vitals  BP (!) 148/71 (BP 1 Location: Left upper arm, BP Patient Position: Sitting, BP Cuff Size: Large adult) Comment: . Pulse 63   Temp 98 °F (36.7 °C) (Temporal)   Resp 20   Ht 5' 8\" (1.727 m)   Wt 271 lb (122.9 kg)   SpO2 98%   BMI 41.21 kg/m²     ECOG PS: 1  General: no distress  Eyes: anicteric sclerae  HENT: oropharynx clear  Neck: supple  Lymphatic: no cervical, supraclavicular, or inguinal adenopathy  Respiratory: normal respiratory effort  CV: no peripheral edema  GI: soft, nontender, nondistended, no masses  Skin: no rashes, no ecchymoses, no petechiae      Results:     Lab Results   Component Value Date/Time    WBC 7.6 05/26/2021 12:34 PM    HGB 9.9 (L) 05/26/2021 12:34 PM    HCT 32.3 (L) 05/26/2021 12:34 PM    PLATELET 245 12/87/3536 12:34 PM    MCV 77.3 (L) 05/26/2021 12:34 PM    ABS. NEUTROPHILS 5.6 05/26/2021 12:34 PM     Lab Results   Component Value Date/Time    Sodium 142 05/26/2021 12:34 PM    Potassium 4.3 05/26/2021 12:34 PM    Chloride 113 (H) 05/26/2021 12:34 PM    CO2 21 05/26/2021 12:34 PM    Glucose 163 (H) 05/26/2021 12:34 PM    BUN 21 (H) 05/26/2021 12:34 PM    Creatinine 1.09 05/26/2021 12:34 PM    GFR est AA >60 05/26/2021 12:34 PM    GFR est non-AA >60 05/26/2021 12:34 PM    Calcium 8.6 05/26/2021 12:34 PM    Glucose (POC) 289 (H) 04/22/2021 11:37 AM     Lab Results   Component Value Date/Time    Bilirubin, total 0.3 05/26/2021 12:34 PM    ALT (SGPT) 26 05/26/2021 12:34 PM    Alk.  phosphatase 220 (H) 05/26/2021 12:34 PM    Protein, total 6.8 05/26/2021 12:34 PM    Albumin 3.1 (L) 05/26/2021 12:34 PM    Globulin 3.7 05/26/2021 12:34 PM     Lab Results   Component Value Date/Time    Iron % saturation 20 04/20/2021 05:45 AM    TIBC 221 (L) 04/20/2021 05:45 AM    Ferritin 368 04/20/2021 05:45 AM    Vitamin B12 1,356 (H) 04/20/2021 05:45 AM    Folate 16.8 04/20/2021 05:45 AM     Recent Labs     05/26/21  1234 04/27/21  1532 04/17/21  0517   PSA 5.3* 104.0* 153.0*       Date  PSA  05/26/2021 5.3 Lupron started  05/10/2021  Initiated Raymondo Luis Antonio and Prednisone  04/27/2021 104 [employer] given  04/17/2021 153      5/26/2021:  Hemoglobin fractionation: Hgb G-[place]    DEXA 6/23/2021: done today, results pending      Assessment:   1) Metastatic castrate sensitive prostate cancer  He has extensive osseous metastatic disease. Pathology results from bone biopsy, along with elevated PSA, confirm a diagnosis of metastatic prostate cancer. His cancer is not curable and management is with palliative intent. He is currently on ADT along with upfront Abiraterone and Prednisone. He is tolerating therapy well with manageable toxicity. PSA falling on last check, repeat pending today. We will proceed with treatment. The patient will be seen monthly with each cycle of therapy, and labs will be monitored to assess for toxicity from therapy. Repeat imaging with PSA reaches lisa. [id number] to assess for somatic mutations resulted in multiple mutations including MSI-high. [facility] testing for germline mutations negative. 2) Osseous metastases, Thoracic cord compression  S/p ablation and kyphoplasty of T10/11 on 4/20/2021. Completed radiation with Dr. Ga Son. Consider Xgeva when he becomes castrate resistant. I advised he start taking vitamin D. He gets calcium through his diet. DEXA pending from today. 3) Cancer pain  Improved after kyphoplasty/ablation and radiation. 4) Anemia with Hemoglobin G-Philadelpha  Hemoglobin fractionation is consistent with HGB G-[place], likely the cause of his microcytic anemia. Monitor. 5) DM  Managed by his PCP. Of note, abiraterone interacts with his Actos, needs to monitor glucose closely. Prednisone may worsen DM as well. Reports BS have been good, <100.     Plan:     · Continue abiraterone 1000mg PO daily  · Continue prednisone 5mg PO daily  · Continue Lupron every 12 weeks, scheduled for 8/25  · Labs today and monthly: CBC, CMP, PSA  · Return to see me in about 4 weeks    Signed By: Jayden Sepulveda MD

## 2021-06-23 ENCOUNTER — HOSPITAL ENCOUNTER (OUTPATIENT)
Dept: MAMMOGRAPHY | Age: 66
Discharge: HOME | End: 2021-06-23
Attending: INTERNAL MEDICINE
Payer: MEDICARE

## 2021-06-23 ENCOUNTER — OFFICE VISIT (OUTPATIENT)
Dept: ONCOLOGY | Age: 66
End: 2021-06-23
Payer: MEDICARE

## 2021-06-23 VITALS
HEART RATE: 63 BPM | DIASTOLIC BLOOD PRESSURE: 71 MMHG | OXYGEN SATURATION: 98 % | WEIGHT: 271 LBS | HEIGHT: 68 IN | TEMPERATURE: 98 F | SYSTOLIC BLOOD PRESSURE: 148 MMHG | RESPIRATION RATE: 20 BRPM | BODY MASS INDEX: 41.07 KG/M2

## 2021-06-23 DIAGNOSIS — C79.51 METASTASIS TO BONE (HCC): ICD-10-CM

## 2021-06-23 DIAGNOSIS — C61 PROSTATE CANCER (HCC): Primary | ICD-10-CM

## 2021-06-23 DIAGNOSIS — C61 PROSTATE CANCER (HCC): ICD-10-CM

## 2021-06-23 DIAGNOSIS — D58.2 HEMOGLOBINOPATHY (HCC): ICD-10-CM

## 2021-06-23 DIAGNOSIS — D64.9 ANEMIA, UNSPECIFIED TYPE: ICD-10-CM

## 2021-06-23 DIAGNOSIS — Z79.52 LONG TERM (CURRENT) USE OF SYSTEMIC STEROIDS: ICD-10-CM

## 2021-06-23 PROCEDURE — 1101F PT FALLS ASSESS-DOCD LE1/YR: CPT | Performed by: INTERNAL MEDICINE

## 2021-06-23 PROCEDURE — G8754 DIAS BP LESS 90: HCPCS | Performed by: INTERNAL MEDICINE

## 2021-06-23 PROCEDURE — G8427 DOCREV CUR MEDS BY ELIG CLIN: HCPCS | Performed by: INTERNAL MEDICINE

## 2021-06-23 PROCEDURE — G0463 HOSPITAL OUTPT CLINIC VISIT: HCPCS | Performed by: INTERNAL MEDICINE

## 2021-06-23 PROCEDURE — 3017F COLORECTAL CA SCREEN DOC REV: CPT | Performed by: INTERNAL MEDICINE

## 2021-06-23 PROCEDURE — 77080 DXA BONE DENSITY AXIAL: CPT

## 2021-06-23 PROCEDURE — G8417 CALC BMI ABV UP PARAM F/U: HCPCS | Performed by: INTERNAL MEDICINE

## 2021-06-23 PROCEDURE — 99215 OFFICE O/P EST HI 40 MIN: CPT | Performed by: INTERNAL MEDICINE

## 2021-06-23 PROCEDURE — G8536 NO DOC ELDER MAL SCRN: HCPCS | Performed by: INTERNAL MEDICINE

## 2021-06-23 PROCEDURE — G8753 SYS BP > OR = 140: HCPCS | Performed by: INTERNAL MEDICINE

## 2021-06-23 PROCEDURE — G8432 DEP SCR NOT DOC, RNG: HCPCS | Performed by: INTERNAL MEDICINE

## 2021-06-23 NOTE — PROGRESS NOTES
Franklin Aliya is a 73 y.o. male follow up for prostate cancer. 1. Have you been to the ER, urgent care clinic since your last visit? Hospitalized since your last visit?no     2. Have you seen or consulted any other health care providers outside of the [address] [place] [address] since your last visit? Include any pap smears or colon screening.  no

## 2021-06-23 NOTE — PROGRESS NOTES
[address] [facility] at [facility]  [phone]    06/23/21- Informed pt of results he verbalized understanding. No further questions or concerns.

## 2021-07-20 NOTE — PROGRESS NOTES
[facility] [place] [facility] [place]  [address] [facility], [address]  Diamond Gilmore: [phone]  F: [phone]      Reason for Visit:   Gordan Sandhoff is a 73 y.o. male who is seen for follow up of prostate cancer. Hematology/Oncology Treatment History:   · CT C/A/P 2021: Sclerosis and lysis in the left 2nd rib. Sclerosis and lysis with expansion of the anterior, right 6th rib with an associated pathologic fracture laterally. Metastatic disease in T10 with slight compression and extension into the anterior, superior endplate of I34. There is soft tissue attenuation in the fat surrounding the T10 vertebral body concerning for soft tissue extension. Lysis in the inferior endplate of F78 is concerning for metastatic disease. Adenopathy in the retroperitoneum, adjacent to the aorta and in the mesentery in the left lower quadrant. Prostatomegaly with some heterogeneity. · Bone scan 2021: Osseous metastatic disease   · Biopsy, ablation, and kyphoplasty of T10/11 2021: metastatic carcinoma, compatible with metastatic adenocarcinoma of prostatic primary  · Stage IV Prostate Cancer  · Elvia Bhatia on 4/27/2021 x 1 dose  · Radiation with Dr. Blanca Turk completed 2021, x 5 fractions  · Initiated Hershell Ang with Prednisone on 5/10/2021  · Initiated Lupron on 2021    History of Present Illness:   Appetite has been normal. No nausea or vomiting. No change in bowels. No itching or rashes. No change in breathing. No cough. Denies pain. Staying active. Review of systems was obtained and pertinent findings reviewed above. Past medical history, social history, family history, medications, and allergies are located in the electronic medical record. Physical Exam:     Visit Vitals  BP (!) 123/50 (BP 1 Location: Left upper arm, BP Patient Position: Sitting, BP Cuff Size: Large adult) Comment: .    Pulse 69   Temp 97 °F (36.1 °C) (Temporal)   Resp 16   Ht 5' 8\" (1.727 m)   Wt 274 lb (124.3 kg)   SpO2 98%   BMI 41.66 kg/m²     ECOG PS: 1  General: no distress  Eyes: anicteric sclerae  HENT: oropharynx clear  Neck: supple  Lymphatic: no cervical, supraclavicular adenopathy  Respiratory: normal respiratory effort  CV: no peripheral edema  GI: soft, nontender, nondistended, no masses  Skin: no rashes, no ecchymoses, no petechiae      Results:     Lab Results   Component Value Date/Time    WBC 8.9 06/23/2021 10:28 AM    HGB 10.1 (L) 06/23/2021 10:28 AM    HCT 34.1 (L) 06/23/2021 10:28 AM    PLATELET 565 54/53/1580 10:28 AM    MCV 79.9 (L) 06/23/2021 10:28 AM    ABS. NEUTROPHILS 6.2 06/23/2021 10:28 AM     Lab Results   Component Value Date/Time    Sodium 144 06/23/2021 10:28 AM    Potassium 4.7 06/23/2021 10:28 AM    Chloride 113 (H) 06/23/2021 10:28 AM    CO2 25 06/23/2021 10:28 AM    Glucose 66 06/23/2021 10:28 AM    BUN 20 06/23/2021 10:28 AM    Creatinine 1.18 06/23/2021 10:28 AM    GFR est AA >60 06/23/2021 10:28 AM    GFR est non-AA >60 06/23/2021 10:28 AM    Calcium 9.1 06/23/2021 10:28 AM    Glucose (POC) 289 (H) 04/22/2021 11:37 AM     Lab Results   Component Value Date/Time    Bilirubin, total 0.3 06/23/2021 10:28 AM    ALT (SGPT) 33 06/23/2021 10:28 AM    Alk. phosphatase 246 (H) 06/23/2021 10:28 AM    Protein, total 6.6 06/23/2021 10:28 AM    Albumin 3.5 06/23/2021 10:28 AM    Globulin 3.1 06/23/2021 10:28 AM     Lab Results   Component Value Date/Time    Iron % saturation 20 04/20/2021 05:45 AM    TIBC 221 (L) 04/20/2021 05:45 AM    Ferritin 368 04/20/2021 05:45 AM    Vitamin B12 1,356 (H) 04/20/2021 05:45 AM    Folate 16.8 04/20/2021 05:45 AM     Recent Labs     06/23/21  1028 05/26/21  1234 04/27/21  1532 04/17/21  0517   PSA 1.6 5.3* 104.0* 153.0*       Date  PSA  06/23/2021 1.6  05/26/2021 5.3 Lupron started  05/10/2021  Initiated Zytiga and Prednisone  04/27/2021 104 Firmagon given  04/17/2021 153      5/26/2021:  Hemoglobin fractionation: Hgb G-[place]    DEXA 6/23/2021:  This patient is osteopenic using the World Health Organization criteria  10 year probability of major osteoporotic fracture:  8.8%  10 year probability of hip fracture:  1.1%      Assessment:   1) Metastatic castrate sensitive prostate cancer  He has extensive osseous metastatic disease. Pathology results from bone biopsy, along with elevated PSA, confirm a diagnosis of metastatic prostate cancer. His cancer is not curable and management is with palliative intent. He is currently on ADT along with upfront Abiraterone and Prednisone. He is tolerating therapy well with manageable toxicity. PSA responding well to therapy. We will proceed with treatment. The patient will be seen monthly with each cycle of therapy, and labs will be monitored to assess for toxicity from therapy. Repeat imaging with PSA reaches lisa. [id number] to assess for somatic mutations resulted in multiple mutations including MSI-high. [facility] testing for germline mutations negative. 2) Osseous metastases, Thoracic cord compression  S/p ablation and kyphoplasty of T10/11 on 4/20/2021. Completed radiation with Dr. Mikey Miller. Consider Xgeva when he becomes castrate resistant. Continue vitamin D. He gets calcium through his diet. DEXA with osteopenia, repeat in 2 weeks    3) Cancer pain  Improved after kyphoplasty/ablation and radiation. 4) Anemia with Hemoglobin G-Philadelpha  Hemoglobin fractionation is consistent with HGB G-[place], likely the cause of his microcytic anemia. Monitor. 5) DM  Managed by his PCP. Of note, abiraterone interacts with his Actos, needs to monitor glucose closely. Prednisone may worsen DM as well. Reports BS have been good, <100 in the mornings.      Plan:     · Continue abiraterone 1000mg PO daily  · Continue prednisone 5mg PO daily  · Continue Lupron every 12 weeks, scheduled for 8/25  · Labs today and monthly: CBC, CMP, PSA  · Return to see me in about 5 weeks, then move to every 6 week follow ups    I have personally seen and evaluated the patient in conjunction with Rika Myers NP. I find the patient's history and physical exam are consistent with the NP's documentation. I agree with the above assessment and plan, which I have modified as needed. He is tolerating systemic therapy with manageable toxicity, so we will proceed with treatment.       Signed By: Dong Mccurdy MD

## 2021-07-21 ENCOUNTER — OFFICE VISIT (OUTPATIENT)
Dept: ONCOLOGY | Age: 66
End: 2021-07-21
Payer: MEDICARE

## 2021-07-21 VITALS
BODY MASS INDEX: 41.52 KG/M2 | DIASTOLIC BLOOD PRESSURE: 50 MMHG | SYSTOLIC BLOOD PRESSURE: 123 MMHG | HEART RATE: 69 BPM | TEMPERATURE: 97 F | OXYGEN SATURATION: 98 % | HEIGHT: 68 IN | WEIGHT: 274 LBS | RESPIRATION RATE: 16 BRPM

## 2021-07-21 DIAGNOSIS — C79.51 METASTASIS TO BONE (HCC): ICD-10-CM

## 2021-07-21 DIAGNOSIS — C61 PROSTATE CANCER (HCC): Primary | ICD-10-CM

## 2021-07-21 PROCEDURE — G8417 CALC BMI ABV UP PARAM F/U: HCPCS | Performed by: INTERNAL MEDICINE

## 2021-07-21 PROCEDURE — G8754 DIAS BP LESS 90: HCPCS | Performed by: INTERNAL MEDICINE

## 2021-07-21 PROCEDURE — G8427 DOCREV CUR MEDS BY ELIG CLIN: HCPCS | Performed by: INTERNAL MEDICINE

## 2021-07-21 PROCEDURE — G8536 NO DOC ELDER MAL SCRN: HCPCS | Performed by: INTERNAL MEDICINE

## 2021-07-21 PROCEDURE — 99215 OFFICE O/P EST HI 40 MIN: CPT | Performed by: INTERNAL MEDICINE

## 2021-07-21 PROCEDURE — G8752 SYS BP LESS 140: HCPCS | Performed by: INTERNAL MEDICINE

## 2021-07-21 PROCEDURE — 1101F PT FALLS ASSESS-DOCD LE1/YR: CPT | Performed by: INTERNAL MEDICINE

## 2021-07-21 PROCEDURE — 3017F COLORECTAL CA SCREEN DOC REV: CPT | Performed by: INTERNAL MEDICINE

## 2021-07-21 PROCEDURE — G0463 HOSPITAL OUTPT CLINIC VISIT: HCPCS | Performed by: NURSE PRACTITIONER

## 2021-07-21 PROCEDURE — G8432 DEP SCR NOT DOC, RNG: HCPCS | Performed by: INTERNAL MEDICINE

## 2021-07-21 NOTE — PROGRESS NOTES
Erwin Fonseca is a 73 y.o. male follow up for prostate cancer. 1. Have you been to the ER, urgent care clinic since your last visit? Hospitalized since your last visit?no     2. Have you seen or consulted any other health care providers outside of the [address] [place] [address] since your last visit? Include any pap smears or colon screening.  no

## 2021-08-25 ENCOUNTER — HOSPITAL ENCOUNTER (OUTPATIENT)
Dept: INFUSION THERAPY | Age: 66
Discharge: HOME | End: 2021-08-25
Payer: MEDICARE

## 2021-08-25 ENCOUNTER — OFFICE VISIT (OUTPATIENT)
Dept: ONCOLOGY | Age: 66
End: 2021-08-25
Payer: MEDICARE

## 2021-08-25 VITALS
WEIGHT: 273.37 LBS | HEART RATE: 58 BPM | HEIGHT: 68 IN | BODY MASS INDEX: 41.43 KG/M2 | SYSTOLIC BLOOD PRESSURE: 131 MMHG | OXYGEN SATURATION: 98 % | TEMPERATURE: 98.8 F | DIASTOLIC BLOOD PRESSURE: 60 MMHG | RESPIRATION RATE: 18 BRPM

## 2021-08-25 VITALS
SYSTOLIC BLOOD PRESSURE: 143 MMHG | TEMPERATURE: 97.2 F | HEIGHT: 68 IN | BODY MASS INDEX: 41.43 KG/M2 | OXYGEN SATURATION: 98 % | DIASTOLIC BLOOD PRESSURE: 62 MMHG | RESPIRATION RATE: 18 BRPM | WEIGHT: 273.37 LBS | HEART RATE: 67 BPM

## 2021-08-25 DIAGNOSIS — C61 PROSTATE CANCER (HCC): ICD-10-CM

## 2021-08-25 DIAGNOSIS — C79.51 METASTASIS TO BONE (HCC): ICD-10-CM

## 2021-08-25 DIAGNOSIS — C61 PROSTATE CANCER (HCC): Primary | ICD-10-CM

## 2021-08-25 DIAGNOSIS — C79.51 METASTASIS TO BONE (HCC): Primary | ICD-10-CM

## 2021-08-25 LAB
ALBUMIN SERPL-MCNC: 3.2 G/DL (ref 3.5–5)
ALBUMIN/GLOB SERPL: 0.8 {RATIO} (ref 1.1–2.2)
ALP SERPL-CCNC: 183 U/L (ref 45–117)
ALT SERPL-CCNC: 25 U/L (ref 12–78)
ANION GAP SERPL CALC-SCNC: 6 MMOL/L (ref 5–15)
AST SERPL-CCNC: 14 U/L (ref 15–37)
BASOPHILS # BLD: 0.1 K/UL (ref 0–0.1)
BASOPHILS NFR BLD: 1 % (ref 0–1)
BILIRUB SERPL-MCNC: 0.2 MG/DL (ref 0.2–1)
BUN SERPL-MCNC: 21 MG/DL (ref 6–20)
BUN/CREAT SERPL: 17 (ref 12–20)
CALCIUM SERPL-MCNC: 8.8 MG/DL (ref 8.5–10.1)
CHLORIDE SERPL-SCNC: 112 MMOL/L (ref 97–108)
CO2 SERPL-SCNC: 25 MMOL/L (ref 21–32)
CREAT SERPL-MCNC: 1.26 MG/DL (ref 0.7–1.3)
DIFFERENTIAL METHOD BLD: ABNORMAL
EOSINOPHIL # BLD: 1.1 K/UL (ref 0–0.4)
EOSINOPHIL NFR BLD: 15 % (ref 0–7)
ERYTHROCYTE [DISTWIDTH] IN BLOOD BY AUTOMATED COUNT: 12.9 % (ref 11.5–14.5)
GLOBULIN SER CALC-MCNC: 3.9 G/DL (ref 2–4)
GLUCOSE SERPL-MCNC: 81 MG/DL (ref 65–100)
HCT VFR BLD AUTO: 34.7 % (ref 36.6–50.3)
HGB BLD-MCNC: 10.8 G/DL (ref 12.1–17)
IMM GRANULOCYTES # BLD AUTO: 0 K/UL (ref 0–0.04)
IMM GRANULOCYTES NFR BLD AUTO: 0 % (ref 0–0.5)
LYMPHOCYTES # BLD: 1.4 K/UL (ref 0.8–3.5)
LYMPHOCYTES NFR BLD: 18 % (ref 12–49)
MCH RBC QN AUTO: 23.7 PG (ref 26–34)
MCHC RBC AUTO-ENTMCNC: 31.1 G/DL (ref 30–36.5)
MCV RBC AUTO: 76.3 FL (ref 80–99)
MONOCYTES # BLD: 0.8 K/UL (ref 0–1)
MONOCYTES NFR BLD: 10 % (ref 5–13)
NEUTS SEG # BLD: 4.2 K/UL (ref 1.8–8)
NEUTS SEG NFR BLD: 56 % (ref 32–75)
NRBC # BLD: 0 K/UL (ref 0–0.01)
NRBC BLD-RTO: 0 PER 100 WBC
PLATELET # BLD AUTO: 328 K/UL (ref 150–400)
PMV BLD AUTO: 10.2 FL (ref 8.9–12.9)
POTASSIUM SERPL-SCNC: 3.7 MMOL/L (ref 3.5–5.1)
PROT SERPL-MCNC: 7.1 G/DL (ref 6.4–8.2)
PSA SERPL-MCNC: 0.6 NG/ML (ref 0.01–4)
RBC # BLD AUTO: 4.55 M/UL (ref 4.1–5.7)
RBC MORPH BLD: ABNORMAL
RBC MORPH BLD: ABNORMAL
SODIUM SERPL-SCNC: 143 MMOL/L (ref 136–145)
WBC # BLD AUTO: 7.6 K/UL (ref 4.1–11.1)

## 2021-08-25 PROCEDURE — 3017F COLORECTAL CA SCREEN DOC REV: CPT | Performed by: INTERNAL MEDICINE

## 2021-08-25 PROCEDURE — G8536 NO DOC ELDER MAL SCRN: HCPCS | Performed by: INTERNAL MEDICINE

## 2021-08-25 PROCEDURE — G0463 HOSPITAL OUTPT CLINIC VISIT: HCPCS | Performed by: NURSE PRACTITIONER

## 2021-08-25 PROCEDURE — 36415 COLL VENOUS BLD VENIPUNCTURE: CPT

## 2021-08-25 PROCEDURE — 85025 COMPLETE CBC W/AUTO DIFF WBC: CPT

## 2021-08-25 PROCEDURE — 1101F PT FALLS ASSESS-DOCD LE1/YR: CPT | Performed by: INTERNAL MEDICINE

## 2021-08-25 PROCEDURE — G8427 DOCREV CUR MEDS BY ELIG CLIN: HCPCS | Performed by: INTERNAL MEDICINE

## 2021-08-25 PROCEDURE — 74011250636 HC RX REV CODE- 250/636: Performed by: NURSE PRACTITIONER

## 2021-08-25 PROCEDURE — G8417 CALC BMI ABV UP PARAM F/U: HCPCS | Performed by: INTERNAL MEDICINE

## 2021-08-25 PROCEDURE — G8752 SYS BP LESS 140: HCPCS | Performed by: INTERNAL MEDICINE

## 2021-08-25 PROCEDURE — G8432 DEP SCR NOT DOC, RNG: HCPCS | Performed by: INTERNAL MEDICINE

## 2021-08-25 PROCEDURE — 80053 COMPREHEN METABOLIC PANEL: CPT

## 2021-08-25 PROCEDURE — 99215 OFFICE O/P EST HI 40 MIN: CPT | Performed by: INTERNAL MEDICINE

## 2021-08-25 PROCEDURE — 84153 ASSAY OF PSA TOTAL: CPT

## 2021-08-25 PROCEDURE — G8754 DIAS BP LESS 90: HCPCS | Performed by: INTERNAL MEDICINE

## 2021-08-25 PROCEDURE — 96401 CHEMO ANTI-NEOPL SQ/IM: CPT

## 2021-08-25 RX ADMIN — LEUPROLIDE ACETATE 22.5 MG: KIT at 11:41

## 2021-08-25 NOTE — PROGRESS NOTES
Jose Mazariegos is a 73 y.o. male follow up for prostate cancer. 1. Have you been to the ER, urgent care clinic since your last visit? Hospitalized since your last visit?no     2. Have you seen or consulted any other health care providers outside of the [address] [place] [address] since your last visit? Include any pap smears or colon screening.  no

## 2021-08-25 NOTE — PROGRESS NOTES
[facility] [place] at [address] [facility] [address] [facility]  Wade Passer: [phone]  F: [phone]      Reason for Visit:   Sugey Girard is a 73 y.o. male who is seen for follow up of prostate cancer. Hematology/Oncology Treatment History:   · CT C/A/P 4/19/2021: Sclerosis and lysis in the left 2nd rib. Sclerosis and lysis with expansion of the anterior, right 6th rib with an associated pathologic fracture laterally. Metastatic disease in T10 with slight compression and extension into the anterior, superior endplate of H13. There is soft tissue attenuation in the fat surrounding the T10 vertebral body concerning for soft tissue extension. Lysis in the inferior endplate of N88 is concerning for metastatic disease. Adenopathy in the retroperitoneum, adjacent to the aorta and in the mesentery in the left lower quadrant. Prostatomegaly with some heterogeneity. · Bone scan 4/19/2021: Osseous metastatic disease   · Biopsy, ablation, and kyphoplasty of T10/11 4/20/2021: metastatic carcinoma, compatible with metastatic adenocarcinoma of prostatic primary  · Stage IV Prostate Cancer  · Rana Mar on 4/27/2021 x 1 dose  · Radiation with Dr. Juno Elias completed 5/7/2021, x 5 fractions  · Initiated De Santiago Formica with Prednisone on 5/10/2021  · Initiated Lupron on 5/26/2021    History of Present Illness:   Doing well on Zytiga and Prednisone, confirms compliance. No hot flashes or night sweats. Denies pain. Appetite has been stable. No nausea or vomiting. Energy has been good. Review of systems was obtained and pertinent findings reviewed above. Past medical history, social history, family history, medications, and allergies are located in the electronic medical record.     Physical Exam:     Visit Vitals  BP (!) 143/62 (BP 1 Location: Right arm, BP Patient Position: Sitting, BP Cuff Size: Large adult)   Pulse 67   Temp 97.2 °F (36.2 °C) (Temporal)   Resp 18   Ht 5' 8\" (1.727 m)   Wt 273 lb 5.9 oz (124 kg)   SpO2 98%   BMI 41.57 kg/m²     ECOG PS: 1  General: no distress  Eyes: anicteric sclerae  HENT: oropharynx clear  Neck: supple  Lymphatic: no cervical, supraclavicular adenopathy  Respiratory: normal respiratory effort  CV: no peripheral edema  GI: soft, nontender, nondistended, no masses  Skin: no rashes, no ecchymoses, no petechiae      Results:     Lab Results   Component Value Date/Time    WBC 7.6 08/25/2021 11:28 AM    HGB 10.8 (L) 08/25/2021 11:28 AM    HCT 34.7 (L) 08/25/2021 11:28 AM    PLATELET 969 43/95/3396 11:28 AM    MCV 76.3 (L) 08/25/2021 11:28 AM    ABS. NEUTROPHILS 4.2 08/25/2021 11:28 AM     Lab Results   Component Value Date/Time    Sodium 143 08/25/2021 11:28 AM    Potassium 3.7 08/25/2021 11:28 AM    Chloride 112 (H) 08/25/2021 11:28 AM    CO2 25 08/25/2021 11:28 AM    Glucose 81 08/25/2021 11:28 AM    BUN 21 (H) 08/25/2021 11:28 AM    Creatinine 1.26 08/25/2021 11:28 AM    GFR est AA >60 08/25/2021 11:28 AM    GFR est non-AA 57 (L) 08/25/2021 11:28 AM    Calcium 8.8 08/25/2021 11:28 AM    Glucose (POC) 289 (H) 04/22/2021 11:37 AM     Lab Results   Component Value Date/Time    Bilirubin, total 0.2 08/25/2021 11:28 AM    ALT (SGPT) 25 08/25/2021 11:28 AM    Alk.  phosphatase 183 (H) 08/25/2021 11:28 AM    Protein, total 7.1 08/25/2021 11:28 AM    Albumin 3.2 (L) 08/25/2021 11:28 AM    Globulin 3.9 08/25/2021 11:28 AM     Lab Results   Component Value Date/Time    Iron % saturation 20 04/20/2021 05:45 AM    TIBC 221 (L) 04/20/2021 05:45 AM    Ferritin 368 04/20/2021 05:45 AM    Vitamin B12 1,356 (H) 04/20/2021 05:45 AM    Folate 16.8 04/20/2021 05:45 AM     Recent Labs     07/21/21  1115 06/23/21  1028 05/26/21  1234 04/27/21  1532 04/17/21  0517   PSA 0.9 1.6 5.3* 104.0* 153.0*       Date  PSA  07/21/2021 0.9  06/23/2021 1.6  05/26/2021 5.3 Lupron started  05/10/2021  Initiated Zytiga and Prednisone  04/27/2021 104 Firmagon given  04/17/2021 153      5/26/2021:  Hemoglobin fractionation: Hgb G-Hartwell    DEXA 6/23/2021: This patient is osteopenic using the World Health Organization criteria  10 year probability of major osteoporotic fracture:  8.8%  10 year probability of hip fracture:  1.1%      Assessment:   1) Metastatic castrate sensitive prostate cancer  He has extensive osseous metastatic disease. Pathology results from bone biopsy, along with elevated PSA, confirm a diagnosis of metastatic prostate cancer. His cancer is not curable and management is with palliative intent. He is currently on ADT along with upfront Abiraterone and Prednisone. He is tolerating therapy well with manageable toxicity. PSA responding well to therapy. We will proceed with treatment. The patient will be seen monthly with each cycle of therapy, and labs will be monitored to assess for toxicity from therapy. Repeat imaging with PSA reaches lisa. [id number] to assess for somatic mutations resulted in multiple mutations including MSI-high. Inv[facility]ae testing for germline mutations negative. 2) Osseous metastases, Thoracic cord compression  S/p ablation and kyphoplasty of T10/11 on 4/20/2021. Completed radiation with Dr. Blanca Turk. Consider Xgeva when he becomes castrate resistant. Continue vitamin D. He gets calcium through his diet. DEXA with osteopenia, repeat in 6/2023    3) Cancer pain  Improved after kyphoplasty/ablation and radiation. 4) Anemia with Hemoglobin G-Philadelpha  Hemoglobin fractionation is consistent with HGB G-Hartwell, likely the cause of his microcytic anemia. Monitor. 5) DM  Managed by his PCP. Of note, abiraterone interacts with his Actos, needs to monitor glucose closely. Prednisone may worsen DM as well. Reports recent visit with PCP for management.      Plan:     · Continue abiraterone 1000mg PO daily  · Continue prednisone 5mg PO daily  · Continue Lupron every 12 weeks, due today  · Labs today and monthly: CBC, CMP, PSA  · Return to see me in 6 weeks    I have personally seen and evaluated the patient in conjunction with Frank Natarajan NP. I find the patient's history and physical exam are consistent with the NP's documentation. I agree with the above assessment and plan, which I have modified as needed. He is tolerating systemic therapy with manageable toxicity, so we will proceed with treatment.       Signed By: Vianney Cardenas MD

## 2021-08-25 NOTE — PROGRESS NOTES
[facility] Progress Note    Date: 2021    Name: Titi Schafer    MRN: [id number]         : 1955    Mr. Inna Franks Arrived ambulatory and in no distress for Lupron Injection [facility]). Assessment was completed, no acute issues at this time, no new complaints voiced. Peripheral Labs drawn and sent for processing. Chemotherapy Flowsheet 2021   Cycle C2D1   Date 2021   Drug / Regimen Lupron   Notes LGM       Mr. Agnes Mukherjee vitals were reviewed. Visit Vitals  /60 (BP 1 Location: Left arm, BP Patient Position: Sitting)   Pulse (!) 58   Temp 98.8 °F (37.1 °C)   Resp 18   Ht 5' 8\" (1.727 m)   Wt 124 kg (273 lb 5.9 oz)   SpO2 98%   BMI 41.57 kg/m²     Lab results obtained and reviewed. Recent Results (from the past 8 hour(s))   CBC WITH AUTOMATED DIFF    Collection Time: 21 11:28 AM   Result Value Ref Range    WBC 7.6 4.1 - 11.1 K/uL    RBC 4.55 4.10 - 5.70 M/uL    HGB 10.8 (L) 12.1 - 17.0 g/dL    HCT 34.7 (L) 36.6 - 50.3 %    MCV 76.3 (L) 80.0 - 99.0 FL    MCH 23.7 (L) 26.0 - 34.0 PG    MCHC 31.1 30.0 - 36.5 g/dL    RDW 12.9 11.5 - 14.5 %    PLATELET 376 390 - 455 K/uL    MPV 10.2 8.9 - 12.9 FL    NRBC 0.0 0  WBC    ABSOLUTE NRBC 0.00 0.00 - 0.01 K/uL    NEUTROPHILS 56 32 - 75 %    LYMPHOCYTES 18 12 - 49 %    MONOCYTES 10 5 - 13 %    EOSINOPHILS 15 (H) 0 - 7 %    BASOPHILS 1 0 - 1 %    IMMATURE GRANULOCYTES 0 0.0 - 0.5 %    ABS. NEUTROPHILS 4.2 1.8 - 8.0 K/UL    ABS. LYMPHOCYTES 1.4 0.8 - 3.5 K/UL    ABS. MONOCYTES 0.8 0.0 - 1.0 K/UL    ABS. EOSINOPHILS 1.1 (H) 0.0 - 0.4 K/UL    ABS. BASOPHILS 0.1 0.0 - 0.1 K/UL    ABS. IMM.  GRANS. 0.0 0.00 - 0.04 K/UL    DF SMEAR SCANNED      RBC COMMENTS HYPOCHROMIA  1+        RBC COMMENTS MICROCYTOSIS  1+       METABOLIC PANEL, COMPREHENSIVE    Collection Time: 21 11:28 AM   Result Value Ref Range    Sodium 143 136 - 145 mmol/L    Potassium 3.7 3.5 - 5.1 mmol/L    Chloride 112 (H) 97 - 108 mmol/L    CO2 25 21 - 32 mmol/L    Anion gap 6 5 - 15 mmol/L    Glucose 81 65 - 100 mg/dL    BUN 21 (H) 6 - 20 MG/DL    Creatinine 1.26 0.70 - 1.30 MG/DL    BUN/Creatinine ratio 17 12 - 20      GFR est AA >60 >60 ml/min/1.73m2    GFR est non-AA 57 (L) >60 ml/min/1.73m2    Calcium 8.8 8.5 - 10.1 MG/DL    Bilirubin, total 0.2 0.2 - 1.0 MG/DL    ALT (SGPT) 25 12 - 78 U/L    AST (SGOT) 14 (L) 15 - 37 U/L    Alk. phosphatase 183 (H) 45 - 117 U/L    Protein, total 7.1 6.4 - 8.2 g/dL    Albumin 3.2 (L) 3.5 - 5.0 g/dL    Globulin 3.9 2.0 - 4.0 g/dL    A-G Ratio 0.8 (L) 1.1 - 2.2           Medications:  Medications Administered     leuprolide depot (LUPRON 3 MONTH) injection sykt 22.5 mg     Admin Date  08/25/2021 Action  Given Dose  22.5 mg Route  IntraMUSCular Administered By  Diane Bright RN                Mr. Emanuel Fisher tolerated treatment well and was discharged from [place] [address] in stable condition. He is to return on November 17 2021 at 1030am for his next appointment.     Samantha Freitas RN  August 25, 2021

## 2021-10-07 NOTE — PROGRESS NOTES
[facility] [place] at [facility] [address]  [address] [facility], [address] [facility] 100[address] [facility]  Opal Levo: [phone]  F: [phone]      Reason for Visit:   Janiya Wang is a 73 y.o. male who is seen for follow up of prostate cancer. Hematology/Oncology Treatment History:   · CT C/A/P 4/19/2021: Sclerosis and lysis in the left 2nd rib. Sclerosis and lysis with expansion of the anterior, right 6th rib with an associated pathologic fracture laterally. Metastatic disease in T10 with slight compression and extension into the anterior, superior endplate of M63. There is soft tissue attenuation in the fat surrounding the T10 vertebral body concerning for soft tissue extension. Lysis in the inferior endplate of T80 is concerning for metastatic disease. Adenopathy in the retroperitoneum, adjacent to the aorta and in the mesentery in the left lower quadrant. Prostatomegaly with some heterogeneity. · Bone scan 4/19/2021: Osseous metastatic disease   · Biopsy, ablation, and kyphoplasty of T10/11 4/20/2021: metastatic carcinoma, compatible with metastatic adenocarcinoma of prostatic primary  · Stage IV Prostate Cancer  · Rachel Le on 4/27/2021 x 1 dose  · Radiation with Dr. Jeramie Bee completed 5/7/2021, x 5 fractions  · Initiated Cozetta Failing with Prednisone on 5/10/2021  · Initiated Lupron on 5/26/2021    History of Present Illness:   Doing well on Zytiga and Prednisone, confirms compliance. He reports no complaints and states he is doing well. He is accompanied by his daughter today. Review of systems was obtained and pertinent findings reviewed above. Past medical history, social history, family history, medications, and allergies are located in the electronic medical record. Physical Exam:     Visit Vitals  BP (!) 146/64 (BP 1 Location: Left upper arm, BP Patient Position: Sitting, BP Cuff Size: Large adult) Comment: .    Pulse 93   Temp 98 °F (36.7 °C) (Temporal)   Resp 16   Ht 5' 8\" (1.727 m)   Wt 273 lb (123.8 kg)   SpO2 96%   BMI 41.51 kg/m²     ECOG PS: 1  General: no distress  Eyes: anicteric sclerae  HENT: oropharynx clear  Neck: supple  Lymphatic: no cervical, supraclavicular adenopathy  Respiratory: normal respiratory effort  CV: no peripheral edema  GI: soft, nontender, nondistended, no masses  Skin: no rashes, no ecchymoses, no petechiae      Results:     Lab Results   Component Value Date/Time    WBC 7.6 08/25/2021 11:28 AM    HGB 10.8 (L) 08/25/2021 11:28 AM    HCT 34.7 (L) 08/25/2021 11:28 AM    PLATELET 412 36/27/9457 11:28 AM    MCV 76.3 (L) 08/25/2021 11:28 AM    ABS. NEUTROPHILS 4.2 08/25/2021 11:28 AM     Lab Results   Component Value Date/Time    Sodium 143 08/25/2021 11:28 AM    Potassium 3.7 08/25/2021 11:28 AM    Chloride 112 (H) 08/25/2021 11:28 AM    CO2 25 08/25/2021 11:28 AM    Glucose 81 08/25/2021 11:28 AM    BUN 21 (H) 08/25/2021 11:28 AM    Creatinine 1.26 08/25/2021 11:28 AM    GFR est AA >60 08/25/2021 11:28 AM    GFR est non-AA 57 (L) 08/25/2021 11:28 AM    Calcium 8.8 08/25/2021 11:28 AM    Glucose (POC) 289 (H) 04/22/2021 11:37 AM     Lab Results   Component Value Date/Time    Bilirubin, total 0.2 08/25/2021 11:28 AM    ALT (SGPT) 25 08/25/2021 11:28 AM    Alk.  phosphatase 183 (H) 08/25/2021 11:28 AM    Protein, total 7.1 08/25/2021 11:28 AM    Albumin 3.2 (L) 08/25/2021 11:28 AM    Globulin 3.9 08/25/2021 11:28 AM     Lab Results   Component Value Date/Time    Iron % saturation 20 04/20/2021 05:45 AM    TIBC 221 (L) 04/20/2021 05:45 AM    Ferritin 368 04/20/2021 05:45 AM    Vitamin B12 1,356 (H) 04/20/2021 05:45 AM    Folate 16.8 04/20/2021 05:45 AM     Recent Labs     08/25/21  1128 07/21/21  1115 06/23/21  1028 05/26/21  1234 04/27/21  1532 04/17/21  0517   PSA 0.6 0.9 1.6 5.3* 104.0* 153.0*       Date  PSA  08/25/2021 0.6  07/21/2021 0.9  06/23/2021 1.6  05/26/2021 5.3 Lupron started  05/10/2021  Initiated Rell Crandall and Prednisone  04/27/2021 104 Elisabet Mohr given  04/17/2021 153      5/26/2021:  Hemoglobin fractionation: Hgb G-Sherman    DEXA 6/23/2021: This patient is osteopenic using the World Health Organization criteria  10 year probability of major osteoporotic fracture:  8.8%  10 year probability of hip fracture:  1.1%      Assessment:   1) Metastatic castrate sensitive prostate cancer  He has extensive osseous metastatic disease. Pathology results from bone biopsy, along with elevated PSA, confirm a diagnosis of metastatic prostate cancer. His cancer is not curable and management is with palliative intent. He is currently on ADT along with upfront Abiraterone and Prednisone. He is tolerating therapy well with manageable toxicity. PSA responding well to therapy. We will continue with treatment. The patient will be seen monthly with each cycle of therapy, and labs will be monitored to assess for toxicity from therapy. Repeat imaging with PSA reaches lisa. [id number] to assess for somatic mutations resulted in multiple mutations including MSI-high. [facility] testing for germline mutations negative. 2) Osseous metastases, Thoracic cord compression  S/p ablation and kyphoplasty of T10/11 on 4/20/2021. Completed radiation with Dr. Barbara Garduno. Consider Xgeva when he becomes castrate resistant. Continue vitamin D. He gets calcium through his diet. DEXA with osteopenia, repeat in 6/2023    3) Cancer pain  Improved after kyphoplasty/ablation and radiation. 4) Anemia with Hemoglobin G-Philadelpha  Hemoglobin fractionation is consistent with HGB G-Sherman, likely the cause of his microcytic anemia. Monitor. 5) DM  Managed by his PCP. Of note, abiraterone interacts with his Actos, needs to monitor glucose closely. Prednisone may worsen DM as well. Reports recent visit with PCP for management.      Plan:     · Continue abiraterone 1000mg PO daily  · Continue prednisone 5mg PO daily  · Continue Lupron every 12 weeks, due 11/17/2021   · Labs today and monthly: CBC, CMP, PSA  · Return to see me in 6 weeks    I have personally seen and evaluated the patient in conjunction with Shalom West NP. I find the patient's history and physical exam are consistent with the NP's documentation. I agree with the above assessment and plan, which I have modified as needed. He is tolerating systemic therapy with manageable toxicity, so we will proceed with treatment.       Signed By: Debbie Freire MD

## 2021-10-08 NOTE — PROGRESS NOTES
Tha Acevedo is a 73 y.o. male follow up for prostate cancer. 1. Have you been to the ER, urgent care clinic since your last visit? Hospitalized since your last visit?no     2. Have you seen or consulted any other health care providers outside of the [address] [place] [address] since your last visit? Include any pap smears or colon screening.  no

## 2021-11-19 NOTE — PROGRESS NOTES
[facility] Progress Note    Date: 2021    Name: Addie Tidwell    MRN: [id number]         : 1955    Mr. Pa Vigil Arrived ambulatory and in no distress for Lupron Injection (RGM). Assessment was completed, no acute issues at this time, no new complaints voiced. Peripheral Labs drawn from right [facility] and sent for processing. Chemotherapy Flowsheet 2021   Cycle C3D1   Date 2021   Drug / Regimen Lupron   Notes RGM     Mr. Cynthia Schultz vitals were reviewed. Visit Vitals  /60 (BP 1 Location: Right arm, BP Patient Position: Sitting)   Pulse 75   Temp 98.1 °F (36.7 °C)   Resp 18   Ht 5' 8\" (1.727 m)   Wt 126.9 kg (279 lb 11.2 oz)   SpO2 97%   BMI 42.53 kg/m²     Lab results are pending in [facility]. Medications:  Medications Administered     leuprolide depot (LUPRON 3 MONTH) injection sykt 22.5 mg     Admin Date  2021 Action  Given Dose  22.5 mg Route  IntraMUSCular Administered By  Lorne Cochran RN              Mr. Pa Vigil tolerated treatment well and was discharged from [place] [address] in stable condition. He is to return on 2022 at 1030am for his next appointment.     Tracy Rodríguez RN  2021

## 2021-11-19 NOTE — PROGRESS NOTES
[facility] [place] at [place] [address] [facility]  Amalia Mile: [phone]  F: [phone]      Reason for Visit:   Lisa Villarreal is a 73 y.o. male who is seen for follow up of prostate cancer. Hematology/Oncology Treatment History:   · CT C/A/P 4/19/2021: Sclerosis and lysis in the left 2nd rib. Sclerosis and lysis with expansion of the anterior, right 6th rib with an associated pathologic fracture laterally. Metastatic disease in T10 with slight compression and extension into the anterior, superior endplate of E89. There is soft tissue attenuation in the fat surrounding the T10 vertebral body concerning for soft tissue extension. Lysis in the inferior endplate of X33 is concerning for metastatic disease. Adenopathy in the retroperitoneum, adjacent to the aorta and in the mesentery in the left lower quadrant. Prostatomegaly with some heterogeneity. · Bone scan 4/19/2021: Osseous metastatic disease   · Biopsy, ablation, and kyphoplasty of T10/11 4/20/2021: metastatic carcinoma, compatible with metastatic adenocarcinoma of prostatic primary  · Stage IV Prostate Cancer  · Cynthea Mail on 4/27/2021 x 1 dose  · Radiation with Dr. Anaya Breath completed 5/7/2021, x 5 fractions  · Initiated Olivia [place] with Prednisone on 5/10/2021  · Initiated Lupron on 5/26/2021    History of Present Illness:   Doing well on Zytiga and Prednisone, confirms compliance. No new complaints. Appetite has been good. No nausea or vomiting. No change in bowels. Denies pain. No hot flashes or night sweats, sleeping well. He is accompanied by his daughter today. Review of systems was obtained and pertinent findings reviewed above. Past medical history, social history, family history, medications, and allergies are located in the electronic medical record.     Physical Exam:     Visit Vitals  /60   Pulse 75   Temp 98.1 °F (36.7 °C)   Resp 18   Ht 5' 8\" (1.727 m)   Wt 279 lb (126.6 kg)   SpO2 97% BMI 42.42 kg/m²     ECOG PS: 1  General: no distress  Eyes: anicteric sclerae  HENT: oropharynx clear  Neck: supple  Lymphatic: no cervical, supraclavicular adenopathy  Respiratory: normal respiratory effort  CV: no peripheral edema  GI: soft, nontender, nondistended, no masses  Skin: no rashes, no ecchymoses, no petechiae      Results:     Lab Results   Component Value Date/Time    WBC 10.3 11/19/2021 10:40 AM    HGB 10.5 (L) 11/19/2021 10:40 AM    HCT 32.6 (L) 11/19/2021 10:40 AM    PLATELET 204 18/54/8032 10:40 AM    MCV 75.5 (L) 11/19/2021 10:40 AM    ABS. NEUTROPHILS 8.7 (H) 11/19/2021 10:40 AM     Lab Results   Component Value Date/Time    Sodium 140 11/19/2021 10:40 AM    Potassium 4.3 11/19/2021 10:40 AM    Chloride 112 (H) 11/19/2021 10:40 AM    CO2 22 11/19/2021 10:40 AM    Glucose 217 (H) 11/19/2021 10:40 AM    BUN 31 (H) 11/19/2021 10:40 AM    Creatinine 1.60 (H) 11/19/2021 10:40 AM    GFR est AA 53 (L) 11/19/2021 10:40 AM    GFR est non-AA 44 (L) 11/19/2021 10:40 AM    Calcium 8.9 11/19/2021 10:40 AM    Glucose (POC) 289 (H) 04/22/2021 11:37 AM     Lab Results   Component Value Date/Time    Bilirubin, total 0.5 11/19/2021 10:40 AM    ALT (SGPT) 33 11/19/2021 10:40 AM    Alk.  phosphatase 115 11/19/2021 10:40 AM    Protein, total 7.0 11/19/2021 10:40 AM    Albumin 3.0 (L) 11/19/2021 10:40 AM    Globulin 4.0 11/19/2021 10:40 AM     Lab Results   Component Value Date/Time    Iron % saturation 20 04/20/2021 05:45 AM    TIBC 221 (L) 04/20/2021 05:45 AM    Ferritin 368 04/20/2021 05:45 AM    Vitamin B12 1,356 (H) 04/20/2021 05:45 AM    Folate 16.8 04/20/2021 05:45 AM     Recent Labs     11/19/21  1040 10/08/21  1200 08/25/21  1128 07/21/21  1115 06/23/21  1028 05/26/21  1234 04/27/21  1532 04/17/21  0517   PSA 0.5 0.5 0.6 0.9 1.6 5.3* 104.0* 153.0*       Date  PSA  10/08/2021 0.5  08/25/2021 0.6  07/21/2021 0.9  06/23/2021 1.6  05/26/2021 5.3 Lupron started  05/10/2021  Initiated Cozetta Failing and Prednisone  04/27/2021 104 Firmagon given  04/17/2021 153      5/26/2021:  Hemoglobin fractionation: Hgb G-[place]alobusha    DEXA 6/23/2021: This patient is osteopenic using the World Health Organization criteria  10 year probability of major osteoporotic fracture:  8.8%  10 year probability of hip fracture:  1.1%      Assessment:   1) Metastatic castrate sensitive prostate cancer  He has extensive osseous metastatic disease. Pathology results from bone biopsy, along with elevated PSA, confirm a diagnosis of metastatic prostate cancer. His cancer is not curable and management is with palliative intent. He is currently on ADT along with upfront Abiraterone and Prednisone. He is tolerating therapy well with manageable toxicity. PSA responding well to therapy. We will continue with treatment. The patient will be seen monthly with each cycle of therapy, and labs will be monitored to assess for toxicity from therapy. Repeat imaging with PSA reaches lisa, will consider upcoming in the next 1-2 months. [id number] to assess for somatic mutations resulted in multiple mutations including MSI-high. [facility] testing for germline mutations negative. 2) Osseous metastases, Thoracic cord compression  S/p ablation and kyphoplasty of T10/11 on 4/20/2021. Completed radiation with Dr. Jaclyn Carter. Consider Xgeva when he becomes castrate resistant. Continue vitamin D. He gets calcium through his diet. DEXA with osteopenia, repeat in 6/2023    3) Cancer pain  Improved after kyphoplasty/ablation and radiation. 4) Anemia with Hemoglobin G-Philadelpha  Hemoglobin fractionation is consistent with HGB G-[place]alobusha, likely the cause of his microcytic anemia. Monitor. 5) DM  Managed by his PCP. Of note, abiraterone interacts with his Actos, needs to monitor glucose closely. Prednisone may worsen DM as well. Reports recent visit with PCP for management.      Plan:     · Continue abiraterone 1000mg PO daily  · Continue prednisone 5mg PO daily  · Continue Lupron every 12 weeks  · Labs today and monthly: CBC, CMP, PSA  · Return to see me in 8 weeks    I have personally seen and evaluated the patient in conjunction with Kalani Fernández NP. I find the patient's history and physical exam are consistent with the NP's documentation. I agree with the above assessment and plan, which I have modified as needed. He is tolerating systemic therapy with manageable toxicity, so we will proceed with treatment.       Signed By: Zaire Levine MD

## 2021-11-19 NOTE — PROGRESS NOTES
Carl Deleon is a 73 y.o. male follow up for prostate cancer. 1. Have you been to the ER, urgent care clinic since your last visit? Hospitalized since your last visit?no     2. Have you seen or consulted any other health care providers outside of the [address] [place] [address] since your last visit? Include any pap smears or colon screening.  No    Vitals 11/19/2021   Blood Pressure 131/60   Pulse 75   Temp 98.1   Resp 18   Height 5' 8\"   Weight 279 lb 11.2 oz   SpO2 97   BSA 2.47 m2   BMI 42.53 kg/m2

## 2021-12-06 NOTE — TELEPHONE ENCOUNTER
[address] at [facility]  [phone]    12/06/21- Mrs. Funes, patients daughter called to report pt has been at [facility] ER since 10 am and has not been seen yet. Only had labs drawn- she is concerned since his pain is increasing and he has not urinated at all today. Expressed apologies that unfortunately our office can not control ER wait times. Encouraged her to ask to speak to nurse with new concerns/ wait times. Advised her if pt is admitted and  needed then he will see pt in the hospital but other than that ER will evaluate and treat patient with new concerns. She verbalized understanding and denied any further questions or concerns.

## 2021-12-06 NOTE — ED TRIAGE NOTES
Pt to ED for urinary retention since this morning, hx of prostate CA. Pain to pelvic area which pt reports is pressure.  Denies fever, chills or swelling

## 2021-12-06 NOTE — ED NOTES
Patients srivastava changed to a leg bag- patient verbalized understanding of leg bag usage. Family also present at the bedside.

## 2021-12-06 NOTE — ED PROVIDER NOTES
HPI the patient complains of urinary retention since this morning. He feels like his bladder is full/distended at this point. He denies having fever, abdominal pain, back pain, vomiting or other complaints. Past Medical History:   Diagnosis Date    Diabetes ([address]     Hypertension        Past Surgical History:   Procedure Laterality Date    IR KYPHOPLASTY THORACIC  4/20/2021         No family history on file. Social History     Socioeconomic History    Marital status:      Spouse name: Not on file    Number of children: Not on file    Years of education: Not on file    Highest education level: Not on file   Occupational History    Not on file   Tobacco Use    Smoking status: Former Smoker    Smokeless tobacco: Not on file   Vaping Use    Vaping Use: Unknown   Substance and Sexual Activity    Alcohol use: Never    Drug use: Never    Sexual activity: Not Currently   Other Topics Concern    Not on file   Social History Narrative    Not on file     Social Determinants of Health     Financial Resource Strain:     Difficulty of Paying Living Expenses: Not on file   Food Insecurity:     Worried About [address] in the Last Year: Not on file    [address] in the Last Year: Not on file   Transportation Needs:     Lack of Transportation (Medical): Not on file    Lack of Transportation (Non-Medical):  Not on file   Physical Activity:     Days of Exercise per Week: Not on file    Minutes of Exercise per Session: Not on file   Stress:     Feeling of Stress : Not on file   Social Connections:     Frequency of Communication with Friends and Family: Not on file    Frequency of Social Gatherings with Friends and Family: Not on file    Attends Yazidi Services: Not on file    Active Member of Clubs or Organizations: Not on file    Attends Club or Organization Meetings: Not on file    Marital Status: Not on file   Intimate Partner Violence:     Fear of Current or Ex-Partner: Not on file    Emotionally Abused: Not on file    Physically Abused: Not on file    Sexually Abused: Not on file   Housing Stability:     Unable to Pay for Housing in the Last Year: Not on file    Number of Places Lived in the Last Year: Not on file    Unstable Housing in the Last Year: Not on file         ALLERGIES: Erythromycin    Review of Systems   Constitutional: Negative for fever. Genitourinary: Positive for difficulty urinating. Musculoskeletal: Negative for back pain.        Vitals:    12/06/21 1153   BP: (!) 180/98   Pulse: 73   Resp: 16   Temp: 98.3 °F (36.8 °C)   SpO2: 97%   Weight: 122.5 kg (270 lb)   Height: 5' 8\" (1.727 m)            Physical Exam     MDM       Procedures

## 2021-12-07 NOTE — TELEPHONE ENCOUNTER
Patients daughter called and stated this patient was taken to the ER yesterday because he could not urinate. She stated they placed a catheter and advised to patient to leave it in for a couple days. She stated they did not tell her where they need to go to have the catheter removed. Please advise.     Cb # [phone]

## 2021-12-07 NOTE — TELEPHONE ENCOUNTER
[address] at [facility]  [phone]    12/07/21- Patient was seen in the ED yesterday for urinary retention and had a catheters placed. He did not receive discharge instructions regarding removal or follow up care. Discussed with Dr. Dianna Frazier, patient should be evaluated by urology. New referral placed to [facility], appointment scheduled for 12/9 at 1 pm (12:40 arrival) with Dr. Skye Hernandez. Located at [address] [place] [address]. Patient's daughter notified of the above appointment information and verbalized understanding. No further questions or concerns.

## 2022-01-01 ENCOUNTER — HOSPITAL ENCOUNTER (OUTPATIENT)
Dept: PREADMISSION TESTING | Age: 67
Discharge: HOME | End: 2022-03-31
Payer: MEDICARE

## 2022-01-01 ENCOUNTER — HOSPITAL ENCOUNTER (OUTPATIENT)
Dept: PREADMISSION TESTING | Age: 67
Discharge: HOME | End: 2022-04-08

## 2022-01-01 ENCOUNTER — APPOINTMENT (OUTPATIENT)
Dept: CT IMAGING | Age: 67
DRG: 699 | End: 2022-01-01
Attending: HOSPITALIST
Payer: MEDICARE

## 2022-01-01 ENCOUNTER — HOSPITAL ENCOUNTER (OUTPATIENT)
Dept: INFUSION THERAPY | Age: 67
Discharge: HOME | End: 2022-08-04
Payer: MEDICARE

## 2022-01-01 ENCOUNTER — TELEPHONE (OUTPATIENT)
Dept: ONCOLOGY | Age: 67
End: 2022-01-01

## 2022-01-01 ENCOUNTER — APPOINTMENT (OUTPATIENT)
Dept: INTERVENTIONAL RADIOLOGY/VASCULAR | Age: 67
End: 2022-01-01
Attending: EMERGENCY MEDICINE
Payer: MEDICARE

## 2022-01-01 ENCOUNTER — HOSPITAL ENCOUNTER (INPATIENT)
Age: 67
LOS: 2 days | Discharge: HOSPICE | DRG: 982 | End: 2022-07-29
Attending: EMERGENCY MEDICINE | Admitting: INTERNAL MEDICINE
Payer: MEDICARE

## 2022-01-01 ENCOUNTER — HOSPITAL ENCOUNTER (OUTPATIENT)
Dept: GENERAL RADIOLOGY | Age: 67
Discharge: HOME | End: 2022-03-31
Attending: UROLOGY
Payer: MEDICARE

## 2022-01-01 ENCOUNTER — APPOINTMENT (OUTPATIENT)
Dept: CT IMAGING | Age: 67
DRG: 723 | End: 2022-01-01
Attending: EMERGENCY MEDICINE
Payer: MEDICARE

## 2022-01-01 ENCOUNTER — HOSPITAL ENCOUNTER (INPATIENT)
Age: 67
LOS: 2 days | Discharge: HOSPICE | DRG: 723 | End: 2022-08-22
Attending: EMERGENCY MEDICINE | Admitting: FAMILY MEDICINE
Payer: MEDICARE

## 2022-01-01 ENCOUNTER — OFFICE VISIT (OUTPATIENT)
Dept: ONCOLOGY | Age: 67
End: 2022-01-01
Payer: MEDICARE

## 2022-01-01 ENCOUNTER — APPOINTMENT (OUTPATIENT)
Dept: ULTRASOUND IMAGING | Age: 67
End: 2022-01-01
Attending: EMERGENCY MEDICINE
Payer: MEDICARE

## 2022-01-01 ENCOUNTER — HOSPITAL ENCOUNTER (OUTPATIENT)
Age: 67
Setting detail: OUTPATIENT SURGERY
Discharge: HOME | End: 2022-04-11
Attending: UROLOGY | Admitting: UROLOGY
Payer: MEDICARE

## 2022-01-01 ENCOUNTER — APPOINTMENT (OUTPATIENT)
Dept: CT IMAGING | Age: 67
End: 2022-01-01
Attending: EMERGENCY MEDICINE
Payer: MEDICARE

## 2022-01-01 ENCOUNTER — HOSPITAL ENCOUNTER (OUTPATIENT)
Dept: NUCLEAR MEDICINE | Age: 67
Discharge: HOME | End: 2022-05-20
Attending: NURSE PRACTITIONER
Payer: MEDICARE

## 2022-01-01 ENCOUNTER — ANESTHESIA EVENT (OUTPATIENT)
Dept: SURGERY | Age: 67
End: 2022-01-01
Payer: MEDICARE

## 2022-01-01 ENCOUNTER — HOSPITAL ENCOUNTER (INPATIENT)
Age: 67
LOS: 6 days | Discharge: HOME | DRG: 699 | End: 2022-07-20
Attending: EMERGENCY MEDICINE | Admitting: HOSPITALIST
Payer: MEDICARE

## 2022-01-01 ENCOUNTER — HOSPITAL ENCOUNTER (EMERGENCY)
Age: 67
Discharge: HOME | End: 2022-06-20
Attending: EMERGENCY MEDICINE
Payer: MEDICARE

## 2022-01-01 ENCOUNTER — HOSPITAL ENCOUNTER (OUTPATIENT)
Dept: INFUSION THERAPY | Age: 67
Discharge: HOME | End: 2022-02-11
Payer: MEDICARE

## 2022-01-01 ENCOUNTER — HOSPITAL ENCOUNTER (OUTPATIENT)
Dept: INTERVENTIONAL RADIOLOGY/VASCULAR | Age: 67
Discharge: HOME | End: 2022-04-22
Attending: UROLOGY | Admitting: UROLOGY
Payer: MEDICARE

## 2022-01-01 ENCOUNTER — APPOINTMENT (OUTPATIENT)
Dept: INFUSION THERAPY | Age: 67
End: 2022-01-01
Payer: MEDICARE

## 2022-01-01 ENCOUNTER — APPOINTMENT (OUTPATIENT)
Dept: INTERVENTIONAL RADIOLOGY/VASCULAR | Age: 67
DRG: 982 | End: 2022-01-01
Attending: NURSE PRACTITIONER
Payer: MEDICARE

## 2022-01-01 ENCOUNTER — TRANSCRIBE ORDER (OUTPATIENT)
Dept: SCHEDULING | Age: 67
End: 2022-01-01

## 2022-01-01 ENCOUNTER — HOSPITAL ENCOUNTER (OUTPATIENT)
Dept: CT IMAGING | Age: 67
Discharge: HOME | End: 2022-05-20
Attending: NURSE PRACTITIONER
Payer: MEDICARE

## 2022-01-01 ENCOUNTER — HOSPITAL ENCOUNTER (EMERGENCY)
Age: 67
Discharge: HOME | End: 2022-06-22
Attending: EMERGENCY MEDICINE
Payer: MEDICARE

## 2022-01-01 ENCOUNTER — HOSPITAL ENCOUNTER (EMERGENCY)
Age: 67
Discharge: HOME | End: 2022-01-25
Attending: EMERGENCY MEDICINE
Payer: MEDICARE

## 2022-01-01 ENCOUNTER — ANESTHESIA (OUTPATIENT)
Dept: SURGERY | Age: 67
End: 2022-01-01
Payer: MEDICARE

## 2022-01-01 ENCOUNTER — HOSPITAL ENCOUNTER (OUTPATIENT)
Dept: INFUSION THERAPY | Age: 67
Discharge: HOME | End: 2022-05-06
Payer: MEDICARE

## 2022-01-01 ENCOUNTER — HOSPITAL ENCOUNTER (EMERGENCY)
Age: 67
Discharge: HOME | End: 2022-06-23
Attending: EMERGENCY MEDICINE
Payer: MEDICARE

## 2022-01-01 ENCOUNTER — HOSPITAL ENCOUNTER (EMERGENCY)
Age: 67
Discharge: HOME | End: 2022-04-29
Attending: EMERGENCY MEDICINE
Payer: MEDICARE

## 2022-01-01 ENCOUNTER — APPOINTMENT (OUTPATIENT)
Dept: GENERAL RADIOLOGY | Age: 67
DRG: 699 | End: 2022-01-01
Attending: INTERNAL MEDICINE
Payer: MEDICARE

## 2022-01-01 ENCOUNTER — HOSPITAL ENCOUNTER (OUTPATIENT)
Dept: INFUSION THERAPY | Age: 67
Discharge: HOME | End: 2022-07-29

## 2022-01-01 VITALS
BODY MASS INDEX: 33.24 KG/M2 | OXYGEN SATURATION: 99 % | SYSTOLIC BLOOD PRESSURE: 138 MMHG | RESPIRATION RATE: 18 BRPM | WEIGHT: 219.3 LBS | HEART RATE: 87 BPM | TEMPERATURE: 97.9 F | HEIGHT: 68 IN | DIASTOLIC BLOOD PRESSURE: 71 MMHG

## 2022-01-01 VITALS
OXYGEN SATURATION: 96 % | BODY MASS INDEX: 34.1 KG/M2 | DIASTOLIC BLOOD PRESSURE: 61 MMHG | HEART RATE: 83 BPM | RESPIRATION RATE: 14 BRPM | HEIGHT: 68 IN | TEMPERATURE: 98.9 F | SYSTOLIC BLOOD PRESSURE: 127 MMHG | WEIGHT: 225 LBS

## 2022-01-01 VITALS
TEMPERATURE: 97.8 F | SYSTOLIC BLOOD PRESSURE: 146 MMHG | DIASTOLIC BLOOD PRESSURE: 65 MMHG | OXYGEN SATURATION: 98 % | HEIGHT: 68 IN | RESPIRATION RATE: 18 BRPM | HEART RATE: 61 BPM | BODY MASS INDEX: 40.84 KG/M2 | WEIGHT: 269.5 LBS

## 2022-01-01 VITALS
HEIGHT: 68 IN | WEIGHT: 219 LBS | HEART RATE: 87 BPM | DIASTOLIC BLOOD PRESSURE: 71 MMHG | TEMPERATURE: 97.9 F | OXYGEN SATURATION: 99 % | SYSTOLIC BLOOD PRESSURE: 138 MMHG | RESPIRATION RATE: 18 BRPM | BODY MASS INDEX: 33.19 KG/M2

## 2022-01-01 VITALS
DIASTOLIC BLOOD PRESSURE: 71 MMHG | WEIGHT: 249 LBS | OXYGEN SATURATION: 98 % | HEIGHT: 69 IN | SYSTOLIC BLOOD PRESSURE: 167 MMHG | RESPIRATION RATE: 20 BRPM | TEMPERATURE: 97.6 F | HEART RATE: 75 BPM | BODY MASS INDEX: 36.88 KG/M2

## 2022-01-01 VITALS
HEIGHT: 69 IN | BODY MASS INDEX: 36.21 KG/M2 | TEMPERATURE: 97.1 F | WEIGHT: 244.5 LBS | HEART RATE: 64 BPM | SYSTOLIC BLOOD PRESSURE: 159 MMHG | DIASTOLIC BLOOD PRESSURE: 72 MMHG | RESPIRATION RATE: 16 BRPM | OXYGEN SATURATION: 98 %

## 2022-01-01 VITALS
TEMPERATURE: 98 F | BODY MASS INDEX: 29.4 KG/M2 | WEIGHT: 194 LBS | HEART RATE: 67 BPM | OXYGEN SATURATION: 97 % | SYSTOLIC BLOOD PRESSURE: 126 MMHG | RESPIRATION RATE: 18 BRPM | DIASTOLIC BLOOD PRESSURE: 67 MMHG | HEIGHT: 68 IN

## 2022-01-01 VITALS
DIASTOLIC BLOOD PRESSURE: 93 MMHG | BODY MASS INDEX: 40.16 KG/M2 | OXYGEN SATURATION: 98 % | HEIGHT: 68 IN | RESPIRATION RATE: 16 BRPM | TEMPERATURE: 98.2 F | SYSTOLIC BLOOD PRESSURE: 160 MMHG | WEIGHT: 265 LBS | HEART RATE: 92 BPM

## 2022-01-01 VITALS
OXYGEN SATURATION: 100 % | RESPIRATION RATE: 20 BRPM | SYSTOLIC BLOOD PRESSURE: 132 MMHG | HEART RATE: 68 BPM | BODY MASS INDEX: 29.4 KG/M2 | HEIGHT: 68 IN | WEIGHT: 194 LBS | DIASTOLIC BLOOD PRESSURE: 68 MMHG | TEMPERATURE: 99.3 F

## 2022-01-01 VITALS
BODY MASS INDEX: 34.07 KG/M2 | HEART RATE: 73 BPM | TEMPERATURE: 97.7 F | WEIGHT: 230 LBS | OXYGEN SATURATION: 100 % | RESPIRATION RATE: 18 BRPM | HEIGHT: 69 IN | DIASTOLIC BLOOD PRESSURE: 53 MMHG | SYSTOLIC BLOOD PRESSURE: 120 MMHG

## 2022-01-01 VITALS
DIASTOLIC BLOOD PRESSURE: 67 MMHG | RESPIRATION RATE: 18 BRPM | SYSTOLIC BLOOD PRESSURE: 126 MMHG | BODY MASS INDEX: 29.42 KG/M2 | TEMPERATURE: 98 F | WEIGHT: 194.1 LBS | HEART RATE: 67 BPM | HEIGHT: 68 IN | OXYGEN SATURATION: 97 %

## 2022-01-01 VITALS
OXYGEN SATURATION: 98 % | RESPIRATION RATE: 16 BRPM | HEIGHT: 68 IN | SYSTOLIC BLOOD PRESSURE: 127 MMHG | BODY MASS INDEX: 34.86 KG/M2 | HEART RATE: 86 BPM | DIASTOLIC BLOOD PRESSURE: 62 MMHG | TEMPERATURE: 98.1 F | WEIGHT: 230 LBS

## 2022-01-01 VITALS
TEMPERATURE: 98.9 F | WEIGHT: 230 LBS | RESPIRATION RATE: 26 BRPM | BODY MASS INDEX: 34.86 KG/M2 | DIASTOLIC BLOOD PRESSURE: 59 MMHG | HEART RATE: 83 BPM | SYSTOLIC BLOOD PRESSURE: 144 MMHG | OXYGEN SATURATION: 100 % | HEIGHT: 68 IN

## 2022-01-01 VITALS
RESPIRATION RATE: 20 BRPM | TEMPERATURE: 97.4 F | SYSTOLIC BLOOD PRESSURE: 163 MMHG | HEIGHT: 68 IN | OXYGEN SATURATION: 98 % | DIASTOLIC BLOOD PRESSURE: 77 MMHG | WEIGHT: 249 LBS | HEART RATE: 80 BPM | BODY MASS INDEX: 37.74 KG/M2

## 2022-01-01 VITALS
OXYGEN SATURATION: 97 % | SYSTOLIC BLOOD PRESSURE: 162 MMHG | TEMPERATURE: 98.1 F | RESPIRATION RATE: 20 BRPM | HEART RATE: 69 BPM | HEIGHT: 68 IN | BODY MASS INDEX: 40.47 KG/M2 | DIASTOLIC BLOOD PRESSURE: 74 MMHG | WEIGHT: 267 LBS

## 2022-01-01 VITALS
OXYGEN SATURATION: 99 % | BODY MASS INDEX: 33.34 KG/M2 | TEMPERATURE: 98.9 F | RESPIRATION RATE: 18 BRPM | HEIGHT: 68 IN | HEART RATE: 93 BPM | SYSTOLIC BLOOD PRESSURE: 139 MMHG | DIASTOLIC BLOOD PRESSURE: 74 MMHG | WEIGHT: 220 LBS

## 2022-01-01 VITALS
WEIGHT: 231 LBS | SYSTOLIC BLOOD PRESSURE: 132 MMHG | BODY MASS INDEX: 34.21 KG/M2 | OXYGEN SATURATION: 99 % | HEIGHT: 69 IN | HEART RATE: 66 BPM | DIASTOLIC BLOOD PRESSURE: 54 MMHG | RESPIRATION RATE: 17 BRPM | TEMPERATURE: 98.1 F

## 2022-01-01 VITALS
HEART RATE: 59 BPM | HEIGHT: 68 IN | OXYGEN SATURATION: 100 % | DIASTOLIC BLOOD PRESSURE: 60 MMHG | RESPIRATION RATE: 16 BRPM | SYSTOLIC BLOOD PRESSURE: 143 MMHG | TEMPERATURE: 98.7 F | BODY MASS INDEX: 34.86 KG/M2 | WEIGHT: 230 LBS

## 2022-01-01 VITALS
DIASTOLIC BLOOD PRESSURE: 52 MMHG | TEMPERATURE: 97.6 F | HEART RATE: 68 BPM | OXYGEN SATURATION: 100 % | SYSTOLIC BLOOD PRESSURE: 123 MMHG | RESPIRATION RATE: 14 BRPM

## 2022-01-01 DIAGNOSIS — C61 PROSTATE CANCER (HCC): ICD-10-CM

## 2022-01-01 DIAGNOSIS — D50.9 IRON DEFICIENCY ANEMIA, UNSPECIFIED IRON DEFICIENCY ANEMIA TYPE: ICD-10-CM

## 2022-01-01 DIAGNOSIS — C61 PROSTATE CANCER (HCC): Primary | ICD-10-CM

## 2022-01-01 DIAGNOSIS — T83.021A DISPLACEMENT OF FOLEY CATHETER, INITIAL ENCOUNTER (HCC): Primary | ICD-10-CM

## 2022-01-01 DIAGNOSIS — D64.9 ANEMIA, UNSPECIFIED TYPE: Primary | ICD-10-CM

## 2022-01-01 DIAGNOSIS — R39.198 DIFFICULTY URINATING: Primary | ICD-10-CM

## 2022-01-01 DIAGNOSIS — R31.0 GROSS HEMATURIA: Primary | ICD-10-CM

## 2022-01-01 DIAGNOSIS — N39.0 COMPLICATED UTI (URINARY TRACT INFECTION): Primary | ICD-10-CM

## 2022-01-01 DIAGNOSIS — R31.9 URINARY TRACT INFECTION WITH HEMATURIA, SITE UNSPECIFIED: ICD-10-CM

## 2022-01-01 DIAGNOSIS — C79.51 METASTASIS TO BONE (HCC): ICD-10-CM

## 2022-01-01 DIAGNOSIS — R33.9 RETENTION OF URINE, UNSPECIFIED: ICD-10-CM

## 2022-01-01 DIAGNOSIS — N39.0 URINARY TRACT INFECTION WITH HEMATURIA, SITE UNSPECIFIED: ICD-10-CM

## 2022-01-01 DIAGNOSIS — R33.9 RETENTION OF URINE, UNSPECIFIED: Primary | ICD-10-CM

## 2022-01-01 DIAGNOSIS — R31.0 GROSS HEMATURIA: ICD-10-CM

## 2022-01-01 DIAGNOSIS — Z93.59 SUPRAPUBIC CATHETER (HCC): Primary | ICD-10-CM

## 2022-01-01 DIAGNOSIS — C79.51 METASTASIS TO BONE (HCC): Primary | ICD-10-CM

## 2022-01-01 DIAGNOSIS — R33.9 URINARY RETENTION: Primary | ICD-10-CM

## 2022-01-01 DIAGNOSIS — R91.8 LUNG MASS: Primary | ICD-10-CM

## 2022-01-01 DIAGNOSIS — Z43.5 ENCOUNTER FOR SUPRAPUBIC CATHETER CARE (HCC): Primary | ICD-10-CM

## 2022-01-01 DIAGNOSIS — D72.829 LEUKOCYTOSIS, UNSPECIFIED TYPE: ICD-10-CM

## 2022-01-01 DIAGNOSIS — R91.8 LUNG NODULE, MULTIPLE: ICD-10-CM

## 2022-01-01 DIAGNOSIS — T83.9XXA FOLEY CATHETER PROBLEM, INITIAL ENCOUNTER (HCC): ICD-10-CM

## 2022-01-01 DIAGNOSIS — D64.9 CHRONIC ANEMIA: ICD-10-CM

## 2022-01-01 LAB
ABO + RH BLD: NORMAL
ACC. NO. FROM MICRO ORDER, ACCP: ABNORMAL
ACINETOBACTER CALCOACETICUS-BAUMANII COMPLEX, ACBCX: NOT DETECTED
ALBUMIN SERPL-MCNC: 1.5 G/DL (ref 3.5–5)
ALBUMIN SERPL-MCNC: 1.8 G/DL (ref 3.5–5)
ALBUMIN SERPL-MCNC: 1.9 G/DL (ref 3.5–5)
ALBUMIN SERPL-MCNC: 1.9 G/DL (ref 3.5–5)
ALBUMIN SERPL-MCNC: 2 G/DL (ref 3.5–5)
ALBUMIN SERPL-MCNC: 2.4 G/DL (ref 3.5–5)
ALBUMIN SERPL-MCNC: 2.4 G/DL (ref 3.5–5)
ALBUMIN SERPL-MCNC: 2.6 G/DL (ref 3.5–5)
ALBUMIN SERPL-MCNC: 2.9 G/DL (ref 3.5–5)
ALBUMIN SERPL-MCNC: 3.2 G/DL (ref 3.5–5)
ALBUMIN/GLOB SERPL: 0.3 {RATIO} (ref 1.1–2.2)
ALBUMIN/GLOB SERPL: 0.4 (ref 1.1–2.2)
ALBUMIN/GLOB SERPL: 0.4 {RATIO} (ref 1.1–2.2)
ALBUMIN/GLOB SERPL: 0.5 {RATIO} (ref 1.1–2.2)
ALBUMIN/GLOB SERPL: 0.6 {RATIO} (ref 1.1–2.2)
ALBUMIN/GLOB SERPL: 0.7 {RATIO} (ref 1.1–2.2)
ALBUMIN/GLOB SERPL: 0.7 {RATIO} (ref 1.1–2.2)
ALBUMIN/GLOB SERPL: 0.9 {RATIO} (ref 1.1–2.2)
ALP SERPL-CCNC: 122 U/L (ref 45–117)
ALP SERPL-CCNC: 132 U/L (ref 45–117)
ALP SERPL-CCNC: 134 U/L (ref 45–117)
ALP SERPL-CCNC: 138 U/L (ref 45–117)
ALP SERPL-CCNC: 158 U/L (ref 45–117)
ALP SERPL-CCNC: 162 U/L (ref 45–117)
ALP SERPL-CCNC: 171 U/L (ref 45–117)
ALP SERPL-CCNC: 211 U/L (ref 45–117)
ALP SERPL-CCNC: 268 U/L (ref 45–117)
ALP SERPL-CCNC: 389 U/L (ref 45–117)
ALT SERPL-CCNC: 10 U/L (ref 12–78)
ALT SERPL-CCNC: 12 U/L (ref 12–78)
ALT SERPL-CCNC: 13 U/L (ref 12–78)
ALT SERPL-CCNC: 16 U/L (ref 12–78)
ALT SERPL-CCNC: 16 U/L (ref 12–78)
ALT SERPL-CCNC: 18 U/L (ref 12–78)
ALT SERPL-CCNC: 19 U/L (ref 12–78)
ALT SERPL-CCNC: 24 U/L (ref 12–78)
ALT SERPL-CCNC: 25 U/L (ref 12–78)
ALT SERPL-CCNC: 36 U/L (ref 12–78)
AMPHET UR QL SCN: NEGATIVE
ANION GAP SERPL CALC-SCNC: 10 MMOL/L (ref 5–15)
ANION GAP SERPL CALC-SCNC: 11 MMOL/L (ref 5–15)
ANION GAP SERPL CALC-SCNC: 6 MMOL/L (ref 5–15)
ANION GAP SERPL CALC-SCNC: 7 MMOL/L (ref 5–15)
ANION GAP SERPL CALC-SCNC: 8 MMOL/L (ref 5–15)
ANION GAP SERPL CALC-SCNC: 9 MMOL/L (ref 5–15)
APPEARANCE UR: ABNORMAL
AST SERPL-CCNC: 20 U/L (ref 15–37)
AST SERPL-CCNC: 20 U/L (ref 15–37)
AST SERPL-CCNC: 21 U/L (ref 15–37)
AST SERPL-CCNC: 22 U/L (ref 15–37)
AST SERPL-CCNC: 22 U/L (ref 15–37)
AST SERPL-CCNC: 24 U/L (ref 15–37)
AST SERPL-CCNC: 33 U/L (ref 15–37)
AST SERPL-CCNC: 36 U/L (ref 15–37)
AST SERPL-CCNC: 43 U/L (ref 15–37)
AST SERPL-CCNC: 44 U/L (ref 15–37)
ATRIAL RATE: 69 BPM
BACTERIA SPEC CULT: ABNORMAL
BACTERIA SPEC CULT: NORMAL
BACTERIA URNS QL MICRO: ABNORMAL /HPF
BACTEROIDES FRAGILIS, BFRA: NOT DETECTED
BARBITURATES UR QL SCN: NEGATIVE
BASOPHILS # BLD: 0.1 K/UL (ref 0–0.1)
BASOPHILS NFR BLD: 0 % (ref 0–1)
BASOPHILS NFR BLD: 1 % (ref 0–1)
BENZODIAZ UR QL: NEGATIVE
BILIRUB SERPL-MCNC: 0.2 MG/DL (ref 0.2–1)
BILIRUB SERPL-MCNC: 0.3 MG/DL (ref 0.2–1)
BILIRUB SERPL-MCNC: 0.4 MG/DL (ref 0.2–1)
BILIRUB SERPL-MCNC: 0.4 MG/DL (ref 0.2–1)
BILIRUB SERPL-MCNC: 0.6 MG/DL (ref 0.2–1)
BILIRUB UR QL CFM: NEGATIVE
BILIRUB UR QL CFM: NEGATIVE
BILIRUB UR QL CFM: POSITIVE
BILIRUB UR QL CFM: POSITIVE
BILIRUB UR QL: NEGATIVE
BIOFIRE COMMENT, BCIDPF: ABNORMAL
BLD PROD TYP BPU: NORMAL
BLOOD GROUP ANTIBODIES SERPL: NORMAL
BPU ID: NORMAL
BUN SERPL-MCNC: 10 MG/DL (ref 6–20)
BUN SERPL-MCNC: 11 MG/DL (ref 6–20)
BUN SERPL-MCNC: 12 MG/DL (ref 6–20)
BUN SERPL-MCNC: 12 MG/DL (ref 6–20)
BUN SERPL-MCNC: 13 MG/DL (ref 6–20)
BUN SERPL-MCNC: 13 MG/DL (ref 6–20)
BUN SERPL-MCNC: 14 MG/DL (ref 6–20)
BUN SERPL-MCNC: 14 MG/DL (ref 6–20)
BUN SERPL-MCNC: 15 MG/DL (ref 6–20)
BUN SERPL-MCNC: 17 MG/DL (ref 6–20)
BUN SERPL-MCNC: 18 MG/DL (ref 6–20)
BUN SERPL-MCNC: 19 MG/DL (ref 6–20)
BUN SERPL-MCNC: 20 MG/DL (ref 6–20)
BUN SERPL-MCNC: 21 MG/DL (ref 6–20)
BUN SERPL-MCNC: 21 MG/DL (ref 6–20)
BUN SERPL-MCNC: 23 MG/DL (ref 6–20)
BUN SERPL-MCNC: 28 MG/DL (ref 6–20)
BUN SERPL-MCNC: 30 MG/DL (ref 6–20)
BUN/CREAT SERPL: 10 (ref 12–20)
BUN/CREAT SERPL: 11 (ref 12–20)
BUN/CREAT SERPL: 12 (ref 12–20)
BUN/CREAT SERPL: 13 (ref 12–20)
BUN/CREAT SERPL: 14 (ref 12–20)
BUN/CREAT SERPL: 16 (ref 12–20)
BUN/CREAT SERPL: 18 (ref 12–20)
BUN/CREAT SERPL: 20 (ref 12–20)
BUN/CREAT SERPL: 20 (ref 12–20)
C GLABRATA DNA VAG QL NAA+PROBE: NOT DETECTED
CALCIUM SERPL-MCNC: 8.4 MG/DL (ref 8.5–10.1)
CALCIUM SERPL-MCNC: 8.5 MG/DL (ref 8.5–10.1)
CALCIUM SERPL-MCNC: 8.6 MG/DL (ref 8.5–10.1)
CALCIUM SERPL-MCNC: 8.6 MG/DL (ref 8.5–10.1)
CALCIUM SERPL-MCNC: 8.7 MG/DL (ref 8.5–10.1)
CALCIUM SERPL-MCNC: 8.9 MG/DL (ref 8.5–10.1)
CALCIUM SERPL-MCNC: 9 MG/DL (ref 8.5–10.1)
CALCIUM SERPL-MCNC: 9.1 MG/DL (ref 8.5–10.1)
CALCIUM SERPL-MCNC: 9.1 MG/DL (ref 8.5–10.1)
CALCIUM SERPL-MCNC: 9.4 MG/DL (ref 8.5–10.1)
CALCIUM SERPL-MCNC: 9.7 MG/DL (ref 8.5–10.1)
CALCIUM SERPL-MCNC: 9.7 MG/DL (ref 8.5–10.1)
CALCULATED P AXIS, ECG09: 55 DEGREES
CALCULATED R AXIS, ECG10: 28 DEGREES
CALCULATED T AXIS, ECG11: 65 DEGREES
CANDIDA ALBICANS: NOT DETECTED
CANDIDA AURIS, CAAU: NOT DETECTED
CANDIDA KRUSEI, CKRP: NOT DETECTED
CANDIDA PARAPSILOSIS, CPAUP: NOT DETECTED
CANDIDA TROPICALIS, CTROP: NOT DETECTED
CANNABINOIDS UR QL SCN: NEGATIVE
CC UR VC: ABNORMAL
CC UR VC: NORMAL
CC UR VC: NORMAL
CHLORIDE SERPL-SCNC: 106 MMOL/L (ref 97–108)
CHLORIDE SERPL-SCNC: 107 MMOL/L (ref 97–108)
CHLORIDE SERPL-SCNC: 107 MMOL/L (ref 97–108)
CHLORIDE SERPL-SCNC: 108 MMOL/L (ref 97–108)
CHLORIDE SERPL-SCNC: 109 MMOL/L (ref 97–108)
CHLORIDE SERPL-SCNC: 109 MMOL/L (ref 97–108)
CHLORIDE SERPL-SCNC: 111 MMOL/L (ref 97–108)
CHLORIDE SERPL-SCNC: 112 MMOL/L (ref 97–108)
CHLORIDE SERPL-SCNC: 112 MMOL/L (ref 97–108)
CHLORIDE SERPL-SCNC: 113 MMOL/L (ref 97–108)
CHLORIDE SERPL-SCNC: 114 MMOL/L (ref 97–108)
CHLORIDE SERPL-SCNC: 115 MMOL/L (ref 97–108)
CO2 SERPL-SCNC: 19 MMOL/L (ref 21–32)
CO2 SERPL-SCNC: 19 MMOL/L (ref 21–32)
CO2 SERPL-SCNC: 20 MMOL/L (ref 21–32)
CO2 SERPL-SCNC: 21 MMOL/L (ref 21–32)
CO2 SERPL-SCNC: 23 MMOL/L (ref 21–32)
CO2 SERPL-SCNC: 24 MMOL/L (ref 21–32)
CO2 SERPL-SCNC: 25 MMOL/L (ref 21–32)
CO2 SERPL-SCNC: 25 MMOL/L (ref 21–32)
COCAINE UR QL SCN: NEGATIVE
COLOR UR: ABNORMAL
COMMENT, HOLDF: NORMAL
CREAT SERPL-MCNC: 0.82 MG/DL (ref 0.7–1.3)
CREAT SERPL-MCNC: 0.85 MG/DL (ref 0.7–1.3)
CREAT SERPL-MCNC: 0.89 MG/DL (ref 0.7–1.3)
CREAT SERPL-MCNC: 0.93 MG/DL (ref 0.7–1.3)
CREAT SERPL-MCNC: 0.95 MG/DL (ref 0.7–1.3)
CREAT SERPL-MCNC: 0.99 MG/DL (ref 0.7–1.3)
CREAT SERPL-MCNC: 1.05 MG/DL (ref 0.7–1.3)
CREAT SERPL-MCNC: 1.07 MG/DL (ref 0.7–1.3)
CREAT SERPL-MCNC: 1.08 MG/DL (ref 0.7–1.3)
CREAT SERPL-MCNC: 1.12 MG/DL (ref 0.7–1.3)
CREAT SERPL-MCNC: 1.16 MG/DL (ref 0.7–1.3)
CREAT SERPL-MCNC: 1.18 MG/DL (ref 0.7–1.3)
CREAT SERPL-MCNC: 1.19 MG/DL (ref 0.7–1.3)
CREAT SERPL-MCNC: 1.25 MG/DL (ref 0.7–1.3)
CREAT SERPL-MCNC: 1.36 MG/DL (ref 0.7–1.3)
CREAT SERPL-MCNC: 1.46 MG/DL (ref 0.7–1.3)
CREAT SERPL-MCNC: 1.48 MG/DL (ref 0.7–1.3)
CREAT SERPL-MCNC: 2.55 MG/DL (ref 0.7–1.3)
CREAT UR-MCNC: 86 MG/DL
CROSSMATCH RESULT,%XM: NORMAL
CRYPTO NEOFORMANS/GATTII, CRYNEG: NOT DETECTED
DIAGNOSIS, 93000: NORMAL
DIFFERENTIAL METHOD BLD: ABNORMAL
DRUG SCRN COMMENT,DRGCM: NORMAL
ENTEROBACTER CLOACAE COMPLEX, ECCP: NOT DETECTED
ENTEROBACTERALES SP. , ENBLS: NOT DETECTED
ENTEROCOCCUS FAECALIS, ENFA: NOT DETECTED
ENTEROCOCCUS FAECIUM, ENFAM: NOT DETECTED
EOSINOPHIL # BLD: 0.1 K/UL (ref 0–0.4)
EOSINOPHIL # BLD: 0.1 K/UL (ref 0–0.4)
EOSINOPHIL # BLD: 0.2 K/UL (ref 0–0.4)
EOSINOPHIL # BLD: 0.3 K/UL (ref 0–0.4)
EOSINOPHIL # BLD: 0.3 K/UL (ref 0–0.4)
EOSINOPHIL # BLD: 0.4 K/UL (ref 0–0.4)
EOSINOPHIL # BLD: 0.5 K/UL (ref 0–0.4)
EOSINOPHIL # BLD: 0.6 K/UL (ref 0–0.4)
EOSINOPHIL # BLD: 0.7 K/UL (ref 0–0.4)
EOSINOPHIL # BLD: 0.8 K/UL (ref 0–0.4)
EOSINOPHIL # BLD: 0.8 K/UL (ref 0–0.4)
EOSINOPHIL # BLD: 0.9 K/UL (ref 0–0.4)
EOSINOPHIL # BLD: 1 K/UL (ref 0–0.4)
EOSINOPHIL NFR BLD: 0 % (ref 0–7)
EOSINOPHIL NFR BLD: 1 % (ref 0–7)
EOSINOPHIL NFR BLD: 1 % (ref 0–7)
EOSINOPHIL NFR BLD: 2 % (ref 0–7)
EOSINOPHIL NFR BLD: 2 % (ref 0–7)
EOSINOPHIL NFR BLD: 3 % (ref 0–7)
EOSINOPHIL NFR BLD: 4 % (ref 0–7)
EOSINOPHIL NFR BLD: 4 % (ref 0–7)
EOSINOPHIL NFR BLD: 5 % (ref 0–7)
EOSINOPHIL NFR BLD: 6 % (ref 0–7)
EOSINOPHIL NFR BLD: 8 % (ref 0–7)
EOSINOPHIL NFR BLD: 9 % (ref 0–7)
EPITH CASTS URNS QL MICRO: ABNORMAL /LPF
ERYTHROCYTE [DISTWIDTH] IN BLOOD BY AUTOMATED COUNT: 14.1 % (ref 11.5–14.5)
ERYTHROCYTE [DISTWIDTH] IN BLOOD BY AUTOMATED COUNT: 14.6 % (ref 11.5–14.5)
ERYTHROCYTE [DISTWIDTH] IN BLOOD BY AUTOMATED COUNT: 14.6 % (ref 11.5–14.5)
ERYTHROCYTE [DISTWIDTH] IN BLOOD BY AUTOMATED COUNT: 15.3 % (ref 11.5–14.5)
ERYTHROCYTE [DISTWIDTH] IN BLOOD BY AUTOMATED COUNT: 15.4 % (ref 11.5–14.5)
ERYTHROCYTE [DISTWIDTH] IN BLOOD BY AUTOMATED COUNT: 15.8 % (ref 11.5–14.5)
ERYTHROCYTE [DISTWIDTH] IN BLOOD BY AUTOMATED COUNT: 16 % (ref 11.5–14.5)
ERYTHROCYTE [DISTWIDTH] IN BLOOD BY AUTOMATED COUNT: 16.6 % (ref 11.5–14.5)
ERYTHROCYTE [DISTWIDTH] IN BLOOD BY AUTOMATED COUNT: 16.8 % (ref 11.5–14.5)
ERYTHROCYTE [DISTWIDTH] IN BLOOD BY AUTOMATED COUNT: 17.4 % (ref 11.5–14.5)
ERYTHROCYTE [DISTWIDTH] IN BLOOD BY AUTOMATED COUNT: 17.4 % (ref 11.5–14.5)
ERYTHROCYTE [DISTWIDTH] IN BLOOD BY AUTOMATED COUNT: 17.5 % (ref 11.5–14.5)
ERYTHROCYTE [DISTWIDTH] IN BLOOD BY AUTOMATED COUNT: 17.5 % (ref 11.5–14.5)
ERYTHROCYTE [DISTWIDTH] IN BLOOD BY AUTOMATED COUNT: 17.7 % (ref 11.5–14.5)
ERYTHROCYTE [DISTWIDTH] IN BLOOD BY AUTOMATED COUNT: 17.8 % (ref 11.5–14.5)
ERYTHROCYTE [DISTWIDTH] IN BLOOD BY AUTOMATED COUNT: 17.9 % (ref 11.5–14.5)
ESCHERICHIA COLI: NOT DETECTED
EST. AVERAGE GLUCOSE BLD GHB EST-MCNC: 157 MG/DL
ETHANOL SERPL-MCNC: <10 MG/DL
FERRITIN SERPL-MCNC: 129 NG/ML (ref 26–388)
FERRITIN SERPL-MCNC: 700 NG/ML (ref 26–388)
GLOBULIN SER CALC-MCNC: 3.6 G/DL (ref 2–4)
GLOBULIN SER CALC-MCNC: 3.9 G/DL (ref 2–4)
GLOBULIN SER CALC-MCNC: 4 G/DL (ref 2–4)
GLOBULIN SER CALC-MCNC: 4.2 G/DL (ref 2–4)
GLOBULIN SER CALC-MCNC: 5 G/DL (ref 2–4)
GLOBULIN SER CALC-MCNC: 5.1 G/DL (ref 2–4)
GLOBULIN SER CALC-MCNC: 5.3 G/DL (ref 2–4)
GLOBULIN SER CALC-MCNC: 5.7 G/DL (ref 2–4)
GLOBULIN SER CALC-MCNC: 6.1 G/DL (ref 2–4)
GLOBULIN SER CALC-MCNC: 7 G/DL (ref 2–4)
GLUCOSE BLD STRIP.AUTO-MCNC: 101 MG/DL (ref 65–117)
GLUCOSE BLD STRIP.AUTO-MCNC: 103 MG/DL (ref 65–117)
GLUCOSE BLD STRIP.AUTO-MCNC: 108 MG/DL (ref 65–117)
GLUCOSE BLD STRIP.AUTO-MCNC: 114 MG/DL (ref 65–117)
GLUCOSE BLD STRIP.AUTO-MCNC: 116 MG/DL (ref 65–117)
GLUCOSE BLD STRIP.AUTO-MCNC: 119 MG/DL (ref 65–117)
GLUCOSE BLD STRIP.AUTO-MCNC: 120 MG/DL (ref 65–117)
GLUCOSE BLD STRIP.AUTO-MCNC: 120 MG/DL (ref 65–117)
GLUCOSE BLD STRIP.AUTO-MCNC: 129 MG/DL (ref 65–117)
GLUCOSE BLD STRIP.AUTO-MCNC: 132 MG/DL (ref 65–117)
GLUCOSE BLD STRIP.AUTO-MCNC: 142 MG/DL (ref 65–117)
GLUCOSE BLD STRIP.AUTO-MCNC: 144 MG/DL (ref 65–117)
GLUCOSE BLD STRIP.AUTO-MCNC: 150 MG/DL (ref 65–117)
GLUCOSE BLD STRIP.AUTO-MCNC: 152 MG/DL (ref 65–117)
GLUCOSE BLD STRIP.AUTO-MCNC: 163 MG/DL (ref 65–117)
GLUCOSE BLD STRIP.AUTO-MCNC: 166 MG/DL (ref 65–117)
GLUCOSE BLD STRIP.AUTO-MCNC: 182 MG/DL (ref 65–117)
GLUCOSE BLD STRIP.AUTO-MCNC: 196 MG/DL (ref 65–117)
GLUCOSE BLD STRIP.AUTO-MCNC: 206 MG/DL (ref 65–117)
GLUCOSE BLD STRIP.AUTO-MCNC: 207 MG/DL (ref 65–117)
GLUCOSE BLD STRIP.AUTO-MCNC: 212 MG/DL (ref 65–117)
GLUCOSE BLD STRIP.AUTO-MCNC: 220 MG/DL (ref 65–117)
GLUCOSE BLD STRIP.AUTO-MCNC: 228 MG/DL (ref 65–117)
GLUCOSE BLD STRIP.AUTO-MCNC: 248 MG/DL (ref 65–117)
GLUCOSE BLD STRIP.AUTO-MCNC: 250 MG/DL (ref 65–117)
GLUCOSE BLD STRIP.AUTO-MCNC: 266 MG/DL (ref 65–117)
GLUCOSE BLD STRIP.AUTO-MCNC: 273 MG/DL (ref 65–117)
GLUCOSE BLD STRIP.AUTO-MCNC: 362 MG/DL (ref 65–117)
GLUCOSE BLD STRIP.AUTO-MCNC: 363 MG/DL (ref 65–117)
GLUCOSE BLD STRIP.AUTO-MCNC: 58 MG/DL (ref 65–117)
GLUCOSE BLD STRIP.AUTO-MCNC: 60 MG/DL (ref 65–117)
GLUCOSE BLD STRIP.AUTO-MCNC: 65 MG/DL (ref 65–117)
GLUCOSE BLD STRIP.AUTO-MCNC: 69 MG/DL (ref 65–117)
GLUCOSE BLD STRIP.AUTO-MCNC: 70 MG/DL (ref 65–117)
GLUCOSE BLD STRIP.AUTO-MCNC: 82 MG/DL (ref 65–117)
GLUCOSE BLD STRIP.AUTO-MCNC: 86 MG/DL (ref 65–117)
GLUCOSE BLD STRIP.AUTO-MCNC: 88 MG/DL (ref 65–117)
GLUCOSE BLD STRIP.AUTO-MCNC: 90 MG/DL (ref 65–117)
GLUCOSE BLD STRIP.AUTO-MCNC: 96 MG/DL (ref 65–117)
GLUCOSE SERPL-MCNC: 102 MG/DL (ref 65–100)
GLUCOSE SERPL-MCNC: 128 MG/DL (ref 65–100)
GLUCOSE SERPL-MCNC: 143 MG/DL (ref 65–100)
GLUCOSE SERPL-MCNC: 150 MG/DL (ref 65–100)
GLUCOSE SERPL-MCNC: 152 MG/DL (ref 65–100)
GLUCOSE SERPL-MCNC: 156 MG/DL (ref 65–100)
GLUCOSE SERPL-MCNC: 185 MG/DL (ref 65–100)
GLUCOSE SERPL-MCNC: 193 MG/DL (ref 65–100)
GLUCOSE SERPL-MCNC: 204 MG/DL (ref 65–100)
GLUCOSE SERPL-MCNC: 233 MG/DL (ref 65–100)
GLUCOSE SERPL-MCNC: 315 MG/DL (ref 65–100)
GLUCOSE SERPL-MCNC: 388 MG/DL (ref 65–100)
GLUCOSE SERPL-MCNC: 60 MG/DL (ref 65–100)
GLUCOSE SERPL-MCNC: 84 MG/DL (ref 65–100)
GLUCOSE SERPL-MCNC: 84 MG/DL (ref 65–100)
GLUCOSE SERPL-MCNC: 86 MG/DL (ref 65–100)
GLUCOSE SERPL-MCNC: 88 MG/DL (ref 65–100)
GLUCOSE SERPL-MCNC: 95 MG/DL (ref 65–100)
GLUCOSE UR STRIP.AUTO-MCNC: NEGATIVE MG/DL
HAEMOPHILUS INFLUENZAE, HMI: NOT DETECTED
HAPTOGLOB SERPL-MCNC: 499 MG/DL (ref 30–200)
HBA1C MFR BLD: 7.1 % (ref 4–5.6)
HCT VFR BLD AUTO: 20.2 % (ref 36.6–50.3)
HCT VFR BLD AUTO: 20.9 % (ref 36.6–50.3)
HCT VFR BLD AUTO: 21.6 % (ref 36.6–50.3)
HCT VFR BLD AUTO: 21.6 % (ref 36.6–50.3)
HCT VFR BLD AUTO: 22.6 % (ref 36.6–50.3)
HCT VFR BLD AUTO: 22.9 % (ref 36.6–50.3)
HCT VFR BLD AUTO: 23.1 % (ref 36.6–50.3)
HCT VFR BLD AUTO: 23.4 % (ref 36.6–50.3)
HCT VFR BLD AUTO: 23.5 % (ref 36.6–50.3)
HCT VFR BLD AUTO: 23.9 % (ref 36.6–50.3)
HCT VFR BLD AUTO: 24.5 % (ref 36.6–50.3)
HCT VFR BLD AUTO: 24.6 % (ref 36.6–50.3)
HCT VFR BLD AUTO: 25.1 % (ref 36.6–50.3)
HCT VFR BLD AUTO: 25.4 % (ref 36.6–50.3)
HCT VFR BLD AUTO: 25.6 % (ref 36.6–50.3)
HCT VFR BLD AUTO: 25.8 % (ref 36.6–50.3)
HCT VFR BLD AUTO: 27.8 % (ref 36.6–50.3)
HCT VFR BLD AUTO: 27.9 % (ref 36.6–50.3)
HCT VFR BLD AUTO: 28.1 % (ref 36.6–50.3)
HCT VFR BLD AUTO: 28.4 % (ref 36.6–50.3)
HCT VFR BLD AUTO: 28.4 % (ref 36.6–50.3)
HCT VFR BLD AUTO: 30.3 % (ref 36.6–50.3)
HGB BLD-MCNC: 6.3 G/DL (ref 12.1–17)
HGB BLD-MCNC: 6.5 G/DL (ref 12.1–17)
HGB BLD-MCNC: 6.7 G/DL (ref 12.1–17)
HGB BLD-MCNC: 7 G/DL (ref 12.1–17)
HGB BLD-MCNC: 7 G/DL (ref 12.1–17)
HGB BLD-MCNC: 7.3 G/DL (ref 12.1–17)
HGB BLD-MCNC: 7.6 G/DL (ref 12.1–17)
HGB BLD-MCNC: 7.6 G/DL (ref 12.1–17)
HGB BLD-MCNC: 7.7 G/DL (ref 12.1–17)
HGB BLD-MCNC: 7.9 G/DL (ref 12.1–17)
HGB BLD-MCNC: 7.9 G/DL (ref 12.1–17)
HGB BLD-MCNC: 8 G/DL (ref 12.1–17)
HGB BLD-MCNC: 8.1 G/DL (ref 12.1–17)
HGB BLD-MCNC: 8.1 G/DL (ref 12.1–17)
HGB BLD-MCNC: 8.7 G/DL (ref 12.1–17)
HGB BLD-MCNC: 8.9 G/DL (ref 12.1–17)
HGB BLD-MCNC: 9 G/DL (ref 12.1–17)
HGB BLD-MCNC: 9.9 G/DL (ref 12.1–17)
HGB UR QL STRIP: ABNORMAL
HISTORY CHECKED?,CKHIST: NORMAL
HYALINE CASTS URNS QL MICRO: ABNORMAL /LPF (ref 0–5)
IMM GRANULOCYTES # BLD AUTO: 0 K/UL
IMM GRANULOCYTES # BLD AUTO: 0 K/UL
IMM GRANULOCYTES # BLD AUTO: 0 K/UL (ref 0–0.04)
IMM GRANULOCYTES # BLD AUTO: 0 K/UL (ref 0–0.04)
IMM GRANULOCYTES # BLD AUTO: 0.1 K/UL (ref 0–0.04)
IMM GRANULOCYTES # BLD AUTO: 0.2 K/UL (ref 0–0.04)
IMM GRANULOCYTES # BLD AUTO: 0.2 K/UL (ref 0–0.04)
IMM GRANULOCYTES # BLD AUTO: 0.3 K/UL (ref 0–0.04)
IMM GRANULOCYTES NFR BLD AUTO: 0 %
IMM GRANULOCYTES NFR BLD AUTO: 0 %
IMM GRANULOCYTES NFR BLD AUTO: 0 % (ref 0–0.5)
IMM GRANULOCYTES NFR BLD AUTO: 1 % (ref 0–0.5)
INR PPP: 1.1 (ref 0.9–1.1)
IRON SATN MFR SERPL: 11 % (ref 20–50)
IRON SATN MFR SERPL: 18 % (ref 20–50)
IRON SERPL-MCNC: 22 UG/DL (ref 35–150)
IRON SERPL-MCNC: 29 UG/DL (ref 35–150)
KETONES UR QL STRIP.AUTO: ABNORMAL MG/DL
KETONES UR QL STRIP.AUTO: ABNORMAL MG/DL
KETONES UR QL STRIP.AUTO: NEGATIVE MG/DL
KLEBSIELLA AEROGENES, KLAE: NOT DETECTED
KLEBSIELLA OXYTOCA: NOT DETECTED
KLEBSIELLA PNEUMONIAE GROUP, KPPG: NOT DETECTED
LACTATE BLD-SCNC: 1.01 MMOL/L (ref 0.4–2)
LACTATE SERPL-SCNC: 1.5 MMOL/L (ref 0.4–2)
LDH SERPL L TO P-CCNC: 204 U/L (ref 85–241)
LEUKOCYTE ESTERASE UR QL STRIP.AUTO: ABNORMAL
LISTERIA MONOCYTOGENES, LMONP: NOT DETECTED
LYMPHOCYTES # BLD: 0.9 K/UL (ref 0.8–3.5)
LYMPHOCYTES # BLD: 1 K/UL (ref 0.8–3.5)
LYMPHOCYTES # BLD: 1.1 K/UL (ref 0.8–3.5)
LYMPHOCYTES # BLD: 1.2 K/UL (ref 0.8–3.5)
LYMPHOCYTES # BLD: 1.3 K/UL (ref 0.8–3.5)
LYMPHOCYTES # BLD: 1.3 K/UL (ref 0.8–3.5)
LYMPHOCYTES # BLD: 1.5 K/UL (ref 0.8–3.5)
LYMPHOCYTES # BLD: 1.5 K/UL (ref 0.8–3.5)
LYMPHOCYTES # BLD: 1.6 K/UL (ref 0.8–3.5)
LYMPHOCYTES # BLD: 1.7 K/UL (ref 0.8–3.5)
LYMPHOCYTES # BLD: 1.8 K/UL (ref 0.8–3.5)
LYMPHOCYTES # BLD: 1.9 K/UL (ref 0.8–3.5)
LYMPHOCYTES # BLD: 2 K/UL (ref 0.8–3.5)
LYMPHOCYTES NFR BLD: 10 % (ref 12–49)
LYMPHOCYTES NFR BLD: 11 % (ref 12–49)
LYMPHOCYTES NFR BLD: 12 % (ref 12–49)
LYMPHOCYTES NFR BLD: 14 % (ref 12–49)
LYMPHOCYTES NFR BLD: 14 % (ref 12–49)
LYMPHOCYTES NFR BLD: 16 % (ref 12–49)
LYMPHOCYTES NFR BLD: 19 % (ref 12–49)
LYMPHOCYTES NFR BLD: 5 % (ref 12–49)
LYMPHOCYTES NFR BLD: 5 % (ref 12–49)
LYMPHOCYTES NFR BLD: 7 % (ref 12–49)
LYMPHOCYTES NFR BLD: 9 % (ref 12–49)
MAGNESIUM SERPL-MCNC: 1.7 MG/DL (ref 1.6–2.4)
MAGNESIUM SERPL-MCNC: 1.8 MG/DL (ref 1.6–2.4)
MAGNESIUM SERPL-MCNC: 2 MG/DL (ref 1.6–2.4)
MCH RBC QN AUTO: 22.7 PG (ref 26–34)
MCH RBC QN AUTO: 22.8 PG (ref 26–34)
MCH RBC QN AUTO: 22.8 PG (ref 26–34)
MCH RBC QN AUTO: 23.1 PG (ref 26–34)
MCH RBC QN AUTO: 23.2 PG (ref 26–34)
MCH RBC QN AUTO: 23.3 PG (ref 26–34)
MCH RBC QN AUTO: 23.5 PG (ref 26–34)
MCH RBC QN AUTO: 23.6 PG (ref 26–34)
MCH RBC QN AUTO: 23.6 PG (ref 26–34)
MCH RBC QN AUTO: 23.7 PG (ref 26–34)
MCH RBC QN AUTO: 23.7 PG (ref 26–34)
MCH RBC QN AUTO: 23.8 PG (ref 26–34)
MCH RBC QN AUTO: 23.8 PG (ref 26–34)
MCH RBC QN AUTO: 23.9 PG (ref 26–34)
MCH RBC QN AUTO: 23.9 PG (ref 26–34)
MCH RBC QN AUTO: 24 PG (ref 26–34)
MCH RBC QN AUTO: 24.1 PG (ref 26–34)
MCH RBC QN AUTO: 24.4 PG (ref 26–34)
MCHC RBC AUTO-ENTMCNC: 30.3 G/DL (ref 30–36.5)
MCHC RBC AUTO-ENTMCNC: 30.6 G/DL (ref 30–36.5)
MCHC RBC AUTO-ENTMCNC: 31 G/DL (ref 30–36.5)
MCHC RBC AUTO-ENTMCNC: 31.1 G/DL (ref 30–36.5)
MCHC RBC AUTO-ENTMCNC: 31.1 G/DL (ref 30–36.5)
MCHC RBC AUTO-ENTMCNC: 31.2 G/DL (ref 30–36.5)
MCHC RBC AUTO-ENTMCNC: 31.3 G/DL (ref 30–36.5)
MCHC RBC AUTO-ENTMCNC: 31.4 G/DL (ref 30–36.5)
MCHC RBC AUTO-ENTMCNC: 31.4 G/DL (ref 30–36.5)
MCHC RBC AUTO-ENTMCNC: 31.6 G/DL (ref 30–36.5)
MCHC RBC AUTO-ENTMCNC: 31.7 G/DL (ref 30–36.5)
MCHC RBC AUTO-ENTMCNC: 31.7 G/DL (ref 30–36.5)
MCHC RBC AUTO-ENTMCNC: 31.8 G/DL (ref 30–36.5)
MCHC RBC AUTO-ENTMCNC: 31.9 G/DL (ref 30–36.5)
MCHC RBC AUTO-ENTMCNC: 31.9 G/DL (ref 30–36.5)
MCHC RBC AUTO-ENTMCNC: 32.1 G/DL (ref 30–36.5)
MCHC RBC AUTO-ENTMCNC: 32.4 G/DL (ref 30–36.5)
MCHC RBC AUTO-ENTMCNC: 32.7 G/DL (ref 30–36.5)
MCV RBC AUTO: 72.5 FL (ref 80–99)
MCV RBC AUTO: 72.6 FL (ref 80–99)
MCV RBC AUTO: 73 FL (ref 80–99)
MCV RBC AUTO: 73.1 FL (ref 80–99)
MCV RBC AUTO: 73.4 FL (ref 80–99)
MCV RBC AUTO: 74.3 FL (ref 80–99)
MCV RBC AUTO: 74.9 FL (ref 80–99)
MCV RBC AUTO: 74.9 FL (ref 80–99)
MCV RBC AUTO: 75.6 FL (ref 80–99)
MCV RBC AUTO: 75.7 FL (ref 80–99)
MCV RBC AUTO: 75.8 FL (ref 80–99)
MCV RBC AUTO: 75.9 FL (ref 80–99)
MCV RBC AUTO: 75.9 FL (ref 80–99)
MCV RBC AUTO: 76 FL (ref 80–99)
MCV RBC AUTO: 76.3 FL (ref 80–99)
MCV RBC AUTO: 76.6 FL (ref 80–99)
MCV RBC AUTO: 77 FL (ref 80–99)
MECA/C AND MREJ (METHICILLIN RESISTANT GENE), MECAC: DETECTED
METHADONE UR QL: NEGATIVE
MONOCYTES # BLD: 0.6 K/UL (ref 0–1)
MONOCYTES # BLD: 0.7 K/UL (ref 0–1)
MONOCYTES # BLD: 0.9 K/UL (ref 0–1)
MONOCYTES # BLD: 1.1 K/UL (ref 0–1)
MONOCYTES # BLD: 1.3 K/UL (ref 0–1)
MONOCYTES # BLD: 1.4 K/UL (ref 0–1)
MONOCYTES # BLD: 1.5 K/UL (ref 0–1)
MONOCYTES # BLD: 1.6 K/UL (ref 0–1)
MONOCYTES # BLD: 1.7 K/UL (ref 0–1)
MONOCYTES # BLD: 2.2 K/UL (ref 0–1)
MONOCYTES NFR BLD: 10 % (ref 5–13)
MONOCYTES NFR BLD: 10 % (ref 5–13)
MONOCYTES NFR BLD: 11 % (ref 5–13)
MONOCYTES NFR BLD: 12 % (ref 5–13)
MONOCYTES NFR BLD: 12 % (ref 5–13)
MONOCYTES NFR BLD: 13 % (ref 5–13)
MONOCYTES NFR BLD: 15 % (ref 5–13)
MONOCYTES NFR BLD: 5 % (ref 5–13)
MONOCYTES NFR BLD: 5 % (ref 5–13)
MONOCYTES NFR BLD: 7 % (ref 5–13)
MONOCYTES NFR BLD: 8 % (ref 5–13)
MONOCYTES NFR BLD: 8 % (ref 5–13)
MONOCYTES NFR BLD: 9 % (ref 5–13)
NEISSERIA MENINGITIDIS, NMNI: NOT DETECTED
NEUTS SEG # BLD: 10.2 K/UL (ref 1.8–8)
NEUTS SEG # BLD: 10.9 K/UL (ref 1.8–8)
NEUTS SEG # BLD: 11 K/UL (ref 1.8–8)
NEUTS SEG # BLD: 11.8 K/UL (ref 1.8–8)
NEUTS SEG # BLD: 13.1 K/UL (ref 1.8–8)
NEUTS SEG # BLD: 15.2 K/UL (ref 1.8–8)
NEUTS SEG # BLD: 15.9 K/UL (ref 1.8–8)
NEUTS SEG # BLD: 19 K/UL (ref 1.8–8)
NEUTS SEG # BLD: 19.8 K/UL (ref 1.8–8)
NEUTS SEG # BLD: 6.1 K/UL (ref 1.8–8)
NEUTS SEG # BLD: 6.4 K/UL (ref 1.8–8)
NEUTS SEG # BLD: 6.6 K/UL (ref 1.8–8)
NEUTS SEG # BLD: 6.9 K/UL (ref 1.8–8)
NEUTS SEG # BLD: 7.1 K/UL (ref 1.8–8)
NEUTS SEG # BLD: 7.7 K/UL (ref 1.8–8)
NEUTS SEG # BLD: 9.4 K/UL (ref 1.8–8)
NEUTS SEG # BLD: 9.9 K/UL (ref 1.8–8)
NEUTS SEG NFR BLD: 62 % (ref 32–75)
NEUTS SEG NFR BLD: 66 % (ref 32–75)
NEUTS SEG NFR BLD: 67 % (ref 32–75)
NEUTS SEG NFR BLD: 67 % (ref 32–75)
NEUTS SEG NFR BLD: 71 % (ref 32–75)
NEUTS SEG NFR BLD: 71 % (ref 32–75)
NEUTS SEG NFR BLD: 72 % (ref 32–75)
NEUTS SEG NFR BLD: 73 % (ref 32–75)
NEUTS SEG NFR BLD: 78 % (ref 32–75)
NEUTS SEG NFR BLD: 78 % (ref 32–75)
NEUTS SEG NFR BLD: 81 % (ref 32–75)
NEUTS SEG NFR BLD: 83 % (ref 32–75)
NEUTS SEG NFR BLD: 83 % (ref 32–75)
NEUTS SEG NFR BLD: 87 % (ref 32–75)
NITRITE UR QL STRIP.AUTO: NEGATIVE
NITRITE UR QL STRIP.AUTO: POSITIVE
NRBC # BLD: 0 K/UL (ref 0–0.01)
NRBC # BLD: 0.02 K/UL (ref 0–0.01)
NRBC BLD-RTO: 0 PER 100 WBC
NRBC BLD-RTO: 0.1 PER 100 WBC
OPIATES UR QL: NEGATIVE
P-R INTERVAL, ECG05: 162 MS
PCP UR QL: NEGATIVE
PH UR STRIP: 5.5 [PH] (ref 5–8)
PH UR STRIP: 5.5 [PH] (ref 5–8)
PH UR STRIP: 6 [PH] (ref 5–8)
PH UR STRIP: 7 (ref 5–8)
PH UR STRIP: 8 [PH] (ref 5–8)
PLATELET # BLD AUTO: 375 K/UL (ref 150–400)
PLATELET # BLD AUTO: 395 K/UL (ref 150–400)
PLATELET # BLD AUTO: 415 K/UL (ref 150–400)
PLATELET # BLD AUTO: 417 K/UL (ref 150–400)
PLATELET # BLD AUTO: 440 K/UL (ref 150–400)
PLATELET # BLD AUTO: 460 K/UL (ref 150–400)
PLATELET # BLD AUTO: 460 K/UL (ref 150–400)
PLATELET # BLD AUTO: 463 K/UL (ref 150–400)
PLATELET # BLD AUTO: 465 K/UL (ref 150–400)
PLATELET # BLD AUTO: 468 K/UL (ref 150–400)
PLATELET # BLD AUTO: 469 K/UL (ref 150–400)
PLATELET # BLD AUTO: 477 K/UL (ref 150–400)
PLATELET # BLD AUTO: 502 K/UL (ref 150–400)
PLATELET # BLD AUTO: 505 K/UL (ref 150–400)
PLATELET # BLD AUTO: 505 K/UL (ref 150–400)
PLATELET # BLD AUTO: 531 K/UL (ref 150–400)
PLATELET # BLD AUTO: 532 K/UL (ref 150–400)
PLATELET # BLD AUTO: 569 K/UL (ref 150–400)
PLATELET # BLD AUTO: 573 K/UL (ref 150–400)
PLATELET # BLD AUTO: 589 K/UL (ref 150–400)
PMV BLD AUTO: 10 FL (ref 8.9–12.9)
PMV BLD AUTO: 10.1 FL (ref 8.9–12.9)
PMV BLD AUTO: 10.1 FL (ref 8.9–12.9)
PMV BLD AUTO: 10.2 FL (ref 8.9–12.9)
PMV BLD AUTO: 10.2 FL (ref 8.9–12.9)
PMV BLD AUTO: 10.3 FL (ref 8.9–12.9)
PMV BLD AUTO: 10.5 FL (ref 8.9–12.9)
PMV BLD AUTO: 10.5 FL (ref 8.9–12.9)
PMV BLD AUTO: 10.6 FL (ref 8.9–12.9)
PMV BLD AUTO: 10.7 FL (ref 8.9–12.9)
PMV BLD AUTO: 10.8 FL (ref 8.9–12.9)
PMV BLD AUTO: 11.3 FL (ref 8.9–12.9)
PMV BLD AUTO: 9.4 FL (ref 8.9–12.9)
PMV BLD AUTO: 9.5 FL (ref 8.9–12.9)
PMV BLD AUTO: 9.6 FL (ref 8.9–12.9)
PMV BLD AUTO: 9.9 FL (ref 8.9–12.9)
POTASSIUM SERPL-SCNC: 3 MMOL/L (ref 3.5–5.1)
POTASSIUM SERPL-SCNC: 3.1 MMOL/L (ref 3.5–5.1)
POTASSIUM SERPL-SCNC: 3.2 MMOL/L (ref 3.5–5.1)
POTASSIUM SERPL-SCNC: 3.2 MMOL/L (ref 3.5–5.1)
POTASSIUM SERPL-SCNC: 3.3 MMOL/L (ref 3.5–5.1)
POTASSIUM SERPL-SCNC: 3.3 MMOL/L (ref 3.5–5.1)
POTASSIUM SERPL-SCNC: 3.7 MMOL/L (ref 3.5–5.1)
POTASSIUM SERPL-SCNC: 3.8 MMOL/L (ref 3.5–5.1)
POTASSIUM SERPL-SCNC: 3.8 MMOL/L (ref 3.5–5.1)
POTASSIUM SERPL-SCNC: 4 MMOL/L (ref 3.5–5.1)
POTASSIUM SERPL-SCNC: 4 MMOL/L (ref 3.5–5.1)
POTASSIUM SERPL-SCNC: 4.1 MMOL/L (ref 3.5–5.1)
POTASSIUM SERPL-SCNC: 4.1 MMOL/L (ref 3.5–5.1)
POTASSIUM SERPL-SCNC: 4.4 MMOL/L (ref 3.5–5.1)
POTASSIUM SERPL-SCNC: 4.5 MMOL/L (ref 3.5–5.1)
PROCALCITONIN SERPL-MCNC: 0.51 NG/ML
PROT SERPL-MCNC: 6.4 G/DL (ref 6.4–8.2)
PROT SERPL-MCNC: 6.5 G/DL (ref 6.4–8.2)
PROT SERPL-MCNC: 6.8 G/DL (ref 6.4–8.2)
PROT SERPL-MCNC: 7 G/DL (ref 6.4–8.2)
PROT SERPL-MCNC: 7.1 G/DL (ref 6.4–8.2)
PROT SERPL-MCNC: 7.2 G/DL (ref 6.4–8.2)
PROT SERPL-MCNC: 7.2 G/DL (ref 6.4–8.2)
PROT SERPL-MCNC: 7.5 G/DL (ref 6.4–8.2)
PROT SERPL-MCNC: 7.9 G/DL (ref 6.4–8.2)
PROT SERPL-MCNC: 8.9 G/DL (ref 6.4–8.2)
PROT UR STRIP-MCNC: 100 MG/DL
PROT UR STRIP-MCNC: 100 MG/DL
PROT UR STRIP-MCNC: 300 MG/DL
PROT UR STRIP-MCNC: 300 MG/DL
PROT UR STRIP-MCNC: >300 MG/DL
PROT UR-MCNC: 645 MG/DL (ref 0–11.9)
PROTEUS, PRP: NOT DETECTED
PROTHROMBIN TIME: 11.8 SEC (ref 9–11.1)
PSA SERPL-MCNC: 0.1 NG/ML (ref 0.01–4)
PSA SERPL-MCNC: 0.1 NG/ML (ref 0.01–4)
PSA SERPL-MCNC: 0.2 NG/ML (ref 0.01–4)
PSA SERPL-MCNC: 0.2 NG/ML (ref 0.01–4)
PSEUDOMONAS AERUGINOSA: NOT DETECTED
Q-T INTERVAL, ECG07: 436 MS
QRS DURATION, ECG06: 78 MS
QTC CALCULATION (BEZET), ECG08: 467 MS
RBC # BLD AUTO: 2.72 M/UL (ref 4.1–5.7)
RBC # BLD AUTO: 2.86 M/UL (ref 4.1–5.7)
RBC # BLD AUTO: 2.95 M/UL (ref 4.1–5.7)
RBC # BLD AUTO: 2.98 M/UL (ref 4.1–5.7)
RBC # BLD AUTO: 3.03 M/UL (ref 4.1–5.7)
RBC # BLD AUTO: 3.05 M/UL (ref 4.1–5.7)
RBC # BLD AUTO: 3.08 M/UL (ref 4.1–5.7)
RBC # BLD AUTO: 3.19 M/UL (ref 4.1–5.7)
RBC # BLD AUTO: 3.2 M/UL (ref 4.1–5.7)
RBC # BLD AUTO: 3.23 M/UL (ref 4.1–5.7)
RBC # BLD AUTO: 3.24 M/UL (ref 4.1–5.7)
RBC # BLD AUTO: 3.26 M/UL (ref 4.1–5.7)
RBC # BLD AUTO: 3.38 M/UL (ref 4.1–5.7)
RBC # BLD AUTO: 3.5 M/UL (ref 4.1–5.7)
RBC # BLD AUTO: 3.68 M/UL (ref 4.1–5.7)
RBC # BLD AUTO: 3.71 M/UL (ref 4.1–5.7)
RBC # BLD AUTO: 3.78 M/UL (ref 4.1–5.7)
RBC # BLD AUTO: 3.82 M/UL (ref 4.1–5.7)
RBC # BLD AUTO: 3.82 M/UL (ref 4.1–5.7)
RBC # BLD AUTO: 4.15 M/UL (ref 4.1–5.7)
RBC #/AREA URNS HPF: >100 /HPF (ref 0–5)
RBC MORPH BLD: ABNORMAL
RESISTANT GENE SPACE, REGENE: ABNORMAL
RETICS # AUTO: 0.02 M/UL (ref 0.03–0.1)
RETICS/RBC NFR AUTO: 0.7 % (ref 0.7–2.1)
SALMONELLA, SALMO: NOT DETECTED
SAMPLES BEING HELD,HOLD: NORMAL
SERRATIA MARCESCENS: NOT DETECTED
SERVICE CMNT-IMP: ABNORMAL
SERVICE CMNT-IMP: NORMAL
SODIUM SERPL-SCNC: 136 MMOL/L (ref 136–145)
SODIUM SERPL-SCNC: 138 MMOL/L (ref 136–145)
SODIUM SERPL-SCNC: 138 MMOL/L (ref 136–145)
SODIUM SERPL-SCNC: 139 MMOL/L (ref 136–145)
SODIUM SERPL-SCNC: 139 MMOL/L (ref 136–145)
SODIUM SERPL-SCNC: 140 MMOL/L (ref 136–145)
SODIUM SERPL-SCNC: 141 MMOL/L (ref 136–145)
SODIUM SERPL-SCNC: 142 MMOL/L (ref 136–145)
SODIUM SERPL-SCNC: 143 MMOL/L (ref 136–145)
SODIUM SERPL-SCNC: 144 MMOL/L (ref 136–145)
SODIUM SERPL-SCNC: 144 MMOL/L (ref 136–145)
SODIUM UR-SCNC: 68 MMOL/L
SP GR UR REFRACTOMETRY: 1.01 (ref 1–1.03)
SP GR UR REFRACTOMETRY: 1.01 (ref 1–1.03)
SP GR UR REFRACTOMETRY: 1.02 (ref 1–1.03)
SPECIMEN EXP DATE BLD: NORMAL
STAPH EPIDERMIDIS, STEP: NOT DETECTED
STAPH LUGDUNENSIS, STALUG: NOT DETECTED
STAPHYLOCOCCUS AUREUS: DETECTED
STAPHYLOCOCCUS, STAPP: DETECTED
STATUS OF UNIT,%ST: NORMAL
STENO MALTOPHILIA, STMA: NOT DETECTED
STREPTOCOCCUS , STPSP: NOT DETECTED
STREPTOCOCCUS AGALACTIAE (GROUP B): NOT DETECTED
STREPTOCOCCUS PNEUMONIAE , SPNP: NOT DETECTED
STREPTOCOCCUS PYOGENES (GROUP A), SPYOP: NOT DETECTED
TIBC SERPL-MCNC: 160 UG/DL (ref 250–450)
TIBC SERPL-MCNC: 208 UG/DL (ref 250–450)
UA: UC IF INDICATED,UAUC: ABNORMAL
UNIT DIVISION, %UDIV: 0
UR CULT HOLD, URHOLD: NORMAL
UROBILINOGEN UR QL STRIP.AUTO: 0.2 EU/DL (ref 0.2–1)
UROBILINOGEN UR QL STRIP.AUTO: 1 EU/DL (ref 0.2–1)
UROBILINOGEN UR QL STRIP.AUTO: 1 EU/DL (ref 0.2–1)
VENTRICULAR RATE, ECG03: 69 BPM
WBC # BLD AUTO: 10.2 K/UL (ref 4.1–11.1)
WBC # BLD AUTO: 10.6 K/UL (ref 4.1–11.1)
WBC # BLD AUTO: 10.8 K/UL (ref 4.1–11.1)
WBC # BLD AUTO: 12 K/UL (ref 4.1–11.1)
WBC # BLD AUTO: 14.1 K/UL (ref 4.1–11.1)
WBC # BLD AUTO: 14.1 K/UL (ref 4.1–11.1)
WBC # BLD AUTO: 14.8 K/UL (ref 4.1–11.1)
WBC # BLD AUTO: 15.1 K/UL (ref 4.1–11.1)
WBC # BLD AUTO: 16.2 K/UL (ref 4.1–11.1)
WBC # BLD AUTO: 16.2 K/UL (ref 4.1–11.1)
WBC # BLD AUTO: 16.5 K/UL (ref 4.1–11.1)
WBC # BLD AUTO: 16.7 K/UL (ref 4.1–11.1)
WBC # BLD AUTO: 17.4 K/UL (ref 4.1–11.1)
WBC # BLD AUTO: 19.3 K/UL (ref 4.1–11.1)
WBC # BLD AUTO: 22.8 K/UL (ref 4.1–11.1)
WBC # BLD AUTO: 23.6 K/UL (ref 4.1–11.1)
WBC # BLD AUTO: 25.8 K/UL (ref 4.1–11.1)
WBC # BLD AUTO: 9 K/UL (ref 4.1–11.1)
WBC # BLD AUTO: 9.5 K/UL (ref 4.1–11.1)
WBC # BLD AUTO: 9.8 K/UL (ref 4.1–11.1)
WBC URNS QL MICRO: >100 /HPF (ref 0–4)
WBC URNS QL MICRO: ABNORMAL /HPF (ref 0–4)
WBC URNS QL MICRO: ABNORMAL /HPF (ref 0–4)
YEAST URNS QL MICRO: PRESENT

## 2022-01-01 PROCEDURE — C1769 GUIDE WIRE: HCPCS

## 2022-01-01 PROCEDURE — 83036 HEMOGLOBIN GLYCOSYLATED A1C: CPT

## 2022-01-01 PROCEDURE — 74011000250 HC RX REV CODE- 250: Performed by: HOSPITALIST

## 2022-01-01 PROCEDURE — 96374 THER/PROPH/DIAG INJ IV PUSH: CPT

## 2022-01-01 PROCEDURE — 76210000021 HC REC RM PH II 0.5 TO 1 HR: Performed by: UROLOGY

## 2022-01-01 PROCEDURE — 82570 ASSAY OF URINE CREATININE: CPT

## 2022-01-01 PROCEDURE — 80048 BASIC METABOLIC PNL TOTAL CA: CPT

## 2022-01-01 PROCEDURE — 99283 EMERGENCY DEPT VISIT LOW MDM: CPT

## 2022-01-01 PROCEDURE — 83540 ASSAY OF IRON: CPT

## 2022-01-01 PROCEDURE — 96372 THER/PROPH/DIAG INJ SC/IM: CPT

## 2022-01-01 PROCEDURE — 85027 COMPLETE CBC AUTOMATED: CPT

## 2022-01-01 PROCEDURE — C1769 GUIDE WIRE: HCPCS | Performed by: UROLOGY

## 2022-01-01 PROCEDURE — 74011250636 HC RX REV CODE- 250/636: Performed by: HOSPITALIST

## 2022-01-01 PROCEDURE — 82962 GLUCOSE BLOOD TEST: CPT

## 2022-01-01 PROCEDURE — 2709999900 HC NON-CHARGEABLE SUPPLY

## 2022-01-01 PROCEDURE — 74011000250 HC RX REV CODE- 250: Performed by: UROLOGY

## 2022-01-01 PROCEDURE — 74011250637 HC RX REV CODE- 250/637: Performed by: HOSPITALIST

## 2022-01-01 PROCEDURE — 78306 BONE IMAGING WHOLE BODY: CPT

## 2022-01-01 PROCEDURE — 85025 COMPLETE CBC W/AUTO DIFF WBC: CPT

## 2022-01-01 PROCEDURE — 99152 MOD SED SAME PHYS/QHP 5/>YRS: CPT

## 2022-01-01 PROCEDURE — 86900 BLOOD TYPING SEROLOGIC ABO: CPT

## 2022-01-01 PROCEDURE — 76010000149 HC OR TIME 1 TO 1.5 HR: Performed by: UROLOGY

## 2022-01-01 PROCEDURE — 36415 COLL VENOUS BLD VENIPUNCTURE: CPT

## 2022-01-01 PROCEDURE — G8432 DEP SCR NOT DOC, RNG: HCPCS | Performed by: INTERNAL MEDICINE

## 2022-01-01 PROCEDURE — 77030040831 HC BAG URINE DRNG MDII -A

## 2022-01-01 PROCEDURE — 74011636637 HC RX REV CODE- 636/637: Performed by: FAMILY MEDICINE

## 2022-01-01 PROCEDURE — 96402 CHEMO HORMON ANTINEOPL SQ/IM: CPT

## 2022-01-01 PROCEDURE — 83735 ASSAY OF MAGNESIUM: CPT

## 2022-01-01 PROCEDURE — 77002 NEEDLE LOCALIZATION BY XRAY: CPT

## 2022-01-01 PROCEDURE — G8417 CALC BMI ABV UP PARAM F/U: HCPCS | Performed by: INTERNAL MEDICINE

## 2022-01-01 PROCEDURE — 71046 X-RAY EXAM CHEST 2 VIEWS: CPT

## 2022-01-01 PROCEDURE — 84300 ASSAY OF URINE SODIUM: CPT

## 2022-01-01 PROCEDURE — 81001 URINALYSIS AUTO W/SCOPE: CPT

## 2022-01-01 PROCEDURE — 74011250636 HC RX REV CODE- 250/636: Performed by: INTERNAL MEDICINE

## 2022-01-01 PROCEDURE — 80053 COMPREHEN METABOLIC PANEL: CPT

## 2022-01-01 PROCEDURE — 77030040389 HC CATH ANGIO DX BEACON COOK -B

## 2022-01-01 PROCEDURE — 74011636637 HC RX REV CODE- 636/637: Performed by: HOSPITALIST

## 2022-01-01 PROCEDURE — 77030029065 HC DRSG HEMO QCLOT ZMED -B

## 2022-01-01 PROCEDURE — G8536 NO DOC ELDER MAL SCRN: HCPCS | Performed by: INTERNAL MEDICINE

## 2022-01-01 PROCEDURE — P9016 RBC LEUKOCYTES REDUCED: HCPCS

## 2022-01-01 PROCEDURE — 51702 INSERT TEMP BLADDER CATH: CPT

## 2022-01-01 PROCEDURE — 74011000636 HC RX REV CODE- 636: Performed by: STUDENT IN AN ORGANIZED HEALTH CARE EDUCATION/TRAINING PROGRAM

## 2022-01-01 PROCEDURE — 97165 OT EVAL LOW COMPLEX 30 MIN: CPT

## 2022-01-01 PROCEDURE — 74011250636 HC RX REV CODE- 250/636: Performed by: EMERGENCY MEDICINE

## 2022-01-01 PROCEDURE — 04VE3DZ RESTRICTION OF RIGHT INTERNAL ILIAC ARTERY WITH INTRALUMINAL DEVICE, PERCUTANEOUS APPROACH: ICD-10-PCS | Performed by: RADIOLOGY

## 2022-01-01 PROCEDURE — 85610 PROTHROMBIN TIME: CPT

## 2022-01-01 PROCEDURE — 99215 OFFICE O/P EST HI 40 MIN: CPT | Performed by: INTERNAL MEDICINE

## 2022-01-01 PROCEDURE — 74011250636 HC RX REV CODE- 250/636: Performed by: UROLOGY

## 2022-01-01 PROCEDURE — 77030008684 HC TU ET CUF COVD -B: Performed by: ANESTHESIOLOGY

## 2022-01-01 PROCEDURE — C1889 IMPLANT/INSERT DEVICE, NOC: HCPCS

## 2022-01-01 PROCEDURE — 1101F PT FALLS ASSESS-DOCD LE1/YR: CPT | Performed by: INTERNAL MEDICINE

## 2022-01-01 PROCEDURE — 82728 ASSAY OF FERRITIN: CPT

## 2022-01-01 PROCEDURE — 65270000029 HC RM PRIVATE

## 2022-01-01 PROCEDURE — 87077 CULTURE AEROBIC IDENTIFY: CPT

## 2022-01-01 PROCEDURE — 77030026438 HC STYL ET INTUB CARD -A: Performed by: ANESTHESIOLOGY

## 2022-01-01 PROCEDURE — 85045 AUTOMATED RETICULOCYTE COUNT: CPT

## 2022-01-01 PROCEDURE — C1887 CATHETER, GUIDING: HCPCS

## 2022-01-01 PROCEDURE — 85018 HEMOGLOBIN: CPT

## 2022-01-01 PROCEDURE — 1123F ACP DISCUSS/DSCN MKR DOCD: CPT | Performed by: INTERNAL MEDICINE

## 2022-01-01 PROCEDURE — 74011000258 HC RX REV CODE- 258: Performed by: INTERNAL MEDICINE

## 2022-01-01 PROCEDURE — 36430 TRANSFUSION BLD/BLD COMPNT: CPT

## 2022-01-01 PROCEDURE — 84153 ASSAY OF PSA TOTAL: CPT

## 2022-01-01 PROCEDURE — G8753 SYS BP > OR = 140: HCPCS | Performed by: INTERNAL MEDICINE

## 2022-01-01 PROCEDURE — 77030005513 HC CATH URETH FOL11 MDII -B

## 2022-01-01 PROCEDURE — 70450 CT HEAD/BRAIN W/O DYE: CPT

## 2022-01-01 PROCEDURE — 74011000636 HC RX REV CODE- 636: Performed by: EMERGENCY MEDICINE

## 2022-01-01 PROCEDURE — 86923 COMPATIBILITY TEST ELECTRIC: CPT

## 2022-01-01 PROCEDURE — 99153 MOD SED SAME PHYS/QHP EA: CPT

## 2022-01-01 PROCEDURE — 74011250637 HC RX REV CODE- 250/637: Performed by: FAMILY MEDICINE

## 2022-01-01 PROCEDURE — 82077 ASSAY SPEC XCP UR&BREATH IA: CPT

## 2022-01-01 PROCEDURE — 77030012865 HC BG URIN LEG MDII -A

## 2022-01-01 PROCEDURE — 97530 THERAPEUTIC ACTIVITIES: CPT

## 2022-01-01 PROCEDURE — 74011250637 HC RX REV CODE- 250/637: Performed by: EMERGENCY MEDICINE

## 2022-01-01 PROCEDURE — 76770 US EXAM ABDO BACK WALL COMP: CPT

## 2022-01-01 PROCEDURE — 84145 PROCALCITONIN (PCT): CPT

## 2022-01-01 PROCEDURE — C1892 INTRO/SHEATH,FIXED,PEEL-AWAY: HCPCS

## 2022-01-01 PROCEDURE — 74011000258 HC RX REV CODE- 258: Performed by: HOSPITALIST

## 2022-01-01 PROCEDURE — 77030007949 HC PARTIC EMB CNTUR BSC -F

## 2022-01-01 PROCEDURE — 99284 EMERGENCY DEPT VISIT MOD MDM: CPT

## 2022-01-01 PROCEDURE — 93005 ELECTROCARDIOGRAM TRACING: CPT

## 2022-01-01 PROCEDURE — 99223 1ST HOSP IP/OBS HIGH 75: CPT | Performed by: INTERNAL MEDICINE

## 2022-01-01 PROCEDURE — 83605 ASSAY OF LACTIC ACID: CPT

## 2022-01-01 PROCEDURE — 77030019698 HC SYR ANGI MDLON MRTM -A

## 2022-01-01 PROCEDURE — 71045 X-RAY EXAM CHEST 1 VIEW: CPT

## 2022-01-01 PROCEDURE — G0463 HOSPITAL OUTPT CLINIC VISIT: HCPCS | Performed by: INTERNAL MEDICINE

## 2022-01-01 PROCEDURE — 74011250636 HC RX REV CODE- 250/636: Performed by: NURSE PRACTITIONER

## 2022-01-01 PROCEDURE — 74011000250 HC RX REV CODE- 250: Performed by: FAMILY MEDICINE

## 2022-01-01 PROCEDURE — 77030004561 HC CATH ANGI DX COBRA ANGI -B

## 2022-01-01 PROCEDURE — G8752 SYS BP LESS 140: HCPCS | Performed by: INTERNAL MEDICINE

## 2022-01-01 PROCEDURE — 74011250636 HC RX REV CODE- 250/636: Performed by: FAMILY MEDICINE

## 2022-01-01 PROCEDURE — 76210000006 HC OR PH I REC 0.5 TO 1 HR: Performed by: UROLOGY

## 2022-01-01 PROCEDURE — 74011000636 HC RX REV CODE- 636: Performed by: RADIOLOGY

## 2022-01-01 PROCEDURE — 3017F COLORECTAL CA SCREEN DOC REV: CPT | Performed by: INTERNAL MEDICINE

## 2022-01-01 PROCEDURE — 74177 CT ABD & PELVIS W/CONTRAST: CPT

## 2022-01-01 PROCEDURE — 77030040831 HC BAG URINE DRNG MDII -A: Performed by: UROLOGY

## 2022-01-01 PROCEDURE — 2709999900 HC NON-CHARGEABLE SUPPLY: Performed by: UROLOGY

## 2022-01-01 PROCEDURE — 0TPBX0Z REMOVAL OF DRAINAGE DEVICE FROM BLADDER, EXTERNAL APPROACH: ICD-10-PCS | Performed by: INTERNAL MEDICINE

## 2022-01-01 PROCEDURE — 87086 URINE CULTURE/COLONY COUNT: CPT

## 2022-01-01 PROCEDURE — 77030005518 HC CATH URETH FOL 2W BARD -B

## 2022-01-01 PROCEDURE — 83010 ASSAY OF HAPTOGLOBIN QUANT: CPT

## 2022-01-01 PROCEDURE — 51705 CHANGE OF BLADDER TUBE: CPT

## 2022-01-01 PROCEDURE — 37243 VASC EMBOLIZE/OCCLUDE ORGAN: CPT

## 2022-01-01 PROCEDURE — B41J1ZZ FLUOROSCOPY OF OTHER LOWER ARTERIES USING LOW OSMOLAR CONTRAST: ICD-10-PCS | Performed by: RADIOLOGY

## 2022-01-01 PROCEDURE — G8427 DOCREV CUR MEDS BY ELIG CLIN: HCPCS | Performed by: INTERNAL MEDICINE

## 2022-01-01 PROCEDURE — 87186 SC STD MICRODIL/AGAR DIL: CPT

## 2022-01-01 PROCEDURE — 74011000250 HC RX REV CODE- 250: Performed by: RADIOLOGY

## 2022-01-01 PROCEDURE — 74011636637 HC RX REV CODE- 636/637: Performed by: INTERNAL MEDICINE

## 2022-01-01 PROCEDURE — 87040 BLOOD CULTURE FOR BACTERIA: CPT

## 2022-01-01 PROCEDURE — 99285 EMERGENCY DEPT VISIT HI MDM: CPT

## 2022-01-01 PROCEDURE — 74011250637 HC RX REV CODE- 250/637: Performed by: INTERNAL MEDICINE

## 2022-01-01 PROCEDURE — 74011250636 HC RX REV CODE- 250/636: Performed by: ANESTHESIOLOGY

## 2022-01-01 PROCEDURE — 77030040922 HC BLNKT HYPOTHRM STRY -A

## 2022-01-01 PROCEDURE — 74176 CT ABD & PELVIS W/O CONTRAST: CPT

## 2022-01-01 PROCEDURE — 75810000275 HC EMERGENCY DEPT VISIT NO LEVEL OF CARE

## 2022-01-01 PROCEDURE — C1894 INTRO/SHEATH, NON-LASER: HCPCS

## 2022-01-01 PROCEDURE — 77030011230 HC DIL VESL COON COOK -B

## 2022-01-01 PROCEDURE — 74011000250 HC RX REV CODE- 250: Performed by: STUDENT IN AN ORGANIZED HEALTH CARE EDUCATION/TRAINING PROGRAM

## 2022-01-01 PROCEDURE — 30233N1 TRANSFUSION OF NONAUTOLOGOUS RED BLOOD CELLS INTO PERIPHERAL VEIN, PERCUTANEOUS APPROACH: ICD-10-PCS | Performed by: HOSPITALIST

## 2022-01-01 PROCEDURE — 96365 THER/PROPH/DIAG IV INF INIT: CPT

## 2022-01-01 PROCEDURE — 1111F DSCHRG MED/CURRENT MED MERGE: CPT | Performed by: INTERNAL MEDICINE

## 2022-01-01 PROCEDURE — 77030040361 HC SLV COMPR DVT MDII -B

## 2022-01-01 PROCEDURE — G8754 DIAS BP LESS 90: HCPCS | Performed by: INTERNAL MEDICINE

## 2022-01-01 PROCEDURE — 74011250636 HC RX REV CODE- 250/636: Performed by: RADIOLOGY

## 2022-01-01 PROCEDURE — 77030005518 HC CATH URETH FOL 2W BARD -B: Performed by: UROLOGY

## 2022-01-01 PROCEDURE — 74011000272 HC RX REV CODE- 272: Performed by: RADIOLOGY

## 2022-01-01 PROCEDURE — 83615 LACTATE (LD) (LDH) ENZYME: CPT

## 2022-01-01 PROCEDURE — G0463 HOSPITAL OUTPT CLINIC VISIT: HCPCS | Performed by: NURSE PRACTITIONER

## 2022-01-01 PROCEDURE — 76060000033 HC ANESTHESIA 1 TO 1.5 HR: Performed by: UROLOGY

## 2022-01-01 PROCEDURE — 77030009378 HC DEV TORQ OLCT F/GWIRE MANIP COOK -A

## 2022-01-01 PROCEDURE — 0T9B30Z DRAINAGE OF BLADDER WITH DRAINAGE DEVICE, PERCUTANEOUS APPROACH: ICD-10-PCS | Performed by: INTERNAL MEDICINE

## 2022-01-01 PROCEDURE — 84156 ASSAY OF PROTEIN URINE: CPT

## 2022-01-01 PROCEDURE — 74011000258 HC RX REV CODE- 258: Performed by: EMERGENCY MEDICINE

## 2022-01-01 PROCEDURE — 74011000250 HC RX REV CODE- 250: Performed by: NURSE ANESTHETIST, CERTIFIED REGISTERED

## 2022-01-01 PROCEDURE — 51798 US URINE CAPACITY MEASURE: CPT

## 2022-01-01 PROCEDURE — 74011000636 HC RX REV CODE- 636: Performed by: NURSE PRACTITIONER

## 2022-01-01 PROCEDURE — 97116 GAIT TRAINING THERAPY: CPT

## 2022-01-01 PROCEDURE — 77030028158 HC ELECTRD BISOLSR PROST RWOL -B: Performed by: UROLOGY

## 2022-01-01 PROCEDURE — 77030019927 HC TBNG IRR CYSTO BAXT -A: Performed by: UROLOGY

## 2022-01-01 PROCEDURE — 74011250636 HC RX REV CODE- 250/636: Performed by: NURSE ANESTHETIST, CERTIFIED REGISTERED

## 2022-01-01 PROCEDURE — 51102 DRAIN BL W/CATH INSERTION: CPT

## 2022-01-01 PROCEDURE — 87150 DNA/RNA AMPLIFIED PROBE: CPT

## 2022-01-01 PROCEDURE — 99282 EMERGENCY DEPT VISIT SF MDM: CPT

## 2022-01-01 PROCEDURE — 74011250636 HC RX REV CODE- 250/636: Performed by: STUDENT IN AN ORGANIZED HEALTH CARE EDUCATION/TRAINING PROGRAM

## 2022-01-01 PROCEDURE — 04VF3DZ RESTRICTION OF LEFT INTERNAL ILIAC ARTERY WITH INTRALUMINAL DEVICE, PERCUTANEOUS APPROACH: ICD-10-PCS | Performed by: RADIOLOGY

## 2022-01-01 PROCEDURE — 74011000250 HC RX REV CODE- 250: Performed by: ANESTHESIOLOGY

## 2022-01-01 PROCEDURE — C1760 CLOSURE DEV, VASC: HCPCS

## 2022-01-01 PROCEDURE — B41C1ZZ FLUOROSCOPY OF PELVIC ARTERIES USING LOW OSMOLAR CONTRAST: ICD-10-PCS | Performed by: RADIOLOGY

## 2022-01-01 PROCEDURE — 97161 PT EVAL LOW COMPLEX 20 MIN: CPT

## 2022-01-01 PROCEDURE — 99233 SBSQ HOSP IP/OBS HIGH 50: CPT | Performed by: INTERNAL MEDICINE

## 2022-01-01 PROCEDURE — 80307 DRUG TEST PRSMV CHEM ANLYZR: CPT

## 2022-01-01 PROCEDURE — 74011000258 HC RX REV CODE- 258: Performed by: UROLOGY

## 2022-01-01 PROCEDURE — 96360 HYDRATION IV INFUSION INIT: CPT

## 2022-01-01 PROCEDURE — 74011250637 HC RX REV CODE- 250/637: Performed by: STUDENT IN AN ORGANIZED HEALTH CARE EDUCATION/TRAINING PROGRAM

## 2022-01-01 RX ORDER — FAMOTIDINE 20 MG/1
20 TABLET, FILM COATED ORAL 2 TIMES DAILY
Status: DISCONTINUED | OUTPATIENT
Start: 2022-01-01 | End: 2022-01-01 | Stop reason: HOSPADM

## 2022-01-01 RX ORDER — DEXTROMETHORPHAN POLISTIREX 30 MG/5 ML
SUSPENSION, EXTENDED RELEASE 12 HR ORAL AS NEEDED
Status: DISCONTINUED | OUTPATIENT
Start: 2022-01-01 | End: 2022-01-01 | Stop reason: HOSPADM

## 2022-01-01 RX ORDER — INSULIN LISPRO 100 [IU]/ML
INJECTION, SOLUTION INTRAVENOUS; SUBCUTANEOUS
Status: DISCONTINUED | OUTPATIENT
Start: 2022-01-01 | End: 2022-01-01 | Stop reason: HOSPADM

## 2022-01-01 RX ORDER — MIDAZOLAM HYDROCHLORIDE 1 MG/ML
.5-1 INJECTION, SOLUTION INTRAMUSCULAR; INTRAVENOUS
Status: DISCONTINUED | OUTPATIENT
Start: 2022-01-01 | End: 2022-01-01 | Stop reason: HOSPADM

## 2022-01-01 RX ORDER — DEXAMETHASONE SODIUM PHOSPHATE 4 MG/ML
INJECTION, SOLUTION INTRA-ARTICULAR; INTRALESIONAL; INTRAMUSCULAR; INTRAVENOUS; SOFT TISSUE AS NEEDED
Status: DISCONTINUED | OUTPATIENT
Start: 2022-01-01 | End: 2022-01-01 | Stop reason: HOSPADM

## 2022-01-01 RX ORDER — OXYCODONE HYDROCHLORIDE 5 MG/1
5 TABLET ORAL
Qty: 10 TABLET | Refills: 0 | Status: SHIPPED | OUTPATIENT
Start: 2022-01-01 | End: 2022-01-01

## 2022-01-01 RX ORDER — FENTANYL CITRATE 50 UG/ML
25-200 INJECTION, SOLUTION INTRAMUSCULAR; INTRAVENOUS
Status: DISCONTINUED | OUTPATIENT
Start: 2022-01-01 | End: 2022-01-01 | Stop reason: HOSPADM

## 2022-01-01 RX ORDER — POLYETHYLENE GLYCOL 3350 17 G/17G
17 POWDER, FOR SOLUTION ORAL DAILY
Status: DISCONTINUED | OUTPATIENT
Start: 2022-01-01 | End: 2022-01-01 | Stop reason: HOSPADM

## 2022-01-01 RX ORDER — INSULIN GLARGINE 100 [IU]/ML
7 INJECTION, SOLUTION SUBCUTANEOUS 2 TIMES DAILY
Status: DISCONTINUED | OUTPATIENT
Start: 2022-01-01 | End: 2022-01-01 | Stop reason: HOSPADM

## 2022-01-01 RX ORDER — ONDANSETRON 2 MG/ML
4 INJECTION INTRAMUSCULAR; INTRAVENOUS
Status: CANCELLED | OUTPATIENT
Start: 2022-01-01

## 2022-01-01 RX ORDER — PHENYLEPHRINE HCL IN 0.9% NACL 0.4MG/10ML
SYRINGE (ML) INTRAVENOUS AS NEEDED
Status: DISCONTINUED | OUTPATIENT
Start: 2022-01-01 | End: 2022-01-01 | Stop reason: HOSPADM

## 2022-01-01 RX ORDER — ACETAMINOPHEN 325 MG/1
650 TABLET ORAL
Status: DISCONTINUED | OUTPATIENT
Start: 2022-01-01 | End: 2022-01-01 | Stop reason: HOSPADM

## 2022-01-01 RX ORDER — ATENOLOL 50 MG/1
50 TABLET ORAL
Status: DISCONTINUED | OUTPATIENT
Start: 2022-01-01 | End: 2022-01-01 | Stop reason: HOSPADM

## 2022-01-01 RX ORDER — SODIUM CHLORIDE, SODIUM LACTATE, POTASSIUM CHLORIDE, CALCIUM CHLORIDE 600; 310; 30; 20 MG/100ML; MG/100ML; MG/100ML; MG/100ML
75 INJECTION, SOLUTION INTRAVENOUS CONTINUOUS
Status: DISCONTINUED | OUTPATIENT
Start: 2022-01-01 | End: 2022-01-01

## 2022-01-01 RX ORDER — SODIUM CHLORIDE 0.9 % (FLUSH) 0.9 %
5-40 SYRINGE (ML) INJECTION EVERY 8 HOURS
Status: DISCONTINUED | OUTPATIENT
Start: 2022-01-01 | End: 2022-01-01 | Stop reason: HOSPADM

## 2022-01-01 RX ORDER — INSULIN GLARGINE 100 [IU]/ML
14 INJECTION, SOLUTION SUBCUTANEOUS 2 TIMES DAILY
Status: DISCONTINUED | OUTPATIENT
Start: 2022-01-01 | End: 2022-01-01 | Stop reason: HOSPADM

## 2022-01-01 RX ORDER — INSULIN GLARGINE 100 [IU]/ML
23 INJECTION, SOLUTION SUBCUTANEOUS
Status: CANCELLED | OUTPATIENT
Start: 2022-01-01

## 2022-01-01 RX ORDER — ADHESIVE BANDAGE
30 BANDAGE TOPICAL DAILY PRN
Status: DISCONTINUED | OUTPATIENT
Start: 2022-01-01 | End: 2022-01-01 | Stop reason: HOSPADM

## 2022-01-01 RX ORDER — INSULIN GLARGINE 100 [IU]/ML
10 INJECTION, SOLUTION SUBCUTANEOUS
Status: DISCONTINUED | OUTPATIENT
Start: 2022-01-01 | End: 2022-01-01 | Stop reason: HOSPADM

## 2022-01-01 RX ORDER — ACETAMINOPHEN 650 MG/1
650 SUPPOSITORY RECTAL
Status: DISCONTINUED | OUTPATIENT
Start: 2022-01-01 | End: 2022-01-01 | Stop reason: HOSPADM

## 2022-01-01 RX ORDER — SODIUM CHLORIDE 0.9 % (FLUSH) 0.9 %
5-40 SYRINGE (ML) INJECTION AS NEEDED
Status: DISCONTINUED | OUTPATIENT
Start: 2022-01-01 | End: 2022-01-01 | Stop reason: HOSPADM

## 2022-01-01 RX ORDER — PIOGLITAZONEHYDROCHLORIDE 15 MG/1
45 TABLET ORAL DAILY
Status: DISCONTINUED | OUTPATIENT
Start: 2022-01-01 | End: 2022-01-01 | Stop reason: HOSPADM

## 2022-01-01 RX ORDER — INSULIN GLARGINE 100 [IU]/ML
14 INJECTION, SOLUTION SUBCUTANEOUS DAILY
Status: DISCONTINUED | OUTPATIENT
Start: 2022-01-01 | End: 2022-01-01

## 2022-01-01 RX ORDER — MIDAZOLAM HYDROCHLORIDE 1 MG/ML
INJECTION, SOLUTION INTRAMUSCULAR; INTRAVENOUS AS NEEDED
Status: DISCONTINUED | OUTPATIENT
Start: 2022-01-01 | End: 2022-01-01 | Stop reason: HOSPADM

## 2022-01-01 RX ORDER — SODIUM CHLORIDE 9 MG/ML
250 INJECTION, SOLUTION INTRAVENOUS AS NEEDED
Status: DISCONTINUED | OUTPATIENT
Start: 2022-01-01 | End: 2022-01-01 | Stop reason: HOSPADM

## 2022-01-01 RX ORDER — LANOLIN ALCOHOL/MO/W.PET/CERES
325 CREAM (GRAM) TOPICAL
Qty: 30 TABLET | Refills: 0 | Status: SHIPPED | OUTPATIENT
Start: 2022-01-01 | End: 2022-01-01

## 2022-01-01 RX ORDER — ONDANSETRON 2 MG/ML
4 INJECTION INTRAMUSCULAR; INTRAVENOUS
Status: DISCONTINUED | OUTPATIENT
Start: 2022-01-01 | End: 2022-01-01 | Stop reason: HOSPADM

## 2022-01-01 RX ORDER — ABIRATERONE ACETATE 250 MG/1
1000 TABLET ORAL DAILY
Status: CANCELLED | OUTPATIENT
Start: 2022-01-01

## 2022-01-01 RX ORDER — FINASTERIDE 5 MG/1
5 TABLET, FILM COATED ORAL DAILY
Status: CANCELLED | OUTPATIENT
Start: 2022-01-01

## 2022-01-01 RX ORDER — INFANT FORMULA WITH IRON
POWDER (GRAM) ORAL AS NEEDED
Status: CANCELLED | OUTPATIENT
Start: 2022-01-01

## 2022-01-01 RX ORDER — DEXTROSE MONOHYDRATE 100 MG/ML
0-250 INJECTION, SOLUTION INTRAVENOUS AS NEEDED
Status: CANCELLED | OUTPATIENT
Start: 2022-01-01

## 2022-01-01 RX ORDER — CEPHALEXIN 500 MG/1
500 CAPSULE ORAL 4 TIMES DAILY
Qty: 28 CAPSULE | Refills: 0 | Status: SHIPPED | OUTPATIENT
Start: 2022-01-01 | End: 2022-01-01

## 2022-01-01 RX ORDER — PREDNISONE 5 MG/1
5 TABLET ORAL DAILY
Status: CANCELLED | OUTPATIENT
Start: 2022-01-01

## 2022-01-01 RX ORDER — ENOXAPARIN SODIUM 100 MG/ML
40 INJECTION SUBCUTANEOUS DAILY
Status: DISCONTINUED | OUTPATIENT
Start: 2022-01-01 | End: 2022-01-01 | Stop reason: HOSPADM

## 2022-01-01 RX ORDER — IBUPROFEN 200 MG
4 TABLET ORAL AS NEEDED
Status: DISCONTINUED | OUTPATIENT
Start: 2022-01-01 | End: 2022-01-01 | Stop reason: HOSPADM

## 2022-01-01 RX ORDER — SODIUM CHLORIDE 0.9 % (FLUSH) 0.9 %
5-40 SYRINGE (ML) INJECTION AS NEEDED
Status: CANCELLED | OUTPATIENT
Start: 2022-01-01

## 2022-01-01 RX ORDER — DOCUSATE SODIUM 100 MG/1
100 CAPSULE, LIQUID FILLED ORAL 2 TIMES DAILY
Status: CANCELLED | OUTPATIENT
Start: 2022-01-01

## 2022-01-01 RX ORDER — SODIUM CHLORIDE 9 MG/ML
75 INJECTION, SOLUTION INTRAVENOUS CONTINUOUS
Status: DISCONTINUED | OUTPATIENT
Start: 2022-01-01 | End: 2022-01-01 | Stop reason: HOSPADM

## 2022-01-01 RX ORDER — DEXTROSE MONOHYDRATE 100 MG/ML
250 INJECTION, SOLUTION INTRAVENOUS ONCE
Status: COMPLETED | OUTPATIENT
Start: 2022-01-01 | End: 2022-01-01

## 2022-01-01 RX ORDER — SODIUM CHLORIDE, SODIUM LACTATE, POTASSIUM CHLORIDE, CALCIUM CHLORIDE 600; 310; 30; 20 MG/100ML; MG/100ML; MG/100ML; MG/100ML
125 INJECTION, SOLUTION INTRAVENOUS CONTINUOUS
Status: CANCELLED | OUTPATIENT
Start: 2022-01-01

## 2022-01-01 RX ORDER — ATORVASTATIN CALCIUM 10 MG/1
10 TABLET, FILM COATED ORAL
Status: DISCONTINUED | OUTPATIENT
Start: 2022-01-01 | End: 2022-01-01 | Stop reason: HOSPADM

## 2022-01-01 RX ORDER — CEPHALEXIN 500 MG/1
500 CAPSULE ORAL 3 TIMES DAILY
Qty: 21 CAPSULE | Refills: 0 | Status: SHIPPED | OUTPATIENT
Start: 2022-01-01 | End: 2022-01-01

## 2022-01-01 RX ORDER — LEVOFLOXACIN 5 MG/ML
750 INJECTION, SOLUTION INTRAVENOUS ONCE
Status: COMPLETED | OUTPATIENT
Start: 2022-01-01 | End: 2022-01-01

## 2022-01-01 RX ORDER — ONDANSETRON 4 MG/1
4 TABLET, ORALLY DISINTEGRATING ORAL
Status: DISCONTINUED | OUTPATIENT
Start: 2022-01-01 | End: 2022-01-01 | Stop reason: HOSPADM

## 2022-01-01 RX ORDER — ATORVASTATIN CALCIUM 10 MG/1
10 TABLET, FILM COATED ORAL
Status: CANCELLED | OUTPATIENT
Start: 2022-01-01

## 2022-01-01 RX ORDER — DOCUSATE SODIUM 100 MG/1
100 CAPSULE, LIQUID FILLED ORAL 2 TIMES DAILY
Status: DISCONTINUED | OUTPATIENT
Start: 2022-01-01 | End: 2022-01-01 | Stop reason: HOSPADM

## 2022-01-01 RX ORDER — ATENOLOL 50 MG/1
25 TABLET ORAL
Status: DISCONTINUED | OUTPATIENT
Start: 2022-01-01 | End: 2022-01-01 | Stop reason: HOSPADM

## 2022-01-01 RX ORDER — SODIUM CHLORIDE 9 MG/ML
75 INJECTION, SOLUTION INTRAVENOUS CONTINUOUS
Status: DISCONTINUED | OUTPATIENT
Start: 2022-01-01 | End: 2022-01-01

## 2022-01-01 RX ORDER — SENNOSIDES 8.6 MG/1
2 TABLET ORAL
Status: CANCELLED | OUTPATIENT
Start: 2022-01-01

## 2022-01-01 RX ORDER — AMOXICILLIN 250 MG
1 CAPSULE ORAL 2 TIMES DAILY
Status: DISCONTINUED | OUTPATIENT
Start: 2022-01-01 | End: 2022-01-01 | Stop reason: HOSPADM

## 2022-01-01 RX ORDER — POLYETHYLENE GLYCOL 3350 17 G/17G
17 POWDER, FOR SOLUTION ORAL DAILY PRN
Status: DISCONTINUED | OUTPATIENT
Start: 2022-01-01 | End: 2022-01-01 | Stop reason: HOSPADM

## 2022-01-01 RX ORDER — CIPROFLOXACIN 2 MG/ML
400 INJECTION, SOLUTION INTRAVENOUS
Status: DISCONTINUED | OUTPATIENT
Start: 2022-01-01 | End: 2022-01-01 | Stop reason: CLARIF

## 2022-01-01 RX ORDER — PREDNISONE 5 MG/1
5 TABLET ORAL DAILY
Status: DISCONTINUED | OUTPATIENT
Start: 2022-01-01 | End: 2022-01-01 | Stop reason: HOSPADM

## 2022-01-01 RX ORDER — DOCUSATE SODIUM 100 MG/1
100 CAPSULE, LIQUID FILLED ORAL
COMMUNITY

## 2022-01-01 RX ORDER — SUCCINYLCHOLINE CHLORIDE 20 MG/ML
INJECTION INTRAMUSCULAR; INTRAVENOUS AS NEEDED
Status: DISCONTINUED | OUTPATIENT
Start: 2022-01-01 | End: 2022-01-01 | Stop reason: HOSPADM

## 2022-01-01 RX ORDER — LIDOCAINE HYDROCHLORIDE 10 MG/ML
0.1 INJECTION, SOLUTION EPIDURAL; INFILTRATION; INTRACAUDAL; PERINEURAL AS NEEDED
Status: DISCONTINUED | OUTPATIENT
Start: 2022-01-01 | End: 2022-01-01 | Stop reason: HOSPADM

## 2022-01-01 RX ORDER — CLINDAMYCIN PHOSPHATE 600 MG/50ML
600 INJECTION INTRAVENOUS EVERY 6 HOURS
Status: DISCONTINUED | OUTPATIENT
Start: 2022-01-01 | End: 2022-01-01

## 2022-01-01 RX ORDER — MAGNESIUM SULFATE 100 %
4 CRYSTALS MISCELLANEOUS AS NEEDED
Status: DISCONTINUED | OUTPATIENT
Start: 2022-01-01 | End: 2022-01-01 | Stop reason: HOSPADM

## 2022-01-01 RX ORDER — LIDOCAINE HYDROCHLORIDE 10 MG/ML
20 INJECTION INFILTRATION; PERINEURAL
Status: DISCONTINUED | OUTPATIENT
Start: 2022-01-01 | End: 2022-01-01

## 2022-01-01 RX ORDER — INSULIN GLARGINE 100 [IU]/ML
14 INJECTION, SOLUTION SUBCUTANEOUS 2 TIMES DAILY
Status: DISCONTINUED | OUTPATIENT
Start: 2022-01-01 | End: 2022-01-01

## 2022-01-01 RX ORDER — MORPHINE SULFATE 1 MG/ML
2 INJECTION, SOLUTION EPIDURAL; INTRATHECAL; INTRAVENOUS
Status: CANCELLED | OUTPATIENT
Start: 2022-01-01

## 2022-01-01 RX ORDER — HYDROMORPHONE HYDROCHLORIDE 1 MG/ML
.25-1 INJECTION, SOLUTION INTRAMUSCULAR; INTRAVENOUS; SUBCUTANEOUS
Status: CANCELLED | OUTPATIENT
Start: 2022-01-01

## 2022-01-01 RX ORDER — OXYCODONE HYDROCHLORIDE 5 MG/1
5 TABLET ORAL ONCE
Status: COMPLETED | OUTPATIENT
Start: 2022-01-01 | End: 2022-01-01

## 2022-01-01 RX ORDER — TAMSULOSIN HYDROCHLORIDE 0.4 MG/1
0.4 CAPSULE ORAL 2 TIMES DAILY
Status: DISCONTINUED | OUTPATIENT
Start: 2022-01-01 | End: 2022-01-01 | Stop reason: HOSPADM

## 2022-01-01 RX ORDER — TAMSULOSIN HYDROCHLORIDE 0.4 MG/1
0.4 CAPSULE ORAL 2 TIMES DAILY
Status: CANCELLED | OUTPATIENT
Start: 2022-01-01

## 2022-01-01 RX ORDER — ONDANSETRON 4 MG/1
4 TABLET, ORALLY DISINTEGRATING ORAL
Status: DISCONTINUED | OUTPATIENT
Start: 2022-01-01 | End: 2022-01-01

## 2022-01-01 RX ORDER — ATENOLOL 50 MG/1
50 TABLET ORAL
Status: CANCELLED | OUTPATIENT
Start: 2022-01-01

## 2022-01-01 RX ORDER — SODIUM CHLORIDE 0.9 % (FLUSH) 0.9 %
5-40 SYRINGE (ML) INJECTION EVERY 8 HOURS
Status: CANCELLED | OUTPATIENT
Start: 2022-01-01

## 2022-01-01 RX ORDER — NALOXONE HYDROCHLORIDE 0.4 MG/ML
0.4 INJECTION, SOLUTION INTRAMUSCULAR; INTRAVENOUS; SUBCUTANEOUS AS NEEDED
Status: CANCELLED | OUTPATIENT
Start: 2022-01-01

## 2022-01-01 RX ORDER — INSULIN GLARGINE 100 [IU]/ML
15 INJECTION, SOLUTION SUBCUTANEOUS 2 TIMES DAILY
Qty: 1 ML | Refills: 0 | Status: SHIPPED
Start: 2022-01-01 | End: 2022-01-01

## 2022-01-01 RX ORDER — LANOLIN ALCOHOL/MO/W.PET/CERES
325 CREAM (GRAM) TOPICAL
Status: DISCONTINUED | OUTPATIENT
Start: 2022-01-01 | End: 2022-01-01 | Stop reason: HOSPADM

## 2022-01-01 RX ORDER — INSULIN LISPRO 100 [IU]/ML
INJECTION, SOLUTION INTRAVENOUS; SUBCUTANEOUS
Status: CANCELLED | OUTPATIENT
Start: 2022-01-01

## 2022-01-01 RX ORDER — DIPHENHYDRAMINE HCL 25 MG
25 CAPSULE ORAL
Status: CANCELLED | OUTPATIENT
Start: 2022-01-01

## 2022-01-01 RX ORDER — CEPHALEXIN 250 MG/1
500 CAPSULE ORAL
Status: COMPLETED | OUTPATIENT
Start: 2022-01-01 | End: 2022-01-01

## 2022-01-01 RX ORDER — DIPHENHYDRAMINE HYDROCHLORIDE 50 MG/ML
50 INJECTION, SOLUTION INTRAMUSCULAR; INTRAVENOUS ONCE
Status: COMPLETED | OUTPATIENT
Start: 2022-01-01 | End: 2022-01-01

## 2022-01-01 RX ORDER — DEXTROSE MONOHYDRATE 100 MG/ML
0-250 INJECTION, SOLUTION INTRAVENOUS AS NEEDED
Status: DISCONTINUED | OUTPATIENT
Start: 2022-01-01 | End: 2022-01-01 | Stop reason: HOSPADM

## 2022-01-01 RX ORDER — DOCUSATE SODIUM 100 MG/1
100 CAPSULE, LIQUID FILLED ORAL
Status: DISCONTINUED | OUTPATIENT
Start: 2022-01-01 | End: 2022-01-01

## 2022-01-01 RX ORDER — POTASSIUM CHLORIDE 750 MG/1
40 TABLET, FILM COATED, EXTENDED RELEASE ORAL
Status: ACTIVE | OUTPATIENT
Start: 2022-01-01 | End: 2022-01-01

## 2022-01-01 RX ORDER — ONDANSETRON 2 MG/ML
INJECTION INTRAMUSCULAR; INTRAVENOUS AS NEEDED
Status: DISCONTINUED | OUTPATIENT
Start: 2022-01-01 | End: 2022-01-01 | Stop reason: HOSPADM

## 2022-01-01 RX ORDER — SODIUM CHLORIDE 9 MG/ML
100 INJECTION, SOLUTION INTRAVENOUS CONTINUOUS
Status: DISCONTINUED | OUTPATIENT
Start: 2022-01-01 | End: 2022-01-01 | Stop reason: HOSPADM

## 2022-01-01 RX ORDER — SIMETHICONE 80 MG
80 TABLET,CHEWABLE ORAL
Status: DISCONTINUED | OUTPATIENT
Start: 2022-01-01 | End: 2022-01-01 | Stop reason: HOSPADM

## 2022-01-01 RX ORDER — FENTANYL CITRATE 50 UG/ML
INJECTION, SOLUTION INTRAMUSCULAR; INTRAVENOUS AS NEEDED
Status: DISCONTINUED | OUTPATIENT
Start: 2022-01-01 | End: 2022-01-01 | Stop reason: HOSPADM

## 2022-01-01 RX ORDER — PROPOFOL 10 MG/ML
INJECTION, EMULSION INTRAVENOUS AS NEEDED
Status: DISCONTINUED | OUTPATIENT
Start: 2022-01-01 | End: 2022-01-01 | Stop reason: HOSPADM

## 2022-01-01 RX ORDER — INSULIN GLARGINE 100 [IU]/ML
15 INJECTION, SOLUTION SUBCUTANEOUS 2 TIMES DAILY
Status: DISCONTINUED | OUTPATIENT
Start: 2022-01-01 | End: 2022-01-01

## 2022-01-01 RX ORDER — LIDOCAINE HYDROCHLORIDE 10 MG/ML
5 INJECTION INFILTRATION; PERINEURAL
Status: DISCONTINUED | OUTPATIENT
Start: 2022-01-01 | End: 2022-01-01 | Stop reason: HOSPADM

## 2022-01-01 RX ORDER — SODIUM CHLORIDE, SODIUM LACTATE, POTASSIUM CHLORIDE, CALCIUM CHLORIDE 600; 310; 30; 20 MG/100ML; MG/100ML; MG/100ML; MG/100ML
75 INJECTION, SOLUTION INTRAVENOUS CONTINUOUS
Status: DISCONTINUED | OUTPATIENT
Start: 2022-01-01 | End: 2022-01-01 | Stop reason: HOSPADM

## 2022-01-01 RX ORDER — FACIAL-BODY WIPES
10 EACH TOPICAL DAILY PRN
Status: DISCONTINUED | OUTPATIENT
Start: 2022-01-01 | End: 2022-01-01 | Stop reason: HOSPADM

## 2022-01-01 RX ORDER — MAGNESIUM SULFATE 100 %
4 CRYSTALS MISCELLANEOUS AS NEEDED
Status: CANCELLED | OUTPATIENT
Start: 2022-01-01

## 2022-01-01 RX ORDER — MORPHINE SULFATE 2 MG/ML
2 INJECTION, SOLUTION INTRAMUSCULAR; INTRAVENOUS
Status: DISCONTINUED | OUTPATIENT
Start: 2022-01-01 | End: 2022-01-01 | Stop reason: HOSPADM

## 2022-01-01 RX ORDER — TAMSULOSIN HYDROCHLORIDE 0.4 MG/1
0.4 CAPSULE ORAL 2 TIMES DAILY
COMMUNITY
Start: 2021-01-01

## 2022-01-01 RX ORDER — TRAMADOL HYDROCHLORIDE 50 MG/1
50 TABLET ORAL
Qty: 30 TABLET | Refills: 1 | Status: SHIPPED | OUTPATIENT
Start: 2022-01-01 | End: 2022-01-01

## 2022-01-01 RX ORDER — LIDOCAINE HYDROCHLORIDE 20 MG/ML
15 INJECTION, SOLUTION INFILTRATION; PERINEURAL ONCE
Status: COMPLETED | OUTPATIENT
Start: 2022-01-01 | End: 2022-01-01

## 2022-01-01 RX ORDER — LIDOCAINE HYDROCHLORIDE 20 MG/ML
INJECTION, SOLUTION EPIDURAL; INFILTRATION; INTRACAUDAL; PERINEURAL AS NEEDED
Status: DISCONTINUED | OUTPATIENT
Start: 2022-01-01 | End: 2022-01-01 | Stop reason: HOSPADM

## 2022-01-01 RX ORDER — FINASTERIDE 5 MG/1
5 TABLET, FILM COATED ORAL DAILY
Status: DISCONTINUED | OUTPATIENT
Start: 2022-01-01 | End: 2022-01-01 | Stop reason: HOSPADM

## 2022-01-01 RX ORDER — DIPHENHYDRAMINE HYDROCHLORIDE 50 MG/ML
12.5 INJECTION, SOLUTION INTRAMUSCULAR; INTRAVENOUS AS NEEDED
Status: CANCELLED | OUTPATIENT
Start: 2022-01-01 | End: 2022-01-01

## 2022-01-01 RX ORDER — AMOXICILLIN AND CLAVULANATE POTASSIUM 500; 125 MG/1; MG/1
1 TABLET, FILM COATED ORAL 2 TIMES DAILY
Qty: 10 TABLET | Refills: 0 | Status: SHIPPED | OUTPATIENT
Start: 2022-01-01 | End: 2022-01-01

## 2022-01-01 RX ORDER — FAMOTIDINE 10 MG/ML
20 INJECTION INTRAVENOUS
Status: COMPLETED | OUTPATIENT
Start: 2022-01-01 | End: 2022-01-01

## 2022-01-01 RX ORDER — ACETAMINOPHEN 650 MG/1
650 SUPPOSITORY RECTAL
Status: DISCONTINUED | OUTPATIENT
Start: 2022-01-01 | End: 2022-01-01

## 2022-01-01 RX ORDER — MAG HYDROX/ALUMINUM HYD/SIMETH 200-200-20
15 SUSPENSION, ORAL (FINAL DOSE FORM) ORAL
Status: DISCONTINUED | OUTPATIENT
Start: 2022-01-01 | End: 2022-01-01 | Stop reason: HOSPADM

## 2022-01-01 RX ORDER — ONDANSETRON 2 MG/ML
4 INJECTION INTRAMUSCULAR; INTRAVENOUS AS NEEDED
Status: CANCELLED | OUTPATIENT
Start: 2022-01-01

## 2022-01-01 RX ORDER — OXYCODONE HYDROCHLORIDE 5 MG/1
5-10 TABLET ORAL
Status: CANCELLED | OUTPATIENT
Start: 2022-01-01

## 2022-01-01 RX ORDER — HYDROMORPHONE HYDROCHLORIDE 1 MG/ML
1 INJECTION, SOLUTION INTRAMUSCULAR; INTRAVENOUS; SUBCUTANEOUS ONCE
Status: COMPLETED | OUTPATIENT
Start: 2022-01-01 | End: 2022-01-01

## 2022-01-01 RX ORDER — FINASTERIDE 5 MG/1
5 TABLET, FILM COATED ORAL DAILY
COMMUNITY
Start: 2021-01-01

## 2022-01-01 RX ORDER — ROCURONIUM BROMIDE 10 MG/ML
INJECTION, SOLUTION INTRAVENOUS AS NEEDED
Status: DISCONTINUED | OUTPATIENT
Start: 2022-01-01 | End: 2022-01-01 | Stop reason: HOSPADM

## 2022-01-01 RX ORDER — ALOGLIPTIN 6.25 MG/1
6.25 TABLET, FILM COATED ORAL DAILY
Status: DISCONTINUED | OUTPATIENT
Start: 2022-01-01 | End: 2022-01-01 | Stop reason: HOSPADM

## 2022-01-01 RX ORDER — LIDOCAINE HYDROCHLORIDE 10 MG/ML
10 INJECTION, SOLUTION EPIDURAL; INFILTRATION; INTRACAUDAL; PERINEURAL ONCE
Status: COMPLETED | OUTPATIENT
Start: 2022-01-01 | End: 2022-01-01

## 2022-01-01 RX ORDER — ACETAMINOPHEN 500 MG
1000 TABLET ORAL EVERY 6 HOURS
Status: CANCELLED | OUTPATIENT
Start: 2022-01-01

## 2022-01-01 RX ORDER — LIDOCAINE HYDROCHLORIDE 20 MG/ML
JELLY TOPICAL AS NEEDED
Status: DISCONTINUED | OUTPATIENT
Start: 2022-01-01 | End: 2022-01-01 | Stop reason: HOSPADM

## 2022-01-01 RX ADMIN — ATORVASTATIN CALCIUM 10 MG: 10 TABLET, FILM COATED ORAL at 21:41

## 2022-01-01 RX ADMIN — SODIUM CHLORIDE, PRESERVATIVE FREE 10 ML: 5 INJECTION INTRAVENOUS at 14:30

## 2022-01-01 RX ADMIN — FAMOTIDINE 20 MG: 20 TABLET, FILM COATED ORAL at 17:37

## 2022-01-01 RX ADMIN — POLYETHYLENE GLYCOL 3350 17 G: 17 POWDER, FOR SOLUTION ORAL at 23:01

## 2022-01-01 RX ADMIN — ALOGLIPTIN 6.25 MG: 6.25 TABLET, FILM COATED ORAL at 09:26

## 2022-01-01 RX ADMIN — ATENOLOL 50 MG: 50 TABLET ORAL at 21:13

## 2022-01-01 RX ADMIN — LEUPROLIDE ACETATE 22.5 MG: KIT at 11:12

## 2022-01-01 RX ADMIN — ATENOLOL 50 MG: 50 TABLET ORAL at 20:37

## 2022-01-01 RX ADMIN — SODIUM CHLORIDE, PRESERVATIVE FREE 10 ML: 5 INJECTION INTRAVENOUS at 05:34

## 2022-01-01 RX ADMIN — PIOGLITAZONE 45 MG: 15 TABLET ORAL at 08:51

## 2022-01-01 RX ADMIN — INSULIN LISPRO 12 UNITS: 100 INJECTION, SOLUTION INTRAVENOUS; SUBCUTANEOUS at 17:59

## 2022-01-01 RX ADMIN — SODIUM CHLORIDE, PRESERVATIVE FREE 10 ML: 5 INJECTION INTRAVENOUS at 21:40

## 2022-01-01 RX ADMIN — PIOGLITAZONE 45 MG: 15 TABLET ORAL at 09:21

## 2022-01-01 RX ADMIN — IOPAMIDOL 100 ML: 755 INJECTION, SOLUTION INTRAVENOUS at 16:40

## 2022-01-01 RX ADMIN — SENNOSIDES AND DOCUSATE SODIUM 1 TABLET: 50; 8.6 TABLET ORAL at 08:00

## 2022-01-01 RX ADMIN — TAMSULOSIN HYDROCHLORIDE 0.4 MG: 0.4 CAPSULE ORAL at 21:40

## 2022-01-01 RX ADMIN — INSULIN GLARGINE 15 UNITS: 100 INJECTION, SOLUTION SUBCUTANEOUS at 21:40

## 2022-01-01 RX ADMIN — MIDAZOLAM 1 MG: 1 INJECTION INTRAMUSCULAR; INTRAVENOUS at 11:11

## 2022-01-01 RX ADMIN — INSULIN LISPRO 3 UNITS: 100 INJECTION, SOLUTION INTRAVENOUS; SUBCUTANEOUS at 11:55

## 2022-01-01 RX ADMIN — SODIUM CHLORIDE 75 ML/HR: 900 INJECTION, SOLUTION INTRAVENOUS at 08:02

## 2022-01-01 RX ADMIN — INSULIN LISPRO 3 UNITS: 100 INJECTION, SOLUTION INTRAVENOUS; SUBCUTANEOUS at 08:46

## 2022-01-01 RX ADMIN — CEFEPIME 2 G: 2 INJECTION, POWDER, FOR SOLUTION INTRAVENOUS at 21:12

## 2022-01-01 RX ADMIN — PREDNISONE 5 MG: 5 TABLET ORAL at 08:55

## 2022-01-01 RX ADMIN — TAMSULOSIN HYDROCHLORIDE 0.4 MG: 0.4 CAPSULE ORAL at 18:37

## 2022-01-01 RX ADMIN — SODIUM CHLORIDE, PRESERVATIVE FREE 10 ML: 5 INJECTION INTRAVENOUS at 21:09

## 2022-01-01 RX ADMIN — PIPERACILLIN AND TAZOBACTAM 3.38 G: 3; .375 INJECTION, POWDER, FOR SOLUTION INTRAVENOUS at 21:47

## 2022-01-01 RX ADMIN — SENNOSIDES AND DOCUSATE SODIUM 1 TABLET: 50; 8.6 TABLET ORAL at 21:03

## 2022-01-01 RX ADMIN — ATENOLOL 50 MG: 50 TABLET ORAL at 21:40

## 2022-01-01 RX ADMIN — FINASTERIDE 5 MG: 5 TABLET, FILM COATED ORAL at 09:46

## 2022-01-01 RX ADMIN — MORPHINE SULFATE 2 MG: 2 INJECTION, SOLUTION INTRAMUSCULAR; INTRAVENOUS at 04:08

## 2022-01-01 RX ADMIN — IOPAMIDOL 100 ML: 755 INJECTION, SOLUTION INTRAVENOUS at 11:04

## 2022-01-01 RX ADMIN — POLYETHYLENE GLYCOL 3350 17 G: 17 POWDER, FOR SOLUTION ORAL at 18:19

## 2022-01-01 RX ADMIN — MIDAZOLAM 1 MG: 1 INJECTION INTRAMUSCULAR; INTRAVENOUS at 12:07

## 2022-01-01 RX ADMIN — PIPERACILLIN AND TAZOBACTAM 4.5 G: 4; .5 INJECTION, POWDER, LYOPHILIZED, FOR SOLUTION INTRAVENOUS at 22:09

## 2022-01-01 RX ADMIN — SODIUM CHLORIDE, PRESERVATIVE FREE 2 G: 5 INJECTION INTRAVENOUS at 17:00

## 2022-01-01 RX ADMIN — DOCUSATE SODIUM 100 MG: 100 CAPSULE, LIQUID FILLED ORAL at 08:45

## 2022-01-01 RX ADMIN — SODIUM CHLORIDE 1000 ML: 9 INJECTION, SOLUTION INTRAVENOUS at 22:35

## 2022-01-01 RX ADMIN — FINASTERIDE 5 MG: 5 TABLET, FILM COATED ORAL at 09:17

## 2022-01-01 RX ADMIN — SUCCINYLCHOLINE CHLORIDE 140 MG: 20 INJECTION, SOLUTION INTRAMUSCULAR; INTRAVENOUS at 13:04

## 2022-01-01 RX ADMIN — MIDAZOLAM 1 MG: 1 INJECTION INTRAMUSCULAR; INTRAVENOUS at 12:54

## 2022-01-01 RX ADMIN — SODIUM CHLORIDE, POTASSIUM CHLORIDE, SODIUM LACTATE AND CALCIUM CHLORIDE 75 ML/HR: 600; 310; 30; 20 INJECTION, SOLUTION INTRAVENOUS at 12:18

## 2022-01-01 RX ADMIN — DEXTROSE MONOHYDRATE 150 ML: 100 INJECTION, SOLUTION INTRAVENOUS at 12:19

## 2022-01-01 RX ADMIN — SODIUM CHLORIDE, PRESERVATIVE FREE 10 ML: 5 INJECTION INTRAVENOUS at 21:04

## 2022-01-01 RX ADMIN — INSULIN GLARGINE 7 UNITS: 100 INJECTION, SOLUTION SUBCUTANEOUS at 09:46

## 2022-01-01 RX ADMIN — ACETAMINOPHEN 650 MG: 325 TABLET ORAL at 00:08

## 2022-01-01 RX ADMIN — SODIUM CHLORIDE, PRESERVATIVE FREE 10 ML: 5 INJECTION INTRAVENOUS at 06:11

## 2022-01-01 RX ADMIN — VANCOMYCIN HYDROCHLORIDE 2250 MG: 10 INJECTION, POWDER, LYOPHILIZED, FOR SOLUTION INTRAVENOUS at 20:13

## 2022-01-01 RX ADMIN — INSULIN GLARGINE 15 UNITS: 100 INJECTION, SOLUTION SUBCUTANEOUS at 20:37

## 2022-01-01 RX ADMIN — TAMSULOSIN HYDROCHLORIDE 0.4 MG: 0.4 CAPSULE ORAL at 09:18

## 2022-01-01 RX ADMIN — INSULIN LISPRO 2 UNITS: 100 INJECTION, SOLUTION INTRAVENOUS; SUBCUTANEOUS at 08:36

## 2022-01-01 RX ADMIN — FINASTERIDE 5 MG: 5 TABLET, FILM COATED ORAL at 10:45

## 2022-01-01 RX ADMIN — INSULIN GLARGINE 14 UNITS: 100 INJECTION, SOLUTION SUBCUTANEOUS at 08:36

## 2022-01-01 RX ADMIN — MIDAZOLAM 1 MG: 1 INJECTION INTRAMUSCULAR; INTRAVENOUS at 11:08

## 2022-01-01 RX ADMIN — MIDAZOLAM 2 MG: 1 INJECTION INTRAMUSCULAR; INTRAVENOUS at 11:35

## 2022-01-01 RX ADMIN — MORPHINE SULFATE 2 MG: 2 INJECTION, SOLUTION INTRAMUSCULAR; INTRAVENOUS at 23:29

## 2022-01-01 RX ADMIN — FAMOTIDINE 20 MG: 20 TABLET, FILM COATED ORAL at 09:00

## 2022-01-01 RX ADMIN — SODIUM CHLORIDE 1000 ML: 9 INJECTION, SOLUTION INTRAVENOUS at 15:15

## 2022-01-01 RX ADMIN — SIMETHICONE 80 MG: 80 TABLET, CHEWABLE ORAL at 14:50

## 2022-01-01 RX ADMIN — SODIUM CHLORIDE, PRESERVATIVE FREE 10 ML: 5 INJECTION INTRAVENOUS at 21:13

## 2022-01-01 RX ADMIN — PIPERACILLIN AND TAZOBACTAM 3.38 G: 3; .375 INJECTION, POWDER, FOR SOLUTION INTRAVENOUS at 04:08

## 2022-01-01 RX ADMIN — INSULIN GLARGINE 14 UNITS: 100 INJECTION, SOLUTION SUBCUTANEOUS at 08:46

## 2022-01-01 RX ADMIN — FAMOTIDINE 20 MG: 20 TABLET, FILM COATED ORAL at 16:58

## 2022-01-01 RX ADMIN — POLYETHYLENE GLYCOL 3350 17 G: 17 POWDER, FOR SOLUTION ORAL at 09:22

## 2022-01-01 RX ADMIN — ATORVASTATIN CALCIUM 10 MG: 10 TABLET, FILM COATED ORAL at 20:37

## 2022-01-01 RX ADMIN — ACETAMINOPHEN 650 MG: 325 TABLET ORAL at 15:26

## 2022-01-01 RX ADMIN — MIDAZOLAM 1 MG: 1 INJECTION INTRAMUSCULAR; INTRAVENOUS at 11:39

## 2022-01-01 RX ADMIN — FINASTERIDE 5 MG: 5 TABLET, FILM COATED ORAL at 09:00

## 2022-01-01 RX ADMIN — ENOXAPARIN SODIUM 40 MG: 100 INJECTION SUBCUTANEOUS at 11:17

## 2022-01-01 RX ADMIN — SODIUM CHLORIDE 100 ML/HR: 9 INJECTION, SOLUTION INTRAVENOUS at 01:57

## 2022-01-01 RX ADMIN — SODIUM CHLORIDE 75 ML/HR: 900 INJECTION, SOLUTION INTRAVENOUS at 05:18

## 2022-01-01 RX ADMIN — PIPERACILLIN AND TAZOBACTAM 3.38 G: 3; .375 INJECTION, POWDER, FOR SOLUTION INTRAVENOUS at 14:42

## 2022-01-01 RX ADMIN — TAMSULOSIN HYDROCHLORIDE 0.4 MG: 0.4 CAPSULE ORAL at 08:51

## 2022-01-01 RX ADMIN — ATENOLOL 25 MG: 50 TABLET ORAL at 22:48

## 2022-01-01 RX ADMIN — GENTAMICIN SULFATE 440 MG: 40 INJECTION, SOLUTION INTRAMUSCULAR; INTRAVENOUS at 12:41

## 2022-01-01 RX ADMIN — SODIUM CHLORIDE, PRESERVATIVE FREE 10 ML: 5 INJECTION INTRAVENOUS at 20:38

## 2022-01-01 RX ADMIN — SODIUM CHLORIDE, PRESERVATIVE FREE 10 ML: 5 INJECTION INTRAVENOUS at 05:19

## 2022-01-01 RX ADMIN — PIPERACILLIN AND TAZOBACTAM 3.38 G: 3; .375 INJECTION, POWDER, FOR SOLUTION INTRAVENOUS at 15:12

## 2022-01-01 RX ADMIN — Medication 10 ML: at 06:00

## 2022-01-01 RX ADMIN — TAMSULOSIN HYDROCHLORIDE 0.4 MG: 0.4 CAPSULE ORAL at 17:38

## 2022-01-01 RX ADMIN — SODIUM CHLORIDE, PRESERVATIVE FREE 2 G: 5 INJECTION INTRAVENOUS at 17:37

## 2022-01-01 RX ADMIN — DIPHENHYDRAMINE HYDROCHLORIDE 50 MG: 50 INJECTION, SOLUTION INTRAMUSCULAR; INTRAVENOUS at 07:28

## 2022-01-01 RX ADMIN — INSULIN GLARGINE 7 UNITS: 100 INJECTION, SOLUTION SUBCUTANEOUS at 22:13

## 2022-01-01 RX ADMIN — FAMOTIDINE 20 MG: 10 INJECTION, SOLUTION INTRAVENOUS at 07:28

## 2022-01-01 RX ADMIN — PREDNISONE 5 MG: 5 TABLET ORAL at 09:46

## 2022-01-01 RX ADMIN — PREDNISONE 5 MG: 5 TABLET ORAL at 09:17

## 2022-01-01 RX ADMIN — POLYETHYLENE GLYCOL 3350 17 G: 17 POWDER, FOR SOLUTION ORAL at 09:46

## 2022-01-01 RX ADMIN — LIDOCAINE HYDROCHLORIDE 10 ML: 20 INJECTION, SOLUTION INFILTRATION; PERINEURAL at 11:38

## 2022-01-01 RX ADMIN — CEFEPIME 1 G: 1 INJECTION, POWDER, FOR SOLUTION INTRAMUSCULAR; INTRAVENOUS at 05:57

## 2022-01-01 RX ADMIN — FERROUS SULFATE TAB 325 MG (65 MG ELEMENTAL FE) 325 MG: 325 (65 FE) TAB at 08:37

## 2022-01-01 RX ADMIN — CEFEPIME 2 G: 2 INJECTION, POWDER, FOR SOLUTION INTRAVENOUS at 21:08

## 2022-01-01 RX ADMIN — FAMOTIDINE 20 MG: 20 TABLET, FILM COATED ORAL at 21:40

## 2022-01-01 RX ADMIN — FAMOTIDINE 20 MG: 20 TABLET, FILM COATED ORAL at 18:37

## 2022-01-01 RX ADMIN — INSULIN GLARGINE 15 UNITS: 100 INJECTION, SOLUTION SUBCUTANEOUS at 21:38

## 2022-01-01 RX ADMIN — PIOGLITAZONE 45 MG: 15 TABLET ORAL at 09:18

## 2022-01-01 RX ADMIN — ATENOLOL 50 MG: 50 TABLET ORAL at 21:04

## 2022-01-01 RX ADMIN — OXYCODONE 5 MG: 5 TABLET ORAL at 15:16

## 2022-01-01 RX ADMIN — SENNOSIDES AND DOCUSATE SODIUM 1 TABLET: 50; 8.6 TABLET ORAL at 21:12

## 2022-01-01 RX ADMIN — SODIUM PHOSPHATE, DIBASIC AND SODIUM PHOSPHATE, MONOBASIC 1 ENEMA: 7; 19 ENEMA RECTAL at 05:18

## 2022-01-01 RX ADMIN — METHYLPREDNISOLONE SODIUM SUCCINATE 125 MG: 125 INJECTION, POWDER, FOR SOLUTION INTRAMUSCULAR; INTRAVENOUS at 07:28

## 2022-01-01 RX ADMIN — FAMOTIDINE 20 MG: 20 TABLET, FILM COATED ORAL at 17:44

## 2022-01-01 RX ADMIN — SENNOSIDES AND DOCUSATE SODIUM 1 TABLET: 50; 8.6 TABLET ORAL at 09:46

## 2022-01-01 RX ADMIN — CEFEPIME 2 G: 2 INJECTION, POWDER, FOR SOLUTION INTRAVENOUS at 05:18

## 2022-01-01 RX ADMIN — SODIUM CHLORIDE, PRESERVATIVE FREE 2 G: 5 INJECTION INTRAVENOUS at 18:15

## 2022-01-01 RX ADMIN — SODIUM CHLORIDE 125 MG: 9 INJECTION, SOLUTION INTRAVENOUS at 21:04

## 2022-01-01 RX ADMIN — FINASTERIDE 5 MG: 5 TABLET, FILM COATED ORAL at 08:20

## 2022-01-01 RX ADMIN — PROPOFOL 150 MG: 10 INJECTION, EMULSION INTRAVENOUS at 13:03

## 2022-01-01 RX ADMIN — TAMSULOSIN HYDROCHLORIDE 0.4 MG: 0.4 CAPSULE ORAL at 17:44

## 2022-01-01 RX ADMIN — FAMOTIDINE 20 MG: 20 TABLET, FILM COATED ORAL at 08:20

## 2022-01-01 RX ADMIN — INSULIN LISPRO 3 UNITS: 100 INJECTION, SOLUTION INTRAVENOUS; SUBCUTANEOUS at 22:49

## 2022-01-01 RX ADMIN — Medication 3 UNITS: at 17:54

## 2022-01-01 RX ADMIN — MORPHINE SULFATE 2 MG: 2 INJECTION, SOLUTION INTRAMUSCULAR; INTRAVENOUS at 15:26

## 2022-01-01 RX ADMIN — INSULIN GLARGINE 15 UNITS: 100 INJECTION, SOLUTION SUBCUTANEOUS at 21:41

## 2022-01-01 RX ADMIN — IOPAMIDOL 300 ML: 612 INJECTION, SOLUTION INTRAVENOUS at 13:49

## 2022-01-01 RX ADMIN — SENNOSIDES AND DOCUSATE SODIUM 1 TABLET: 50; 8.6 TABLET ORAL at 21:09

## 2022-01-01 RX ADMIN — FAMOTIDINE 20 MG: 20 TABLET, FILM COATED ORAL at 08:51

## 2022-01-01 RX ADMIN — SODIUM CHLORIDE 75 ML/HR: 900 INJECTION, SOLUTION INTRAVENOUS at 21:08

## 2022-01-01 RX ADMIN — SENNOSIDES AND DOCUSATE SODIUM 1 TABLET: 50; 8.6 TABLET ORAL at 09:18

## 2022-01-01 RX ADMIN — FENTANYL CITRATE 50 MCG: 50 INJECTION, SOLUTION INTRAMUSCULAR; INTRAVENOUS at 11:11

## 2022-01-01 RX ADMIN — FAMOTIDINE 20 MG: 20 TABLET, FILM COATED ORAL at 09:17

## 2022-01-01 RX ADMIN — ATORVASTATIN CALCIUM 10 MG: 10 TABLET, FILM COATED ORAL at 22:13

## 2022-01-01 RX ADMIN — ACETAMINOPHEN 650 MG: 325 TABLET ORAL at 08:20

## 2022-01-01 RX ADMIN — TAMSULOSIN HYDROCHLORIDE 0.4 MG: 0.4 CAPSULE ORAL at 18:16

## 2022-01-01 RX ADMIN — SODIUM CHLORIDE 125 MG: 9 INJECTION, SOLUTION INTRAVENOUS at 21:29

## 2022-01-01 RX ADMIN — POLYETHYLENE GLYCOL 3350 17 G: 17 POWDER, FOR SOLUTION ORAL at 08:20

## 2022-01-01 RX ADMIN — LIDOCAINE HYDROCHLORIDE 2 ML: 20 JELLY TOPICAL at 15:57

## 2022-01-01 RX ADMIN — ACETAMINOPHEN 650 MG: 325 TABLET ORAL at 13:22

## 2022-01-01 RX ADMIN — ATORVASTATIN CALCIUM 10 MG: 10 TABLET, FILM COATED ORAL at 21:04

## 2022-01-01 RX ADMIN — ACETAMINOPHEN 650 MG: 325 TABLET ORAL at 02:30

## 2022-01-01 RX ADMIN — ONDANSETRON HYDROCHLORIDE 4 MG: 2 SOLUTION INTRAMUSCULAR; INTRAVENOUS at 13:35

## 2022-01-01 RX ADMIN — Medication 1 EACH: at 12:44

## 2022-01-01 RX ADMIN — Medication 10 ML: at 22:09

## 2022-01-01 RX ADMIN — DEXAMETHASONE SODIUM PHOSPHATE 8 MG: 4 INJECTION, SOLUTION INTRAMUSCULAR; INTRAVENOUS at 13:18

## 2022-01-01 RX ADMIN — SODIUM CHLORIDE, POTASSIUM CHLORIDE, SODIUM LACTATE AND CALCIUM CHLORIDE 75 ML/HR: 600; 310; 30; 20 INJECTION, SOLUTION INTRAVENOUS at 17:27

## 2022-01-01 RX ADMIN — SUGAMMADEX 300 MG: 100 INJECTION, SOLUTION INTRAVENOUS at 13:45

## 2022-01-01 RX ADMIN — ACETAMINOPHEN 650 MG: 325 TABLET ORAL at 21:04

## 2022-01-01 RX ADMIN — SODIUM CHLORIDE, PRESERVATIVE FREE 2 G: 5 INJECTION INTRAVENOUS at 21:40

## 2022-01-01 RX ADMIN — TAMSULOSIN HYDROCHLORIDE 0.4 MG: 0.4 CAPSULE ORAL at 08:20

## 2022-01-01 RX ADMIN — FENTANYL CITRATE 100 MCG: 50 INJECTION, SOLUTION INTRAMUSCULAR; INTRAVENOUS at 13:01

## 2022-01-01 RX ADMIN — ALOGLIPTIN 6.25 MG: 6.25 TABLET, FILM COATED ORAL at 09:12

## 2022-01-01 RX ADMIN — SODIUM CHLORIDE 125 MG: 9 INJECTION, SOLUTION INTRAVENOUS at 20:13

## 2022-01-01 RX ADMIN — FINASTERIDE 5 MG: 5 TABLET, FILM COATED ORAL at 09:22

## 2022-01-01 RX ADMIN — SODIUM CHLORIDE 75 ML/HR: 900 INJECTION, SOLUTION INTRAVENOUS at 20:36

## 2022-01-01 RX ADMIN — ACETAMINOPHEN 650 MG: 325 TABLET ORAL at 17:44

## 2022-01-01 RX ADMIN — FENTANYL CITRATE 50 MCG: 50 INJECTION, SOLUTION INTRAMUSCULAR; INTRAVENOUS at 11:07

## 2022-01-01 RX ADMIN — ENOXAPARIN SODIUM 40 MG: 100 INJECTION SUBCUTANEOUS at 10:45

## 2022-01-01 RX ADMIN — FINASTERIDE 5 MG: 5 TABLET, FILM COATED ORAL at 09:12

## 2022-01-01 RX ADMIN — FINASTERIDE 5 MG: 5 TABLET, FILM COATED ORAL at 08:37

## 2022-01-01 RX ADMIN — PREDNISONE 5 MG: 5 TABLET ORAL at 09:22

## 2022-01-01 RX ADMIN — MORPHINE SULFATE 2 MG: 2 INJECTION, SOLUTION INTRAMUSCULAR; INTRAVENOUS at 15:28

## 2022-01-01 RX ADMIN — ATENOLOL 25 MG: 50 TABLET ORAL at 22:17

## 2022-01-01 RX ADMIN — SENNOSIDES AND DOCUSATE SODIUM 1 TABLET: 50; 8.6 TABLET ORAL at 21:39

## 2022-01-01 RX ADMIN — SODIUM CHLORIDE 75 ML/HR: 900 INJECTION, SOLUTION INTRAVENOUS at 09:19

## 2022-01-01 RX ADMIN — CEFEPIME 2 G: 2 INJECTION, POWDER, FOR SOLUTION INTRAVENOUS at 04:13

## 2022-01-01 RX ADMIN — SENNOSIDES AND DOCUSATE SODIUM 1 TABLET: 50; 8.6 TABLET ORAL at 09:17

## 2022-01-01 RX ADMIN — FINASTERIDE 5 MG: 5 TABLET, FILM COATED ORAL at 08:45

## 2022-01-01 RX ADMIN — ATORVASTATIN CALCIUM 10 MG: 10 TABLET, FILM COATED ORAL at 21:13

## 2022-01-01 RX ADMIN — ATENOLOL 50 MG: 50 TABLET ORAL at 21:09

## 2022-01-01 RX ADMIN — SODIUM CHLORIDE 100 ML/HR: 9 INJECTION, SOLUTION INTRAVENOUS at 10:47

## 2022-01-01 RX ADMIN — POLYETHYLENE GLYCOL 3350 17 G: 17 POWDER, FOR SOLUTION ORAL at 09:17

## 2022-01-01 RX ADMIN — ROCURONIUM BROMIDE 30 MG: 10 INJECTION INTRAVENOUS at 13:17

## 2022-01-01 RX ADMIN — ATENOLOL 50 MG: 50 TABLET ORAL at 21:39

## 2022-01-01 RX ADMIN — INSULIN LISPRO 2 UNITS: 100 INJECTION, SOLUTION INTRAVENOUS; SUBCUTANEOUS at 18:19

## 2022-01-01 RX ADMIN — INSULIN GLARGINE 7 UNITS: 100 INJECTION, SOLUTION SUBCUTANEOUS at 21:13

## 2022-01-01 RX ADMIN — IOPAMIDOL 15 ML: 612 INJECTION, SOLUTION INTRAVENOUS at 11:16

## 2022-01-01 RX ADMIN — SODIUM CHLORIDE, PRESERVATIVE FREE 10 ML: 5 INJECTION INTRAVENOUS at 12:27

## 2022-01-01 RX ADMIN — SODIUM CHLORIDE 75 ML/HR: 9 INJECTION, SOLUTION INTRAVENOUS at 08:05

## 2022-01-01 RX ADMIN — FERROUS SULFATE TAB 325 MG (65 MG ELEMENTAL FE) 325 MG: 325 (65 FE) TAB at 08:45

## 2022-01-01 RX ADMIN — INSULIN GLARGINE 15 UNITS: 100 INJECTION, SOLUTION SUBCUTANEOUS at 09:17

## 2022-01-01 RX ADMIN — CEFEPIME 2 G: 2 INJECTION, POWDER, FOR SOLUTION INTRAVENOUS at 12:45

## 2022-01-01 RX ADMIN — ACETAMINOPHEN 650 MG: 325 TABLET ORAL at 04:26

## 2022-01-01 RX ADMIN — SODIUM CHLORIDE, PRESERVATIVE FREE 10 ML: 5 INJECTION INTRAVENOUS at 13:02

## 2022-01-01 RX ADMIN — SENNOSIDES AND DOCUSATE SODIUM 1 TABLET: 50; 8.6 TABLET ORAL at 08:20

## 2022-01-01 RX ADMIN — SENNOSIDES AND DOCUSATE SODIUM 1 TABLET: 50; 8.6 TABLET ORAL at 20:37

## 2022-01-01 RX ADMIN — ATORVASTATIN CALCIUM 10 MG: 10 TABLET, FILM COATED ORAL at 21:39

## 2022-01-01 RX ADMIN — INSULIN GLARGINE 15 UNITS: 100 INJECTION, SOLUTION SUBCUTANEOUS at 08:51

## 2022-01-01 RX ADMIN — Medication 10 ML: at 05:16

## 2022-01-01 RX ADMIN — SODIUM CHLORIDE, PRESERVATIVE FREE 10 ML: 5 INJECTION INTRAVENOUS at 18:16

## 2022-01-01 RX ADMIN — SODIUM CHLORIDE 75 ML/HR: 9 INJECTION, SOLUTION INTRAVENOUS at 18:24

## 2022-01-01 RX ADMIN — SENNOSIDES AND DOCUSATE SODIUM 1 TABLET: 50; 8.6 TABLET ORAL at 08:51

## 2022-01-01 RX ADMIN — INSULIN GLARGINE 15 UNITS: 100 INJECTION, SOLUTION SUBCUTANEOUS at 09:22

## 2022-01-01 RX ADMIN — LIDOCAINE HYDROCHLORIDE 10 ML: 10 INJECTION, SOLUTION INFILTRATION; PERINEURAL at 11:11

## 2022-01-01 RX ADMIN — SENNOSIDES AND DOCUSATE SODIUM 1 TABLET: 50; 8.6 TABLET ORAL at 21:40

## 2022-01-01 RX ADMIN — LEUPROLIDE ACETATE 22.5 MG: KIT at 11:39

## 2022-01-01 RX ADMIN — POTASSIUM BICARBONATE 40 MEQ: 782 TABLET, EFFERVESCENT ORAL at 08:20

## 2022-01-01 RX ADMIN — Medication 10 ML: at 21:47

## 2022-01-01 RX ADMIN — IOPAMIDOL 100 ML: 755 INJECTION, SOLUTION INTRAVENOUS at 13:04

## 2022-01-01 RX ADMIN — TAMSULOSIN HYDROCHLORIDE 0.4 MG: 0.4 CAPSULE ORAL at 09:26

## 2022-01-01 RX ADMIN — LIDOCAINE HYDROCHLORIDE 60 MG: 20 INJECTION, SOLUTION EPIDURAL; INFILTRATION; INTRACAUDAL; PERINEURAL at 13:03

## 2022-01-01 RX ADMIN — SODIUM CHLORIDE 75 ML/HR: 9 INJECTION, SOLUTION INTRAVENOUS at 15:28

## 2022-01-01 RX ADMIN — MORPHINE SULFATE 2 MG: 2 INJECTION, SOLUTION INTRAMUSCULAR; INTRAVENOUS at 20:59

## 2022-01-01 RX ADMIN — CEFAZOLIN SODIUM 2 G: 1 POWDER, FOR SOLUTION INTRAMUSCULAR; INTRAVENOUS at 13:09

## 2022-01-01 RX ADMIN — ROCURONIUM BROMIDE 10 MG: 10 INJECTION INTRAVENOUS at 13:03

## 2022-01-01 RX ADMIN — HYDROMORPHONE HYDROCHLORIDE 1 MG: 1 INJECTION, SOLUTION INTRAMUSCULAR; INTRAVENOUS; SUBCUTANEOUS at 06:55

## 2022-01-01 RX ADMIN — ALOGLIPTIN 6.25 MG: 6.25 TABLET, FILM COATED ORAL at 09:18

## 2022-01-01 RX ADMIN — SODIUM CHLORIDE, PRESERVATIVE FREE 10 ML: 5 INJECTION INTRAVENOUS at 18:37

## 2022-01-01 RX ADMIN — FINASTERIDE 5 MG: 5 TABLET, FILM COATED ORAL at 11:17

## 2022-01-01 RX ADMIN — Medication 80 MCG: at 13:25

## 2022-01-01 RX ADMIN — TAMSULOSIN HYDROCHLORIDE 0.4 MG: 0.4 CAPSULE ORAL at 09:46

## 2022-01-01 RX ADMIN — FAMOTIDINE 20 MG: 20 TABLET, FILM COATED ORAL at 09:46

## 2022-01-01 RX ADMIN — FENTANYL CITRATE 50 MCG: 50 INJECTION, SOLUTION INTRAMUSCULAR; INTRAVENOUS at 13:24

## 2022-01-01 RX ADMIN — MIDAZOLAM 2 MG: 1 INJECTION, SOLUTION INTRAMUSCULAR; INTRAVENOUS at 12:53

## 2022-01-01 RX ADMIN — POLYETHYLENE GLYCOL 3350 17 G: 17 POWDER, FOR SOLUTION ORAL at 08:51

## 2022-01-01 RX ADMIN — IOPAMIDOL 8 ML: 612 INJECTION, SOLUTION INTRAVENOUS at 15:57

## 2022-01-01 RX ADMIN — Medication 80 MCG: at 13:44

## 2022-01-01 RX ADMIN — TAMSULOSIN HYDROCHLORIDE 0.4 MG: 0.4 CAPSULE ORAL at 09:17

## 2022-01-01 RX ADMIN — LEUPROLIDE ACETATE 22.5 MG: KIT at 11:45

## 2022-01-01 RX ADMIN — PREDNISONE 5 MG: 5 TABLET ORAL at 08:20

## 2022-01-01 RX ADMIN — FENTANYL CITRATE 25 MCG: 50 INJECTION, SOLUTION INTRAMUSCULAR; INTRAVENOUS at 12:54

## 2022-01-01 RX ADMIN — Medication 10 ML: at 15:12

## 2022-01-01 RX ADMIN — FAMOTIDINE 20 MG: 20 TABLET, FILM COATED ORAL at 09:21

## 2022-01-01 RX ADMIN — SODIUM CHLORIDE 75 ML/HR: 9 INJECTION, SOLUTION INTRAVENOUS at 14:00

## 2022-01-01 RX ADMIN — SODIUM CHLORIDE, PRESERVATIVE FREE 10 ML: 5 INJECTION INTRAVENOUS at 21:41

## 2022-01-01 RX ADMIN — SODIUM CHLORIDE, PRESERVATIVE FREE 10 ML: 5 INJECTION INTRAVENOUS at 05:24

## 2022-01-01 RX ADMIN — SODIUM CHLORIDE, PRESERVATIVE FREE 2 G: 5 INJECTION INTRAVENOUS at 17:44

## 2022-01-01 RX ADMIN — SODIUM CHLORIDE 100 ML/HR: 9 INJECTION, SOLUTION INTRAVENOUS at 20:15

## 2022-01-01 RX ADMIN — Medication 5 UNITS: at 15:25

## 2022-01-01 RX ADMIN — FAMOTIDINE 20 MG: 20 TABLET, FILM COATED ORAL at 18:16

## 2022-01-01 RX ADMIN — TAMSULOSIN HYDROCHLORIDE 0.4 MG: 0.4 CAPSULE ORAL at 16:58

## 2022-01-01 RX ADMIN — FENTANYL CITRATE 50 MCG: 50 INJECTION, SOLUTION INTRAMUSCULAR; INTRAVENOUS at 11:20

## 2022-01-01 RX ADMIN — SODIUM CHLORIDE, PRESERVATIVE FREE 10 ML: 5 INJECTION INTRAVENOUS at 05:52

## 2022-01-01 RX ADMIN — LEVOFLOXACIN 750 MG: 5 INJECTION, SOLUTION INTRAVENOUS at 17:53

## 2022-01-01 RX ADMIN — ATENOLOL 50 MG: 50 TABLET ORAL at 23:12

## 2022-01-01 RX ADMIN — PIPERACILLIN AND TAZOBACTAM 3.38 G: 3; .375 INJECTION, POWDER, FOR SOLUTION INTRAVENOUS at 04:33

## 2022-01-01 RX ADMIN — PREDNISONE 5 MG: 5 TABLET ORAL at 09:18

## 2022-01-01 RX ADMIN — INSULIN LISPRO 3 UNITS: 100 INJECTION, SOLUTION INTRAVENOUS; SUBCUTANEOUS at 11:48

## 2022-01-01 RX ADMIN — IRON SUCROSE 200 MG: 20 INJECTION, SOLUTION INTRAVENOUS at 09:47

## 2022-01-01 RX ADMIN — ATENOLOL 50 MG: 50 TABLET ORAL at 21:46

## 2022-01-01 RX ADMIN — CEPHALEXIN 500 MG: 250 CAPSULE ORAL at 20:04

## 2022-01-01 RX ADMIN — FENTANYL CITRATE 25 MCG: 50 INJECTION, SOLUTION INTRAMUSCULAR; INTRAVENOUS at 12:06

## 2022-01-01 RX ADMIN — LIDOCAINE HYDROCHLORIDE 10 ML: 10 INJECTION, SOLUTION EPIDURAL; INFILTRATION; INTRACAUDAL; PERINEURAL at 15:31

## 2022-01-13 NOTE — TELEPHONE ENCOUNTER
Noam Gordillo from  [facility] called and left VM stating about PT and a certain medication the PT is taking,wanted to speak with [place]- please state Ref# [id number]- please advise a CB# [phone]

## 2022-01-13 NOTE — TELEPHONE ENCOUNTER
[address] at [facility]  [phone]    01/13/22- Spoke to Hadley Monique, pharmacist with [facility], she stated patient recently filled finasteride with his local pharmacy. She wanted to confirm he is supposed to be on both finasteride and abiraterone. Reviewed notes scanned in, urology prescribed finasteride for urinary retention. Discussed with Guerda Hinton, hever for patient to continue both medications. Pharmacist verbalized understanding.

## 2022-01-19 NOTE — PROGRESS NOTES
[facility] [place] at [place] [address] [facility]  Lionel [phone]  F: [phone]      Reason for Visit:   Ronen Pulido is a 73 y.o. male who is seen for follow up of prostate cancer. Hematology/Oncology Treatment History:   · CT C/A/P 2021: Sclerosis and lysis in the left 2nd rib. Sclerosis and lysis with expansion of the anterior, right 6th rib with an associated pathologic fracture laterally. Metastatic disease in T10 with slight compression and extension into the anterior, superior endplate of H18. There is soft tissue attenuation in the fat surrounding the T10 vertebral body concerning for soft tissue extension. Lysis in the inferior endplate of W69 is concerning for metastatic disease. Adenopathy in the retroperitoneum, adjacent to the aorta and in the mesentery in the left lower quadrant. Prostatomegaly with some heterogeneity. · Bone scan 2021: Osseous metastatic disease   · Biopsy, ablation, and kyphoplasty of T10/11 2021: metastatic carcinoma, compatible with metastatic adenocarcinoma of prostatic primary  · Stage IV Prostate Cancer  · Diana Zan on 4/27/2021 x 1 dose  · Radiation with Dr. Karen Noland I completed 2021, x 5 fractions  · Initiated Elnoria Merritts with Prednisone on 5/10/2021  · Initiated Lupron on 2021    History of Present Illness:   Having more fatigue associated with not sleeping well due to having to straight cath. Urinary hesitancy, not fully emptying his bladder. Denies pain today. Taking Zytiga as scheduled. No nausea, vomiting, diarrhea or constipation noted. He is accompanied by his daughter today. Review of systems was obtained and pertinent findings reviewed above. Past medical history, social history, family history, medications, and allergies are located in the electronic medical record.     Physical Exam:     Visit Vitals  BP (!) 162/74 (BP 1 Location: Left upper arm, BP Patient Position: Sitting) Comment: . Pulse 69   Temp 98.1 °F (36.7 °C) (Temporal)   Resp 20   Ht 5' 8\" (1.727 m)   Wt 267 lb (121.1 kg)   SpO2 97%   BMI 40.60 kg/m²     ECOG PS: 1  General: no distress  Eyes: anicteric sclerae  HENT: oropharynx clear  Neck: supple  Lymphatic: no cervical, supraclavicular adenopathy  Respiratory: normal respiratory effort  CV: no peripheral edema  GI: soft, nontender, nondistended, no masses  Skin: no rashes, no ecchymoses, no petechiae      Results:     Lab Results   Component Value Date/Time    WBC 12.4 (H) 12/06/2021 02:23 PM    HGB 11.0 (L) 12/06/2021 02:23 PM    HCT 34.8 (L) 12/06/2021 02:23 PM    PLATELET 144 56/31/5999 02:23 PM    MCV 75.7 (L) 12/06/2021 02:23 PM    ABS. NEUTROPHILS 10.8 (H) 12/06/2021 02:23 PM     Lab Results   Component Value Date/Time    Sodium 139 12/06/2021 02:23 PM    Potassium 4.4 12/06/2021 02:23 PM    Chloride 109 (H) 12/06/2021 02:23 PM    CO2 26 12/06/2021 02:23 PM    Glucose 215 (H) 12/06/2021 02:23 PM    BUN 37 (H) 12/06/2021 02:23 PM    Creatinine 1.82 (H) 12/06/2021 02:23 PM    GFR est AA 46 (L) 12/06/2021 02:23 PM    GFR est non-AA 38 (L) 12/06/2021 02:23 PM    Calcium 9.3 12/06/2021 02:23 PM    Glucose (POC) 289 (H) 04/22/2021 11:37 AM     Lab Results   Component Value Date/Time    Bilirubin, total 0.3 12/06/2021 02:23 PM    ALT (SGPT) 33 12/06/2021 02:23 PM    Alk.  phosphatase 122 (H) 12/06/2021 02:23 PM    Protein, total 7.3 12/06/2021 02:23 PM    Albumin 3.2 (L) 12/06/2021 02:23 PM    Globulin 4.1 (H) 12/06/2021 02:23 PM     Lab Results   Component Value Date/Time    Iron % saturation 20 04/20/2021 05:45 AM    TIBC 221 (L) 04/20/2021 05:45 AM    Ferritin 368 04/20/2021 05:45 AM    Vitamin B12 1,356 (H) 04/20/2021 05:45 AM    Folate 16.8 04/20/2021 05:45 AM     Recent Labs     11/19/21  1040 10/08/21  1200 08/25/21  1128 07/21/21  1115 06/23/21  1028 05/26/21  1234 04/27/21  1532 04/17/21  0517   PSA 0.5 0.5 0.6 0.9 1.6 5.3* 104.0* 153.0* Date  PSA  11/19/2021 0.5  10/08/2021 0.5  08/25/2021 0.6  07/21/2021 0.9  06/23/2021 1.6  05/26/2021 5.3 Lupron started  05/10/2021  Initiated Zytiga and Prednisone  04/27/2021 104 Firmagon given  04/17/2021 153      5/26/2021:  Hemoglobin fractionation: Hgb G-[place]    DEXA 6/23/2021: This patient is osteopenic using the World Health Organization criteria  10 year probability of major osteoporotic fracture:  8.8%  10 year probability of hip fracture:  1.1%      Assessment:   1) Metastatic castrate sensitive prostate cancer  He has extensive osseous metastatic disease. Pathology results from bone biopsy, along with elevated PSA, confirm a diagnosis of metastatic prostate cancer. His cancer is not curable and management is with palliative intent. He is currently on ADT along with upfront Abiraterone and Prednisone. He is tolerating therapy well with manageable toxicity. PSA responding well to therapy. We will continue with treatment. The patient will be seen monthly with each cycle of therapy, and labs will be monitored to assess for toxicity from therapy. Repeat imaging with PSA reaches lisa-discussed today, patient prefers to have ordered next visit. [id number] to assess for somatic mutations resulted in multiple mutations including MSI-high. [facility] testing for germline mutations negative. 2) Osseous metastases, Thoracic cord compression  S/p ablation and kyphoplasty of T10/11 on 4/20/2021. Completed radiation with Dr. Memo Mendoza. Consider Xgeva when he becomes castrate resistant. Continue vitamin D. He gets calcium through his diet. DEXA with osteopenia, repeat in 6/2023    3) Cancer pain  Improved after kyphoplasty/ablation and radiation. 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 Monitor. 5) DM  Managed by his PCP.  Of note, abiraterone interacts with his Actos, needs to monitor glucose closely. Prednisone may worsen DM as well. 6) Emotional Well Being  No psychosocial concerns identified today. Patient has adequate support. He is getting financial assistance for [employer]. Will meet with financial counselor today. 7) Urinary retention  Under treatment with with urology-Dr. Landen Hayes. Currently self-cathing, while considering other options (TURP vs indwelling srivastava vs suprapubic catheter). We reviewed some of these options today and I encouraged him to follow up with urology. Plan:     · Continue abiraterone 1000mg PO daily  · Continue prednisone 5mg PO daily  · Continue Lupron every 12 weeks  · Labs today and monthly: CBC, CMP, PSA  · Return to see me in 8 weeks    I personally saw and evaluated the patient and performed the key components of medical decision making. The history, physical exam, and documentation were performed by Jh John NP. I reviewed and verified the above documentation and modified it as needed. He is tolerating systemic therapy with manageable toxicity, so we will proceed with treatment.       Signed By: Emma Serrano MD

## 2022-01-19 NOTE — PROGRESS NOTES
Mynor Dietz is a 73 y.o. male follow up for prostate cancer. 1. Have you been to the ER, urgent care clinic since your last visit? Hospitalized since your last visit?no     2. Have you seen or consulted any other health care providers outside of the [address] [place] [address] since your last visit? Include any pap smears or colon screening.  no

## 2022-01-20 NOTE — TELEPHONE ENCOUNTER
1/19/22- Met with patient in the office. Patient stated he was on a marion with [employer] in 2021 for the [place]al [place] and it has now run out for this year. Informed pt there are no grants open currently for prostate diagnosis but we can apply pt for [employer]. Patient was agreeable. Had patient sign application in office and faxed completed application to [address] [place] [address]. Fax Confirmation received. Informed pt this user will be in contact with patient for updates.

## 2022-01-21 NOTE — TELEPHONE ENCOUNTER
1/21/22- Called [employer] to verify patients application was received. Per representative Patricia Burton the application is received as of 1/20/22 and is now is process. Per [place] it can take 5 to 7 business days to process.

## 2022-01-25 NOTE — DISCHARGE INSTRUCTIONS
We hope that we have addressed all of your medical concerns. The examination and treatment you received in the Emergency Department were for an emergent problem and were not intended as complete care. It is important that you follow up with your healthcare provider(s) for ongoing care. If your symptoms worsen or do not improve as expected, and you are unable to reach your usual health care provider(s), you should return to the Emergency Department. Today's healthcare is undergoing tremendous change, and patient satisfaction surveys are one of the many tools to assess the quality of medical care. You may receive a survey from the [employer] regarding your experience in the Emergency Department. I hope that your experience has been completely positive, particularly the medical care that I provided. As such, please participate in the survey; anything less than excellent does not meet my expectations or intentions. [address] [facility] and [address] [place] [address] participate in nationally recognized quality of care measures. If your blood pressure is greater than 120/80, as reported below, we urge that you seek medical care to address the potential of high blood pressure, commonly known as hypertension. Hypertension can be hereditary or can be caused by certain medical conditions, pain, stress, or \"white coat syndrome. \"       Please make an appointment with your health care provider(s) for follow up of your Emergency Department visit. VITALS:   Patient Vitals for the past 8 hrs:   Temp Pulse Resp BP SpO2   01/24/22 2038 98.2 °F (36.8 °C) 92 16 (!) 160/93 98 %          Thank you for allowing us to provide you with medical care today. We realize that you have many choices for your emergency care needs. Please choose us in the future for any continued health care needs. Dayo Dale, [address] [place] [address] [address] Office: [phone]            Recent Results (from the past 24 hour(s))   CBC WITH AUTOMATED DIFF    Collection Time: 01/24/22 10:29 PM   Result Value Ref Range    WBC 9.5 4.1 - 11.1 K/uL    RBC 4.15 4.10 - 5.70 M/uL    HGB 9.9 (L) 12.1 - 17.0 g/dL    HCT 30.3 (L) 36.6 - 50.3 %    MCV 73.0 (L) 80.0 - 99.0 FL    MCH 23.9 (L) 26.0 - 34.0 PG    MCHC 32.7 30.0 - 36.5 g/dL    RDW 14.1 11.5 - 14.5 %    PLATELET 890 543 - 165 K/uL    MPV 10.7 8.9 - 12.9 FL    NRBC 0.0 0  WBC    ABSOLUTE NRBC 0.00 0.00 - 0.01 K/uL    NEUTROPHILS 66 32 - 75 %    LYMPHOCYTES 12 12 - 49 %    MONOCYTES 12 5 - 13 %    EOSINOPHILS 8 (H) 0 - 7 %    BASOPHILS 1 0 - 1 %    IMMATURE GRANULOCYTES 1 (H) 0.0 - 0.5 %    ABS. NEUTROPHILS 6.4 1.8 - 8.0 K/UL    ABS. LYMPHOCYTES 1.1 0.8 - 3.5 K/UL    ABS. MONOCYTES 1.1 (H) 0.0 - 1.0 K/UL    ABS. EOSINOPHILS 0.8 (H) 0.0 - 0.4 K/UL    ABS. BASOPHILS 0.1 0.0 - 0.1 K/UL    ABS. IMM. GRANS. 0.1 (H) 0.00 - 0.04 K/UL    DF AUTOMATED     METABOLIC PANEL, COMPREHENSIVE    Collection Time: 01/24/22 10:29 PM   Result Value Ref Range    Sodium 143 136 - 145 mmol/L    Potassium 3.7 3.5 - 5.1 mmol/L    Chloride 111 (H) 97 - 108 mmol/L    CO2 25 21 - 32 mmol/L    Anion gap 7 5 - 15 mmol/L    Glucose 185 (H) 65 - 100 mg/dL    BUN 28 (H) 6 - 20 MG/DL    Creatinine 2.55 (H) 0.70 - 1.30 MG/DL    BUN/Creatinine ratio 11 (L) 12 - 20      GFR est AA 31 (L) >60 ml/min/1.73m2    GFR est non-AA 25 (L) >60 ml/min/1.73m2    Calcium 8.7 8.5 - 10.1 MG/DL    Bilirubin, total 0.3 0.2 - 1.0 MG/DL    ALT (SGPT) 25 12 - 78 U/L    AST (SGOT) 36 15 - 37 U/L    Alk.  phosphatase 122 (H) 45 - 117 U/L    Protein, total 7.0 6.4 - 8.2 g/dL    Albumin 2.0 (L) 3.5 - 5.0 g/dL    Globulin 5.0 (H) 2.0 - 4.0 g/dL    A-G Ratio 0.4 (L) 1.1 - 2.2     URINALYSIS W/MICROSCOPIC    Collection Time: 01/25/22 12:07 AM   Result Value Ref Range    Color PENDING     Appearance PENDING     Specific gravity PENDING     Specific gravity PENDING     pH (UA) PENDING     Protein PENDING mg/dL    Glucose PENDING mg/dL    Ketone PENDING mg/dL    Bilirubin PENDING     Blood PENDING     Urobilinogen PENDING EU/dL    Nitrites PENDING     Leukocyte Esterase PENDING     WBC PENDING /hpf    RBC PENDING /hpf    Epithelial cells PENDING /lpf    Bacteria PENDING /hpf       US RETROPERITONEUM COMP    Result Date: 1/24/2022  EXAM:  US retroperitoneum complete INDICATION: Worsening renal function COMPARISON: CT 4/19/2021. FINDINGS: The patient is studied with coronal and axial real-time ultrasound. The right kidney measures 11.2 cm, and the left kidney measures 12.3 cm. There is increased echogenicity of the bilateral renal parenchyma. A right kidney cyst measures to 3.0 cm, unchanged. There is no mass or shadowing calculus. There is no hydronephrosis. The ureters are not dilated. The abdominal aorta tapers normally. The proximal origins of the iliac arteries are normal in caliber. The IVC is patent. The urinary bladder is nondistended. Hyperechogenicity of the bilateral renal parenchyma in keeping with nonspecific medical renal disease. No hydronephrosis.

## 2022-01-25 NOTE — ED PROVIDER NOTES
HPI   72 yo M presents with decreased appetite onset 3-4 days ago. Denies any pain, nausea, vomiting, fever, chills. Normal BM, no bloody or black stools. Has been straight cathing himself for past few months. Self caths 4-5 x per day. C/o pain with cathing after starting to use a new catheter. Talked with doctor and waiting for other catheters to come in. Pt requesting srivastaav to be placed. Last cath was 2 hours ago. Pt states he wants the urologist to put a suprapubic catheter in. C/o hematuria. Not on blood thinner. Diabetic, reports good glucose control. Past Medical History:   Diagnosis Date    Diabetes ([facility] [address]     Hypertension        Past Surgical History:   Procedure Laterality Date    IR KYPHOPLASTY THORACIC  4/20/2021         No family history on file. Social History     Socioeconomic History    Marital status:      Spouse name: Not on file    Number of children: Not on file    Years of education: Not on file    Highest education level: Not on file   Occupational History    Not on file   Tobacco Use    Smoking status: Former Smoker    Smokeless tobacco: Not on file   Vaping Use    Vaping Use: Unknown   Substance and Sexual Activity    Alcohol use: Never    Drug use: Never    Sexual activity: Not Currently   Other Topics Concern    Not on file   Social History Narrative    Not on file     Social Determinants of Health     Financial Resource Strain:     Difficulty of Paying Living Expenses: Not on file   Food Insecurity:     Worried About [address] in the Last Year: Not on file    [address] in the Last Year: Not on file   Transportation Needs:     Lack of Transportation (Medical): Not on file    Lack of Transportation (Non-Medical):  Not on file   Physical Activity:     Days of Exercise per Week: Not on file    Minutes of Exercise per Session: Not on file   Stress:     Feeling of Stress : Not on file   Social Connections:     Frequency of Communication with Friends and Family: Not on file    Frequency of Social Gatherings with Friends and Family: Not on file    Attends Holiness Services: Not on file    Active Member of Clubs or Organizations: Not on file    Attends Club or Organization Meetings: Not on file    Marital Status: Not on file   Intimate Partner Violence:     Fear of Current or Ex-Partner: Not on file    Emotionally Abused: Not on file    Physically Abused: Not on file    Sexually Abused: Not on file   Housing Stability:     Unable to Pay for Housing in the Last Year: Not on file    Number of [place] in the Last Year: Not on file    Unstable Housing in the Last Year: Not on file         ALLERGIES: Erythromycin    Review of Systems   Constitutional: Negative for chills and fever. Respiratory: Negative for cough and shortness of breath. Cardiovascular: Negative for chest pain. Gastrointestinal: Negative for abdominal pain, constipation, diarrhea, nausea and vomiting. Genitourinary: Positive for hematuria and penile pain. Musculoskeletal: Negative for back pain. Skin: Negative for rash. Neurological: Negative for headaches. All other systems reviewed and are negative.       Vitals:    01/24/22 2038   BP: (!) 160/93   Pulse: 92   Resp: 16   Temp: 98.2 °F (36.8 °C)   SpO2: 98%   Weight: 120.2 kg (265 lb)   Height: 5' 8\" (1.727 m)            Physical Exam   Physical Examination: General appearance - alert, well appearing, and in no distress, oriented to person, place, and time and normal appearing weight  Eyes - pupils equal and reactive, extraocular eye movements intact  Neck - supple, no significant adenopathy  Chest - clear to auscultation, no wheezes, rales or rhonchi, symmetric air entry  Heart - normal rate, regular rhythm, normal S1, S2, no murmurs, rubs, clicks or gallops  Abdomen - soft, nontender, nondistended, no masses or organomegaly  Back exam - full range of motion, no tenderness, palpable spasm or pain on motion  Neurological - alert, oriented, normal speech, no focal findings or movement disorder noted  Musculoskeletal - no joint tenderness, deformity or swelling  Extremities - peripheral pulses normal, no pedal edema, no clubbing or cyanosis  Skin - normal coloration and turgor, no rashes, no suspicious skin lesions noted  MDM  Number of Diagnoses or Management Options     Amount and/or Complexity of Data Reviewed  Clinical lab tests: ordered and reviewed  Tests in the radiology section of CPT®: ordered and reviewed  Decide to obtain previous medical records or to obtain history from someone other than the patient: yes  Obtain history from someone other than the patient: yes (son)  Review and summarize past medical records: yes  Independent visualization of images, tracings, or specimens: yes    Patient Progress  Patient progress: improved         Procedures  Patient with gross hematuria after Cruz placement. Vital signs stable. Cruz irrigated by nursing with clearing of urine. Will start patient on Keflex. Follow-up with urology return to ED for worsening symptoms. Patient with mild elevation in creatinine. He has received IV fluids. Ultrasound without acute process.

## 2022-01-25 NOTE — ED TRIAGE NOTES
Pt arrives to the ER for complaints of decreased appetite and pain when he self caths. Reports that the pain started a couple a days ago and has never had an issue prior. Pt states that his urine looks bloody when he caths. Pt is unable to void due prostate cancer. Denies abdominal pain, back pain, chest pain, shortness of breath, N/V/D, or fevers. PMH of Stage 3 Prostate Cancer and is actively being treated for it.

## 2022-01-26 NOTE — TELEPHONE ENCOUNTER
1/26/22- Received a temporary approval from [address] [place] [address] effective 1/26/22 until 3/27/22 and [address] [place] [address] is requesting Medicare Part D Attestation Form to be completed and returned before patient is fully covered until 12/31/22. Called patient and left a voicemail with the above information requesting a return call to discuss further.

## 2022-02-11 NOTE — PROGRESS NOTES
[facility] Progress Note    Date: 2022    Name: Irais [place]    MRN: [id number]         : 1955    Mr. Aaron Donnelly arrived ambulatory and in no distress for C4D1 Lupron Injection. Assessment was completed, patient states he is having some constipation. Reviewed OTC meds for the patient to try. Labs drawn and sent for processing. Mr. Elisa Lund vitals were reviewed. Visit Vitals  BP (!) 146/65 (BP 1 Location: Right upper arm, BP Patient Position: At rest;Sitting)   Pulse 61   Temp 97.8 °F (36.6 °C)   Resp 18   Ht 5' 8\" (1.727 m)   Wt 122.2 kg (269 lb 8 oz)   SpO2 98%   BMI 40.98 kg/m²         Medications:  Medications Administered     leuprolide depot (LUPRON 3 MONTH) injection sykt 22.5 mg     Admin Date  2022 Action  Given Dose  22.5 mg Route  IntraMUSCular Administered By  Kaitlin Simpson RN                Mr. Aaron Donnelly tolerated treatment well and was discharged from [place] [address] in stable condition. He is to return on May 6 at 1100 for his next appointment.     Yash Dukes RN  2022

## 2022-03-09 NOTE — TELEPHONE ENCOUNTER
3/9/22- Received patients Medicare Part D Attestation Form via email from patients [email or web address]. Faxed form to [facility] and fax confirmation received.

## 2022-03-16 NOTE — PROGRESS NOTES
[facility] [place] [facility] [place] [address] [facility] [address] Come: [phone]  F: [phone]      Reason for Visit:   Asim Salazar is a 73 y.o. male who is seen for follow up of prostate cancer. Hematology/Oncology Treatment History:   · CT C/A/P 4/19/2021: Sclerosis and lysis in the left 2nd rib. Sclerosis and lysis with expansion of the anterior, right 6th rib with an associated pathologic fracture laterally. Metastatic disease in T10 with slight compression and extension into the anterior, superior endplate of E98. There is soft tissue attenuation in the fat surrounding the T10 vertebral body concerning for soft tissue extension. Lysis in the inferior endplate of G33 is concerning for metastatic disease. Adenopathy in the retroperitoneum, adjacent to the aorta and in the mesentery in the left lower quadrant. Prostatomegaly with some heterogeneity. · Bone scan 4/19/2021: Osseous metastatic disease   · Biopsy, ablation, and kyphoplasty of T10/11 4/20/2021: metastatic carcinoma, compatible with metastatic adenocarcinoma of prostatic primary  · Stage IV Prostate Cancer  · Mukesh Bowers on 4/27/2021 x 1 dose  · Radiation with Dr. Lissy Gagnon completed 5/7/2021, x 5 fractions  · Initiated Ursula Muskego with Prednisone on 5/10/2021  · Initiated Lupron on 5/26/2021    History of Present Illness:   Taking Zytiga and Prednisone. Doing well on this. No hot flashes or night sweats. Denies pain. Reports considerable irritation at site of srivastava catheter. Planning TURP with urology so he can avoid catheter. No fever, chills, cough, congestion. Denies SOB. He is accompanied by his daughter today. Review of systems was obtained and pertinent findings reviewed above. Past medical history, social history, family history, medications, and allergies are located in the electronic medical record.     Physical Exam:     Visit Vitals  BP (!) 163/77 (BP 1 Location: Left upper arm, BP Patient Position: Sitting, BP Cuff Size: Large adult) Comment: . Pulse 80   Temp 97.4 °F (36.3 °C) (Temporal)   Resp 20   Ht 5' 8\" (1.727 m)   Wt 249 lb (112.9 kg)   SpO2 98%   BMI 37.86 kg/m²     ECOG PS: 1  General: no distress  Eyes: anicteric sclerae  HENT: oropharynx clear  Neck: supple  Lymphatic: no cervical, supraclavicular adenopathy  Respiratory: normal respiratory effort  CV: no peripheral edema  GI: soft, nontender, nondistended, no masses  Skin: no rashes, no ecchymoses, no petechiae    Results:     Lab Results   Component Value Date/Time    WBC 9.0 02/11/2022 11:36 AM    HGB 8.7 (L) 02/11/2022 11:36 AM    HCT 27.8 (L) 02/11/2022 11:36 AM    PLATELET 311 (H) 32/41/3115 11:36 AM    MCV 74.9 (L) 02/11/2022 11:36 AM    ABS. NEUTROPHILS 6.6 02/11/2022 11:36 AM     Lab Results   Component Value Date/Time    Sodium 144 02/11/2022 11:36 AM    Potassium 3.8 02/11/2022 11:36 AM    Chloride 114 (H) 02/11/2022 11:36 AM    CO2 19 (L) 02/11/2022 11:36 AM    Glucose 86 02/11/2022 11:36 AM    BUN 14 02/11/2022 11:36 AM    Creatinine 1.36 (H) 02/11/2022 11:36 AM    GFR est AA >60 02/11/2022 11:36 AM    GFR est non-AA 53 (L) 02/11/2022 11:36 AM    Calcium 8.5 02/11/2022 11:36 AM    Glucose (POC) 289 (H) 04/22/2021 11:37 AM     Lab Results   Component Value Date/Time    Bilirubin, total 0.2 02/11/2022 11:36 AM    ALT (SGPT) 16 02/11/2022 11:36 AM    Alk.  phosphatase 138 (H) 02/11/2022 11:36 AM    Protein, total 6.5 02/11/2022 11:36 AM    Albumin 2.6 (L) 02/11/2022 11:36 AM    Globulin 3.9 02/11/2022 11:36 AM     Lab Results   Component Value Date/Time    Iron % saturation 20 04/20/2021 05:45 AM    TIBC 221 (L) 04/20/2021 05:45 AM    Ferritin 368 04/20/2021 05:45 AM    Vitamin B12 1,356 (H) 04/20/2021 05:45 AM    Folate 16.8 04/20/2021 05:45 AM     Recent Labs     02/11/22  1136 11/19/21  1040 10/08/21  1200 08/25/21  1128 07/21/21  1115 06/23/21  1028 05/26/21  1234   PSA 0.2 0.5 0.5 0.6 0.9 1.6 5.3* Date  PSA  02/11/2022 0.2  11/19/2021 0.5  10/08/2021 0.5  08/25/2021 0.6  07/21/2021 0.9  06/23/2021 1.6  05/26/2021 5.3 Lupron started  05/10/2021  Initiated Zytiga and Prednisone  04/27/2021 104 Firmagon given  04/17/2021 153      5/26/2021:  Hemoglobin fractionation: Hgb G-Glasscock    DEXA 6/23/2021: This patient is osteopenic using the World Health Organization criteria  10 year probability of major osteoporotic fracture:  8.8%  10 year probability of hip fracture:  1.1%      Assessment:   1) Metastatic castrate sensitive prostate cancer  He has extensive osseous metastatic disease. Pathology results from bone biopsy, along with elevated PSA, confirm a diagnosis of metastatic prostate cancer. His cancer is not curable and management is with palliative intent. He is currently on ADT along with upfront Abiraterone and Prednisone. He is tolerating therapy well with manageable toxicity. PSA responding well to therapy. Plan to repeat imaging prior to next visit as new baseline now that PSA is at lisa. We will continue with treatment as ordered. The patient will be seen monthly with each cycle of therapy, and labs will be monitored to assess for toxicity from therapy. [id number] to assess for somatic mutations resulted in multiple mutations including MSI-high. [facility] testing for germline mutations negative. 2) Osseous metastases, Thoracic cord compression  S/p ablation and kyphoplasty of T10/11 on 4/20/2021. Completed radiation with Dr. Papi Gramajo. Consider Xgeva when he becomes castrate resistant. Continue vitamin D. He gets calcium through his diet. DEXA with osteopenia, repeat in 6/2023    3) Cancer pain  Improved after kyphoplasty/ablation and radiation. 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 Monitor. 5) DM  Managed by his PCP.  Of note, abiraterone interacts with his Actos, needs to monitor glucose closely. Prednisone may worsen DM as well. 6) Emotional Well Being  No psychosocial concerns identified today. Patient has adequate support. He is getting financial assistance for [employer] 7) Urinary retention  Under treatment with with urology-Dr. Cliff Chi. Had indwelling catheter placed with urology, replaced today. Has TURP scheduled for 4/11/2022. Will need catheter in for 5 days after procedure, but then hoping to remove this. Plan:     · Continue abiraterone 1000mg PO daily  · Continue prednisone 5mg PO daily  · Continue Lupron every 12 weeks  · Labs today and monthly: CBC, CMP, PSA  · Follow up with urology as scheduled  · CT and bone scan when he returns  · Return to see me with next Lupron    I personally saw and evaluated the patient and performed the key components of medical decision making. The history, physical exam, and documentation were performed by Yee Mahoney NP. I reviewed and verified the above documentation and modified it as needed. He is tolerating systemic therapy with manageable toxicity, so we will proceed with treatment. Repeat scans at next visit.       Signed By: Dong Gonzalez MD

## 2022-03-16 NOTE — PROGRESS NOTES
Ruby Chantal is a 73 y.o. male follow up for prostate cancer. 1. Have you been to the ER, urgent care clinic since your last visit? Hospitalized since your last visit?no     2. Have you seen or consulted any other health care providers outside of the [address] [place] [address] since your last visit? Include any pap smears or colon screening.  no

## 2022-03-31 NOTE — PERIOP NOTES
[address] , [address] [facility]   PRE-ADMISSION TESTING    [phone]     Surgery Date:  4/11/2022 Monday      Is surgery arrival time given by surgeon? NO  If NO, [address] [facility] staff will call you between 3 and 7pm the day before your surgery with your arrival time. (If your surgery is on a Monday, we will call you the Friday before.)    Call [phone] after 7pm Monday-Friday if you did not receive this call. INSTRUCTIONS BEFORE YOUR SURGERY   When You  Arrive Arrive at the 2nd [address] [facility] on the day of your surgery  Have your insurance card, photo ID, and any copayment (if needed)   Food   and   Drink NO food or drink after midnight the night before surgery    This means NO water, gum, mints, coffee, juice, etc.  No alcohol (beer, wine, liquor) 24 hours before and after surgery   Medications to   TAKE   Morning of Surgery MEDICATIONS TO TAKE THE MORNING OF SURGERY WITH A SIP OF WATER:    None    Call your prescribing doctor for instructions with your insulin. Medications  To  STOP      7 days before surgery  Non-Steroidal anti-inflammatory Drugs (NSAID's): for example, Ibuprofen (Advil, Motrin), Naproxen (Aleve)   Aspirin   Herbal supplements, vitamins, and fish oil   Other:  (Pain medications not listed above, including Tylenol may be taken)   Blood  Thinners  If you take  Aspirin, Plavix, Coumadin, or any blood-thinning or anti-blood clot medicine, talk to the doctor who prescribed the medications for pre-operative instructions. Bathing Clothing  Jewelry  Valuables      If you shower the morning of surgery, please do not apply anything to your skin (lotions, powders, deodorant, or makeup, especially mascara)   Follow Chlorhexidine [employer] body wash instructions provided to you during PAT appointment. Begin 3 days prior to surgery.    Do not shave or trim anywhere 24 hours before surgery   Wear your hair loose or down; no pony-tails, buns, or metal hair clips   Wear loose, comfortable, clean clothes   Wear glasses instead of contacts  [address], valuables, and jewelry, including body piercings, at home   If you were given an [employer], bring it on day of surgery. Going Home - or Spending the Night  SAME-DAY SURGERY: You must have a responsible adult drive you home and stay with you 24 hours after surgery   ADMITS: If your doctor is keeping you in the hospital after surgery, leave personal belongings/luggage in your car until you have a hospital room number. Hospital discharge time is 12 noon  Drivers must be here before 12 noon unless you are told differently   Special Instructions        Follow all instructions so your surgery wont be cancelled. Please, be on time. If a situation occurs and you are delayed the day of surgery, call [phone]. If your physical condition changes (like a fever, cold, flu, etc.) call your surgeon. Home medication(s) reviewed and verified verbally with list during PAT appointment. The patient was contacted in person. The patient verbalizes understanding of all instructions and does not need reinforcement.

## 2022-04-01 NOTE — TELEPHONE ENCOUNTER
[address] at [place]  [phone]    04/01/22- Per hever Ledesma to have scans done same day as office visit. Patient's daughter notified and given the number to call central scheduling. No further questions or concerns.

## 2022-04-01 NOTE — TELEPHONE ENCOUNTER
Patients daughter called and stated that they would like to have his scan done the day of his appointment due to them living an hour away.  Please advise,    CB# [phone]

## 2022-04-04 NOTE — H&P
History and Physical    Patient: Ursula Miles MRN: [id number]  SSN: [id number]    YOB: 1955  Age: 78 y.o. Sex: male      CC: Enlarged prostate/prostate cancer    Subjective:      Ursula Miles is a 78 y.o. male referred for pre-operative evaluation by Dr. Donaldo Orellana for surgery on 4/11/22. Julia Rodriguez states his prostate is causing urinary retention. He currently has a srivastava in and has failed voiding trials. He has intermittent hematuria in his urine. He notes mild pain in his penis from srivastava. The patient was evaluated in the surgeon's office and it was determined that the most appropriate plan of care is to proceed with surgical intervention. Patient's PCP Perry Brooks MD    Past Medical History:   Diagnosis Date    BPH (benign prostatic hyperplasia)     COVID-19 vaccine series completed     Diabetes ([address]     Srivastava catheter in place 11/2022    High cholesterol     Hypertension     No blood products     Urinary retention      Past Surgical History:   Procedure Laterality Date    IR KYPHOPLASTY THORACIC  4/20/2021      Family History   Problem Relation Age of Onset    Anesth Problems Neg Hx     Clotting Disorder Neg Hx      Social History     Tobacco Use    Smoking status: Former Smoker     Types: Cigarettes    Smokeless tobacco: Never Used   Vaping Use    Vaping Use: Unknown   Substance Use Topics    Alcohol use: Never    Drug use: Never      Prior to Admission medications    Medication Sig Start Date End Date Taking? Authorizing Provider   finasteride (PROSCAR) 5 mg tablet Take 5 mg by mouth daily. 12/27/21  Yes Provider, Historical   tamsulosin (FLOMAX) 0.4 mg capsule Take 0.4 mg by mouth two (2) times a day. 12/27/21  Yes Provider, Historical   Lantus U-100 Insulin 100 unit/mL injection 23 Units by SubCUTAneous route two (2) times a day. 10/28/21  Yes Provider, Historical   HumaLOG U-100 Insulin 100 unit/mL injection by SubCUTAneous route four (4) times daily as needed. Patient was given R arm sling and tolerated well. DME form was signed and sent to scanning.    Sliding scale 10/28/21  Yes Provider, Historical   Januvia 100 mg tablet Take 100 mg by mouth daily. 11/15/21  Yes Provider, Historical   abiraterone (Zytiga) 250 mg tab Take 4 Tablets by mouth daily. 10/21/21  Yes Irineo Meraz NP   predniSONE (DELTASONE) 5 mg tablet Take 1 Tab by mouth daily. 4/27/21  Yes Jaclyn Sanchez MD   acetaminophen (TYLENOL) 325 mg tablet Take 2 Tabs by mouth every six (6) hours as needed for Pain. 4/22/21  Yes Renea Espinosa,    aspirin 81 mg chewable tablet Take 81 mg by mouth daily. Yes Provider, Historical   atenoloL (TENORMIN) 50 mg tablet Take 50 mg by mouth nightly. Yes Provider, Historical   atorvastatin (LIPITOR) 10 mg tablet Take 10 mg by mouth nightly. Yes Provider, Historical   benazepriL (LOTENSIN) 40 mg tablet Take 40 mg by mouth daily. Yes Provider, Historical   metFORMIN (GLUCOPHAGE) 500 mg tablet Take 1,500 mg by mouth daily (with breakfast). Yes Provider, Historical   metFORMIN (GLUCOPHAGE) 500 mg tablet Take 1,000 mg by mouth daily (with dinner). Yes Provider, Historical   pioglitazone (Actos) 45 mg tablet Take 45 mg by mouth daily. Yes Provider, Historical        Allergies   Allergen Reactions    Erythromycin Other (comments)     Insomnia- felt like speed       Review of Systems:  Constitutional: Negative for chills and fever  HENT: Negative for congestion and sore throat  Eyes: negative for blurred vision and double vision  Respiratory: Negative for cough, shortness of breath and wheezing  Mouth: Negative for loose, broken or chipped teeth. Cardiovascular: Negative for chest pain and palpitations  Gastrointestinal: Negative for abdominal pain, constipation, diarrhea and nausea  Genitourinary: Cruz in place, hematuria  Musculoskeletal: Mild joint pain  Skin: Negative for rash, open wounds.    Neurological: Negative for dizziness, tremors and headaches  Psychiatric: Negative for anxiety    Objective:     Vitals:    03/31/22 0953   BP: (!) 159/72 Pulse: 64   Resp: 16   Temp: 97.1 °F (36.2 °C)   SpO2: 98%   Weight: 110.9 kg (244 lb 8 oz)   Height: 5' 9\" (1.753 m)        Physical Exam:  General Appearance: Alert, Well Appearing and in no distress  Mental Status: Normal mood, behavior, speech and dress  Neck: Normal appearance externally  Ears: External canal no drainage  Nose: Normal external appearance and no drainage   Chest: Clear to auscultation, no wheezes, rales or rhonchi  Heart: Normal rate, regular rhythm, no murmurs, rubs, clicks or gallops  Skin: Normal color, no lesions externally  Abdomen: Not examined  Neuro: Not examined  Musculoskeletal: Gait antalgic    Recent Results (from the past 168 hour(s))   CBC WITH AUTOMATED DIFF    Collection Time: 03/31/22 10:29 AM   Result Value Ref Range    WBC 12.0 (H) 4.1 - 11.1 K/uL    RBC 3.78 (L) 4.10 - 5.70 M/uL    HGB 8.9 (L) 12.1 - 17.0 g/dL    HCT 28.1 (L) 36.6 - 50.3 %    MCV 74.3 (L) 80.0 - 99.0 FL    MCH 23.5 (L) 26.0 - 34.0 PG    MCHC 31.7 30.0 - 36.5 g/dL    RDW 16.0 (H) 11.5 - 14.5 %    PLATELET 013 (H) 890 - 400 K/uL    MPV 10.0 8.9 - 12.9 FL    NRBC 0.0 0  WBC    ABSOLUTE NRBC 0.00 0.00 - 0.01 K/uL    NEUTROPHILS 78 (H) 32 - 75 %    LYMPHOCYTES 9 (L) 12 - 49 %    MONOCYTES 5 5 - 13 %    EOSINOPHILS 6 0 - 7 %    BASOPHILS 1 0 - 1 %    IMMATURE GRANULOCYTES 1 (H) 0.0 - 0.5 %    ABS. NEUTROPHILS 9.4 (H) 1.8 - 8.0 K/UL    ABS. LYMPHOCYTES 1.1 0.8 - 3.5 K/UL    ABS. MONOCYTES 0.6 0.0 - 1.0 K/UL    ABS. EOSINOPHILS 0.7 (H) 0.0 - 0.4 K/UL    ABS. BASOPHILS 0.1 0.0 - 0.1 K/UL    ABS. IMM.  GRANS. 0.1 (H) 0.00 - 0.04 K/UL    DF AUTOMATED     TYPE & SCREEN    Collection Time: 03/31/22 10:29 AM   Result Value Ref Range    Crossmatch Expiration 04/14/2022,3363     ABO/Rh(D) O POSITIVE     Antibody screen NEG    SAMPLES BEING HELD    Collection Time: 03/31/22 10:29 AM   Result Value Ref Range    [facility]     COMMENT        Add-on orders for these samples will be processed based on acceptable specimen integrity and analyte stability, which may vary by analyte. METABOLIC PANEL, COMPREHENSIVE    Collection Time: 03/31/22 10:42 AM   Result Value Ref Range    Sodium 143 136 - 145 mmol/L    Potassium 4.0 3.5 - 5.1 mmol/L    Chloride 114 (H) 97 - 108 mmol/L    CO2 23 21 - 32 mmol/L    Anion gap 6 5 - 15 mmol/L    Glucose 88 65 - 100 mg/dL    BUN 13 6 - 20 MG/DL    Creatinine 1.05 0.70 - 1.30 MG/DL    BUN/Creatinine ratio 12 12 - 20      GFR est AA >60 >60 ml/min/1.73m2    GFR est non-AA >60 >60 ml/min/1.73m2    Calcium 9.1 8.5 - 10.1 MG/DL    Bilirubin, total 0.2 0.2 - 1.0 MG/DL    ALT (SGPT) 19 12 - 78 U/L    AST (SGOT) 22 15 - 37 U/L    Alk. phosphatase 158 (H) 45 - 117 U/L    Protein, total 7.1 6.4 - 8.2 g/dL    Albumin 2.9 (L) 3.5 - 5.0 g/dL    Globulin 4.2 (H) 2.0 - 4.0 g/dL    A-G Ratio 0.7 (L) 1.1 - 2.2     EKG, 12 LEAD, INITIAL    Collection Time: 03/31/22 10:46 AM   Result Value Ref Range    Ventricular Rate 69 BPM    Atrial Rate 69 BPM    P-R Interval 162 ms    QRS Duration 78 ms    Q-T Interval 436 ms    QTC Calculation (Bezet) 467 ms    Calculated P Axis 55 degrees    Calculated R Axis 28 degrees    Calculated T Axis 65 degrees    Diagnosis       Normal sinus rhythm  Nonspecific T wave abnormality  Prolonged QT  Abnormal ECG  When compared with ECG of 17-APR-2021 05:22,  Nonspecific T wave abnormality no longer evident in Inferior leads  Confirmed by Mira Pal MD., [facility] ([id number]) on 3/31/2022 8:50:58 PM           Assessment:     Enlarged prostate  Cruz in place  Pre-Operative Evaluation    Plan:     Resection of prostate  Cruz in place- unable to obtain urine. Per patient he gave some urine this week at the surgeon's office  CBC- anemia at 8.9 and elevated WBC- surgeon reviewed  Labs, CXR and EKG reviewed.        Signed By: Robyn Aleman NP     April 4, 2022

## 2022-04-04 NOTE — H&P (VIEW-ONLY)
History and Physical    Patient: Junior Jaimes MRN: [id number]  SSN: [id number]    YOB: 1955  Age: 78 y.o. Sex: male      CC: Enlarged prostate/prostate cancer    Subjective:      Junior Husbands is a 78 y.o. male referred for pre-operative evaluation by Dr. Avelino England for surgery on 4/11/22. Flaco Palomino states his prostate is causing urinary retention. He currently has a srivastava in and has failed voiding trials. He has intermittent hematuria in his urine. He notes mild pain in his penis from srivastava. The patient was evaluated in the surgeon's office and it was determined that the most appropriate plan of care is to proceed with surgical intervention. Patient's PCP Mc Cherry MD    Past Medical History:   Diagnosis Date    BPH (benign prostatic hyperplasia)     COVID-19 vaccine series completed     Diabetes ([address]     Srivastava catheter in place 11/2022    High cholesterol     Hypertension     No blood products     Urinary retention      Past Surgical History:   Procedure Laterality Date    IR KYPHOPLASTY THORACIC  4/20/2021      Family History   Problem Relation Age of Onset    Anesth Problems Neg Hx     Clotting Disorder Neg Hx      Social History     Tobacco Use    Smoking status: Former Smoker     Types: Cigarettes    Smokeless tobacco: Never Used   Vaping Use    Vaping Use: Unknown   Substance Use Topics    Alcohol use: Never    Drug use: Never      Prior to Admission medications    Medication Sig Start Date End Date Taking? Authorizing Provider   finasteride (PROSCAR) 5 mg tablet Take 5 mg by mouth daily. 12/27/21  Yes Provider, Historical   tamsulosin (FLOMAX) 0.4 mg capsule Take 0.4 mg by mouth two (2) times a day. 12/27/21  Yes Provider, Historical   Lantus U-100 Insulin 100 unit/mL injection 23 Units by SubCUTAneous route two (2) times a day. 10/28/21  Yes Provider, Historical   HumaLOG U-100 Insulin 100 unit/mL injection by SubCUTAneous route four (4) times daily as needed. Sliding scale 10/28/21  Yes Provider, Historical   Januvia 100 mg tablet Take 100 mg by mouth daily. 11/15/21  Yes Provider, Historical   abiraterone (Zytiga) 250 mg tab Take 4 Tablets by mouth daily. 10/21/21  Yes Efrain Arenas NP   predniSONE (DELTASONE) 5 mg tablet Take 1 Tab by mouth daily. 4/27/21  Yes Brandy Sanchez MD   acetaminophen (TYLENOL) 325 mg tablet Take 2 Tabs by mouth every six (6) hours as needed for Pain. 4/22/21  Yes Ra Peels, DO   aspirin 81 mg chewable tablet Take 81 mg by mouth daily. Yes Provider, Historical   atenoloL (TENORMIN) 50 mg tablet Take 50 mg by mouth nightly. Yes Provider, Historical   atorvastatin (LIPITOR) 10 mg tablet Take 10 mg by mouth nightly. Yes Provider, Historical   benazepriL (LOTENSIN) 40 mg tablet Take 40 mg by mouth daily. Yes Provider, Historical   metFORMIN (GLUCOPHAGE) 500 mg tablet Take 1,500 mg by mouth daily (with breakfast). Yes Provider, Historical   metFORMIN (GLUCOPHAGE) 500 mg tablet Take 1,000 mg by mouth daily (with dinner). Yes Provider, Historical   pioglitazone (Actos) 45 mg tablet Take 45 mg by mouth daily. Yes Provider, Historical        Allergies   Allergen Reactions    Erythromycin Other (comments)     Insomnia- felt like speed       Review of Systems:  Constitutional: Negative for chills and fever  HENT: Negative for congestion and sore throat  Eyes: negative for blurred vision and double vision  Respiratory: Negative for cough, shortness of breath and wheezing  Mouth: Negative for loose, broken or chipped teeth. Cardiovascular: Negative for chest pain and palpitations  Gastrointestinal: Negative for abdominal pain, constipation, diarrhea and nausea  Genitourinary: Cruz in place, hematuria  Musculoskeletal: Mild joint pain  Skin: Negative for rash, open wounds.    Neurological: Negative for dizziness, tremors and headaches  Psychiatric: Negative for anxiety    Objective:     Vitals:    03/31/22 0953   BP: (!) 159/72 Pulse: 64   Resp: 16   Temp: 97.1 °F (36.2 °C)   SpO2: 98%   Weight: 110.9 kg (244 lb 8 oz)   Height: 5' 9\" (1.753 m)        Physical Exam:  General Appearance: Alert, Well Appearing and in no distress  Mental Status: Normal mood, behavior, speech and dress  Neck: Normal appearance externally  Ears: External canal no drainage  Nose: Normal external appearance and no drainage   Chest: Clear to auscultation, no wheezes, rales or rhonchi  Heart: Normal rate, regular rhythm, no murmurs, rubs, clicks or gallops  Skin: Normal color, no lesions externally  Abdomen: Not examined  Neuro: Not examined  Musculoskeletal: Gait antalgic    Recent Results (from the past 168 hour(s))   CBC WITH AUTOMATED DIFF    Collection Time: 03/31/22 10:29 AM   Result Value Ref Range    WBC 12.0 (H) 4.1 - 11.1 K/uL    RBC 3.78 (L) 4.10 - 5.70 M/uL    HGB 8.9 (L) 12.1 - 17.0 g/dL    HCT 28.1 (L) 36.6 - 50.3 %    MCV 74.3 (L) 80.0 - 99.0 FL    MCH 23.5 (L) 26.0 - 34.0 PG    MCHC 31.7 30.0 - 36.5 g/dL    RDW 16.0 (H) 11.5 - 14.5 %    PLATELET 644 (H) 147 - 400 K/uL    MPV 10.0 8.9 - 12.9 FL    NRBC 0.0 0  WBC    ABSOLUTE NRBC 0.00 0.00 - 0.01 K/uL    NEUTROPHILS 78 (H) 32 - 75 %    LYMPHOCYTES 9 (L) 12 - 49 %    MONOCYTES 5 5 - 13 %    EOSINOPHILS 6 0 - 7 %    BASOPHILS 1 0 - 1 %    IMMATURE GRANULOCYTES 1 (H) 0.0 - 0.5 %    ABS. NEUTROPHILS 9.4 (H) 1.8 - 8.0 K/UL    ABS. LYMPHOCYTES 1.1 0.8 - 3.5 K/UL    ABS. MONOCYTES 0.6 0.0 - 1.0 K/UL    ABS. EOSINOPHILS 0.7 (H) 0.0 - 0.4 K/UL    ABS. BASOPHILS 0.1 0.0 - 0.1 K/UL    ABS. IMM.  GRANS. 0.1 (H) 0.00 - 0.04 K/UL    DF AUTOMATED     TYPE & SCREEN    Collection Time: 03/31/22 10:29 AM   Result Value Ref Range    Crossmatch Expiration 04/14/2022,2469     ABO/Rh(D) O POSITIVE     Antibody screen NEG    SAMPLES BEING HELD    Collection Time: 03/31/22 10:29 AM   Result Value Ref Range    [facility]     COMMENT        Add-on orders for these samples will be processed based on acceptable specimen integrity and analyte stability, which may vary by analyte. METABOLIC PANEL, COMPREHENSIVE    Collection Time: 03/31/22 10:42 AM   Result Value Ref Range    Sodium 143 136 - 145 mmol/L    Potassium 4.0 3.5 - 5.1 mmol/L    Chloride 114 (H) 97 - 108 mmol/L    CO2 23 21 - 32 mmol/L    Anion gap 6 5 - 15 mmol/L    Glucose 88 65 - 100 mg/dL    BUN 13 6 - 20 MG/DL    Creatinine 1.05 0.70 - 1.30 MG/DL    BUN/Creatinine ratio 12 12 - 20      GFR est AA >60 >60 ml/min/1.73m2    GFR est non-AA >60 >60 ml/min/1.73m2    Calcium 9.1 8.5 - 10.1 MG/DL    Bilirubin, total 0.2 0.2 - 1.0 MG/DL    ALT (SGPT) 19 12 - 78 U/L    AST (SGOT) 22 15 - 37 U/L    Alk. phosphatase 158 (H) 45 - 117 U/L    Protein, total 7.1 6.4 - 8.2 g/dL    Albumin 2.9 (L) 3.5 - 5.0 g/dL    Globulin 4.2 (H) 2.0 - 4.0 g/dL    A-G Ratio 0.7 (L) 1.1 - 2.2     EKG, 12 LEAD, INITIAL    Collection Time: 03/31/22 10:46 AM   Result Value Ref Range    Ventricular Rate 69 BPM    Atrial Rate 69 BPM    P-R Interval 162 ms    QRS Duration 78 ms    Q-T Interval 436 ms    QTC Calculation (Bezet) 467 ms    Calculated P Axis 55 degrees    Calculated R Axis 28 degrees    Calculated T Axis 65 degrees    Diagnosis       Normal sinus rhythm  Nonspecific T wave abnormality  Prolonged QT  Abnormal ECG  When compared with ECG of 17-APR-2021 05:22,  Nonspecific T wave abnormality no longer evident in Inferior leads  Confirmed by Jessica Beard MD., [facility] ([id number]) on 3/31/2022 8:50:58 PM           Assessment:     Enlarged prostate  Cruz in place  Pre-Operative Evaluation    Plan:     Resection of prostate  Cruz in place- unable to obtain urine. Per patient he gave some urine this week at the surgeon's office  CBC- anemia at 8.9 and elevated WBC- surgeon reviewed  Labs, CXR and EKG reviewed.        Signed By: Areli Salazar NP     April 4, 2022

## 2022-04-11 NOTE — BRIEF OP NOTE
Brief Postoperative Note    Patient: Lamar Russo  YOB: 1955  MRN: [id number]    Date of Procedure: 4/11/2022     Pre-Op Diagnosis: BPH with urinary obstruction [N40.1, N13.8]    Post-Op Diagnosis: Extensive urethral involvement with metastatic deposits, TURP aborted    Procedure(s):  CYSTOSCOPY WITH COMPLICATED PATIÑO    Surgeon(s):  Velvet Willams MD    Surgical Assistant: None    Anesthesia: General     Estimated Blood Loss (mL): <27VI    Complications: None    Specimens: None    Implants: None    Drains: 20Fr 2-way [place] tip catheter    Findings: Extensive urethral involvement to the pendulous urethra with metastatic deposits presumably from metastatic prostate cancer. Nodular indurated urethra on physical exam. TURP aborted. With much difficulty able to access the bladder neck, advance wire, and place [place] tip catheter over wire. I believe that he will be best served with a suprapubic catheter based on these findings. Discussed with his daughter and son in the postoperative area while patient still under anesthesia. We agreed to defer TURP, allow patient to recover, and consider SPT as alternative. They'd like to keep follow up as planned to give him 1 week to consider next steps. I am wary that continued [place] catheter exchanges or CIC will be beneficial based on urethral involvement.     Electronically Signed by Henry Calderon MD on 4/11/2022 at 1:50 PM

## 2022-04-11 NOTE — ANESTHESIA PREPROCEDURE EVALUATION
Relevant Problems   CARDIOVASCULAR   (+) Hypertension      RENAL FAILURE   (+) MAMADOU (acute kidney injury) ([facility] [address]      ENDOCRINE   (+) Diabetes (HCC)      PERSONAL HX & FAMILY HX OF CANCER   (+) Metastasis to bone (HCC)   (+) Prostate cancer (HCC)       Anesthetic History   No history of anesthetic complications            Review of Systems / Medical History  Patient summary reviewed, nursing notes reviewed and pertinent labs reviewed    Pulmonary  Within defined limits                 Neuro/Psych   Within defined limits           Cardiovascular    Hypertension              Exercise tolerance: >4 METS     GI/Hepatic/Renal  Within defined limits              Endo/Other    Diabetes    Cancer     Other Findings   Comments: Metastatic prostate ca    No blood products           Physical Exam    Airway  Mallampati: II    Neck ROM: normal range of motion   Mouth opening: Normal     Cardiovascular  Regular rate and rhythm,  S1 and S2 normal,  no murmur, click, rub, or gallop  Rhythm: regular  Rate: normal         Dental  No notable dental hx       Pulmonary  Breath sounds clear to auscultation               Abdominal  GI exam deferred       Other Findings            Anesthetic Plan    ASA: 3  Anesthesia type: general          Induction: Intravenous  Anesthetic plan and risks discussed with: Patient

## 2022-04-11 NOTE — OP NOTES
Preoperative Diagnosis: BPH with LUTS, metastatic prostate cancer, urinary retention    Postoperative Diagnosis:  Extensive urethral deposits to the pendulous urethra with metastatic prostate cancer, TURP aborted    Procedure(s) Performed:    1. Cystourethroscopy  2. Complicated Cruz catheter placement    Surgeon:  Andressa Wood MD    Assistant(s):      Anesthesia:  General    Fluids:  See anesthesia record    Estimated blood loss:  <10 mL    Specimens:  None    Cultures:  None    Implants: None    Tubes/Lines/Drains:  20Fr 2-way [place] urethral Cruz catheter    Complications:  None. TURP aborted due to urethral findings. Indications: 78 y.o. male with a past medical history of metastatic prostate cancer, DM, HTN, urinary retention. He is followed by Dr. Pina He of med onc for his metastatic prostate cancer with recent PSA 0.2, on Zytiga/prednisone. Also on Flomax/finasteride. PSA at diagnosis 4/17/21 was 153. He presents today for cystoscopy with TURP. Risks & benefits of the procedure discussed with the patient, who wishes to proceed. Findings:  Extensive urethral involvement to the pendulous urethra with metastatic deposits presumably from metastatic prostate cancer. Nodular indurated urethra on physical exam. TURP aborted. With much difficulty I was able to access the bladder neck, advance wire, and place [place] tip catheter over wire. Description:  The patient was correctly identified in the preop holding area where written informed consent as well as potential risks and complications were reviewed. He agreed. They were brought back to the operative suite. Once correct information was verified, general anesthesia was induced. They were then gently placed into dorsal lithotomy position with SCDs in place for VTE prophylaxis. They were prepped and draped in the usual sterile fashion and given appropriate preoperative antibiotics with Ancef/Gentamicin. A surgical timeout was performed.      We inserted a 21F rigid cystoscope per urethra with copious lubrication and normal saline irrigation running. This demonstrated findings as described above. At first I thought I had encountered residual sloughed off tissue in the pendulous to bulbar urethra but it became apparent quickly that the entire urethra was involved with metastatic deposits from prostate cancer, with nodular growths of papillary tissue scattered throughout the urethra. The scope was removed. Exam of the urethra showed a woody, nodular indurated diffuse feeling on palpation. The scope was replaced. I gently prodded through the masses and with a great deal of difficulty was able to finally access the bladder neck and look into the bladder. Cystoscopy was limited otherwise however and I never visualized true intravesical anatomy besides the bladder neck. A sensor wire was advanced through the scope. The scope was offloaded. 2% urojet was instilled in the urethra. A 20Fr 2-way [place] tip catheter was placed over the wire into the bladder. 10cc sterile water was placed in the catheter balloon. The catheter was irrigated with a 60cc [place] tip syringe and irrigated well. I made the decision to abort attempts at TURP. I went and discussed the findings with the patient's family. We agreed to defer attempts at TURP. I think he will benefit most from consideration of suprapubic catheterization but we agreed to let the patient recover from today's procedure and make his own medical decision regarding that. A statlock was placed. The catheter was attached to a standing gravity bag. He was awoken from anesthesia and taken to recovery area for routine postoperative care. Post Op Plan:    -- Keep Cruz in place. For now keep follow up with me on 4/18.  Cancel attempt at void trial. I think he would benefit most from suprapubic catheterization and chronic exchanges based on today's findings  -- I will notify Dr. Esperanza Bynum of these findings as eliana Keys MD  [address] [place] [address] Urology

## 2022-04-11 NOTE — DISCHARGE INSTRUCTIONS
You have a follow up appointment scheduled with Dr. Charly Galvin on 4/18/22 at [address] [place] [address]. Please arrive at least 15 minutes prior to the scheduled appointment to allow for enough time to check in. You will be discharged with a prescription for oxycodone to be used for pain control on an as needed basis every 4-6 hours. Oxycodone is a narcotic that may make you drowsy, sleepy and reduce your reaction time. You should not drive while taking this medication. You may also take ibuprofen and/or Tylenol as needed for pain. You will be given a prescription for Augmentin, an antibiotic, that I would like for you to continue taking twice a day until the day of your clinic visit on 4/18/22. I've refilled this to give you enough pills to take this through 4/18. Continue taking colace. Hold this medications if you have diarrhea. You may take over-the-counter laxatives such as MiraLAX or Milk of Magnesia as well if you are passing gas but feeling constipated. AZO (Pyridium) can help with urinary discomfort. This medication can be obtained at any pharmacy over-the-counter. Take AZO three times a day as needed. This medication turns the urine very orange, so do not be alarmed by this. You may have been discharged with a Cruz catheter draining urine out of your bladder. Other times you may not have needed a catheter at all, or the catheter may have been removed prior to discharge. A Cruz catheter consists of a tube coming out of your urethra which connects to tubing and a drainage bag. The catheter is held in your bladder by a balloon which is inflated using water. Before you are discharged from the hospital your nurse will instruct you how to care for the Cruz catheter. If you have any questions regarding the Cruz catheter please be sure to ask your nurse for further teaching or clarification prior to discharge. See instructions noted below for Cruz catheter care and management when you are at home.      If you go home with a catheter, clean the catheter where it enters your body daily. First, wash your hands before caring for your catheter. Use a clean wash cloth and soap to clean the catheter and the surrounding skin. Begin cleaning at the urethra (where the catheter enters your body) and wipe away from this area. Apply an antibiotic ointment around the site where the catheter enters the body after cleaning. It is normal to see some blood in your urine after a procedure like you had today. However, you should call [facility] if your urine is the color and consistency of tomato juice or you are passing blood clots greater than the size of a quarter in diameter. If this is happening it can clog your catheter or prevent you from being able to empty your bladder. Sometimes a piece of tissue or a blood clot can get caught in your Cruz catheter at home and cause it not to drain properly. This may cause leakage of urine around the catheter and you may feel like your bladder is full. In this case, you should disconnect the catheter from the drainage bag and flush the catheter with about 30-60mL of saline (available at pharmacies), preferably with a catheter tip syringe. If that does not help you should come in to be evaluated. [address] [place] [address] Urology ([phone] during business hours, or [phone] after hours) for fever >101F by mouth, uncontrolled nausea or vomiting, Cruz catheter problems, pain uncontrolled by medication, decreased urine output, persistent heavy blood in the urine or large clot passage; also call for any new or concerning symptoms. Driving should be avoided for at least 24 hours after surgery/anesthesia and longer if you are using oxycodone. Do not drive while taking narcotic pain medications. Take all medications as prescribed. Daily walks are encouraged. Prolonged sitting or lying in bed should be avoided. Avoid heavy lifting >10 pounds for 2-3 weeks after surgery.  You can expect to return to work as soon as 1-2 weeks following your procedure at your discretion, or as instructed. Cruz catheter management -   - Keep your catheter below the level of your bladder at all times, otherwise it may not drain properly. A Cruz catheter drains your bladder by gravity.  - Make sure there is slack on the catheter between the drainage bag and your urethra. The catheter should not be on any tension  - Empty the drainage bag when it is full  - You may disconnect the urinary drainage bag when showering and let the catheter drain freely  - You can use the leg bag when upright. The leg bag should be below your waist in order to drain properly. Use the larger drainage bag when lying down or sleeping  - For males, topical antibiotic ointment applied to the catheter as it inserts into the penis will reduce discomfort from the catheter. This can be obtained over-the-counter at your local pharmacy  - For uncircumcised males, be sure to keep your foreskin pulled down over the head of the penis      DISCHARGE SUMMARY from your Nurse      PATIENT INSTRUCTIONS    After general anesthesia or intravenous sedation, for 24 hours or while taking prescription Narcotics:  · Limit your activities  · Do not drive and operate hazardous machinery  · Do not make important personal or business decisions  · Do  not drink alcoholic beverages  · If you have not urinated within 8 hours after discharge, please contact your surgeon on call.     Report the following to your surgeon:  · Excessive pain, swelling, redness or odor of or around the surgical area  · Temperature over 100.5  · Nausea and vomiting lasting longer than 4 hours or if unable to take medications  · Any signs of decreased circulation or nerve impairment to extremity: change in color, persistent  numbness, tingling, coldness or increase pain  · Any questions      GOOD HELP TO FIGHT AN INFECTION  Here are a few tip to help reduce the chance of getting an infection after surgery:   Wash Your Hands   Good handwashing is the most important thing you and your caregiver can do.  Wash before and after caring for any wounds. Dry your hand with a clean towel.  Wash with soap and water for at least 20 seconds. A TIP: sing the \"Happy Birthday\" song through one time while washing to help with the timing.  Use a hand  in between washings.  Shower   When your surgeon says it is OK to take a shower, use a new bar of antibacterial soap (if that is what you use, and keep that bar of soap ONLY for your use), or antibacterial body wash.  Use a clean wash cloth or sponge when you bathe.  Dry off with a clean towel  after every bath - be careful around any wounds, skin staples, sutures or surgical glue over/on wounds.  Do not enter swimming pools, hot tubs, lakes, rivers and/or ocean until wounds are healed and your doctor/surgeon says it is OK.  Use Clean Sheets   Sleep on freshly laundered sheets after your surgery.  Keep the surgery site covered with a clean, dry bandage (if instructed to do so). If the bandage becomes soiled, reapply a new, dry, clean bandage.  Do not allow pets to sleep with you while your wound is healing.  Lifestyle Modification and Controlling Your Blood Sugar   Smoking slows wound healing. Stop smoking and limit exposure to second-hand smoke.  High blood sugar slows wound healing. Eat a well-balanced diet to provide proper nutrition while healing   Monitor your blood sugar (if you are a diabetic) and take your medications as you are suppose to so you can control you blood sugar after surgery. COUGH AND DEEP BREATHE    Breathing deeply and coughing are very important exercises to do after surgery. Deep breathing and coughing open the little air tubes and air sacks in your lungs. You take deep breaths every day. You may not even notice - it is just something you do when you sigh or yawn.   It is a natural exercise you do to keep these air passages open. After surgery, take deep breaths and cough, on purpose. DIRECTIONS:  · Take 10 to 15 slow deep breaths every hour while awake. · Breathe in deeply, and hold it for 2 seconds. · Exhale slowly through puckered lips, like blowing up a balloon. · After every 4th or 5th deep breath, hug your pillow to your chest or belly and give a hard, deep cough. Yes, it will probably hurt. But doing this exercise is a very important part of healing after surgery. Take your pain medicine to help you do this exercise without too much pain. Coughing and deep breathing help prevent bronchitis and pneumonia after surgery. If you had chest or belly surgery, use a pillow as a \"hug david\" and hold it tightly to your chest or belly when you cough. ANKLE PUMPS    Ankle pumps increase the circulation of oxygenated blood to your lower extremities and decrease your risk for circulation problems such as blood clots. They also stretch the muscles, tendons and ligaments in your foot and ankle, and prevent joint contracture in the ankle and foot, especially after surgeries on the legs. It is important to do ankle pump exercises regularly after surgery because immobility increases your risk for developing a blood clot. Your doctor may also have you take an Aspirin for the next few days as well. If your doctor did not ask you to take an Aspirin, consult with him before starting Aspirin therapy on your own. The exercise is quite simple. · Slowly point your foot forward, feeling the muscles on the top of your lower leg stretch, and hold this position for 5 seconds. · Next, pull your foot back toward you as far as possible, stretching the calf muscles, and hold that position for 5 seconds. · Repeat with the other foot.   · Perform 10 repetitions every hour while awake for both ankles if possible (down and then up with the foot once is one repetition). You should feel gentle stretching of the muscles in your lower leg when doing this exercise. If you feel pain, or your range of motion is limited, don't push too hard. Only go the limit your joint and muscles will let you go. If you have increasing pain, progressively worsening leg warmth or swelling, STOP the exercise and call your doctor. MEDICATION AND   SIDE EFFECT GUIDE    The [facility] Insurance MEDICATION AND SIDE EFFECT GUIDE was provided to the PATIENT AND CARE PROVIDER. Information provided includes instruction about drug purpose and common side effects for the following medications:   · augmentin  · roxicodone  ·         These are general instructions for a healthy lifestyle:    *   Please give a list of your current medications to your Primary Care Provider. *   Please update this list whenever your medications are discontinued, doses are changed, or new medications (including over-the-counter products) are added. *   Please carry medication information at all times in case of emergency situations. About Smoking  No smoking / No tobacco products  Avoid exposure to second hand smoke     Surgeon General's Warning:  Quitting smoking now greatly reduces serious risk to your health. Obesity, smoking, and sedentary lifestyle greatly increases your risk for illness and disease. A healthy diet, regular physical exercise & weight monitoring are important for maintaining a healthy lifestyle. Congestive Heart Failure  You may be retaining fluid if you have a history of heart failure or if you experience any of the following symptoms:  Weight gain of 3 pounds or more overnight or 5 pounds in a week, increased swelling in your hands or feet or shortness of breath while lying flat in bed. Please call your doctor as soon as you notice any of these symptoms; do not wait until your next office visit.       Recognize signs and symptoms of STROKE:  F -  Face looks uneven  A -  Arms unable to move or move evenly  S -  Speech slurred or non-existent  T -  Time-call 911 as soon as signs and symptoms begin-DO NOT go          back to bed or wait to see if you get better-TIME IS BRAIN. Warning Signs of HEART ATTACK   Call 911 if you have these symptoms:     Chest discomfort. Most heart attacks involve discomfort in the center of the chest that lasts more than a few minutes, or that goes away and comes back. It can feel like uncomfortable pressure, squeezing, fullness, or pain.  Discomfort in other areas of the upper body. Symptoms can include pain or discomfort in one or both arms, the back, neck, jaw, or stomach.  Shortness of breath with or without chest discomfort.  Other signs may include breaking out in a cold sweat, nausea, or lightheadedness. Don't wait more than five minutes to call 911 - MINUTES MATTER! Fast action can save your life. Calling 911 is almost always the fastest way to get lifesaving treatment. Emergency Medical Services staff can begin treatment when they arrive -- up to an hour sooner than if someone gets to the hospital by car. Learning About Coronavirus [phone])  Coronavirus [phone]): Overview  What is coronavirus (COVID-19)? The coronavirus disease (COVID-19) is caused by a virus. It is an illness that was first found in [place], [place], in December 2019. It has since spread worldwide. The virus can cause fever, cough, and trouble breathing. In severe cases, it can cause pneumonia and make it hard to breathe without help. It can cause death. Coronaviruses are a large group of viruses. They cause the common cold. They also cause more serious illnesses like Middle East respiratory syndrome (MERS) and severe acute respiratory syndrome (SARS). COVID-19 is caused by a novel coronavirus. That means it's a new type that has not been seen in people before. This virus spreads person-to-person through droplets from coughing and sneezing.  It can also spread when you are close to someone who is infected. And it can spread when you touch something that has the virus on it, such as a doorknob or a tabletop. What can you do to protect yourself from coronavirus (COVID-19)? The best way to protect yourself from getting sick is to:  · Avoid areas where there is an outbreak. · Avoid contact with people who may be infected. · Wash your hands often with soap or alcohol-based hand sanitizers. · Avoid crowds and try to stay at least 6 feet away from other people. · Wash your hands often, especially after you cough or sneeze. Use soap and water, and scrub for at least 20 seconds. If soap and water aren't available, use an alcohol-based hand . · Avoid touching your mouth, nose, and eyes. What can you do to avoid spreading the virus to others? To help avoid spreading the virus to others:  · Cover your mouth with a tissue when you cough or sneeze. Then throw the tissue in the trash. · Use a disinfectant to clean things that you touch often. · Stay home if you are sick or have been exposed to the virus. Don't go to school, work, or public areas. And don't use public transportation. · If you are sick:  ? Leave your home only if you need to get medical care. But call the doctor's office first so they know you're coming. And wear a face mask, if you have one.  ? If you have a face mask, wear it whenever you're around other people. It can help stop the spread of the virus when you cough or sneeze. ? Clean and disinfect your home every day. Use household  and disinfectant wipes or sprays. Take special care to clean things that you grab with your hands. These include doorknobs, remote controls, phones, and handles on your refrigerator and microwave. And don't forget countertops, tabletops, bathrooms, and computer keyboards. When to call for help  Call 911 anytime you think you may need emergency care. For example, call if:  · You have severe trouble breathing. (You can't talk at all.)  · You have constant chest pain or pressure. · You are severely dizzy or lightheaded. · You are confused or can't think clearly. · Your face and lips have a blue color. · You pass out (lose consciousness) or are very hard to wake up. Call your doctor now if you develop symptoms such as:  · Shortness of breath. · Fever. · Cough. If you need to get care, call ahead to the doctor's office for instructions before you go. Make sure you wear a face mask, if you have one, to prevent exposing other people to the virus. Where can you get the latest information? The following health organizations are tracking and studying this virus. Their websites contain the most up-to-date information. Tad Bottoms also learn what to do if you think you may have been exposed to the virus. · U.S. Centers for Disease Control and Prevention (CDC): The CDC provides updated news about the disease and travel advice. The website also tells you how to prevent the spread of infection. www.cdc.gov  · World Health Organization [facility]): WHO offers information about the virus outbreaks. WHO also has travel advice. www.who.int  Current as of: April 1, 2020               Content Version: 12.4  © 2006-2020 [employer]. Care instructions adapted under license by your healthcare professional. If you have questions about a medical condition or this instruction, always ask your healthcare professional. [address] any warranty or liability for your use of this information. The discharge information has been reviewed with the {PATIENT PARENT GUARDIAN:11714}. Any questions and concerns from the {PATIENT PARENT GUARDIAN:81652} have been addressed. The {PATIENT PARENT GUARDIAN:53120} verbalized understanding.         CONTENTS FOUND IN YOUR DISCHARGE ENVELOPE:  [x]     Surgeon and Hospital Discharge Instructions  [x]     [facility] Care Provider Card  []     Medication & Side Effect Guide            (your newly prescribed medications have been marked/highlighted showing the most common side effects from   the classes of drugs on your prescriptions)  []     Medication Prescription(s) x *** ( [] These have been sent electronically to your pharmacy by your surgeon,   - OR -       your surgeon has already provided these to you during a previous/pre-op office visit)  []     [address]  []     Physical Therapy Prescription  []     Follow-up Appointment Cards  []     Surgery-related Pictures/Media  []     Pain block and/or block with On-Q Catheter from Anesthesia Service (information included in your instructions above)  []     Medical device information sheets/pamphlets from their    []     School/work excuse note. []     /parent work excuse note. The following personal items collected during your admission are returned to you:   Dental Appliance: Dental Appliances: None  Vision:    Hearing Aid: Hearing Aid: None  Jewelry: Jewelry: None  Clothing: Clothing: Other (comment) (street cloths to pacu)  Other Valuables:  Other Valuables: Eyeglasses (to pacu)  Valuables sent to safe:

## 2022-04-11 NOTE — ANESTHESIA POSTPROCEDURE EVALUATION
Procedure(s):  CYSTOSCOPY WITH COMPLICATED PATIÑO. general    Anesthesia Post Evaluation      Multimodal analgesia: multimodal analgesia not used between 6 hours prior to anesthesia start to PACU discharge  Patient location during evaluation: PACU  Patient participation: complete - patient participated  Level of consciousness: awake  Pain management: adequate  Airway patency: patent  Anesthetic complications: no  Cardiovascular status: acceptable, blood pressure returned to baseline and hemodynamically stable  Respiratory status: acceptable  Hydration status: acceptable  Post anesthesia nausea and vomiting:  controlled      INITIAL Post-op Vital signs:   Vitals Value Taken Time   /71 04/11/22 1440   Temp 36.4 °C (97.6 °F) 04/11/22 1358   Pulse 75 04/11/22 1443   Resp 19 04/11/22 1443   SpO2 98 % 04/11/22 1443   Vitals shown include unvalidated device data.

## 2022-04-11 NOTE — INTERVAL H&P NOTE
Update History & Physical    The Patient's History and Physical was reviewed with the patient and I examined the patient. There was no change. The surgical site was confirmed by the patient and me. Plan:  The risk, benefits, expected outcome, and alternative to the recommended procedure have been discussed with the patient. Patient understands and wants to proceed with the procedure.     Electronically signed by Adelina Christiansen MD on 4/11/2022 at 12:46 PM

## 2022-04-22 NOTE — TELEPHONE ENCOUNTER
4/22/22- Called [employer] to check status of patients application. Per representative pt is officially approved now until 12/31/22. No further financial assistance required at this time.

## 2022-04-22 NOTE — DISCHARGE INSTRUCTIONS
Patient Education     Suprapubic Catheter Care: Care Instructions  Overview  A suprapubic catheter is a thin tube that drains urine from your bladder. The tube is put into your bladder through a small cut in your lower belly. The urine collects in a bag attached to the tube. The bag is usually attached to your leg. Sometimes the catheter tube has a valve that lets you drain the urine into the toilet or other container. You may need a suprapubic catheter if you have nerve damage, a problem with your urinary tract, or a disease that weakens your muscles. Having a catheter for a long time increases the risk of getting a urinary tract infection. So catheter care focuses on preventing infection. Follow-up care is a key part of your treatment and safety. Be sure to make and go to all appointments, and call your doctor if you are having problems. It's also a good idea to know your test results and keep a list of the medicines you take. How can you care for yourself at home? · Wash your hands before you handle the catheter. · Clean the area around the catheter with soap and water daily. · Keep the drainage bag lower than your bladder to keep urine from backing up. · Clean the bag every day after removing it from the catheter. Use another container while you clean the bag. To clean the bag, fill it with 2 parts vinegar to 3 parts water and let it stand for 20 minutes. Then empty it out, and let it air dry. · Empty the drainage bag when it is full or at least every 8 hours. When should you call for help? Call your doctor now or seek immediate medical care if:    · Your catheter becomes blocked and urine does not collect in the drainage bag.     · Your catheter leaks.     · You have blood or pus in your urine.     · You have pain in your back just below your rib cage.  This is called flank pain.     · You have a fever, chills, or body aches.     · You have groin or belly pain.     · Your urine is cloudy or smells bad.     · You have pain, increasing redness, or bleeding around the catheter.     · You have swelling around the catheter or in your belly. Watch closely for changes in your health, and be sure to contact your doctor if you have any problems. Where can you learn more? Go to http://www.gray.com/  Enter D213 in the search box to learn more about \"Suprapubic Catheter Care: Care Instructions. \"  Current as of: October 18, 2021               Content Version: 13.2  © 2006-2022 [employer]. Care instructions adapted under license by [employer] (which disclaims liability or warranty for this information). If you have questions about a medical condition or this instruction, always ask your healthcare professional. [address] any warranty or liability for your use of this information.

## 2022-04-22 NOTE — PROGRESS NOTES
TRANSFER - OUT REPORT:    Verbal report given to [facility] rn on Sharad King being transferred to [facility] for routine post - op       Report consisted of patient's Situation, Background, Assessment and   Recommendations(SBAR). Information from the following report(s) Procedure Summary was reviewed with the receiving nurse. Opportunity for questions and clarification was provided.       Patient transported with:   Registered Nurse

## 2022-04-22 NOTE — H&P
Interventional and Vascular Radiology History and Physical    Patient: Low Tovar 78 y.o. male     Chief Complaint: suprapubic catheter     History of Present Illness: 78year old male with chronic srivastava and BPH presents for suprapubic catheter placement. History:    Past Medical History:   Diagnosis Date    BPH (benign prostatic hyperplasia)     COVID-19 vaccine series completed     Diabetes ([facility] [address]     Srivastava catheter in place 11/2022    High cholesterol     Hypertension     No blood products     Urinary retention      Family History   Problem Relation Age of Onset    Anesth Problems Neg Hx     Clotting Disorder Neg Hx      Social History     Socioeconomic History    Marital status:      Spouse name: Not on file    Number of children: Not on file    Years of education: Not on file    Highest education level: Not on file   Occupational History    Not on file   Tobacco Use    Smoking status: Former Smoker     Types: Cigarettes    Smokeless tobacco: Never Used   Vaping Use    Vaping Use: Unknown   Substance and Sexual Activity    Alcohol use: Never    Drug use: Never    Sexual activity: Not Currently   Other Topics Concern    Not on file   Social History Narrative    Not on file     Social Determinants of Health     Financial Resource Strain:     Difficulty of Paying Living Expenses: Not on file   Food Insecurity:     Worried About Running Out of Food in the Last Year: Not on file    [place]lin of Food in the Last Year: Not on file   Transportation Needs:     Lack of Transportation (Medical): Not on file    Lack of Transportation (Non-Medical):  Not on file   Physical Activity:     Days of Exercise per Week: Not on file    Minutes of Exercise per Session: Not on file   Stress:     Feeling of Stress : Not on file   Social Connections:     Frequency of Communication with Friends and Family: Not on file    Frequency of Social Gatherings with Friends and Family: Not on file   Jose Attends Holiness Services: Not on file    Active Member of Clubs or Organizations: Not on file    Attends Club or Organization Meetings: Not on file    Marital Status: Not on file   Intimate Partner Violence:     Fear of Current or Ex-Partner: Not on file    Emotionally Abused: Not on file    Physically Abused: Not on file    Sexually Abused: Not on file   Housing Stability:     Unable to Pay for Housing in the Last Year: Not on file    Number of [place] in the Last Year: Not on file    Unstable Housing in the Last Year: Not on file       Allergies: Allergies   Allergen Reactions    Erythromycin Other (comments)     Insomnia- felt like speed       Current Medications:  No current facility-administered medications for this encounter. Physical Exam:  There were no vitals taken for this visit. No acute distress  Good peripheral perfusion  Nonlabored respirations  Abdomen nondistended    Alerts:    Hospital Problems  Date Reviewed: 3/16/2022    None          Laboratory:    No results for input(s): HGB, HCT, WBC, PLT, INR, BUN, CREA, K, CRCLT, HGBEXT, HCTEXT, PLTEXT, INREXT in the last 72 hours. No lab exists for component: PTT, PT      Plan of Care/Planned Procedure:  Risks, benefits, and alternatives reviewed with patient and he agrees to proceed with the procedure. Conscious sedation will be performed with IV fentanyl and versed.        Robbie Marshall MD

## 2022-04-22 NOTE — ROUTINE PROCESS
9:29 AM  Patient brought back from waiting area. Armband and allergies confirmed. Consents signed. 10:45 AM  TRANSFER - OUT REPORT:    Verbal report given to [facility], Rn (name) on Jt Ross  being transferred to Angio Lab (unit) for ordered procedure       Report consisted of patients Situation, Background, Assessment and   Recommendations(SBAR). Information from the following report(s) SBAR was reviewed with the receiving nurse. Lines:   Peripheral IV 04/22/22 Anterior;Proximal;Right Forearm (Active)   Site Assessment Clean, dry, & intact 04/22/22 0952   Phlebitis Assessment 0 04/22/22 0952   Dressing Status Clean, dry, & intact 04/22/22 0952   Dressing Type Transparent 04/22/22 0952   Hub Color/Line Status Pink 04/22/22 8207        Opportunity for questions and clarification was provided. Patient transported with:   Registered Nurse      11:24 AM  TRANSFER - IN REPORT:    Verbal report received from Anjana Barahona [facility] (name) on Jt Ross  being received from Angio Lab (unit) for routine progression of care      Report consisted of patients Situation, Background, Assessment and   Recommendations(SBAR). Information from the following report(s) SBAR was reviewed with the receiving nurse. Opportunity for questions and clarification was provided. Assessment completed upon patients arrival to unit and care assumed. 11:30 AM  Spoke to Dr. Ronak Tijerina about patients currently srivastava catheter. Dr. Ronak Tijerina advise that the srivastava could be removed. Order placed and srivastava catheter removed without issue. 12:10 PM  Discharge instructions reviewed with patient and family. Voiced understanding. Patient given copy of discharge instructions to take home. 12:40 PM  Pt discharged via wheelchair with daughter. Personal belongings with patient upon discharge.

## 2022-04-22 NOTE — PROGRESS NOTES
TRANSFER - IN REPORT:    Verbal report received from [facility] on Lacho Gibson  being [facility]      Report consisted of patients Situation, Background, Assessment and   Recommendations(SBAR). Information from the following report(s) SBAR was reviewed with the receiving nurse. Opportunity for questions and clarification was provided. Assessment completed upon patients arrival to unit and care assumed.

## 2022-04-29 NOTE — ED NOTES
Patient expressed understanding of all follow up care including care of srivastava catheter and required specialist appointments. Verbalized understanding of conditions to return to the emergency dept for emergent care.

## 2022-04-29 NOTE — ED NOTES
Attempted to flush suprapubic catheter with a total of 160ml NS. Catheter flushed without resistance. approx 10ml drainage returned. Attempted to draw from catheter without any fluid return. Bladder scan performed showing 638ml. Per MD attempted to flush with an additional 60ml NS, without any fluid drainage. Per MD 14 Thai Indwelling urinary catheter placed. Catheter draining dark red urine. 2 small clots noted from penis, outside the catheter. Pt tolerated well and endorses feeling much better.

## 2022-04-29 NOTE — ED NOTES
8:06 AM  Change of shift. Care of patient taken over from Radha Le MD; H&P reviewed, bedside handoff complete. Awaiting Urology consult      Urology bedside able to flush suprapubic catheter without difficulty patient will be discharged will empirically start patient on Keflex 500 mg twice daily for 5 days patient to follow-up with urology.

## 2022-04-29 NOTE — ED TRIAGE NOTES
Pt c/o urinary retention. He has an indwelling suprapubic catheter that has not drained for the past 9 hours. Pt endorses abd pain.

## 2022-04-29 NOTE — ED PROVIDER NOTES
Patient is a 61-year-old gentleman with a history of prostate cancer, urinary retention, and recent placement of suprapubic catheter. He comes into the emergency department with abdominal discomfort after his catheter stops draining at approximately 9 PM last night. He has not had any fevers or chills but notes that he has had large clots in his catheter bag. The history is provided by the patient. Urinary Retention   Associated symptoms include hematuria. Pertinent negatives include no fever. Past Medical History:   Diagnosis Date    BPH (benign prostatic hyperplasia)     COVID-19 vaccine series completed     Diabetes ([address]     Cruz catheter in place 11/2022    High cholesterol     Hypertension     No blood products     Urinary retention        Past Surgical History:   Procedure Laterality Date    IR ASP BLADDER SUPRA CATH  4/22/2022    IR KYPHOPLASTY THORACIC  4/20/2021         Family History:   Problem Relation Age of Onset    Anesth Problems Neg Hx     Clotting Disorder Neg Hx        Social History     Socioeconomic History    Marital status:      Spouse name: Not on file    Number of children: Not on file    Years of education: Not on file    Highest education level: Not on file   Occupational History    Not on file   Tobacco Use    Smoking status: Former Smoker     Types: Cigarettes    Smokeless tobacco: Never Used   Vaping Use    Vaping Use: Unknown   Substance and Sexual Activity    Alcohol use: Never    Drug use: Never    Sexual activity: Not Currently   Other Topics Concern    Not on file   Social History Narrative    Not on file     Social Determinants of Health     Financial Resource Strain:     Difficulty of Paying Living Expenses: Not on file   Food Insecurity:     Worried About Running Out of Food in the Last Year: Not on file    [place] of Food in the Last Year: Not on file   Transportation Needs:     Lack of Transportation (Medical):  Not on file  Lack of Transportation (Non-Medical): Not on file   Physical Activity:     Days of Exercise per Week: Not on file    Minutes of Exercise per Session: Not on file   Stress:     Feeling of Stress : Not on file   Social Connections:     Frequency of Communication with Friends and Family: Not on file    Frequency of Social Gatherings with Friends and Family: Not on file    Attends Mormonism Services: Not on file    Active Member of [address] [place] [address] or Organizations: Not on file    Attends Club or Organization Meetings: Not on file    Marital Status: Not on file   Intimate Partner Violence:     Fear of Current or Ex-Partner: Not on file    Emotionally Abused: Not on file    Physically Abused: Not on file    Sexually Abused: Not on file   Housing Stability:     Unable to Pay for Housing in the Last Year: Not on file    Number of [place] in the Last Year: Not on file    Unstable Housing in the Last Year: Not on file         ALLERGIES: Erythromycin    Review of Systems   Constitutional: Negative for fever. Gastrointestinal: Positive for abdominal distention and abdominal pain. Genitourinary: Positive for difficulty urinating and hematuria. All other systems reviewed and are negative. Vitals:    04/29/22 0609   BP: (!) 143/68   Pulse: 73   Resp: 18   Temp: 97.7 °F (36.5 °C)   SpO2: 100%   Weight: 104.3 kg (230 lb)   Height: 5' 9\" (1.753 m)            Physical Exam  Vitals and nursing note reviewed. Constitutional:       Appearance: He is well-developed. HENT:      Head: Normocephalic and atraumatic. Eyes:      General: No scleral icterus. Cardiovascular:      Rate and Rhythm: Normal rate. Pulmonary:      Effort: Pulmonary effort is normal.   Abdominal:      General: There is no distension. Tenderness: There is abdominal tenderness. There is no guarding or rebound.    Genitourinary:     Comments: Small amount of blood at the urethral meatus  Suprapubic catheter in place and not actively draining, yellow discharge surrounding insertion site without induration, erythema, or tenderness  Musculoskeletal:      Cervical back: Normal range of motion. Skin:     General: Skin is warm and dry. Findings: No erythema or rash. Neurological:      General: No focal deficit present. Mental Status: He is alert and oriented to person, place, and time. Psychiatric:         Mood and Affect: Mood normal.         Behavior: Behavior normal.          MDM       Procedures          7 AM  Change of shift. Care of patient signed over to Dr. Selvin Nielson. Bedside handoff complete. Awaiting Urology.

## 2022-04-29 NOTE — CONSULTS
New Urology Consult Note    Patient: Lacho Gibson MRN: [id number]  SSN: [id number]    YOB: 1955  Age: 78 y.o. Sex: male            Assessment:Plan:     Upon initial assessment, pt without complaints. He reports relief of urinary retention after srivastava catheter placement by nursing. Urine at bedside dark brown in color. I performed manual irrigation of the srivastava catheter, retrieving a small amount of brown clots. I continued to irrigate until urine was clear. I then flushed the SPT with 60ml, and 60ml was returned out of the urethral srivastava catheter. I clamped the srivastava catheter and performed irrigation through the SPT and successfully instilled/retracted the normal saline. I then removed srivastava catheter, secured SPT, replaced dressing, and discussed PRN SPT irrigation with pt once home who verbalizes understanding. SPT was draining mildly pink tinged urine upon my departure. Plan:   SPT occlusion, aur   -Upon assessment, I was able to irrigate old blood clot from bladder which may have been the culprit for SPT occlusion. SPT draining well upon my departure from room- occlusion and aur resolved. Educated pt on home irrigation PRN and verbalizes understanding- provided with home irrigation kit as well.   -Spoke with ER provider and discussed prophylactic abx  -Follow UCX   -No further  interventions indicated at this time. Pt to f/u as previously scheduled with Dr. Emil Mcintyre. Thank you for this consult. Please contact [facility] with any further questions/concerns.     History of Present Illness:     Chief Complaint:  Urinary retention     Lacho Gibson is seen in consultation for reasons noted above at the request of Yessenia Espino MD.    This is a 78 y.o. male with a history HTN, HLD, DM, BPH, metastatic prostate CA with hx of retention/incomplete bladder emptying, recently scheduled with [facility] for a TURP that was aborted due to extensive urethral involvement with metastatic deposits presumably from metastatic prostate cancer. Based on these findings, decision was made for SPT and it was placed by IR 4/22/22. Mr. Maite Saunders presented to the ER with complaints of feelings of bladder distension and SPT occlusion approximately 2100 last night. He reports hematuria has been persistent since SPT placement and noticed a blood clot in his SP tubing yesterday. Denies associated symptoms such as light headedness, flank pain, n/v, or pain around the SP insertion site. Upon arrival, ER staff attempted to irrigate SPT but had difficulty; they were able to instill fluid, but not withdrawal it. Bladder scan revealed 638ml, thus srivastava catheter was placed through urethra, relieving bladder distension and yielding two small clots. Known pt to [facility]U, last seen in office by Emil Odonnell 4/18/22. Had SP placed 4/22/22 by [facility] IR Dr. Rell Estrada. Office note from VU visit with Dr. Raghavendra Melendez as below: \"He returns for follow-up. 81-year-old male returns s/p cystoscopy with complicated Srivastava catheter placement. This was performed on 4/11/2022. He was scheduled for TURP that was aborted due to extensive urethral involvement with metastatic deposits presumably from metastatic prostate cancer with a nodular indurated urethra on physical exam.  I think he would benefit most from suprapubic catheterization based on these findings. Would stop Flomax, continue finasteride, pursue SPT if pt willing. Metastatic pros CA with history retention/incomplete bladder emptying. On Flomax 0.8, finasteride. Previously on CIC. UDS performed 3/7/2022 showed inability to void but detrusor pressure up to 245 cm of water during attempt to void. Has not received radiation to prostate. Bladder capacity 234 cc. DOA at 166 cc and again with incontinence at 234 cc.    98g prostate on my estimate based on 4/2021 CT. Pain with prior attempts with CIC. Renal US 1/24/2022 showed no hydroureteronephrosis.     Followed by  Daniel for metastatic prostate cancer with known bony mets, RP adenopathy. Biopsy during April 2021 T10/11 kyphoplasty consistent with metastatic prostate cancer. [facility] Received radiation with Dr. Deyanira Candelaria 5/7/21 to T spine T9-12 (has not had radiation to prostate). Started Sherly Brush with prednisone 5/10/21 and Lupron 5/26/21. Creatinine 11/19/2021 was 1.6, previous creatinine ranged between 1.1-1.2 earlier in year. PSA was 153 on 4/17/21. PSA 11/19/21 was 0.5. History DM, HTN. With his daughter. Doing well after aborted TURP and complicated Cruz. A little bit of spasms, leakage of urine around the catheter, it is draining today. PAST MEDICAL HISTORY:    Allergies: ERYTHROMYCIN (ERYTHROMYCIN BASE) (Severe)    Medications: amoxicillin-pot clavulanate 500-125 mg tablet (amoxicillin-pot clavulanate)   Azo Urinary Pain Relief 95 mg tablet (phenazopyridine) Take 1 tablet by mouth three times a day as needed full glass of water, with or after meals as needed for urinary discomfort. tamsulosin 0.4 mg capsule (tamsulosin) Take 1 capsule by mouth twice a day   finasteride 5 mg tablet (finasteride) 1 tablet by mouth once a day   Januvia 100 mg tablet (sitagliptin)   abiraterone 250 mg tablet (abiraterone)   atenolol 50 mg tablet (atenolol)   dexamethasone 4 mg tablet (dexamethasone)   prednisone 5 mg tablet (prednisone)   benazepril 40 mg tablet (benazepril)   Humalog U-100 Insulin 100 unit/mL solution (insulin lispro)   pioglitazone 45 mg tablet (pioglitazone)   metformin 500 mg tablet (metformin)   atorvastatin 10 mg tablet (atorvastatin)     Problems: UTI (ICD-599.0) (DCL39-K79.0)  BPH with obstruction (ICD-600.0) (JRT37-I30.1)  Urinary retention (ICD-788.20) (JHC10-O36. 9)  Adenocarcinoma, prostate, metastatic (ICD-185) ([id number])    Illnesses: High Blood Pressure and Cancer.    DENIES: Heart Disease, Pacemaker/Defibrillator, Lung Disease, Diabetes, Bowel Problems, Stroke/Seizure, Kidney Problems, Bleeding Problems, HIV, or Hepatitis. Surgeries: DENIES pertinent  surgeries      Family History: DENIES: Prostate cancer, Kidney cancer, Kidney disease, Kidney stones. Social History: Retired. Single. Smoking status: never smoker. Does not drink alcohol. System Review: Admits to: Leg Swelling. DENIES: Unexplained Weight Loss, Dry Eyes, Shortness of Breath, Constipation, Involuntary Urine Loss, Lower Extremity Weakness, Dry Skin, Difficulty Walking, Psychiatric Problems, Impaired Sex Drive, Easy Bleeding, Rash. EXAMINATION: Appearance: well-developed NAD Respiratory Effort: breathing easily Lower Extremity: no edema Abdomen/Flank: soft non-tender without masses; no CVA tenderness Liver/Spleen: no organomegaly Hernia: no ventral hernia       URINALYSIS  Urine Micro not done    IMPRESSION:    1. BPH WITH OBSTRUCTION (SRL33-X81.1)    2. ADENOCARCINOMA - PROSTATE - METASTATIC (LKQ81-Z78)    3. Urinary retention (SSI22-I44.9)    PLAN:  I do not think any transurethral resection for urinary retention is appropriate at this point based on urethroscopic findings with metastatic deposits diffusely involving the entire urethra   Options include CIC, continued Cruz, suprapubic catheter. I have recommended that we try to bypass the urethra as much as possible and therefore lean toward suprapubic catheterization. He agrees to that plan   SP tube placement next available with IR. Can remove urethral Cruz at the same time   CS 3 to 4 weeks after suprapubic catheter placement for suprapubic catheter exchange and follow-up   Continue 30-day catheter exchanges with Cruz in the interim. If he needs an exchange prior to SP tube placement due to scheduling it will need to be with me in the clinic for complicated Cruz catheter exchange over a wire using a [place] tip catheter, not with nurse treatment visits.      cc: Sweta Kessler MD  [employer]  E&M Service    Upcoming Orders  Return Office Visit - with Geovany Randall MD MS in 4 weeks  UA        Electronically signed by Geovany Randall MD MS on 04/18/2022 at 9:52 AM    ________________________________________________________________________  \"    Subjective     Past Medical History  Past Medical History:   Diagnosis Date    BPH (benign prostatic hyperplasia)     COVID-19 vaccine series completed     Diabetes ([address]     Cruz catheter in place 11/2022    High cholesterol     Hypertension     No blood products     Urinary retention        Past Surgical History:   Past Surgical History:   Procedure Laterality Date    IR ASP BLADDER SUPRA CATH  4/22/2022    IR KYPHOPLASTY THORACIC  4/20/2021       Medication:  Current Outpatient Medications   Medication Sig Dispense Refill    docusate sodium (Stool Softener) 100 mg capsule Take 100 mg by mouth two (2) times daily as needed for Constipation.  amoxicillin-clavulanate (AUGMENTIN) 500-125 mg per tablet Take 1 Tablet by mouth two (2) times a day. Indications: bacterial urinary tract infection 10 Tablet 0    finasteride (PROSCAR) 5 mg tablet Take 5 mg by mouth daily.  tamsulosin (FLOMAX) 0.4 mg capsule Take 0.4 mg by mouth two (2) times a day.  Lantus U-100 Insulin 100 unit/mL injection 23 Units by SubCUTAneous route two (2) times a day.  HumaLOG U-100 Insulin 100 unit/mL injection by SubCUTAneous route four (4) times daily as needed. Sliding scale      Januvia 100 mg tablet Take 100 mg by mouth daily.  abiraterone (Zytiga) 250 mg tab Take 4 Tablets by mouth daily. 120 Tablet 5    predniSONE (DELTASONE) 5 mg tablet Take 1 Tab by mouth daily. 30 Tab 5    acetaminophen (TYLENOL) 325 mg tablet Take 2 Tabs by mouth every six (6) hours as needed for Pain. (Patient not taking: Reported on 4/22/2022) 60 Tab 0    aspirin 81 mg chewable tablet Take 81 mg by mouth daily.  atenoloL (TENORMIN) 50 mg tablet Take 50 mg by mouth nightly.       atorvastatin (LIPITOR) 10 mg tablet Take 10 mg by mouth nightly.  benazepriL (LOTENSIN) 40 mg tablet Take 40 mg by mouth daily.  metFORMIN (GLUCOPHAGE) 500 mg tablet Take 1,500 mg by mouth daily (with breakfast).  metFORMIN (GLUCOPHAGE) 500 mg tablet Take 1,000 mg by mouth daily (with dinner).  pioglitazone (Actos) 45 mg tablet Take 45 mg by mouth daily. Allergies: Allergies   Allergen Reactions    Erythromycin Other (comments)     Insomnia- felt like speed       Social History:  Social History     Tobacco Use    Smoking status: Former Smoker     Types: Cigarettes    Smokeless tobacco: Never Used   Vaping Use    Vaping Use: Unknown   Substance Use Topics    Alcohol use: Never    Drug use: Never       Family History  Family History   Problem Relation Age of Onset    Anesth Problems Neg Hx     Clotting Disorder Neg Hx        Review of Systems  Gastrointestinal: positive for abdominal distension and lower abdominal pain   Genitourinary: positive for difficulty urinating and hematuria   All other systems reviewed and are negative    Objective:     Vital signs in last 24 hours:  Visit Vitals  BP (!) 131/57   Pulse 73   Temp 97.7 °F (36.5 °C)   Resp 18   Ht 5' 9\" (1.753 m)   Wt 104.3 kg (230 lb)   SpO2 99%   BMI 33.97 kg/m²       Intake/Output last 3 shifts:  Date 04/28/22 0700 - 04/29/22 0659(Not Admitted) 04/29/22 0700 - 04/30/22 0659   Shift 4856-4055 2114-8565 24 Hour Total 2680-2215 3977-8294 24 Hour Total   INTAKE   Other    220  220     Irrigation Volume Input (mL) (Urinary Catheter 04/29/22 Cruz)    220  220   Shift Total(mL/kg)    220(2.1)  220(2.1)   OUTPUT   Urine    700  700     Urine Output (mL) (Urinary Catheter 04/29/22 Cruz)    700  700   Shift Total(mL/kg)    700(6.7)  700(6.7)   NET    -480  -480   Weight (kg)  104.3 104.3 104.3 104.3 104. 3       Physical Exam  General Appearance: NAD, awake  HENT: atraumatic, normal ears  Cardiovascular: not tachycardic, no distress  Respiratory: no distress, room air  Abdomen: soft, no suprapubic fullness or tenderness  : no CVA tenderness, SPT in place with purulent discharge around insertion site, SPT moves freely without resistance, srivastava catheter in place, dark brown urine noted at bedside  Extremities: moves all  Musculoskeletal: normal alignment of neck and head  Neuro: Appropriate, no focal neurological deficits  Mood/Affect: appropriate, A&O x 3    Lab/Imaging Review:       Most Recent Labs:  Lab Results   Component Value Date/Time    WBC 14.1 (H) 04/29/2022 06:58 AM    HGB 9.0 (L) 04/29/2022 06:58 AM    HCT 28.4 (L) 04/29/2022 06:58 AM    PLATELET 531 90/77/7106 06:58 AM    MCV 74.3 (L) 04/29/2022 06:58 AM        Lab Results   Component Value Date/Time    Sodium 139 04/29/2022 06:58 AM    Potassium 3.7 04/29/2022 06:58 AM    Chloride 111 (H) 04/29/2022 06:58 AM    CO2 19 (L) 04/29/2022 06:58 AM    Anion gap 9 04/29/2022 06:58 AM    Glucose 204 (H) 04/29/2022 06:58 AM    BUN 30 (H) 04/29/2022 06:58 AM    Creatinine 1.48 (H) 04/29/2022 06:58 AM    BUN/Creatinine ratio 20 04/29/2022 06:58 AM    GFR est AA 58 (L) 04/29/2022 06:58 AM    GFR est non-AA 48 (L) 04/29/2022 06:58 AM    Calcium 9.7 04/29/2022 06:58 AM    Bilirubin, total 0.2 03/31/2022 10:42 AM    Alk.  phosphatase 158 (H) 03/31/2022 10:42 AM    Protein, total 7.1 03/31/2022 10:42 AM    Albumin 2.9 (L) 03/31/2022 10:42 AM    Globulin 4.2 (H) 03/31/2022 10:42 AM    A-G Ratio 0.7 (L) 03/31/2022 10:42 AM    ALT (SGPT) 19 03/31/2022 10:42 AM        Lab Results   Component Value Date/Time    Prostate Specific Ag 0.2 03/16/2022 11:31 AM    Prostate Specific Ag 0.2 02/11/2022 11:36 AM    Prostate Specific Ag 0.5 11/19/2021 10:40 AM        COAGS:  No results found for: APTT, PTP, INR, INREXT    Lab Results   Component Value Date/Time    Hemoglobin A1c 11.6 (H) 04/16/2021 02:18 PM        Lab Results   Component Value Date/Time    Troponin-I, Qt. <0.05 04/16/2021 02:18 PM          Urine/Blood Cultures:  Results Procedure Component Value Units Date/Time    URINE CULTURE HOLD SAMPLE [[id number]] Collected: 04/29/22 0707    Order Status: Completed Specimen: Urine from Serum Updated: 04/29/22 0714     Urine culture hold       Urine on hold in Microbiology dept for 2 days. If unpreserved urine is submitted, it cannot be used for addtional testing after 24 hours, recollection will be required. IMAGING:  No results found.         Signed By: Naa Veronica NP  - April 29, 2022

## 2022-05-06 NOTE — PATIENT INSTRUCTIONS
[address] [place] [address] Soft Cloth Surgical Tape - 3\" Mary Grace Roblero get this at drug store or [address]

## 2022-05-06 NOTE — PROGRESS NOTES
Aliyah Bonilla is a 78 y.o. male follow up for prostate cancer. 1. Have you been to the ER, urgent care clinic since your last visit? Hospitalized since your last visit?no     2. Have you seen or consulted any other health care providers outside of the [address] [place] [address] since your last visit? Include any pap smears or colon screening.  No    Vitals 5/6/2022   Blood Pressure 138/71   Pulse 87   Temp 97.9   Resp 18   Height 5' 8\"   Weight 219 lb 4.8 oz   SpO2 99   BSA 2.18 m2   BMI 33.34 kg/m2   BP comment

## 2022-05-06 NOTE — PROGRESS NOTES
[facility] Progress Note    Date: May 6, 2022    Name: Cecil Jovel    MRN: [id number]         : 1955    Mr. Ever Medrano Arrived ambulatory and in no distress for C5D1 Lupron Injection. Assessment was completed, no acute issues at this time, no new complaints voiced. Labs drawn peripherally and sent for processing. Mr. Levine Bloodgood vitals were reviewed. Visit Vitals  /71   Pulse 87   Temp 97.9 °F (36.6 °C)   Resp 18   Ht 5' 8\" (1.727 m)   Wt 99.5 kg (219 lb 4.8 oz)   SpO2 99%   BMI 33.34 kg/m²         Medications:  Medications Administered     leuprolide depot (LUPRON 3 MONTH) injection sykt 22.5 mg     Admin Date  2022 Action  Given Dose  22.5 mg Route  [facility] Administered By  [facility] [place] [place]                Mr. Ever Medrano tolerated treatment well and was discharged from [place] [address] in stable condition. He is to return on  at 1100 for his next appointment.     Δηληγιάννη 17  May 6, 2022

## 2022-05-06 NOTE — PROGRESS NOTES
[facility] [place]  [address] [facility] Stage: [phone]  F: [phone]      Reason for Visit:   Alyiah Bonilla is a 78 y.o. male who is seen for follow up of prostate cancer. Hematology/Oncology Treatment History:   · CT C/A/P 4/19/2021: Sclerosis and lysis in the left 2nd rib. Sclerosis and lysis with expansion of the anterior, right 6th rib with an associated pathologic fracture laterally. Metastatic disease in T10 with slight compression and extension into the anterior, superior endplate of H56. There is soft tissue attenuation in the fat surrounding the T10 vertebral body concerning for soft tissue extension. Lysis in the inferior endplate of V14 is concerning for metastatic disease. Adenopathy in the retroperitoneum, adjacent to the aorta and in the mesentery in the left lower quadrant. Prostatomegaly with some heterogeneity. · Bone scan 4/19/2021: Osseous metastatic disease   · Biopsy, ablation, and kyphoplasty of T10/11 4/20/2021: metastatic carcinoma, compatible with metastatic adenocarcinoma of prostatic primary  · Stage IV Prostate Cancer  · Firmagon on 4/27/2021 x 1 dose  · Radiation with Dr. Ceferino Gurrola completed 5/7/2021, x 5 fractions  · Initiated Jetty Dubonnet with Prednisone on 5/10/2021  · Initiated Lupron on 5/26/2021    History of Present Illness:   Taking Zytiga and Prednisone as prescribed. Underwent TURP since last visit that was unsuccessful, so had suprapubic catheter placed. Seen in ED on 4/29/2022 due to blood clots in catheter. States this visit was helping to learn how to better care for catheter. States he has been tired, feeling run down with little appetite since procedures. No fever or chills in the home. Review of systems was obtained and pertinent findings reviewed above. Past medical history, social history, family history, medications, and allergies are located in the electronic medical record.     Physical Exam: Visit Vitals  /71   Pulse 87   Temp 97.9 °F (36.6 °C) (Temporal)   Resp 18   Ht 5' 8\" (1.727 m)   Wt 219 lb (99.3 kg)   SpO2 99%   BMI 33.30 kg/m²     ECOG PS: 1  General: no distress  Eyes: anicteric sclerae  HENT: oropharynx clear  Neck: supple  Lymphatic: no cervical, supraclavicular adenopathy  Respiratory: normal respiratory effort  CV: no peripheral edema  GI: soft, nontender, nondistended, no masses  Skin: no rashes, no ecchymoses, no petechiae    Results:     Lab Results   Component Value Date/Time    WBC 10.2 05/06/2022 11:29 AM    HGB 8.1 (L) 05/06/2022 11:29 AM    HCT 25.4 (L) 05/06/2022 11:29 AM    PLATELET 324 (H) 03/43/3564 11:29 AM    MCV 72.6 (L) 05/06/2022 11:29 AM    ABS. NEUTROPHILS 6.9 05/06/2022 11:29 AM     Lab Results   Component Value Date/Time    Sodium 138 05/06/2022 11:29 AM    Potassium 3.2 (L) 05/06/2022 11:29 AM    Chloride 109 (H) 05/06/2022 11:29 AM    CO2 20 (L) 05/06/2022 11:29 AM    Glucose 388 (H) 05/06/2022 11:29 AM    BUN 15 05/06/2022 11:29 AM    Creatinine 1.25 05/06/2022 11:29 AM    GFR est AA >60 05/06/2022 11:29 AM    GFR est non-AA 58 (L) 05/06/2022 11:29 AM    Calcium 8.9 05/06/2022 11:29 AM    Glucose (POC) 116 04/22/2022 09:52 AM     Lab Results   Component Value Date/Time    Bilirubin, total 0.3 05/06/2022 11:29 AM    ALT (SGPT) 24 05/06/2022 11:29 AM    Alk.  phosphatase 162 (H) 05/06/2022 11:29 AM    Protein, total 7.5 05/06/2022 11:29 AM    Albumin 2.4 (L) 05/06/2022 11:29 AM    Globulin 5.1 (H) 05/06/2022 11:29 AM     Lab Results   Component Value Date/Time    Reticulocyte count 0.7 05/06/2022 11:29 AM    Iron % saturation 20 04/20/2021 05:45 AM    TIBC 221 (L) 04/20/2021 05:45 AM    Ferritin 368 04/20/2021 05:45 AM    Vitamin B12 1,356 (H) 04/20/2021 05:45 AM    Folate 16.8 04/20/2021 05:45 AM     05/06/2022 11:29 AM     Recent Labs     03/16/22  1131 02/11/22  1136 11/19/21  1040 10/08/21  1200 08/25/21  1128 07/21/21  1115   PSA 0.2 0.2 0.5 0.5 0.6 0.9       Date  PSA  03/16/2022 0.2  02/11/2022 0.2  11/19/2021 0.5  10/08/2021 0.5  08/25/2021 0.6  07/21/2021 0.9  06/23/2021 1.6  05/26/2021 5.3 Lupron started  05/10/2021  Initiated Zytiga and Prednisone  04/27/2021 104 Firmagon given  04/17/2021 153      5/26/2021:  Hemoglobin fractionation: Hgb G-[place]    DEXA 6/23/2021: This patient is osteopenic using the World Health Organization criteria  10 year probability of major osteoporotic fracture:  8.8%  10 year probability of hip fracture:  1.1%    Assessment:   1) Metastatic castrate sensitive prostate cancer  He has extensive osseous metastatic disease. Pathology results from bone biopsy, along with elevated PSA, confirm a diagnosis of metastatic prostate cancer. His cancer is not curable and management is with palliative intent. He is currently on ADT along with upfront Abiraterone and Prednisone. He is tolerating therapy well with manageable toxicity. PSA responding well to therapy. Plan to repeat imaging prior to this visit as new baseline now that PSA is at lisa-this is scheduled 5/20/2022. The patient will be seen monthly with each cycle of therapy, and labs will be monitored to assess for toxicity from therapy. [id number] to assess for somatic mutations resulted in multiple mutations including MSI-high. [facility] testing for germline mutations negative. 2) Osseous metastases, Thoracic cord compression  S/p ablation and kyphoplasty of T10/11 on 4/20/2021. Completed radiation with Dr. Jacques Olivier. Consider Xgeva when he becomes castrate resistant. Continue vitamin D. He gets calcium through his diet. DEXA with osteopenia, repeat in 6/2023    3) Cancer pain  Improved after kyphoplasty/ablation and radiation. 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 Worsening now due to recent blood loss. Add on iron studies today. 5) DM  Managed by his PCP. Of note, abiraterone interacts with his Actos, needs to monitor glucose closely. Prednisone may worsen DM as well. 6) Emotional Well Being  No psychosocial concerns identified today. Patient has adequate support. He is getting financial assistance for [employer] 7) Urinary retention  Under treatment with with urology-Dr. Renetta Lala. Had TURP on 4/11/2022, but this was unsuccessful so he underwent suprapubic catheter placement on 4/26/2022. Will fu with urology on Monday. Plan:     · Continue abiraterone 1000mg PO daily  · Continue prednisone 5mg PO daily  · Continue Lupron every 12 weeks  · Labs today and monthly: CBC, CMP, PSA  · Add on labs today for further anemia assessment  · Follow up with urology as scheduled  · Imaging upcoming  · Return to see me in 6-8 weeks    I personally saw and evaluated the patient and performed the key components of medical decision making. The history, physical exam, and documentation were performed by Ofelia Mata NP. I reviewed and verified the above documentation and modified it as needed. He is tolerating systemic therapy with manageable toxicity, so we will proceed with treatment. He does have worsening anemia, perhaps related to recent bleeding with suprapubic catheter. Check anemia labs today to see if he will benefit from iron.       Signed By: Mdaelyn Ring MD

## 2022-05-20 NOTE — TELEPHONE ENCOUNTER
[address] at [facility]  [phone]    CT and bone scan reviewed. Bone metastases overall appear improved on bone scan. No new bone lesions. CT is concerning for multiple new lung lesions, however. Uncertain etiology. Not a typical location for prostate cancer metastases, and his PSA has been responding. I recommend a biopsy of one of these lesions. I called the number we have for the patient, and it is his daughter's mobile number. I explained the above to her. She asked me to call the patient at [phone]. There was no answer at this number and I left a message for him to call me back. Order placed for CT guided lung biopsy. He will need to hold his ASA for this procedure.

## 2022-05-23 NOTE — TELEPHONE ENCOUNTER
Attempted another call to patient to explain need for lung biopsy and to remind him to hold ASA therapy. No answer.

## 2022-05-31 NOTE — TELEPHONE ENCOUNTER
[address] at [facility]  [phone]    05/31/22- Received a message from schedulers stating patient doesn't want to have the biopsy. Patient/daughter will call back if he changes his mind. Dr. Carleen Sylvester notified.

## 2022-06-20 NOTE — ED PROVIDER NOTES
Gaurang Sosa is a 77 yo M with h/o DM, HTN, BPH and suprapubic cathereter who presents to the ED for replacement of his catheter. He states he was watching TV this afternoon and when he stood up he caught his foot on the catheter tube and it pulled out. He replaced the catheter immediately but the balloon appeared to have deflated and so it will not stay in. He has also noticed that his urine was cloudy with blood clots for the past few days. He denies fever or chills. Past Medical History:   Diagnosis Date    BPH (benign prostatic hyperplasia)     COVID-19 vaccine series completed     Diabetes ([facility] [address]     Cruz catheter in place 11/2022    High cholesterol     Hypertension     No blood products     Urinary retention        Past Surgical History:   Procedure Laterality Date    IR ASP BLADDER SUPRA CATH  4/22/2022    IR KYPHOPLASTY THORACIC  4/20/2021         Family History:   Problem Relation Age of Onset    Anesth Problems Neg Hx     Clotting Disorder Neg Hx        Social History     Socioeconomic History    Marital status:      Spouse name: Not on file    Number of children: Not on file    Years of education: Not on file    Highest education level: Not on file   Occupational History    Not on file   Tobacco Use    Smoking status: Former Smoker     Types: Cigarettes    Smokeless tobacco: Never Used   Vaping Use    Vaping Use: Unknown   Substance and Sexual Activity    Alcohol use: Never    Drug use: Never    Sexual activity: Not Currently   Other Topics Concern    Not on file   Social History Narrative    Not on file     Social Determinants of Health     Financial Resource Strain:     Difficulty of Paying Living Expenses: Not on file   Food Insecurity:     Worried About Running Out of Food in the Last Year: Not on file    Erlin of Food in the Last Year: Not on file   Transportation Needs:     Lack of Transportation (Medical):  Not on file    Lack of Transportation (Non-Medical): Not on file   Physical Activity:     Days of Exercise per Week: Not on file    Minutes of Exercise per Session: Not on file   Stress:     Feeling of Stress : Not on file   Social Connections:     Frequency of Communication with Friends and Family: Not on file    Frequency of Social Gatherings with Friends and Family: Not on file    Attends Anabaptist Services: Not on file    Active Member of [address] [place] [address] or Organizations: Not on file    Attends Club or Organization Meetings: Not on file    Marital Status: Not on file   Intimate Partner Violence:     Fear of Current or Ex-Partner: Not on file    Emotionally Abused: Not on file    Physically Abused: Not on file    Sexually Abused: Not on file   Housing Stability:     Unable to Pay for Housing in the Last Year: Not on file    Number of [place] in the Last Year: Not on file    Unstable Housing in the Last Year: Not on file         ALLERGIES: Erythromycin    Review of Systems   Constitutional: Negative for fever. HENT: Negative for sore throat. Eyes: Negative for visual disturbance. Respiratory: Negative for cough. Cardiovascular: Negative for chest pain. Gastrointestinal: Negative for abdominal pain. Genitourinary: Positive for hematuria. Negative for dysuria. Dislodged suprapubic catheter   Musculoskeletal: Negative for back pain. Skin: Negative for rash. Neurological: Negative for headaches. Vitals:    06/20/22 1820   BP: 139/74   Pulse: 93   Resp: 18   Temp: 98.9 °F (37.2 °C)   SpO2: 99%   Weight: 99.8 kg (220 lb)   Height: 5' 8\" (1.727 m)            Physical Exam  Vitals and nursing note reviewed. Constitutional:       General: He is not in acute distress. Appearance: He is well-developed. HENT:      Head: Normocephalic and atraumatic. Eyes:      Conjunctiva/sclera: Conjunctivae normal.   Neck:      Trachea: Phonation normal.   Cardiovascular:      Rate and Rhythm: Normal rate.    Pulmonary: Effort: Pulmonary effort is normal. No respiratory distress. Abdominal:      General: There is no distension. Comments: Suprapubic catheter in place. No surrounding erythema   Musculoskeletal:         General: No tenderness. Normal range of motion. Cervical back: Normal range of motion. Skin:     General: Skin is warm and dry. Neurological:      Mental Status: He is alert. He is not disoriented. Motor: No abnormal muscle tone. MDM       Procedures    Suprapubic catheter replaced with 16 fr catheter using sterile technique. Cloudy pink tinged fluid with sediment drained. Sample sent for UA and culture. Patient tolerated procedure well. Patient discharged with keflex.

## 2022-06-20 NOTE — ED TRIAGE NOTES
Patient reports \" my suprapubic catheter came out\", states cath was dislodged at 4pm. Patient was able to reinsert cath but would like it to be replaced.        History prostate CA

## 2022-06-22 NOTE — PROGRESS NOTES
TRANSFER - IN REPORT:    [facility] [employer]  [facility]    Report consisted of patients Situation, Background, Assessment and   Recommendations(SBAR). Information from the following report(s) CLPO was reviewed with the receiving nurse. Opportunity for questions and clarification was provided. Assessment completed upon patients arrival to unit and care assumed.

## 2022-06-22 NOTE — DISCHARGE INSTRUCTIONS
Patient Education        Suprapubic Catheter Care: Care Instructions  Overview  A suprapubic catheter is a thin tube that drains urine from your bladder. The tube is put into your bladder through a small cut in your lower belly. The urine collects in a bag attached to the tube. The bag is usually attached to your leg. Sometimes the catheter tube has a valve that lets you drain the urine into the toilet or other container. You may need a suprapubic catheter if you have nerve damage, a problem with your urinary tract, or a disease that weakens your muscles. Having a catheter for a long time increases the risk of getting a urinary tract infection. So catheter care focuses on preventing infection. Follow-up care is a key part of your treatment and safety. Be sure to make and go to all appointments, and call your doctor if you are having problems. It's also a good idea to know your test results and keep a list of the medicines you take. How can you care for yourself at home? · Wash your hands before you handle the catheter. · Clean the area around the catheter with soap and water daily. · Keep the drainage bag lower than your bladder to keep urine from backing up. · Clean the bag every day after removing it from the catheter. Use another container while you clean the bag. To clean the bag, fill it with 2 parts vinegar to 3 parts water and let it stand for 20 minutes. Then empty it out, and let it air dry. · Empty the drainage bag when it is full or at least every 8 hours. When should you call for help? Call your doctor now or seek immediate medical care if:    · Your catheter becomes blocked and urine does not collect in the drainage bag.     · Your catheter leaks.     · You have blood or pus in your urine.     · You have pain in your back just below your rib cage.  This is called flank pain.     · You have a fever, chills, or body aches.     · You have groin or belly pain.     · Your urine is cloudy or smells bad.     · You have pain, increasing redness, or bleeding around the catheter.     · You have swelling around the catheter or in your belly. Watch closely for changes in your health, and be sure to contact your doctor if you have any problems. Where can you learn more? Go to http://www.gray.com/  Enter D213 in the search box to learn more about \"Suprapubic Catheter Care: Care Instructions. \"  Current as of: October 18, 2021               Content Version: 13.2  © 2006-2022 [employer]. Care instructions adapted under license by [employer] (which disclaims liability or warranty for this information). If you have questions about a medical condition or this instruction, always ask your healthcare professional. [address] any warranty or liability for your use of this information.

## 2022-06-22 NOTE — ED TRIAGE NOTES
Patient arrives with suprapubic catheter problem - states it came out last night and \"the inside hole closed up\". Yes

## 2022-06-22 NOTE — H&P
Radiology History and Physical    Patient: Luis Brooks 78 y.o. male       Chief Complaint: Urinary Catheter Problem      History of Present Illness: 78year old male with suprapubic catheter which was removed last night. Needs to be replaced. History:    Past Medical History:   Diagnosis Date    BPH (benign prostatic hyperplasia)     COVID-19 vaccine series completed     Diabetes ([facility] [address]     Cruz catheter in place 11/2022    High cholesterol     Hypertension     No blood products     Urinary retention      Family History   Problem Relation Age of Onset    Anesth Problems Neg Hx     Clotting Disorder Neg Hx      Social History     Socioeconomic History    Marital status:      Spouse name: Not on file    Number of children: Not on file    Years of education: Not on file    Highest education level: Not on file   Occupational History    Not on file   Tobacco Use    Smoking status: Former Smoker     Types: Cigarettes    Smokeless tobacco: Never Used   Vaping Use    Vaping Use: Unknown   Substance and Sexual Activity    Alcohol use: Never    Drug use: Never    Sexual activity: Not Currently   Other Topics Concern    Not on file   Social History Narrative    Not on file     Social Determinants of Health     Financial Resource Strain:     Difficulty of Paying Living Expenses: Not on file   Food Insecurity:     Worried About Running Out of Food in the Last Year: Not on file    Erlin of Food in the Last Year: Not on file   Transportation Needs:     Lack of Transportation (Medical): Not on file    Lack of Transportation (Non-Medical):  Not on file   Physical Activity:     Days of Exercise per Week: Not on file    Minutes of Exercise per Session: Not on file   Stress:     Feeling of Stress : Not on file   Social Connections:     Frequency of Communication with Friends and Family: Not on file    Frequency of Social Gatherings with Friends and Family: Not on file    Attends Holiness Services: Not on file    Active Member of Clubs or Organizations: Not on file    Attends Club or Organization Meetings: Not on file    Marital Status: Not on file   Intimate Partner Violence:     Fear of Current or Ex-Partner: Not on file    Emotionally Abused: Not on file    Physically Abused: Not on file    Sexually Abused: Not on file   Housing Stability:     Unable to Pay for Housing in the Last Year: Not on file    Number of [place] in the Last Year: Not on file    Unstable Housing in the Last Year: Not on file       Allergies: Allergies   Allergen Reactions    Erythromycin Other (comments)     Insomnia- felt like speed       Current Medications:  Current Facility-Administered Medications   Medication Dose Route Frequency    lidocaine (XYLOCAINE) 2 % jelly   Topical PRN    iopamidoL (ISOVUE 300) 61 % contrast injection 100-200 mL  100-200 mL IntraarTERial ONCE    lidocaine (PF) (XYLOCAINE) 10 mg/mL (1 %) injection 10 mL  10 mL IntraDERMal ONCE     Current Outpatient Medications   Medication Sig    cephALEXin (Keflex) 500 mg capsule Take 1 Capsule by mouth three (3) times daily for 7 days.  docusate sodium (Stool Softener) 100 mg capsule Take 100 mg by mouth two (2) times daily as needed for Constipation.  amoxicillin-clavulanate (AUGMENTIN) 500-125 mg per tablet Take 1 Tablet by mouth two (2) times a day. Indications: bacterial urinary tract infection    finasteride (PROSCAR) 5 mg tablet Take 5 mg by mouth daily.  tamsulosin (FLOMAX) 0.4 mg capsule Take 0.4 mg by mouth two (2) times a day.  Lantus U-100 Insulin 100 unit/mL injection 23 Units by SubCUTAneous route two (2) times a day.  HumaLOG U-100 Insulin 100 unit/mL injection by SubCUTAneous route four (4) times daily as needed. Sliding scale    Januvia 100 mg tablet Take 100 mg by mouth daily.  abiraterone (Zytiga) 250 mg tab Take 4 Tablets by mouth daily.     predniSONE (DELTASONE) 5 mg tablet Take 1 Tab by mouth daily.    acetaminophen (TYLENOL) 325 mg tablet Take 2 Tabs by mouth every six (6) hours as needed for Pain. (Patient not taking: Reported on 4/22/2022)    aspirin 81 mg chewable tablet Take 81 mg by mouth daily.  atenoloL (TENORMIN) 50 mg tablet Take 50 mg by mouth nightly.  atorvastatin (LIPITOR) 10 mg tablet Take 10 mg by mouth nightly.  benazepriL (LOTENSIN) 40 mg tablet Take 40 mg by mouth daily.  metFORMIN (GLUCOPHAGE) 500 mg tablet Take 1,500 mg by mouth daily (with breakfast).  metFORMIN (GLUCOPHAGE) 500 mg tablet Take 1,000 mg by mouth daily (with dinner).  pioglitazone (Actos) 45 mg tablet Take 45 mg by mouth daily. Physical Exam:  Blood pressure (!) 154/65, pulse 82, temperature 98.9 °F (37.2 °C), resp. rate 26, height 5' 8\" (1.727 m), weight 104.3 kg (230 lb), SpO2 100 %. GENERAL: alert, cooperative, no distress, appears stated age,   LUNG: Nonlabored respiration on room air  HEART: regular rate and rhythm    Alerts:    Hospital Problems  Date Reviewed: 5/6/2022    None          Laboratory:      Recent Labs     06/22/22  1214   HGB 7.0*   HCT 21.6*   WBC 14.8*   *   BUN 12   CREA 0.95   K 3.7         Plan of Care/Planned Procedure:  Risks, benefits, and alternatives reviewed with patient and he agrees to proceed with the procedure. Replacement of suprapubic catheter. Will attempt tract recanalization, if unsuccessful will place a new catheter    Deemed appropriate for moderate sedation with versed and fentanyl.     Clover Lynch MD  Interventional Radiology  [facility]  3:39 PM, 6/22/2022

## 2022-06-22 NOTE — PROGRESS NOTES
4:09 PM  TRANSFER - IN REPORT:    Verbal report received from [facility]) on Marlyse Oriana  being received from angio(unit) for routine post - op      Report consisted of patients Situation, Background, Assessment and   Recommendations(SBAR). Information from the following report(s) SBAR was reviewed with the receiving nurse. Opportunity for questions and clarification was provided. Assessment completed upon patients arrival to unit and care assumed.

## 2022-06-22 NOTE — ED PROVIDER NOTES
15-year-old male with history of diabetes, high cholesterol, hypertension, prostate cancer with urinary obstruction who is now suprapubic catheter dependent presents to the emergency department from the urology office with concern for misplaced suprapubic catheter. He was in the hospital recently  where suprapubic catheter was replaced. This became dislodged and was replaced overnight with return of pink-tinged urine. He presented to urology this morning for follow-up and catheter seem to have been misplaced. He was sent to the emergency department for CT scan to evaluate the track and other potential complications along with potential IR consultation for replacement. The history is provided by the patient and medical records (Received a call from urology). Urinary Catheter Problem   This is a recurrent problem. The problem has not changed since onset. The patient is experiencing no pain. There has been no fever. Pertinent negatives include no nausea, no vomiting and no abdominal pain. The patient is not pregnant. His past medical history is significant for urinary catheter problem.         Past Medical History:   Diagnosis Date    BPH (benign prostatic hyperplasia)     COVID-19 vaccine series completed     Diabetes ([address]     Cruz catheter in place 11/2022    High cholesterol     Hypertension     No blood products     Urinary retention        Past Surgical History:   Procedure Laterality Date    IR ASP BLADDER SUPRA CATH  4/22/2022    IR KYPHOPLASTY THORACIC  4/20/2021         Family History:   Problem Relation Age of Onset    Anesth Problems Neg Hx     Clotting Disorder Neg Hx        Social History     Socioeconomic History    Marital status:      Spouse name: Not on file    Number of children: Not on file    Years of education: Not on file    Highest education level: Not on file   Occupational History    Not on file   Tobacco Use    Smoking status: Former Smoker     Types: Cigarettes    Smokeless tobacco: Never Used   Vaping Use    Vaping Use: Unknown   Substance and Sexual Activity    Alcohol use: Never    Drug use: Never    Sexual activity: Not Currently   Other Topics Concern    Not on file   Social History Narrative    Not on file     Social Determinants of Health     Financial Resource Strain:     Difficulty of Paying Living Expenses: Not on file   Food Insecurity:     Worried About Running Out of Food in the Last Year: Not on file    Erlin of Food in the Last Year: Not on file   Transportation Needs:     Lack of Transportation (Medical): Not on file    Lack of Transportation (Non-Medical): Not on file   Physical Activity:     Days of Exercise per Week: Not on file    Minutes of Exercise per Session: Not on file   Stress:     Feeling of Stress : Not on file   Social Connections:     Frequency of Communication with Friends and Family: Not on file    Frequency of Social Gatherings with Friends and Family: Not on file    Attends Latter-day Services: Not on file    Active Member of [address] [place] [address] or Organizations: Not on file    Attends Club or Organization Meetings: Not on file    Marital Status: Not on file   Intimate Partner Violence:     Fear of Current or Ex-Partner: Not on file    Emotionally Abused: Not on file    Physically Abused: Not on file    Sexually Abused: Not on file   Housing Stability:     Unable to Pay for Housing in the Last Year: Not on file    Number of [place] in the Last Year: Not on file    Unstable Housing in the Last Year: Not on file         ALLERGIES: Erythromycin    Review of Systems   Constitutional: Negative for fatigue and fever. HENT: Negative for sneezing and sore throat. Respiratory: Negative for cough and shortness of breath. Cardiovascular: Negative for chest pain and leg swelling. Gastrointestinal: Negative for abdominal pain, diarrhea, nausea and vomiting.    Genitourinary: Negative for difficulty urinating and dysuria. Musculoskeletal: Negative for arthralgias and myalgias. Skin: Negative for color change and rash. Neurological: Negative for weakness and headaches. Psychiatric/Behavioral: Negative for agitation and behavioral problems. Vitals:    06/22/22 1211   BP: (!) 144/83   Pulse: 90   Resp: 18   Temp: 99.5 °F (37.5 °C)   SpO2: 99%   Weight: 104.3 kg (230 lb)   Height: 5' 8\" (1.727 m)            Physical Exam  Vitals and nursing note reviewed. Constitutional:       General: He is not in acute distress. Appearance: Normal appearance. He is well-developed. He is not ill-appearing, toxic-appearing or diaphoretic. HENT:      Head: Normocephalic and atraumatic. Nose: Nose normal.      Mouth/Throat:      Mouth: Mucous membranes are moist.      Pharynx: Oropharynx is clear. Eyes:      Extraocular Movements: Extraocular movements intact. Conjunctiva/sclera: Conjunctivae normal.      Pupils: Pupils are equal, round, and reactive to light. Cardiovascular:      Rate and Rhythm: Normal rate and regular rhythm. Pulses: Normal pulses. Heart sounds: No murmur heard. Pulmonary:      Effort: Pulmonary effort is normal. No respiratory distress. Breath sounds: Normal breath sounds. No wheezing. Chest:      Chest wall: No mass or tenderness. Abdominal:      General: There is no distension. Palpations: Abdomen is soft. Tenderness: There is no abdominal tenderness. There is no guarding or rebound. Comments: Empty suprapubic stoma consistent with history of suprapubic catheter. Musculoskeletal:         General: No swelling, tenderness, deformity or signs of injury. Normal range of motion. Cervical back: Normal range of motion and neck supple. No rigidity. No muscular tenderness. Right lower leg: No tenderness. No edema. Left lower leg: No tenderness. No edema. Skin:     General: Skin is warm and dry.       Capillary Refill: Capillary refill takes less than 2 seconds. Neurological:      General: No focal deficit present. Mental Status: He is alert and oriented to person, place, and time. Psychiatric:         Mood and Affect: Mood normal.         Behavior: Behavior normal.          MDM  Number of Diagnoses or Management Options  Suprapubic catheter [facility])  Diagnosis management comments: 72-year-old male presents as above. He was taken to the IR suite by interventional radiology after CT scan for suprapubic catheter placement. This was accomplished without complication and he was discharged by me from recovery. He was instructed to follow-up with urology, return if needed.        Amount and/or Complexity of Data Reviewed  Clinical lab tests: reviewed  Tests in the radiology section of CPT®: reviewed  Decide to obtain previous medical records or to obtain history from someone other than the patient: yes      ED Course as of 06/22/22 1906 Wed Jun 22, 2022   1433 IR tech aware [JM]      ED Course User Index  [JM] Renu Syed MD       Procedures

## 2022-06-22 NOTE — PROCEDURES
Interventional Radiology  Procedure Note        6/22/2022 4:05 PM    Patient: Low Tovar     Informed consent obtained    Diagnosis: Urinary retention    Procedure(s): Suprapubic bladder catheter tract recanalization with 16Fr catheter placement    Specimens removed:  none    Complications: None    Primary Physician: Chandler Shaw MD    Recommendations: N/A    Discharge Disposition: Stable; discharge to home    Full dictated report to follow    Chandler Shaw MD  Interventional Radiology  [facility]  4:05 PM, 6/22/2022

## 2022-06-22 NOTE — ROUTINE PROCESS
3:31 PM  Patient arrived. ID and allergies verified verbally with patient. Pt voices understanding of procedure to be performed. Consent obtained. Pt prepped for procedure. 1730  Patient ambulates in hallway or on unit. Discharge instructions reviewed with patient and family. Voiced understanding. Patient given copy of discharge instructions to take home. Pt discharged via wheelchair with daughter. Personal belongings with patient upon discharge. Dressings, leg bags and secure devices sent home with patient. Instructions reviewed with patients daughter and patient. Upon patient getting ready for discharge, there was a moderate amount of blood pooled around his groin. This was determined to be coming from his penis. MD aware and will follow up with urology. Per patient this is an ongoing problem and nothing new. SPC draining appropriately and dumped 400ml or dark rootbeer/cherry looking urine.

## 2022-06-23 NOTE — TELEPHONE ENCOUNTER
Lm with patients daughter to move patients appointment to the following week since Dr. Sanket Larsen is out on 7/6/22. Gave her office number to call back.

## 2022-06-23 NOTE — TELEPHONE ENCOUNTER
[address] at [facility]  [phone]    06/23/22- Called patient to check in after ED visit yesterday, and reschedule follow up to next week. No answer, voicemail left requesting a return call when available. 2:36 PM- Patient's daughter called back and spoke to the , per Sallie Martin they're unable to make an appointment next week. Follow up scheduled 7/13.

## 2022-06-23 NOTE — ED PROVIDER NOTES
67-year-old male with a past medical history significant for BPH, diabetes, urinary retention and chronic indwelling Cruz catheter placement in the suprapubic area, hypertension, hypercholesterolemia who presents to the ER accompanied by his wife and state that his Cruz catheter accidentally dislodged approximately 3 to 4 hours ago prior to arrival to the ER. The patient said he was asleep and woke up with Cruz catheter being completely out. He denies any further discomfort at this time. Past Medical History:   Diagnosis Date    BPH (benign prostatic hyperplasia)     COVID-19 vaccine series completed     Diabetes ([address]     Cruz catheter in place 11/2022    High cholesterol     Hypertension     No blood products     Urinary retention        Past Surgical History:   Procedure Laterality Date    IR ASP BLADDER SUPRA CATH  4/22/2022    IR KYPHOPLASTY THORACIC  4/20/2021         Family History:   Problem Relation Age of Onset    Anesth Problems Neg Hx     Clotting Disorder Neg Hx        Social History     Socioeconomic History    Marital status:      Spouse name: Not on file    Number of children: Not on file    Years of education: Not on file    Highest education level: Not on file   Occupational History    Not on file   Tobacco Use    Smoking status: Former Smoker     Types: Cigarettes    Smokeless tobacco: Never Used   Vaping Use    Vaping Use: Unknown   Substance and Sexual Activity    Alcohol use: Never    Drug use: Never    Sexual activity: Not Currently   Other Topics Concern    Not on file   Social History Narrative    Not on file     Social Determinants of Health     Financial Resource Strain:     Difficulty of Paying Living Expenses: Not on file   Food Insecurity:     Worried About Running Out of Food in the Last Year: Not on file    Erlin of Food in the Last Year: Not on file   Transportation Needs:     Lack of Transportation (Medical):  Not on file    Lack of Transportation (Non-Medical): Not on file   Physical Activity:     Days of Exercise per Week: Not on file    Minutes of Exercise per Session: Not on file   Stress:     Feeling of Stress : Not on file   Social Connections:     Frequency of Communication with Friends and Family: Not on file    Frequency of Social Gatherings with Friends and Family: Not on file    Attends Catholic Services: Not on file    Active Member of [address] [place] [address] or Organizations: Not on file    Attends Club or Organization Meetings: Not on file    Marital Status: Not on file   Intimate Partner Violence:     Fear of Current or Ex-Partner: Not on file    Emotionally Abused: Not on file    Physically Abused: Not on file    Sexually Abused: Not on file   Housing Stability:     Unable to Pay for Housing in the Last Year: Not on file    Number of [place] in the Last Year: Not on file    Unstable Housing in the Last Year: Not on file         ALLERGIES: Erythromycin    Review of Systems   All other systems reviewed and are negative. Vitals:    06/23/22 0456   BP: 127/61   Pulse: 83   Resp: 14   Temp: 98.9 °F (37.2 °C)   SpO2: 96%   Weight: 102.1 kg (225 lb)   Height: 5' 8\" (1.727 m)            Physical Exam  Vitals and nursing note reviewed. Exam conducted with a chaperone present. CONSTITUTIONAL: Well-appearing; well-nourished; in no apparent distress  HEAD: Normocephalic; atraumatic  EYES: PERRL; EOM intact; conjunctiva and sclera are clear bilaterally. ENT: No rhinorrhea; normal pharynx with no tonsillar hypertrophy; mucous membranes pink/moist, no erythema, no exudate. NECK: Supple; non-tender; no cervical lymphadenopathy  CARD: Normal S1, S2; no murmurs, rubs, or gallops. Regular rate and rhythm. RESP: Normal respiratory effort; breath sounds clear and equal bilaterally; no wheezes, rhonchi, or rales. ABD: Normal bowel sounds; non-distended; non-tender; no palpable organomegaly, no masses, no bruits.   Back Exam: Normal inspection; no vertebral point tenderness, no CVA tenderness. Normal range of motion. EXT: Normal ROM in all four extremities; non-tender to palpation; no swelling or deformity; distal pulses are normal, no edema. SKIN: Warm; dry; no rash. NEURO:Alert and oriented x 3, coherent, ROXI-XII grossly intact, sensory and motor are non-focal.        MDM  Number of Diagnoses or Management Options  Displacement of Cruz catheter, initial encounter [facility])  Diagnosis management comments: Assessment: Dislodged suprapubic Cruz catheter    Plan: Replace Cruz catheter/education, reassurance, symptomatic treatment/serial exam/ Monitor and Reevaluate. Amount and/or Complexity of Data Reviewed  Clinical lab tests: ordered and reviewed  Tests in the radiology section of CPT®: ordered and reviewed  Tests in the medicine section of CPT®: reviewed and ordered  Discussion of test results with the performing providers: yes  Decide to obtain previous medical records or to obtain history from someone other than the patient: yes  Obtain history from someone other than the patient: yes  Review and summarize past medical records: yes  Discuss the patient with other providers: yes  Independent visualization of images, tracings, or specimens: yes    Risk of Complications, Morbidity, and/or Mortality  Presenting problems: low  Diagnostic procedures: low  Management options: low    Patient Progress  Patient progress: stable         Procedures    Procedure notes: Placement of suprapubic Cruz catheter. After obtaining verbal consent from the patient. He was laid in supine position and area was cleaned and draped in sterile fashion. Using sterile technique, a 12 [place] Curz catheter was placed in the suprapubic area without much discomfort. The patient tolerated the procedure very well. There was no complication.   The procedure was performed approximately 15 minutes      Progress Note:   Pt has been reexamined by Ministerio Bess Kely Leyva MD. Pt is feeling much better. Symptoms have improved. All available results have been reviewed with pt and any available family. Pt understands sx, dx, and tx in ED. Care plan has been outlined and questions have been answered. Pt is ready to go home. Will send home on Cruz catheter care instruction. . Outpatient referral with PCP/urology as needed. Written by Anum Simpson MD,5:11 AM    .   .

## 2022-07-13 NOTE — PATIENT INSTRUCTIONS
We have ordered imaging to be completed before your next visit. You will receive an automated call 1-2 days after today's visit. Please answer this call in order to schedule the test. If you miss this call or if you'd prefer to schedule on your own please call the scheduling department at [phone].

## 2022-07-13 NOTE — PROGRESS NOTES
[facility] [place] [address] [address] [place]: [phone]  F: [phone]      Reason for Visit:   Cammie Tobar is a 78 y.o. male who is seen for follow up of prostate cancer. Hematology/Oncology Treatment History:   · CT C/A/P 4/19/2021: Sclerosis and lysis in the left 2nd rib. Sclerosis and lysis with expansion of the anterior, right 6th rib with an associated pathologic fracture laterally. Metastatic disease in T10 with slight compression and extension into the anterior, superior endplate of C14. There is soft tissue attenuation in the fat surrounding the T10 vertebral body concerning for soft tissue extension. Lysis in the inferior endplate of E41 is concerning for metastatic disease. Adenopathy in the retroperitoneum, adjacent to the aorta and in the mesentery in the left lower quadrant. Prostatomegaly with some heterogeneity. · Bone scan 4/19/2021: Osseous metastatic disease   · Biopsy, ablation, and kyphoplasty of T10/11 4/20/2021: metastatic carcinoma, compatible with metastatic adenocarcinoma of prostatic primary  · Stage IV Prostate Cancer  · Firmagon on 4/27/2021 x 1 dose  · Radiation with Dr. Grace Rinne completed 5/7/2021, x 5 fractions  · Initiated Birdia Poplar with Prednisone on 5/10/2021  · Initiated Lupron on 5/26/2021    History of Present Illness:   Taking Zytiga and Prednisone as prescribed. Suprapubic catheter is working well. Draining around the catheter at night, he is using gauze pads to control drainage. Feels this is working well. Skin is doing well around catheter. Some hematuria noted with increased activity, this resolves on its own. Appetite comes and goes. Eating well in the afternoon. Drinking Ensure to supplement. Denies pain. Mild constipation. Using Miralax. Review of systems was obtained and pertinent findings reviewed above.  Past medical history, social history, family history, medications, and allergies are located in the electronic medical record. Physical Exam:     Visit Vitals  BP (!) 123/52 (BP 1 Location: Right arm, BP Patient Position: Sitting, BP Cuff Size: Large adult)   Pulse 68   Temp 97.6 °F (36.4 °C) (Oral)   Resp 14   SpO2 100%     ECOG PS: 1  General: no distress  Eyes: anicteric sclerae  HENT: oropharynx clear  Neck: supple  Lymphatic: no cervical, supraclavicular adenopathy  Respiratory: normal respiratory effort  CV: no peripheral edema  GI: soft, nontender, nondistended, no masses  Skin: no rashes, no ecchymoses, no petechiae    Results:     Lab Results   Component Value Date/Time    WBC 14.8 (H) 06/22/2022 12:14 PM    HGB 7.0 (L) 06/22/2022 12:14 PM    HCT 21.6 (L) 06/22/2022 12:14 PM    PLATELET 593 (H) 67/23/8442 12:14 PM    MCV 72.5 (L) 06/22/2022 12:14 PM    ABS. NEUTROPHILS 10.9 (H) 06/22/2022 12:14 PM     Lab Results   Component Value Date/Time    Sodium 140 06/22/2022 12:14 PM    Potassium 3.7 06/22/2022 12:14 PM    Chloride 107 06/22/2022 12:14 PM    CO2 23 06/22/2022 12:14 PM    Glucose 156 (H) 06/22/2022 12:14 PM    BUN 12 06/22/2022 12:14 PM    Creatinine 0.95 06/22/2022 12:14 PM    GFR est AA >60 06/22/2022 12:14 PM    GFR est non-AA >60 06/22/2022 12:14 PM    Calcium 8.9 06/22/2022 12:14 PM    Glucose (POC) 163 (H) 06/22/2022 03:30 PM     Lab Results   Component Value Date/Time    Bilirubin, total 0.4 06/22/2022 12:14 PM    ALT (SGPT) 10 (L) 06/22/2022 12:14 PM    Alk.  phosphatase 132 (H) 06/22/2022 12:14 PM    Protein, total 6.4 06/22/2022 12:14 PM    Albumin 2.4 (L) 06/22/2022 12:14 PM    Globulin 4.0 06/22/2022 12:14 PM     Lab Results   Component Value Date/Time    Reticulocyte count 0.7 05/06/2022 11:29 AM    Iron % saturation 11 (L) 05/06/2022 11:29 AM    TIBC 208 (L) 05/06/2022 11:29 AM    Ferritin 129 05/06/2022 11:29 AM    Vitamin B12 1,356 (H) 04/20/2021 05:45 AM    Folate 16.8 04/20/2021 05:45 AM    Haptoglobin 499 (H) 05/06/2022 11:29 AM     05/06/2022 11:29 AM Recent Labs     05/06/22  1129 03/16/22  1131 02/11/22  1136 11/19/21  1040 10/08/21  1200   PSA 0.1 0.2 0.2 0.5 0.5       Date  PSA  05/06/2022 0.1  03/16/2022 0.2  02/11/2022 0.2  11/19/2021 0.5  10/08/2021 0.5  08/25/2021 0.6  07/21/2021 0.9  06/23/2021 1.6  05/26/2021 5.3 Lupron started  05/10/2021  Initiated Zytiga and Prednisone  04/27/2021 104 Firmagon given  04/17/2021 153      5/26/2021:  Hemoglobin fractionation: Hgb G-[place]    DEXA 6/23/2021: This patient is osteopenic using the World Health Organization criteria  10 year probability of major osteoporotic fracture:  8.8%  10 year probability of hip fracture:  1.1%    CT C/A/P 5/20/2022:  Imaging findings are consistent with interval progression of disease. There are scattered; more than 5 pulmonary nodules identified as described  above. Percutaneous sampling is feasible if clinically warranted. Osseous metastatic disease burden is not significantly changed status post  kyphoplasty at T10 and T11. No acute intraperitoneal/intrathoracic process is identified. Bone scan 5/20/2022:  Bone metastases shows stable number and location, with decreased  activity. Assessment:   1) Metastatic castrate sensitive prostate cancer  He has extensive osseous metastatic disease. Pathology results from bone biopsy, along with elevated PSA, confirm a diagnosis of metastatic prostate cancer. His cancer is not curable and management is with palliative intent. He is currently on ADT along with upfront Abiraterone and Prednisone. He is tolerating therapy well with manageable toxicity. PSA responding well to therapy. CT imaging concerning for lung nodules, discussed below. The patient will be seen monthly with each cycle of therapy, and labs will be monitored to assess for toxicity from therapy. [id number] to assess for somatic mutations resulted in multiple mutations including MSI-high. [facility] testing for germline mutations negative.     2) Osseous metastases, Thoracic cord compression  S/p ablation and kyphoplasty of T10/11 on 4/20/2021. Completed radiation with Dr. Lashay Lane. Consider Xgeva when he becomes castrate resistant. Continue vitamin D. He gets calcium through his diet. DEXA with osteopenia, repeat in 6/2023    3) Cancer pain  Improved after kyphoplasty/ablation and radiation. 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 5) DM  Managed by his PCP. Of note, abiraterone interacts with his Actos, needs to monitor glucose closely. Prednisone may worsen DM as well. 6) Emotional Well Being  No psychosocial concerns identified today. Patient has adequate support. He is getting financial assistance for [employer] 7) Urinary retention  Under treatment with with urology-Dr. Scotty Echeverria. Had TURP on 4/11/2022, but this was unsuccessful so he underwent suprapubic catheter placement on 4/26/2022. Will fu with urology on 7/28/2022    8) Lung nodules  Multiple on CT imaging. Uncertain etiology. PSA is responding to current therapy, so may not be his prostate cancer. Recommended biopsy to further evaluate. He initially declined, but is now agreeable. He wants to wait and do this at the same time as his next CT, which we will obtain 3 months from his last CT to follow up. Plan:     · Continue abiraterone 1000mg PO daily  · Continue prednisone 5mg PO daily  · Continue Lupron every 12 weeks, due 7/29  · Labs monthly: CBC, CMP, PSA  · Follow up with urology as scheduled  · CT chest September  · CT guided lung biopsy-patient plans to also schedule in September  · Return to see me in 8 weeks    I personally saw and evaluated the patient and performed the key components of medical decision making. The history, physical exam, and documentation were performed by Kwaku Flores NP.   I reviewed and verified the above documentation and modified it as needed. I need to get a biopsy of the lung nodules to understand if this is his prostate cancer or some other malignancy. He is agreeable, but he wants to wait until September.     Signed By: Conrado Rodriguez MD

## 2022-07-13 NOTE — PROGRESS NOTES
Ronen Roberts is a 78 y.o. male follow up for prostate cancer. 1. Have you been to the ER, urgent care clinic since your last visit? Hospitalized since your last visit?no     2. Have you seen or consulted any other health care providers outside of the [address] [place] [address] since your last visit? Include any pap smears or colon screening.  no

## 2022-07-14 PROBLEM — R31.9 HEMATURIA: Status: ACTIVE | Noted: 2022-01-01

## 2022-07-14 PROBLEM — E87.6 HYPOKALEMIA: Status: ACTIVE | Noted: 2022-01-01

## 2022-07-14 PROBLEM — D62 ACUTE BLOOD LOSS ANEMIA: Status: ACTIVE | Noted: 2022-01-01

## 2022-07-14 NOTE — ED NOTES
8:00 PM TRANSFER - OUT REPORT:    Verbal report given to Elizabeth RN(name) on Saskiancani ValdesArturo  being transferred to 4th floor (unit) for routine progression of care       Report consisted of patients Situation, Background, Assessment and   Recommendations(SBAR). Information from the following report(s) SBAR, Kardex, ED Summary, Recent Results and Cardiac Rhythm NSR was reviewed with the receiving nurse. Lines:   Peripheral IV 07/14/22 Distal;Right;Upper Basilic (Active)       Peripheral IV 07/14/22 Distal;Left Basilic (Active)        Opportunity for questions and clarification was provided. Patient transported with:   Tech    6:49 PM Attempted to call report, LULY Lee unable to receive at this time, patient to transport unit by transport. RN made aware to call ED when ready for report. 6:38 PM Attempted to call report, unable to receive at this ti    5:50PM Patient arrives to ED with suprapubic cath in place, placed on 4/26/22 by Dr. Rubi Mcconnell. Leg bag removed, urine sample obtained from srivastava cath. Bladder scanned revealed 0ml of urine. Irrigated cath with 40 ml of NS and only 5 ml withdrew back. Leg bag changed into srivastava bag. Suprapubic cath was not changed. Patient tolerated procedure well.

## 2022-07-14 NOTE — H&P
[facility]  History and Physical    Patient ID:  Bailey Grover  male  1955  [id number]    Admission Date: 2022  Chief Complaint: Gross hematuria  Reason for Admission: Gross hematuria    ASSESSMENT & PLAN:    Acute blood loss anemia-initial hemoglobin 6  secondary to below, follow iron panels, serial hemograms, status post 4 units of PRBC ordered from the emergency room,    Chronic anemia-  documented history of hemoglobin G [place], monitor clinically    Gross hematuria  - follow urine electrolytes, urinalysis, empiric coverage with Rocephin, urology consult requested, likely benefit from cystoscopy, continue monitor, consider 3-week Cruz catheter if patient develops blood clots from hematuria, npo after midnight       History of stage IV metastatic prostate cancer mets to the spine and ribs,,    Diabetes,  resume home medications when indicated, consider insulin sliding scale for blood glucose greater than 180      DNR    HISTORY OF PRESENT ILLNESS:  Patient is a 78 y.o. male with medical h/o metastatic prostate cancer on outpatient chemotherapy, hormone therapy, also followed by urologist, on suprapubic catheter, who presented to [facility] ED with gross hematuria coming out of the suprapubic catheter, patient denies prior episodes before, complains of diffuse weakness, denies dysuria,. Patient is followed by [facility] Dr. Breezy Henning Other (comments)     Insomnia- felt like speed       Prior to Admission Medications   Prescriptions Last Dose Informant Patient Reported? Taking? HumaLOG U-100 Insulin 100 unit/mL injection   Yes No   Sig: by SubCUTAneous route four (4) times daily as needed. Sliding scale   Januvia 100 mg tablet   Yes No   Sig: Take 100 mg by mouth daily. Lantus U-100 Insulin 100 unit/mL injection   Yes No   Si Units by SubCUTAneous route two (2) times a day.    abiraterone (Zytiga) 250 mg tab   No No   Sig: Take 4 Tablets by mouth daily. acetaminophen (TYLENOL) 325 mg tablet   No No   Sig: Take 2 Tabs by mouth every six (6) hours as needed for Pain. Patient not taking: Reported on 4/22/2022   amoxicillin-clavulanate (AUGMENTIN) 500-125 mg per tablet   No No   Sig: Take 1 Tablet by mouth two (2) times a day. Indications: bacterial urinary tract infection   aspirin 81 mg chewable tablet   Yes No   Sig: Take 81 mg by mouth daily. atenoloL (TENORMIN) 50 mg tablet   Yes No   Sig: Take 50 mg by mouth nightly. atorvastatin (LIPITOR) 10 mg tablet   Yes No   Sig: Take 10 mg by mouth nightly. benazepriL (LOTENSIN) 40 mg tablet   Yes No   Sig: Take 40 mg by mouth daily. docusate sodium (Stool Softener) 100 mg capsule   Yes No   Sig: Take 100 mg by mouth two (2) times daily as needed for Constipation. finasteride (PROSCAR) 5 mg tablet   Yes No   Sig: Take 5 mg by mouth daily. metFORMIN (GLUCOPHAGE) 500 mg tablet   Yes No   Sig: Take 1,500 mg by mouth daily (with breakfast). metFORMIN (GLUCOPHAGE) 500 mg tablet   Yes No   Sig: Take 1,000 mg by mouth daily (with dinner). pioglitazone (Actos) 45 mg tablet   Yes No   Sig: Take 45 mg by mouth daily. predniSONE (DELTASONE) 5 mg tablet   No No   Sig: Take 1 Tab by mouth daily. tamsulosin (FLOMAX) 0.4 mg capsule   Yes No   Sig: Take 0.4 mg by mouth two (2) times a day.       Facility-Administered Medications: None       Past Medical History:   Diagnosis Date    BPH (benign prostatic hyperplasia)     COVID-19 vaccine series completed     Diabetes ([facility] [address]     Cruz catheter in place 11/2022    High cholesterol     Hypertension     No blood products     Urinary retention      Past Surgical History:   Procedure Laterality Date    IR ASP BLADDER SUPRA CATH  4/22/2022    IR ASP BLADDER SUPRA CATH  6/22/2022    IR KYPHOPLASTY THORACIC  4/20/2021       Social History     Tobacco Use    Smoking status: Former Smoker     Types: Cigarettes    Smokeless tobacco: Never Used   Substance Use Topics    Alcohol use: Never       FAMILY HISTORY:     Family History   Problem Relation Age of Onset    Anesth Problems Neg Hx     Clotting Disorder Neg Hx        REVIEW OF SYSTEMS:  A 14 point review of systems was taken and pertinent positive as per HPI.       PHYSICAL EXAM:    Visit Vitals  BP (!) 140/50 (BP 1 Location: Left upper arm, BP Patient Position: At rest)   Pulse 77   Temp 98.9 °F (37.2 °C)   Resp 16   Ht 5' 8\" (1.727 m)   Wt 104.3 kg (230 lb)   SpO2 100%   BMI 34.97 kg/m²       General: No acute distress, speaking in full sentences, no use of accessory muscles   HEENT: Pupils equal and reactive to light and accommodation, oropharynx is clear   Neck: Supple, no lymphadenopathy, no JVD   Lungs: Clear to auscultation bilaterally   Cardiovascular: Regular rate and rhythm with normal S1 and S2   Abdomen: Soft, , suprapubic catheter intact, draining gross hematuria, mild tender on deep palpation, normoactive bowel sounds   Extremities: No cyanosis clubbing or edema   Neuro: Nonfocal, A&O x3   Psych: Normal mood and affect     Intake/Output last 3 shifts:        Labs:  CMP:   Lab Results   Component Value Date/Time     07/14/2022 03:31 PM    K 3.3 (L) 07/14/2022 03:31 PM     (H) 07/14/2022 03:31 PM    CO2 21 07/14/2022 03:31 PM    AGAP 8 07/14/2022 03:31 PM     (H) 07/14/2022 03:31 PM    BUN 20 07/14/2022 03:31 PM    CREA 1.12 07/14/2022 03:31 PM    GFRAA >60 07/14/2022 03:31 PM    GFRNA >60 07/14/2022 03:31 PM    CA 8.9 07/14/2022 03:31 PM         CBC:    Lab Results   Component Value Date/Time    WBC 10.8 07/14/2022 03:31 PM    HGB 6.5 (L) 07/14/2022 03:31 PM    HCT 20.9 (L) 07/14/2022 03:31 PM     (H) 07/14/2022 03:31 PM       No results found for: INR, PTMR, PTP, PT1, PT2, INREXT    ABG:  No results found for: PH, PHI, PCO2, PCO2I, PO2, PO2I, HCO3, HCO3I, FIO2, FIO2I        Lab Results   Component Value Date/Time    Troponin-I, Qt. <0.05 04/16/2021 02:18 PM         Imaging & Other Studies:    XR Results (maximum last 3): Results from [place] encounter on 03/31/22    XR CHEST PA LAT    Narrative  EXAM:  XR CHEST PA LAT    INDICATION:  Preoperative chest radiograph for prostate surgery    COMPARISON: April 16, 2021. TECHNIQUE: PA and lateral chest radiographs. FINDINGS: There is a new rounded nodule in the right upper lung. No evidence of  pneumonia. No pleural effusion or pneumothorax. Heart size and mediastinal  contours are normal. Sclerosis of the T10 vertebral bodies indicative of known  osseous metastases. Right rib sclerosis and enlargement indicative of metastatic  disease. Impression  1. Right upper lung pulmonary nodule. 2.  No evidence of pneumonia. 3.  Right rib and T10 vertebral body sclerosis indicative of osseous metastases. Results from Hospital Encounter encounter on 04/16/21    XR CHEST PORT    Narrative  INDICATION: . weakness  Additional history: Tremor/jerking of the left arm which is new, began a few  hours prior to arrival  COMPARISON: None. LIMITATIONS: Portable technique. Rodo Hilt FINDINGS: Single frontal view of the chest.  .  Lines/tubes/surgical: Cardiac monitor leads overly the patient. Heart/mediastinum: Unremarkable. Lungs/pleura:  No focal consolidation or mass. No visualized pleural effusion or  pneumothorax. Additional Comments: None. .    Impression  1. No radiographic evidence of acute cardiopulmonary disease. CT Results (maximum last 3): Results from [place] encounter on 07/14/22    CT ABD PELV WO CONT    Narrative  EXAM:  CT ABDOMEN PELVIS WITHOUT CONTRAST  INDICATION:  hematuria from suprapubic catheter, RLL pain. Additional history:  COMPARISON: CT of the abdomen and pelvis, 6/22/2022. .  TECHNIQUE:  Unenhanced multislice helical CT was performed from the diaphragm to the  symphysis pubis without intravenous contrast administration.  Contiguous 5 mm  axial images were reconstructed and lung and soft tissue windows were generated. Coronal and sagittal reformations were generated. CT dose reduction was achieved through use of a standardized protocol tailored  for this examination and automatic exposure control for dose modulation. Blanchie Bevels FINDINGS:  INCIDENTALLY IMAGED CHEST:  Heart/vessels: Diminished attenuation in the blood pool suggesting anemia. Calcifications about the aortic valve. Calcifications in the coronary arteries. Lungs/Pleura: Increased size of multiple pulmonary nodules. .  . ABDOMEN:  Liver: Within normal limits. Gallbladder/Biliary: Within normal limits. Spleen: Within normal limits. Pancreas: Within normal limits. Adrenals: Within normal limits. Kidneys: Low-attenuation lesion in the right kidney is likely not significantly  changed  Peritoneum/Mesenteries: Within normal limits. Extraperitoneum: Within normal limits. Gastrointestinal tract: Within normal limits. Normal-appearing appendix  Vascular: Within normal limits. Blanchie Bevels PELVIS:  Extraperitoneum: Within normal limits. Ureters: Within normal limits. Bladder: There is a suprapubic catheter within the bladder that has  heterogeneous attenuation, several confined locules of gas and a collection of  gas in the antidependent portion of the bladder. Reproductive System: Enlarged and irregular appearance of the prostate. .  MSK:  Degenerative changes throughout the spine. Vertebroplasty in T10 and T11. Sclerosis in the sternum and T12  . Impression  1. Enlarged and irregular appearance of the prostate with heterogeneous area in  the bladder containing several locules of gas which appear contained. There is a  suprapubic catheter within the bladder as well as a collection of gas in an  antidependent portion of the bladder. 2. Increasing size of lung nodules. 3. Incidental findings as above.       Results from [place] encounter on 06/22/22    CT ABD PELV W CONT    Narrative  EXAM: CT ABD PELV W CONT    INDICATION: suprapubic catheter problem, concern for loss of track    COMPARISON: CT abdomen pelvis May 20, 2022    CONTRAST: 100 mL of Isovue-370. ORAL CONTRAST:    TECHNIQUE:  Following the uneventful intravenous administration of contrast, thin axial  images were obtained through the abdomen and pelvis. Coronal and sagittal  reconstructions were generated. CT dose reduction was achieved through use of a  standardized protocol tailored for this examination and automatic exposure  control for dose modulation. FINDINGS:  LOWER THORAX: 2.4 x 2.4 cm mass in the lingula is increased in size compared to  May 20, previously 2.2 x 1.5 cm. More superiorly in the lingula (2-2) the 1.3 cm  nodule previously measured 1.1 cm. LIVER: No mass. BILIARY TREE: Cholelithiasis without evidence of cholecystitis. CBD is not  dilated. SPLEEN: within normal limits. PANCREAS: No mass or ductal dilatation. ADRENALS: Unremarkable. KIDNEYS: No mass, calculus, or hydronephrosis. STOMACH: Unremarkable. SMALL BOWEL: No dilatation or wall thickening. COLON: No dilatation or wall thickening. APPENDIX: Not visualized. PERITONEUM: No ascites or pneumoperitoneum. RETROPERITONEUM: No lymphadenopathy or aortic aneurysm. REPRODUCTIVE ORGANS: Prostate mass which extends into the base of the bladder,  and filling part of the bladder lumen. URINARY BLADDER: Suprapubic catheter no longer utilized. Suprapubic catheter  tract is present. Tract patency is not able to be evaluated on CT scan. BONES: No destructive bone lesion. Redemonstrated sclerotic change within the  T12 vertebral body. ABDOMINAL WALL: No mass or hernia. ADDITIONAL COMMENTS: N/A    Impression  1. Suprapubic catheter no longer in place. 2. Large prostate mass extending into the base of the bladder is redemonstrated. 3. Interval increase in size of left lung nodules. 2      Results from [place] encounter on 05/20/22    CT CHEST ABD PELV W CONT    Narrative  Clinical history: prostate cancer  INDICATION:   prostate cancer  COMPARISON:  2021  CONTRAST: 100ml Isovue-370  TECHNIQUE:  Following the uneventful intravenous administration of Isovue-370, thin axial  images were obtained through the chest, abdomen and pelvis. Coronal and sagittal  reconstructions were generated. The patient  was  administered oral contrast material as well. CT dose reduction was achieved through use of a standardized protocol tailored  for this examination and automatic exposure control for dose modulation. Adaptive statistical iterative reconstruction (ASIR) was utilized. FINDINGS:    SUPRACLAVICULAR REGION: Major cervical vasculature within normal limits. No  supraclavicular adenopathy. VASCULATURE:  No aortic aneurysm or dissection. No proximal pulmonary embolism is identified. MEDIASTINUM: Thyroid hypodensities do not require further imaging follow-up. Aortic atherosclerotic change. No hilar or mediastinal lymphadenopathy. No  esophageal mass. No endotracheal or endobronchial mass. PLEURA/LUNGS[de-identified] No effusion or pneumothorax. Interval pulmonary masses/nodules. 2-21 12 x 10 mm right upper lobe. 2-22 7 mm left upper lobe. 2-29 9 x 9 mm left lower lobe. 2-49 22 x 15 mm lingular. SOFT TISSUE/ AXILLA:  No mass or lymphadenopathy. LIVER: No mass or biliary dilatation. There is no intrahepatic duct dilatation. There is no hepatic parenchymal mass. Hepatic enhancement pattern is within  normal limits. Portal vein is patent. GALLBLADDER:  No dilatation or wall thickening. SPLEEN/PANCREAS: No mass. There is no pancreatic duct dilatation. There is no  evidence of splenomegaly. ADRENALS/KIDNEYS: No suspicious renal lesions. There is no hydronephrosis. There is no renal mass. There is no perinephric mass. STOMACH: No dilatation or wall thickening. COLON AND SMALL BOWEL: Fecal stasis. There is no free intraperitoneal air.  There  is no evidence of incarceration or obstruction. No mesenteric adenopathy. PERITONEUM: Unremarkable. APPENDIX: Unremarkable. BLADDER/REPRODUCTIVE ORGANS: Cruz catheter in place. Enlarged prostate gland. RETROPERITONEUM: Unremarkable. The abdominal aorta is normal in caliber. No  aneurysm. No retroperitoneal adenopathy. OSSEOUS STRUCTURES: Post kyphoplasty at T10 and T11. Sclerotic focus at T12 is  likely slightly more conspicuous. Sclerotic focus at inferior sternum not  significantly changed. Impression  Imaging findings are consistent with interval progression of disease. There are scattered; more than 5 pulmonary nodules identified as described  above. Percutaneous sampling is feasible if clinically warranted. Osseous metastatic disease burden is not significantly changed status post  kyphoplasty at T10 and T11. No acute intraperitoneal/intrathoracic process is identified. Please see above for additional nonemergent incidental findings. MRI Results (maximum last 3): Results from Hospital Encounter encounter on 04/16/21    MRI LUMB SPINE W WO CONT    Narrative  EXAM:  TEMPORARY  Clinical history: Evaluate compression fractures  INDICATION:   Evaluate for compression fracture    COMPARISON: None    TECHNIQUE:  MR imaging of the lumbar spine was performed with sagittal T1, T2, STIR;  axial  T1, T2. Contrast was not administered. FINDINGS:  T1 hypointense focus in the T12 vertebral body is most likely related to osseous  metastatic disease. T11-intensity also most likely limited to neoplastic  process. Mild T11 vertebral compression. The lumbar spine is without evidence of  fracture or dislocation. . Abdominal aorta is normal in caliber. Proximal common  iliac vessels normal in caliber. Retroperitoneal adenopathy on the left. .  Intervertebral discs are normal. The conus medullaris terminates at L1/L2. Epidural lipomatosis. T12/L1:  The spinal canal and neuroforamina are widely patent.     L1/2:  The spinal canal and neural foramina are widely patent. L2/3:  Mild facet arthropathy. Lobulated protrusion/osteophyte. Mild canal  stenosis. Foramina are patent    L3/4:  Mild facet arthropathy. Disc desiccation. Disc bulge/osteophyte. Mild  canal stenosis. Foramina are patent. L4/5: Mild facet arthropathy. Mild left lateral recess and left foraminal  protrusion/osteophyte. Mild canal stenosis. Moderate left foraminal stenosis  mild right foraminal stenosis. L5/S1:  Facet arthropathy is moderate greater on the right. Disc  bulge/osteophyte asymmetric to the right. Canal is patent. Moderate right  foraminal stenosis. Impression  Imaging findings with of osseous metastatic disease at the T11 and T12. There is  loss of vertebral body height at T11. Likely retroperitoneal lymphadenopathy on the left. Otherwise multilevel disc and facet degenerative change with congenital  narrowing of the lumbar canal and epidural lipomatosis. Mild to moderate canal stenosis at L3-4. Mild canal moderate left foraminal stenosis at L4-5. Moderate right foraminal stenosis at L5-S1. Additional, less severe degenerative findings are as described in detail above. MRI [facility] SPINE W WO CONT    Narrative  EXAM: MRI [facility] SPINE W WO CONT  Clinical history: Evaluate for T10 compression fracture  INDICATION:   T10 compression fracture concerning for neoplasm- evaluate entire  spine for epidural/soft tissue involvement    COMPARISON: CT thoracic spine 4/16/2021    CONTRAST:    Contrast was not administered. TECHNIQUE:  MR imaging of the thoracic spine was performed with the following sequences:  sagittal T1, T2, stir; axial T1, gradient echo. FINDINGS:  Acute vertebral compression deformity at T10 with approximately 50% loss  vertebral body height. There is mild cortical retropulsion. There is mild cord  compression posterior to the T10 vertebral body. Minimal cord edema.  Marrow  signal abnormality extends to the posterior elements at T10. There is abnormal  marrow signal intensity and mild compression deformity along the superior  endplate of T80. There is a small focus of abnormal marrow signal intensity  inferiorly at T12 without associated fracture. . Mild reversal of cervical  lordosis. There is no pleural effusion. Thoracic aorta is normal in caliber. .  Anterior paraspinal soft tissue mass is demonstrated. .    There is moderate to severe canal stenosis of the thoracic spine posterior to  T10. There is moderate bilateral foraminal stenosis at T10. Impression  Aging findings are most consistent with osseous metastatic disease. Pathologic T10 vertebral compression deformity is associated with marrow signal  abnormality . There is approximately 50% loss vertebral body height at T10 with  cortical retropulsion at T10. Mild compression deformity at T11. There is moderate to severe canal stenosis at T10 with mild cord compression  posterior to the T10 vertebral body. Minimal cord edema without cord  myelomalacia  Abnormal marrow signal intensity at T12 and T11. Lesion at T10 is amenable to percutaneous sampling. Contrast-enhanced CT scan of the chest, abdomen and pelvis is recommended. MRI CERV SPINE W WO CONT    Narrative  EXAM: MRI CERV SPINE W WO CONT  Sidney history: Osseous metastatic disease at T10. INDICATION: T10 compression fracture concerning for neoplasm- evaluate entire  spine for epidural/soft tissue involvement. COMPARISON: None    TECHNIQUE: MR imaging of the cervical spine was performed using the following  sequences: sagittal T1, T2, STIR;  axial T2, T1 prior to and following contrast  administration. CONTRAST: 20 mL of Dotarem. FINDINGS:    There is sphenoid sinus disease. There is moderate to severe congenital  narrowing of the cervical canal. There is mild chronic cord thinning at C3-C4. Minimal cord malacia at C3-4. Reversal of cervical lordosis. Vertebral body  heights are maintained.  There is no Chiari or syrinx. The craniocervical junction is intact. There is no fracture. There is no  pathologic intrathecal enhancement. The paraspinal soft tissues are within normal limits. C2-C3: No herniation or stenosis. C3-C4: Moderate protrusion/osteophyte asymmetric to the right. Severe canal and  right foraminal stenosis. Mild cord compression. Chronic cord thinning. Minimal  cord myelomalacia. C4-C5: Desiccation. Moderate to severe facet arthropathy greater on the left. Disc bulge/osteophyte. Moderate canal stenosis. Foramina are patent. C5-C6: Facet arthropathy and hypertrophy is greater on the left. Disc  desiccation. Disc bulge/osteophyte. Mild canal and moderate to severe left  foraminal stenosis. Mild right foraminal stenosis. C6-C7: Disc desiccation. Facet arthropathy. Mild canal stenosis. Mild bilateral  foraminal stenoses. C7-T1: Related protrusion/osteophyte extensors the foramina. Mild canal  stenosis. Moderate to severe bilateral foraminal stenoses. There is no abnormal  intrathecal enhancement. Asymmetry at the base of the tongue is not fully  delineated. Impression  No evidence of osseous metastatic disease at the level of the cervical spine. Extensive multilevel degenerative change of the cervical spine with chronic  severe canal stenosis and right foraminal stenosis at C3-C4. Mild cord  compression at C3-C4 is likely chronic in nature. Moderate canal stenosis at C4-5. Sphenoid sinus disease. Additional, less severe degenerative findings are as described in detail above. Nuclear Medicine Results (maximum last 3): Results from [place] encounter on 05/20/22    NM BONE SCAN [facility] BODY    Narrative  EXAM: Whole Body Nuclear Bone Scan is performed with IV injection of 27 mCi  technetium 99m. INDICATION: prostate cancer evaluation    Comparison Bone Scan: 4/19/2021. Correlation Imaging Studies: CT chest abdomen pelvis 5/20/2022.     TECHNIQUE: Anterior and posterior whole body scintigraphic planar images are  obtained. FINDINGS:  Metastatic foci remain in the sternum, T10/T11, ribs and left femur. Activity at  these sites is diminished/improved. There are no new foci. Impression  Bone metastases shows stable number and location, with decreased  activity. Results from [place] encounter on 04/16/21    NM BONE SCAN [address] BODY    Narrative  EXAM: NM BONE SCAN [address] BODY  INDICATION: cancer staging  COMPARISON: None. IMAGING CORRELATION: Chest abdomen pelvis CT 4/19/2021    TRACER: 27 mCi of Tc-99m MDP. TECHNIQUE: Imaging of the whole body was performed from the anterior and  posterior projections following intravenous administration of  Tc-99m MDP and  appropriate delay. FINDINGS:  Foci of increased tracer activity are seen in the ribs bilaterally, the sternum,  T10-11, right intertrochanteric hip, and in the left distal femur. Degenerative  changes are seen in the ribs bilaterally. Incidental renal imaging shows minimal uptake suggesting possible SuperScan. Impression  Osseous metastatic disease as described above. US Results (maximum last 3): Results from Hospital Encounter encounter on 01/24/22    US RETROPERITONEUM COMP    Narrative  EXAM:  US retroperitoneum complete    INDICATION: Worsening renal function    COMPARISON: CT 4/19/2021. FINDINGS:  The patient is studied with coronal and axial real-time ultrasound. The right kidney measures 11.2 cm, and the left kidney measures 12.3 cm. There  is increased echogenicity of the bilateral renal parenchyma. A right kidney cyst  measures to 3.0 cm, unchanged. There is no mass or shadowing calculus. There  is no hydronephrosis. The ureters are not dilated. The abdominal aorta tapers normally. The proximal origins of the iliac arteries  are normal in caliber. The IVC is patent. The urinary bladder is nondistended.     Impression  Hyperechogenicity of the bilateral renal parenchyma in keeping with nonspecific  medical renal disease. No hydronephrosis. Results from Hospital Encounter encounter on 04/16/21    US RETROPERITONEUM COMP    Narrative  History: Acute renal insufficiency. Ultrasonography of the retroperitoneum demonstrates that the inferior T8 vena  cava and proximal aorta appear unremarkable. The distal aorta is obscured due to  bowel gas. The right kidney measures 10.8 cm. The left kidney measures 10.9 cm. The kidneys are normal in echotexture. There is a 2.8 cm cyst in the midpole of  the right kidney. The prostate gland volume is 33 cc. Impression  Unremarkable retroperitoneal ultrasound. Right renal cyst.      DEXA Results (maximum last 3): Results from Hospital Encounter encounter on 06/23/21    DEXA BONE DENSITY STUDY AXIAL    Narrative  Bone Mineral Density    Indication:  Screening for osteoporosis  Age: 73  Sex: Male. Number of falls in the past year:   None. Risk factors for osteoporosis:  Rib fractures, steroid use    Current medication for osteoporosis: None. Comparison:  None. Technique: Imaging was performed on the [employer].  World Health Organization  meta-analysis fracture risk calculator (FRAX) analysis was performed for 10 year  fracture risk probability assessment    Excluded sites: 0    Findings:    Femoral Neck Left:  Bone mineral density (gm/cm2):  0.894 g/cm?  % of peak bone mass:  84%  % for age matched controls:  79%  T-score:  -1.4  Z-score:  -1.8    Femoral Neck Right:  Bone mineral density (gm/cm2):  1.014 g/cm?  % of peak bone mass:  95%  % for age matched controls:  90%  T-score:  -0.4  Z-score:  -0.9    Total Hip Left:  Bone mineral density (gm/cm2):  1.135 g/cm?  % of peak bone mass:  103%  % for age matched controls:  93%  T-score:  0.2  Z-score:  -0.6    Total Hip Right:  Bone mineral density (gm/cm2):  1.167 g/cm?  % of peak bone mass:  106%  % for age matched controls:  96%  T-score:  0.5  Z-score: -0.4    Lumbar Spine:  L1-4 or specify  Bone mineral density (gm/cm2):  1.527 g/cm?  % of peak bone mass:  123%  % for age matched controls:  112%  T-score:  2.4  Z-score:  1.4    33% Radius Left:  Bone mineral density (gm/cm2):  0.814 g/cm?  % of peak bone mass:  101%  % for age matched controls:  101%  T-score:  0.1  Z-score:  0.0    Impression  This patient is osteopenic using the World Health Organization criteria  10 year probability of major osteoporotic fracture:  8.8%  10 year probability of hip fracture:  1.1%    Recommendations:  Therapy recommendations need to be tailored to each individual patient. Using  the [facility]) FRAX absolute fracture algorithm, the  [address] [place] [address] recommends beginning pharmacological therapy in  postmenopausal women and men over the age of 48 with a 8 year probability of a  hip fracture of >3% OR with the 10 year probability of a major osteoporotic  fracture of >20%. Please reconsider testing based on risk factors. Currently, Medicare will only  reimburse for a central DXA examination every two years, unless the patient is  on chronic glucocorticoid therapy. Note: Please note that reliable, valid comparisons cannot be made between  studies which have been performed on machines from different manufacturers. If  clinically warranted, a follow up study performed at this site, on the same  unit, would allow the most sensitive assessment of change in bone mineral  density. LOC Results (maximum last 3): No results found for this or any previous visit. IR Results (maximum last 3): Results from [place] encounter on 06/22/22    IR ASP BLADDER SUPRA CATH    Narrative  Clinical Data: Patient presents for replacement of inadvertently removed  suprapubic catheter. Date: June 22, 2022    : Allene Aschoff M.D. Estimated Blood Loss: None  Specimens Removed: None  Complications:None    Procedure:  1.  Fluoroscopic guidance for catheter and wire recanalization of the suprapubic  tube tract  2. Serial dilatation of the tract to 16 Western Dawn  3. Fluoroscopic guidance for placement of a new 12 Slovak gastrostomy tube into  the bladder, confirmed with contrast injection. FLUOROSCOPY TIME: 3.6 min  FLUOROSCOPY DOSE (air kerma): 2.1 mGy    Technique: After full discussion of the procedure, including the benefits and  possible complications, the patient gave both written and verbal consent to  continue. The patient was placed in the supine position on the fluoroscopy table. Under  fluoroscopic guidance, the previous superpubic tube tract was identified with a  tiny amount of contrast injection into the external part of the tract, then  recanalized using a angled tip catheter and Glidewire. The Glidewire was exchanged for a superstiff Amplatz wire, and dilatation to 12  Western Dawn was performed. An 12 Slovak suprapubic tube is advanced into the tract. The retention balloon was inflated using 10 cc of saline, and the tube was  retracted to the anterior pelvic wall. Small amount of contrast was injected to  confirm positioning in the stomach fluoroscopically. The patient tolerated the procedure well. There were no immediate complications. The patient left the angiography suite in stable condition. Impression  Successful recannulization of suprapubic tube tract, with placement of a new 16  Slovak suprapubic tube in the bladder. The tube is ready for immediate use. Results from [place] encounter on 04/22/22    IR ASP BLADDER SUPRA CATH    Narrative  CLINICAL DATA: 60-year-old male with bladder outlet obstruction presents for  suprapubic catheter placement. DATE: 4/22/2022 1103 hours    : Liz Mg MD    ESTIMATED BLOOD LOSS: Minimal  COMPLICATIONS: None  SPECIMEN REMOVED: Pre-existing Cruz catheter. PROCEDURE SUMMARY:  1. Ultrasound-guided access of urinary bladder.   2.  Fluoroscopic guidance for placement of 16 Lebanese suprapubic Cruz catheter. SEDATION:  Moderate intravenous conscious sedation was supervised by Dr. Sujey Rivas. The  patient was independently monitored by a registered nurse assigned to the  Department of Radiology using automated blood pressure, EKG, and pulse oximetry. The detail conscious sedation record is stored in the hospital information  system. Medication:  Versed: 2 mg  Fentanyl: 100 mcg    INTRAPROCEDURE TIME: 14 minutes  FLUOROSCOPY TIME: 0.5 min  FLUOROSCOPY DOSE (air kerma): 11 mGy    PROCEDURE DETAILS:  The risks and benefits of the procedure were explained and informed written  consent was obtained. A timeout was performed to ensure proper patient, site,  and procedure. Patient was placed supine on the fluoroscopic table. The suprapubic region was  prepped and draped in sterile fashion. Initial fluoroscopic and ultrasound imaging confirmed appropriate positioning of  the existing Cruz catheter. The bladder was then instilled with approximately  200 mL of saline and the Cruz catheter was clamped. Sonographic evaluation  confirmed a dilated bladder. There was no significant vascularity along the  expected path of access. 1% lidocaine was used for local anesthesia. Under ultrasound guidance, an  18-gauge 15 cm coaxial needle was advanced into the bladder. The inner stylet  was removed and there was return of clear urine. A 145 cm Amplatz wire was  inserted through the needle and coiled into the bladder. After making an  appropriate dermatotomy, the needle was removed. Next, serial dilation was performed and a 20 Lebanese peel-away sheath was placed  into the bladder. The inner obturator was removed and a new 12 Lebanese Cruz  catheter was advanced over the wire through the peel-away and into the bladder  under fluoroscopic guidance. The balloon was dilated with 1 mL of contrast and 9  mL of saline, and the wire was removed.  Contrast injection confirmed appropriate  position/functioning of the tube. The tube was attached to a gravity drainage  bag. The patient tolerated the procedure well, and left the angiographic suite in  stable condition. Impression  1. Successful placement of 16 Lao suprapubic Cruz catheter as above,  attached to gravity drainage bag. Results from [place] encounter on 04/16/21    IR KYPHOPLASTY THORACIC    Narrative  Additional clinical data:  78-year-old male with T10 vertebral body pathologic compression fracture, with  osseous lesions of the T10 and T11 vertebral bodies. : Susan Boudreaux M.D. Estimated blood loss: Minimal  Specimens: None  Complications: None    Procedure:  1. Fluoroscopy guided placement of cannulas into the bilateral T10 pedicles. 2. Core biopsy samples were obtained from the T10 vertebral body through the  pedicular cannulas. 3. Fluoroscopy guided placement of Osteocool RF ablation probes into the T10  vertebral body. 4. RF ablation of the T10 vertebral body. 5. Fluoroscopy-guided placement of cannulas into the bilateral T11 pedicles. 6. Fluoroscopy guided placement of Osteocool RF ablation probes into the T11  vertebral body. 7. RF ablation of the T11 vertebral body. 8. Inflation of kyphoplasty balloons in both the T10 and T11 vertebral bodies. 9. Fluoroscopy guidance for injection of kyphoplasty augmentation cement into  T10 and T11 vertebral bodies. 10. IV conscious sedation. Moderate intravenous conscious sedation was supervised by Dr. Malinda Butts. The patient  was independently monitored by a registered nurse assigned to the Department of  Radiology using automated blood pressure, EKG, and pulse oximetry. The detail  conscious sedation record is stored in the hospital information system.   Medication:  Versed: 8 mg  Fentanyl: 200 mcg  Intraprocedure time: 89 minutes    FLUOROSCOPY TIME: 25.2 min  FLUOROSCOPY DOSE (air kerma): 3596 mGy    Body:  Following discussion of risks, benefits and alternatives, informed written  consent was obtained. The patient was placed prone on the fluoroscopic exam table and prepped and  draped in sterile fashion. The correct level was identified fluoroscopically. 1% lidocaine was administered to the subcutaneous tissues for local anesthesia,  and 0.25% bupivacaine was administered into the periosteum of of the bilateral  pedicles under fluoroscopic guidance using a 22-gauge 10 cm spinal needle for  local anesthesia. Under biplane fluoroscopic guidance, the left T10 pedicle was  traversed with a Kyphon cannula. The tip of the cannula was placed just within  the vertebral body. A OralWiseon bone biopsy device was advanced through the  cannula, and core sample. This was placed directly into formalin. A drill was  then advanced to within the anterior portion of the vertebral body. This  procedure was repeated on the right T10 pedicle. Another core sample was  obtained through the right-sided cannula. Under fluoroscopic guidance, Osteocool RF ablation probes were advanced through  both cannulas, into appropriate position of the vertebral body. Ablation was  activated. While T10 ablation was ongoing, the access procedure was repeated in the right  and left T11 pedicles. Biopsy was not performed in the T11 vertebral body. Under  fluoroscopic guidance, a Kyphon drill was advanced to the anterior part of the  vertebral body through both cannulas. After T10 ablation was completed, the  ablation probes were positioned within the T11 cannulas under fluoroscopic  guidance. Ablation was activated. After ablation was complete, kyphoplasty balloons were placed through all  cannulas and inflated. There is minimal inflation and high pressure within all  balloons, compatible with advanced sclerosis in both T10 and T11. .    The cement was then injected bilaterally under fluoroscopic guidance, first in  the T11 vertebral body, then in T10.  In the T11 body there was a small amount of  cement extravasated out of the right side of the vertebral body. While  administering cement into the T10 vertebral body, there was a substantial amount  of contrast extravasated anteriorly, and through the superior endplate. Cement  injection was briefly paused, in an attempt to allow the extravasated cement to  harden and limit further extravasation. However, after approximately a 2 minute  pause, additional cement continue to extravasate. Therefore, cement injection  was stopped. The devices were removed and a dressing was applied. There were no immediate  complications. Impression  1. Successful core biopsy of the T10 vertebral body. 2. Successful RF ablation of the T10 and T11 vertebral bodies. 3. Successful cement kyphoplasty of the T10 and T11 vertebral bodies, although  total cement administration into the T10 vertebral body was limited by  extravasation. VAS/US Results (maximum last 3): No results found for this or any previous visit. PET Results (maximum last 3): No results found for this or any previous visit.          EKG Results     None          Active Problems:  Patient Active Problem List    Diagnosis Date Noted    Hypokalemia 07/14/2022    Acute blood loss anemia 07/14/2022    Hematuria 07/14/2022    Metastasis to bone ([address] 04/27/2021    Prostate cancer ([address] 04/21/2021    Acute sinusitis 04/16/2021    Pyuria 04/16/2021    Severe hyperglycemia due to diabetes mellitus ([address] 04/16/2021    Elevated alkaline phosphatase level 04/16/2021    Diabetes ([address]     Hypertension     MAMADOU (acute kidney injury) ([address]     HLD (hyperlipidemia)          Molly Reynoso DO  [facility] Hospitalist Service  7/14/2022 5:50 PM

## 2022-07-14 NOTE — ED TRIAGE NOTES
Pt arrives of \"blood shooting around catheter site\"   Pt states he noticed the blood a couple of hours ago

## 2022-07-14 NOTE — ED PROVIDER NOTES
60-year-old male with a history of prostate cancer, status post suprapubic catheter presents with bleeding around his suprapubic catheter. He is having a little pain around the catheter as well. He denies any fevers. He denies any nausea or vomiting. He is not sure if the catheter is draining. He states that he noticed blood around the catheter a couple hours ago. He had the catheter changed in June. He is followed by [facility] Dr. Vaughn Johnson. Past Medical History:   Diagnosis Date    BPH (benign prostatic hyperplasia)     COVID-19 vaccine series completed     Diabetes ([address]     Cruz catheter in place 11/2022    High cholesterol     Hypertension     No blood products     Urinary retention        Past Surgical History:   Procedure Laterality Date    IR ASP BLADDER SUPRA CATH  4/22/2022    IR ASP BLADDER SUPRA CATH  6/22/2022    IR KYPHOPLASTY THORACIC  4/20/2021         Family History:   Problem Relation Age of Onset    Anesth Problems Neg Hx     Clotting Disorder Neg Hx        Social History     Socioeconomic History    Marital status:      Spouse name: Not on file    Number of children: Not on file    Years of education: Not on file    Highest education level: Not on file   Occupational History    Not on file   Tobacco Use    Smoking status: Former Smoker     Types: Cigarettes    Smokeless tobacco: Never Used   Vaping Use    Vaping Use: Unknown   Substance and Sexual Activity    Alcohol use: Never    Drug use: Never    Sexual activity: Not Currently   Other Topics Concern    Not on file   Social History Narrative    Not on file     Social Determinants of Health     Financial Resource Strain:     Difficulty of Paying Living Expenses: Not on file   Food Insecurity:     Worried About Running Out of Food in the Last Year: Not on file    [place] of Food in the Last Year: Not on file   Transportation Needs:     Lack of Transportation (Medical):  Not on file    Lack of Transportation (Non-Medical): Not on file   Physical Activity:     Days of Exercise per Week: Not on file    Minutes of Exercise per Session: Not on file   Stress:     Feeling of Stress : Not on file   Social Connections:     Frequency of Communication with Friends and Family: Not on file    Frequency of Social Gatherings with Friends and Family: Not on file    Attends Taoism Services: Not on file    Active Member of [address] [place] [address] or Organizations: Not on file    Attends Club or Organization Meetings: Not on file    Marital Status: Not on file   Intimate Partner Violence:     Fear of Current or Ex-Partner: Not on file    Emotionally Abused: Not on file    Physically Abused: Not on file    Sexually Abused: Not on file   Housing Stability:     Unable to Pay for Housing in the Last Year: Not on file    Number of [place] in the Last Year: Not on file    Unstable Housing in the Last Year: Not on file         ALLERGIES: Erythromycin    Review of Systems   All other systems reviewed and are negative. Vitals:    07/14/22 1419   BP: 133/75   Pulse: 91   Resp: 16   Temp: 97.5 °F (36.4 °C)   SpO2: 99%   Weight: 104.3 kg (230 lb)   Height: 5' 8\" (1.727 m)            Physical Exam  Vitals and nursing note reviewed. Constitutional:       General: He is not in acute distress. HENT:      Head: Normocephalic and atraumatic. Eyes:      General: No scleral icterus. Conjunctiva/sclera: Conjunctivae normal.      Pupils: Pupils are equal, round, and reactive to light. Neck:      Trachea: No tracheal deviation. Cardiovascular:      Rate and Rhythm: Normal rate and regular rhythm. Pulmonary:      Effort: Pulmonary effort is normal. No respiratory distress. Breath sounds: Normal breath sounds. No stridor. Abdominal:      General: There is no distension. Palpations: Abdomen is soft. Tenderness: There is abdominal tenderness (RLQ).       Comments: Suprapubic catheter in place with active gross hematuria into leg bag   Genitourinary:     Comments: deferred  Musculoskeletal:         General: No deformity. Cervical back: No rigidity. Skin:     General: Skin is warm and dry. Neurological:      General: No focal deficit present. Mental Status: He is alert. Psychiatric:         Mood and Affect: Mood normal.         Behavior: Behavior normal.          MDM  Number of Diagnoses or Management Options  Diagnosis management comments: 36-year-old male with suprapubic catheter and bleeding from site. Will check CBC and renal function. Not actively bleeding on exam.  Hemodynamically stable and afebrile. His vitals are normal.  We will have nursing flush catheter. If draining well we will follow-up with urology. Procedures      [facility] Consult for Admission  4:28 PM    ED Room Number: ER05/05  Patient Name and age:  Ronen Cue 78 y.o.  male  Working Diagnosis:   1. Anemia, unspecified type    2. Gross hematuria        COVID-19 Suspicion:  no  Sepsis present:  no  Reassessment needed: N/A  Code Status:  Do Not Resuscitate  Readmission: no  Isolation Requirements:  no  Recommended Level of Care:  med/surg  Department:[facility] ED - [phone]  Other:  Gross hematuria from suprapubic catheter. HGB 6.5. One unit of packed cells ordered. CT abd/ pelvis, urology consult pending. HD stable. CONSULT NOTE:  5:05 PM Spoke to nurse with [facility]. Dr. Mauricio Ferrer will see the patient likely in the morning. PROGRESS NOTE:  4:34 PM.  Patient meets criteria for admission. Discussed with patient/ family and they are in agreement with plan. LABORATORY TESTS:  Labs Reviewed   CBC WITH AUTOMATED DIFF - Abnormal; Notable for the following components:       Result Value    RBC 2.86 (*)     HGB 6.5 (*)     HCT 20.9 (*)     MCV 73.1 (*)     MCH 22.7 (*)     RDW 16.6 (*)     PLATELET 285 (*)     ABS.  MONOCYTES 1.4 (*)     All other components within normal limits   METABOLIC PANEL, BASIC - Abnormal; Notable for the following components:    Potassium 3.3 (*)     Chloride 114 (*)     Glucose 128 (*)     All other components within normal limits   URINE CULTURE HOLD SAMPLE   URINALYSIS W/MICROSCOPIC   SAMPLES BEING HELD   TYPE & SCREEN   RBC, ALLOCATE       IMAGING RESULTS:  CT ABD PELV WO CONT    (Results Pending)       MEDICATIONS GIVEN:  Medications   0.9% sodium chloride infusion 250 mL (has no administration in time range)         IMPRESSION/ PLAN:  1. Anemia, unspecified type    2. Gross hematuria      - admit to hospitalist for transfusion, urology consult    Total critical care time spent exclusive of procedures:  31 minutes      Jon Mclean MD    Please note that this dictation was completed with [employer], the computer voice recognition software. Quite often unanticipated grammatical, syntax, homophones, and other interpretive errors are inadvertently transcribed by the computer software. Please disregard these errors. Please excuse any errors that have escaped final proofreading.

## 2022-07-15 NOTE — TELEPHONE ENCOUNTER
Patient's daughter called and stated that the patient has been admitted to the hospital, and she states that he was supposed to have CT and a lung biopsy. An appointment wasn't made for the procedures. Patient's daughter would like to know if these could be done while he is admitted. Please advise.    #[phone]

## 2022-07-15 NOTE — PROGRESS NOTES
OCCUPATIONAL THERAPY EVALUATION/DISCHARGE  Patient: Qamar Curry (76 y.o. male)  Date: 7/15/2022  Primary Diagnosis: Acute blood loss anemia [D62]  Hematuria [R31.9]  Hypokalemia [E87.6]       Precautions:       ASSESSMENT  Based on the objective data described below, the patient presents with near baseline ADL performance following admission for acute blood loss anemia and hematuria with suprapubic catheter. Pt with hgb of 6.5 on arrival, now s/p 2 units of blood (hgb 7.8 now) and cleared to participate with OT. Pt today is pleasant and agreeable to participate and offers good efforts. He reports pain in R knee (RN notified) and demonstrates good strength in UEs to complete seated ADL tasks. He is receptive to education provided and verbalizes modified techniques to accommodate for impaired ROM in UEs/LEs for ADL tasks. No LOB observed during mobility to bathroom for simulated toileting and pt able to manage catheter himself during all tasks. He reports having excellent family support at home despite living alone and has no concerns regarding returning home to normal ADL routine. No further skilled OT services indicated at this time. Current Level of Function (ADLs/self-care): overall MOD I to supervision/SBA; up to min A for sock mgmt    Functional Outcome Measure: The patient scored 70/100 on the Barthel outcome measure which is indicative of minimal functional impairment. Other factors to consider for discharge: lives alone; excellent family support     PLAN :  Recommend with staff: OOB to chair for meals; mobility to bathroom for toileting; ADLs as needed    Recommendation for discharge: (in order for the patient to meet his/her long term goals)  No skilled occupational therapy/ follow up rehabilitation needs identified at this time.     This discharge recommendation:  Has not yet been discussed the attending provider and/or case management    IF patient discharges home will need the following DME: none for OT       SUBJECTIVE:   Patient stated I think my family will smother me, they are so supportive.     OBJECTIVE DATA SUMMARY:   HISTORY:   Past Medical History:   Diagnosis Date    BPH (benign prostatic hyperplasia)     COVID-19 vaccine series completed     Diabetes ([address]     Cruz catheter in place 11/2022    High cholesterol     Hypertension     No blood products     Urinary retention      Past Surgical History:   Procedure Laterality Date    IR ASP BLADDER SUPRA CATH  4/22/2022    IR ASP BLADDER SUPRA CATH  6/22/2022    IR KYPHOPLASTY THORACIC  4/20/2021       Prior Level of Function/Environment/Context: independent; lives alone but with multiple family members around for support  Expanded or extensive additional review of patient history:   Home Situation  Home Environment: Private residence  # Steps to Enter: 2  Rails to Enter: Yes  Hand Rails : Bilateral  Wheelchair Ramp: No  One/Two Story Residence: One story  Living Alone: Yes  Support Systems: Other Family Member(s)  Patient Expects to be Discharged to[de-identified] Home with family assistance  Current DME Used/Available at Home: Cane, straight,Grab bars    Hand dominance: Right    EXAMINATION OF PERFORMANCE DEFICITS:  Cognitive/Behavioral Status:  Neurologic State: Alert  Orientation Level: Oriented X4  Cognition: Appropriate decision making; Appropriate for age attention/concentration; Appropriate safety awareness; Follows commands  Perception: Appears intact  Perseveration: No perseveration noted  Safety/Judgement: Awareness of environment; Fall prevention;Good awareness of safety precautions; Home safety; Insight into deficits    Hearing: Auditory  Auditory Impairment: None    Range of Motion:  AROM: Generally decreased, functional (in bilateral shoulders and LEs)    Strength:  Strength: Generally decreased, functional    Coordination:  Fine Motor Skills-Upper: Left Intact; Right Intact    Gross Motor Skills-Upper: Left Intact; Right Intact    Tone & Sensation:  Tone: Normal  Sensation: Intact    Balance:  Sitting: Intact  Standing: Without support  Standing - Static: Good  Standing - Dynamic : Fair;Good    Functional Mobility and Transfers for ADLs:  Bed Mobility:  Rolling: Modified independent  Supine to Sit: Modified independent  Sit to Supine: Modified independent  Scooting: Modified independent    Transfers:  Sit to Stand: Stand-by assistance  Stand to Sit: Stand-by assistance  Bed to Chair: Stand-by assistance  Bathroom Mobility: Stand-by assistance  Toilet Transfer : Supervision    ADL Assessment:  Feeding: Independent    Oral Facial Hygiene/Grooming: Supervision    Bathing: Stand-by assistance    Upper Body Dressing: Independent    Lower Body Dressing: Setup;Minimum assistance    Toileting: Supervision;Stand by assistance    ADL Intervention and task modifications:  Lower Body Dressing Assistance  Socks: Minimum assistance (pt side sits on EOB to manage distal LB dressing)  Leg Crossed Method Used: No  Position Performed: Seated edge of bed (side sitting)  Cues: Verbal cues provided    Cognitive Retraining  Safety/Judgement: Awareness of environment; Fall prevention;Good awareness of safety precautions; Home safety; Insight into deficits    Functional Measure:    Barthel Index:  Bathin  Bladder: 0 (suprapubic catheter)  Bowels: 10  Groomin  Dressin  Feeding: 10  Mobility: 10  Stairs: 5  Toilet Use: 5  Transfer (Bed to Chair and Back): 15  Total: 70/100      The Barthel ADL Index: Guidelines  1. The index should be used as a record of what a patient does, not as a record of what a patient could do. 2. The main aim is to establish degree of independence from any help, physical or verbal, however minor and for whatever reason. 3. The need for supervision renders the patient not independent. 4. A patient's performance should be established using the best available evidence.  Asking the patient, friends/relatives and nurses are the usual sources, but direct observation and common sense are also important. However direct testing is not needed. 5. Usually the patient's performance over the preceding 24-48 hours is important, but occasionally longer periods will be relevant. 6. Middle categories imply that the patient supplies over 50 per cent of the effort. 7. Use of aids to be independent is allowed. Score Interpretation (from [facility])    Independent   60-79 Minimally independent   40-59 Partially dependent   20-39 Very dependent   <20 Totally dependent     -Stevenson Acevedo., Barthel, DAnishW. (1965). Functional evaluation: the Barthel Index. [facility] ([address] (1997). The Barthel activities of daily living index: self-reporting versus actual performance in the old (> or = 75 years). Journal [address] 45(7), [facility], ANDREW, Ellie Brown, Haylie Melton. (1999). Measuring the change in disability after inpatient rehabilitation; comparison of the responsiveness of the Barthel Index and Functional [place] Measure. Journal of Neurology, Neurosurgery, and Psychiatry, 66(4), 111-403. Agnieszka Conroy, N.J.A, LOUISE Benson, & Effie Lopez, MYEMI. (2004) Assessment of post-stroke quality of life in cost-effectiveness studies: The usefulness of the Barthel Index and the EuroQoL-5D. Quality of Life Research, 15, 287-86     Occupational Therapy Evaluation Charge Determination   History Examination Decision-Making   LOW Complexity : Brief history review  LOW Complexity : 1-3 performance deficits relating to physical, cognitive , or psychosocial skils that result in activity limitations and / or participation restrictions  MEDIUM Complexity : Patient may present with comorbidities that affect occupational performnce.  Miniml to moderate modification of tasks or assistance (eg, physical or verbal ) with assesment(s) is necessary to enable patient to complete evaluation Based on the above components, the patient evaluation is determined to be of the following complexity level: LOW   Pain Rating:  Pt reporting minimal pain    Activity Tolerance:   Fair    After treatment patient left in no apparent distress:    Supine in bed, Call bell within reach and Bed / chair alarm activated    COMMUNICATION/EDUCATION:   The patients plan of care was discussed with: Physical therapist and Registered nurse.      Thank you for this referral.  Erma Blanco, OT  Time Calculation: 16 mins

## 2022-07-15 NOTE — PROGRESS NOTES
Problem: Mobility Impaired (Adult and Pediatric)  Goal: *Acute Goals and Plan of Care (Insert Text)  Description: FUNCTIONAL STATUS PRIOR TO ADMISSION: Patient was modified independent using a single point cane for functional mobility. HOME SUPPORT PRIOR TO ADMISSION: The patient lived alone but has family members who checks on him. Physical Therapy Goals  Initiated 7/15/2022  1. Patient will move from supine to sit and sit to supine  in bed with modified independence within 7 day(s). 2.  Patient will transfer from bed to chair and chair to bed with modified independence using the least restrictive device within 7 day(s). 3.  Patient will perform sit to stand with modified independence within 7 day(s). 4.  Patient will ambulate with modified independence for 200 feet with the least restrictive device within 7 day(s). 5.  Patient will ascend/descend 2 stairs with b/l handrail(s) with modified independence within 7 day(s). Outcome: Not Met             PHYSICAL THERAPY EVALUATION  Patient: Raynald Dance (69 y.o. male)  Date: 7/15/2022  Primary Diagnosis: Acute blood loss anemia [D62]  Hematuria [R31.9]  Hypokalemia [E87.6]       Precautions: falls    ASSESSMENT  This is a 79 y/o male who [facility]  [facility]  ED with c/o  hematuria coming out of the suprapubic catheter, admitted on 7/15/22 for acute blood loss anemia. Currently, HgB -7.9 . Pt is A&O x 4,  per pt report, he was mod I for functional transfers/mobility with SPC prior to admission.      Based on the objective data described below, the patient presents with c/o stiffness at R knee,  decreased strength , -3/5 grossly in R LE, L  LE- WFL, balance deficits, generalized weakness, decreased activity tolerance and increased need for assist with functional mobility ( needs mod I for rolling from side to side, mod I for supine>sit transfers, intact  static sitting balance, SBA for sit <>stand and bed<>chair transfers, good  static  standing balance with support, is able to ambulate - [de-identified]' with SPC, SBA/CGA with antalgic gait) . Pt educated on cb L E HEP in prep for safe transfers/mobility  with pt demonstrating and verbalizing understanding. Recommend d/c to home with HHPT and family care when medically appropriate. Current Level of Function Impacting Discharge (mobility/balance): Pt requires SBA/CGA for functional transfers/mobility    Functional Outcome Measure: The patient scored 15 on the tinetti outcome measure which is indicative of high risk for falls. Other factors to consider for discharge: lives alone, family suppory     Patient will benefit from skilled therapy intervention to address the above noted impairments. PLAN :  Recommendations and Planned Interventions: bed mobility training, transfer training, gait training, therapeutic exercises, neuromuscular re-education, patient and family training/education and therapeutic activities      Frequency/Duration: Patient will be followed by physical therapy:  3 times a week to address goals.     Recommendation for discharge: (in order for the patient to meet his/her long term goals)  Physical therapy at least 2 days/week in the home AND ensure assist and/or supervision for safety with functional mobilty    This discharge recommendation:  Has been made in collaboration with the attending provider and/or case management    IF patient discharges home will need the following DME: none         SUBJECTIVE:   Patient stated My R knee is stiff    OBJECTIVE DATA SUMMARY:   HISTORY:    Past Medical History:   Diagnosis Date    BPH (benign prostatic hyperplasia)     COVID-19 vaccine series completed     [address] [address]     Cruz catheter in place 11/2022    High cholesterol     Hypertension     No blood products     Urinary retention      Past Surgical History:   Procedure Laterality Date    IR ASP BLADDER SUPRA CATH  4/22/2022    IR ASP BLADDER SUPRA CATH  6/22/2022    IR KYPHOPLASTY THORACIC 4/20/2021       Personal factors and/or comorbidities impacting plan of care:     Home Situation  Home Environment: Private residence  # Steps to Enter: 2  Rails to Enter: Yes  Hand Rails : Bilateral  Wheelchair Ramp: No  One/Two Story Residence: One story  Living Alone: Yes  Support Systems: Other Family Member(s)  Patient Expects to be Discharged to[de-identified] Home with family assistance  Current DME Used/Available at Home: Cane, straight,Grab bars    EXAMINATION/PRESENTATION/DECISION MAKING:   Critical Behavior:  Neurologic State: Alert  Orientation Level: Oriented X4  Cognition: Follows commands     Hearing: Auditory  Auditory Impairment: None  Skin:  Intact where exposed    Range Of Motion:  AROM: Generally decreased, functional (R Le, L Le - WFL)  Strength:    Strength: Generally decreased, functional (-3/5 grossly r Le, L Le - WFL)  Tone & Sensation:   Tone: Normal  Sensation: Intact  Functional Mobility:  Bed Mobility:  Rolling: Modified independent  Supine to Sit: Modified independent  Sit to Supine: Contact guard assistance (for r LE)  Scooting: Supervision  Transfers:  Sit to Stand: Stand-by assistance  Stand to Sit: Stand-by assistance        Bed to Chair: Stand-by assistance  Balance:   Sitting: Intact; Without support  Standing: Impaired; Without support  Standing - Static: Good;Constant support  Standing - Dynamic : Fair;Constant support  Ambulation/Gait Training:  Distance (ft): 80 Feet (ft)  Assistive Device: Gait belt;Cane, straight  Ambulation - Level of Assistance: Stand-by assistance;Contact guard assistance  Gait Abnormalities: Antalgic  Speed/Graciela: Slow    Functional Measure:  Tinetti test:    Sitting Balance: 1  Arises: 1  Attempts to Rise: 0  Immediate Standing Balance: 1  Standing Balance: 1  Nudged: 1  Eyes Closed: 0  Turn 360 Degrees - Continuous/Discontinuous: 0  Turn 360 Degrees - Steady/Unsteady: 0  Sitting Down: 1  Balance Score: 6 Balance total score  Indication of Gait: 0  R Step Length/Height: 1  L Step Length/Height: 1  R Foot Clearance: 1  L Foot Clearance: 1  Step Symmetry: 1  Step Continuity: 1  Path: 1  Trunk: 1  Walking Time: 1  Gait Score: 9 Gait total score  Total Score: 15/28 Overall total score         Tinetti Tool Score Risk of Falls  <19 = High Fall Risk  19-24 = Moderate Fall Risk  25-28 = Low Fall Risk  Tinetti ME. Performance-Oriented Assessment of Mobility Problems in Elderly Patients. [address] 66; [id number]. (Scoring Description: PT Bulletin Feb. 10, 1993)    Older adults: Samina Brunson et al, 2009; n = 1000 [facility] elderly evaluated with ABC, LEDA, ADL, and IADL)  · Mean LEDA score for males aged 69-68 years = 26.21(3.40)  · Mean LEDA score for females age 69-68 years = 25.16(4.30)  · Mean LEDA score for males over 80 years = 23.29(6.02)  · Mean LEDA score for females over 80 years = 17.20(8.32)       Physical Therapy Evaluation Charge Determination   History Examination Presentation Decision-Making   MEDIUM  Complexity : 1-2 comorbidities / personal factors will impact the outcome/ POC  MEDIUM Complexity : 3 Standardized tests and measures addressing body structure, function, activity limitation and / or participation in recreation  LOW Complexity : Stable, uncomplicated  Other outcome measures Tinetti  HIGH       Based on the above components, the patient evaluation is determined to be of the following complexity level: LOW     Pain Ratin/10    Activity Tolerance:   Good    After treatment patient left in no apparent distress:   Supine in bed, Call bell within reach and Caregiver / family present    COMMUNICATION/EDUCATION:   The patients plan of care was discussed with: Registered nurse. Fall prevention education was provided and the patient/caregiver indicated understanding. and Patient/family agree to work toward stated goals and plan of care.     Thank you for this referral.  Anjana Gallego   Time Calculation: 34 mins

## 2022-07-15 NOTE — PROGRESS NOTES
Problem: Falls - Risk of  Goal: *Absence of Falls  Description: Document Amber Arriaga Fall Risk and appropriate interventions in the flowsheet.   Outcome: Progressing Towards Goal  Note: Fall Risk Interventions:  Mobility Interventions: Bed/chair exit alarm,Patient to call before getting OOB,PT Consult for mobility concerns              Elimination Interventions: Bed/chair exit alarm,Call light in reach              Problem: Patient Education: Go to Patient Education Activity  Goal: Patient/Family Education  Outcome: Progressing Towards Goal

## 2022-07-15 NOTE — CONSULTS
New Urology Consult Note    Patient: Maricarmen Gonzalez MRN: [id number]  SSN: [id number]    YOB: 1955  Age: 78 y.o. Sex: male            Assessment:Plan:     SP 2 units RBCs for hgb of 6.5, repeat Hgb this AM 7.9  No leukocytosis, Cr 0.89  AF VSS   CT abd pelv WO:   IMPRESSION  1. Enlarged and irregular appearance of the prostate with heterogeneous area in  the bladder containing several locules of gas which appear contained. There is a  suprapubic catheter within the bladder as well as a collection of gas in an  antidependent portion of the bladder. 2. Increasing size of lung nodules. 3. Incidental findings as above    UA unreliable in the setting of chronic SPT  I manually irrigated SPT with retrieval of approx 50ml small blood clots. Catheter irrigated without resistance. Irrigated until urine color concentrated toni with pink tinge. Nursing reports good UO overnight, emptying approximately 500ml since dayshift starting. I reassessed pt nearly three hrs after performing irrigation. Catheter draining with clear, brown tinged urine. Plan:  Gross hematuria in the presence of SPT, metastatic prostate cancer   -No indication for CBI at this time. Manual irrigation PRN by nursing staff. Does not appear to be actively bleeding. If hematuria continues with worsening anemia, may consider diverting with bilateral PCNs for palliative purposes although not recommended at this time.   -Hgb stabilized after 2units prbcs, now 7.9.   -No obvious concerns for infection, AF, no leukocytosis    -Continue with SPT, plan for exchange OP next week    No acute urologic interventions planned. Will peripherally review chart in AM to ensure pt remains hemodynamically stable   Thank you for this consult. Please contact [facility] with any further questions/concerns. Addendum: AM labs ordered. Plan discussed with Dr. Bard Linda.  Discussed if worsening anemia or hematuria the potential for bilateral PCNs in the future for palliative purposes- pt is not agreeable to this. History of Present Illness:     Chief Complaint:  Bleeding around SPT site yesterday      Isra Stephenson is seen in consultation for reasons noted above at the request of Dada Chester MD.    This is a 78 y.o. male with a history of HTN, HLD, DM, BPH, metastatic prostate CA, SPT presented to the ER last night with complaints of acute onset bleeding around SPT site. Reports resolution upon admission. Denies associated symptoms of abdominal or flank pain, fever, or catheter occlusion. Initial workup as follows: AFVSS, Hgb 6.5, Cr 1.12, no leukocytosis. CT abd/pelv WO performed, images personally reviewed, revealing enlarged and irregular prostate with heterogeneous area in bladder containing several locules of gas, SPT in place. Since admission, pt has received two units prbc, latest hgb 7.9. Urology consulted today for gross hematuria in the presence of SPT with associated anemia. Known pt of Dr. Zell Holter with  who has been seen multiple times recently for issues with SPT since placement. Followed by Dr. Lisa Braswell for known bony mets, RP adenopathy. Biopsy April 2021 T10/11 kyphoplasty consistent with metastatic prostate cancer. Started [facility] April 2021. Received radiation with Dr. Kathryn Santana 5/7/21 to T spine T9-12. Started Julee Spore with prednisone 5/10/21 and Lupron 5/26/21. PSA was 153 on 4/17/21. PSA 5/6/22 was 0.1. Increasing pulmonary mets on recents scans. S/p cysto with complicated Cruz catheter placement 4/11/22. He was scheduled for TURP that was aborted due to extensive urethral involvement with metastatic deposits presumably from metastatic prostate cancer. Initial placement of SPT 4/22/22 in setting of metastatic prostate cancer with urethral involvement and retention. He has been seen in office since for SPT issues.  It was attempted in office 6/22 to replace SPT -MD attempted Cruz catheter placement via urethra, cystoscopic placement of Cruz catheter via urethra, replacement of SP tube and endoscopic evaluation of suprapubic tract, all unsuccessful. He was sent to [facility] where the [facility] tube was replaced by IR 6/22. He presented once more to office 7/1/22 with complaints of catheter tubing disconnecting from bag and requesting new SPT, however, since SPT was in place and the track was newly established, we recommend it not be exchanged at that time. Due for next exchange 7/20/22.      Subjective     Past Medical History  Past Medical History:   Diagnosis Date    BPH (benign prostatic hyperplasia)     COVID-19 vaccine series completed     Diabetes ([facility] [address]     Cruz catheter in place 11/2022    High cholesterol     Hypertension     No blood products     Urinary retention        Past Surgical History:   Past Surgical History:   Procedure Laterality Date    IR ASP BLADDER SUPRA CATH  4/22/2022    IR ASP BLADDER SUPRA CATH  6/22/2022    IR KYPHOPLASTY THORACIC  4/20/2021       Medication:  Current Facility-Administered Medications   Medication Dose Route Frequency Provider Last Rate Last Admin    0.9% sodium chloride infusion 250 mL  250 mL IntraVENous PRN Rohith Olvera MD        0.9% sodium chloride infusion 250 mL  250 mL IntraVENous PRN Nixon Narvaez, DO        atenoloL (TENORMIN) tablet 50 mg  50 mg Oral QHS Nixon Brazen, DO   50 mg at 07/14/22 2140    atorvastatin (LIPITOR) tablet 10 mg  10 mg Oral QHS Nixnoual Brazen, DO   10 mg at 07/14/22 2141    finasteride (PROSCAR) tablet 5 mg  5 mg Oral DAILY Nixonmarilyn Narvaez, DO   5 mg at 07/15/22 0912    alogliptin (NESINA) tablet 6.25 mg  6.25 mg Oral DAILY Nixon Brazen, DO   6.25 mg at 07/15/22 0912    insulin glargine (LANTUS) injection 15 Units  15 Units SubCUTAneous BID Nixon Narvaez, DO   15 Units at 07/15/22 0851    pioglitazone (ACTOS) tablet 45 mg  45 mg Oral DAILY Nixon Brazumair, DO   45 mg at 07/15/22 0851    predniSONE (DELTASONE) tablet 5 mg  5 mg Oral DAILY Nixon Brazen, DO   5 mg at 07/15/22 0855    tamsulosin (FLOMAX) capsule 0.4 mg  0.4 mg Oral BID Samual Brazen, DO   0.4 mg at 07/15/22 0851    sodium chloride (NS) flush 5-40 mL  5-40 mL IntraVENous Q8H Nixon Brazen, DO   10 mL at 07/15/22 0524    sodium chloride (NS) flush 5-40 mL  5-40 mL IntraVENous PRN Nixon Daryen, DO        acetaminophen (TYLENOL) tablet 650 mg  650 mg Oral Q6H PRN Nixon Daryen, DO        Or    acetaminophen (TYLENOL) suppository 650 mg  650 mg Rectal Q6H PRN Nixon Daryen, DO        polyethylene glycol (MIRALAX) packet 17 g  17 g Oral DAILY Nixonual Brazen, DO   17 g at 07/15/22 8253    senna-docusate (PERICOLACE) 8.6-50 mg per tablet 1 Tablet  1 Tablet Oral BID Nixon Brazen, DO   1 Tablet at 07/15/22 0851    magnesium hydroxide (MILK OF MAGNESIA) 400 mg/5 mL oral suspension 30 mL  30 mL Oral DAILY PRN Nixon Daryen, DO        bisacodyL (DULCOLAX) suppository 10 mg  10 mg Rectal DAILY PRN [facility] Daryen, DO        ondansetron (ZOFRAN ODT) tablet 4 mg  4 mg Oral Q8H PRN [facility], DO        Or    ondansetron [facility]) injection 4 mg  4 mg IntraVENous Q6H PRN [facility] Daryen, DO        famotidine (PEPCID) tablet 20 mg  20 mg Oral BID Nixon Brazen, DO   20 mg at 07/15/22 0851    alum-mag hydroxide-simeth (MYLANTA) oral suspension 15 mL  15 mL Oral Q6H PRN Nixon Daryen, DO        cefTRIAXone (ROCEPHIN) 2 g in 0.9% sodium chloride 20 mL IV syringe  2 g IntraVENous Q24H Nixon Brazen, DO   2 g at 07/14/22 2140    0.9% sodium chloride infusion  75 mL/hr IntraVENous CONTINUOUS Nixonmarilyn Footeen, DO 75 mL/hr at 07/15/22 0802 75 mL/hr at 07/15/22 0802       Allergies:   Allergies   Allergen Reactions    Erythromycin Other (comments)     Insomnia- felt like speed       Social History:  Social History     Tobacco Use    Smoking status: Former Smoker     Types: Cigarettes    Smokeless tobacco: Never Used   Vaping Use    Vaping Use: Unknown   Substance Use Topics    Alcohol use: Never    Drug use: Never Family History  Family History   Problem Relation Age of Onset    Anesth Problems Neg Hx     Clotting Disorder Neg Hx        Review of Systems  Unchanged from admitting provider note from 7/14/22 other than HPI. Objective:     Vital signs in last 24 hours:  Visit Vitals  BP (!) 145/66   Pulse 68   Temp 98.2 °F (36.8 °C)   Resp 17   Ht 5' 8\" (1.727 m)   Wt 104.3 kg (230 lb)   SpO2 99%   BMI 34.97 kg/m²       Intake/Output last 3 shifts:  Date 07/14/22 0700 - 07/15/22 0659 07/15/22 0700 - 07/16/22 0659   Shift 4334-3360 9975-1728 24 Hour Total 3581-4551 4785-6387 24 Hour Total   INTAKE   Blood  372.5 372.5 265  265     Volume (TRANSFUSE PACKED RBC'S)  372.5 372.5        Volume (TRANSFUSE PACKED RBC'S)    265  265   Shift Total(mL/kg)  372.5(3.6) 372.5(3.6) 265(2.5)  265(2.5)   OUTPUT   Urine(mL/kg/hr)  750(0.6) 750(0.3)        Urine Output (mL) (Supra-pubic Catheter 04/26/22)  750 750      Shift Total(mL/kg)  750(7.2) 750(7.2)      NET  -377.5 -377.5 265  265   Weight (kg) 104.3 104.3 104.3 104.3 104.3 104. 3       Physical Exam  General Appearance: NAD, awake  HENT: atraumatic, normal ears  Cardiovascular: not tachycardic, no distress  Respiratory: no distress, room air  Abdomen: soft, no suprapubic fullness or tenderness  : SPT in place with concentrated toni/pink tinged urine in tubing   Extremities: moves all  Musculoskeletal: normal alignment of neck and head  Neuro: Appropriate, no focal neurological deficits  Mood/Affect: appropriate, A&O x 3    Lab/Imaging Review:       Most Recent Labs:  Lab Results   Component Value Date/Time    WBC 9.8 07/15/2022 01:39 AM    HGB 7.9 (L) 07/15/2022 09:35 AM    HCT 20.2 (L) 07/15/2022 01:39 AM    PLATELET 272 (H) 54/72/2583 01:39 AM    MCV 74.3 (L) 07/15/2022 01:39 AM        Lab Results   Component Value Date/Time    Sodium 142 07/15/2022 01:39 AM    Potassium 3.2 (L) 07/15/2022 01:39 AM    Chloride 113 (H) 07/15/2022 01:39 AM    CO2 20 (L) 07/15/2022 01:39 AM Anion gap 9 07/15/2022 01:39 AM    Glucose 84 07/15/2022 01:39 AM    BUN 18 07/15/2022 01:39 AM    Creatinine 0.89 07/15/2022 01:39 AM    BUN/Creatinine ratio 20 07/15/2022 01:39 AM    GFR est AA >60 07/15/2022 01:39 AM    GFR est non-AA >60 07/15/2022 01:39 AM    Calcium 8.4 (L) 07/15/2022 01:39 AM    Bilirubin, total 0.4 06/22/2022 12:14 PM    Alk. phosphatase 132 (H) 06/22/2022 12:14 PM    Protein, total 6.4 06/22/2022 12:14 PM    Albumin 2.4 (L) 06/22/2022 12:14 PM    Globulin 4.0 06/22/2022 12:14 PM    A-G Ratio 0.6 (L) 06/22/2022 12:14 PM    ALT (SGPT) 10 (L) 06/22/2022 12:14 PM        Lab Results   Component Value Date/Time    Prostate Specific Ag 0.1 05/06/2022 11:29 AM    Prostate Specific Ag 0.2 03/16/2022 11:31 AM    Prostate Specific Ag 0.2 02/11/2022 11:36 AM        COAGS:    Lab Results   Component Value Date/Time    PTP 11.8 (H) 07/15/2022 01:39 AM    INR 1.1 07/15/2022 01:39 AM       Lab Results   Component Value Date/Time    Hemoglobin A1c 11.6 (H) 04/16/2021 02:18 PM        Lab Results   Component Value Date/Time    Troponin-I, Qt. <0.05 04/16/2021 02:18 PM          Urine/Blood Cultures:  Results     Procedure Component Value Units Date/Time    URINE CULTURE HOLD SAMPLE [[id number]] Collected: 07/14/22 2275    Order Status: Completed Specimen: Urine from Serum Updated: 07/14/22 1801     Urine culture hold       Urine on hold in Microbiology dept for 2 days. If unpreserved urine is submitted, it cannot be used for addtional testing after 24 hours, recollection will be required. IMAGING:  CT ABD PELV WO CONT    Result Date: 7/14/2022  EXAM:  CT ABDOMEN PELVIS WITHOUT CONTRAST INDICATION:  hematuria from suprapubic catheter, RLL pain. Additional history: COMPARISON: CT of the abdomen and pelvis, 6/22/2022. . TECHNIQUE: Unenhanced multislice helical CT was performed from the diaphragm to the symphysis pubis without intravenous contrast administration.  Contiguous 5 mm axial images were reconstructed and lung and soft tissue windows were generated. Coronal and sagittal reformations were generated. CT dose reduction was achieved through use of a standardized protocol tailored for this examination and automatic exposure control for dose modulation. Jez Padgett FINDINGS: INCIDENTALLY IMAGED CHEST: Heart/vessels: Diminished attenuation in the blood pool suggesting anemia. Calcifications about the aortic valve. Calcifications in the coronary arteries. Lungs/Pleura: Increased size of multiple pulmonary nodules. . . ABDOMEN: Liver: Within normal limits. Gallbladder/Biliary: Within normal limits. Spleen: Within normal limits. Pancreas: Within normal limits. Adrenals: Within normal limits. Kidneys: Low-attenuation lesion in the right kidney is likely not significantly changed Peritoneum/Mesenteries: Within normal limits. Extraperitoneum: Within normal limits. Gastrointestinal tract: Within normal limits. Normal-appearing appendix Vascular: Within normal limits. Jez Padgett PELVIS: Extraperitoneum: Within normal limits. Ureters: Within normal limits. Bladder: There is a suprapubic catheter within the bladder that has heterogeneous attenuation, several confined locules of gas and a collection of gas in the antidependent portion of the bladder. Reproductive System: Enlarged and irregular appearance of the prostate. . MSK: Degenerative changes throughout the spine. Vertebroplasty in T10 and T11. Sclerosis in the sternum and T12 .    1. Enlarged and irregular appearance of the prostate with heterogeneous area in the bladder containing several locules of gas which appear contained. There is a suprapubic catheter within the bladder as well as a collection of gas in an antidependent portion of the bladder. 2. Increasing size of lung nodules. 3. Incidental findings as above.             Signed By: Milvia Jeffers NP  - July 15, 2022

## 2022-07-15 NOTE — PROGRESS NOTES
[facility]  [address], [facility] [address]  [phone]       Hospitalist Admission Note      NAME: Maggi Anand   :  1955   MRN:  [id number]     Date/Time:  7/15/2022     Patient PCP: Brad Albert MD    Emergency Contact:    Extended Emergency Contact Information  Primary Emergency Contact: [address] Phone: [phone]  Mobile Phone: [phone]  Relation: Daughter  Low vision? Yes   needed? No  Secondary Emergency Contact: Mary Sol, [place] Phone: [phone]  Relation: Son      Code: DNR     Isolation Precautions: There are currently no Active Isolations        Subjective:     REASON FOR VISIT: Anemia    HPI & INTERVAL HISTORY:     Patient is a 78 y.o. male with medical h/o metastatic prostate cancer on outpatient chemotherapy, hormone therapy, also followed by urologist, on suprapubic catheter, who presented to [facility] ED with gross hematuria coming out of the suprapubic catheter, patient denies prior episodes before, complains of diffuse weakness, denies dysuria,. Patient is followed by [facility] Dr. Vicky Mendoza    7/15: Patient was seen and examined. He has received PRBC transfusions. He reports feeling better with much less MORROW posttransfusion. Denying chest pain or shortness of breath. Cruz in place still with hematuria. However, no need for CBI per urology note.       Allergies   Allergen Reactions    Erythromycin Other (comments)     Insomnia- felt like speed         ROS:    Gen:   fatigue  Eyes:  negative  ENT:    negative  Resp:  negative and denies cough, shortness of breath, sputum, pleuritis, hemoptysis, wheezing  CVS:   denies palpitations, CP, dizziness, syncope, edema, PND, MORROW, orthopnea  GI:       negative and denies abd pain, nausea, vomit, diarrhea, constipation, GERD, melena, hematochezia  :     hematuria         Objective:      Visit Vitals  BP (!) 145/66   Pulse 68   Temp 98.2 °F (36.8 °C)   Resp 17  5' 8\" (1.727 m)   Wt 104.3 kg (230 lb)   SpO2 99%   BMI 34.97 kg/m²       Physical Exam:    General: alert, well appearing and no distress  Head: Normocephalic, without obvious abnormality, atraumatic  Eyes: PERRL, EOMI, anicteric sclerae and conjuntiva clear  ENT: Lips, mucosa, and tongue normal.   Neck: normal, supple and no tenderness  Lungs: clear to auscultation with good breath sounds and normal respiratory effort  Heart: S1, S2 normal, regular rate and regular rhythm  Abd: not distended, soft, nontender, BS present and normactive  Ext: no cyanosis and no edema  Skin: normal skin color, no rashes and texture normal  Neuro:  alert, oriented x 3, no defects noted in general exam.  Psych: not anxious, cooperative, appropriate affect      Medications:  Current Facility-Administered Medications   Medication Dose Route Frequency    0.9% sodium chloride infusion 250 mL  250 mL IntraVENous PRN    0.9% sodium chloride infusion 250 mL  250 mL IntraVENous PRN    atenoloL (TENORMIN) tablet 50 mg  50 mg Oral QHS    atorvastatin (LIPITOR) tablet 10 mg  10 mg Oral QHS    finasteride (PROSCAR) tablet 5 mg  5 mg Oral DAILY    alogliptin (NESINA) tablet 6.25 mg  6.25 mg Oral DAILY    insulin glargine (LANTUS) injection 15 Units  15 Units SubCUTAneous BID    pioglitazone (ACTOS) tablet 45 mg  45 mg Oral DAILY    predniSONE (DELTASONE) tablet 5 mg  5 mg Oral DAILY    tamsulosin (FLOMAX) capsule 0.4 mg  0.4 mg Oral BID    sodium chloride (NS) flush 5-40 mL  5-40 mL IntraVENous Q8H    sodium chloride (NS) flush 5-40 mL  5-40 mL IntraVENous PRN    acetaminophen (TYLENOL) tablet 650 mg  650 mg Oral Q6H PRN    Or    acetaminophen (TYLENOL) suppository 650 mg  650 mg Rectal Q6H PRN    polyethylene glycol (MIRALAX) packet 17 g  17 g Oral DAILY    senna-docusate (PERICOLACE) 8.6-50 mg per tablet 1 Tablet  1 Tablet Oral BID    magnesium hydroxide (MILK OF MAGNESIA) 400 mg/5 mL oral suspension 30 mL  30 mL Oral DAILY PRN    bisacodyL (DULCOLAX) suppository 10 mg  10 mg Rectal DAILY PRN    ondansetron (ZOFRAN ODT) tablet 4 mg  4 mg Oral Q8H PRN    Or    ondansetron (ZOFRAN) injection 4 mg  4 mg IntraVENous Q6H PRN    famotidine (PEPCID) tablet 20 mg  20 mg Oral BID    alum-mag hydroxide-simeth (MYLANTA) oral suspension 15 mL  15 mL Oral Q6H PRN    cefTRIAXone (ROCEPHIN) 2 g in 0.9% sodium chloride 20 mL IV syringe  2 g IntraVENous Q24H    0.9% sodium chloride infusion  75 mL/hr IntraVENous CONTINUOUS        Labs:    Recent Labs     07/15/22  0935 07/15/22  0139 07/15/22  0139   WBC  --   --  9.8   HGB 7.9*   < > 6.3*   HCT  --   --  20.2*   PLT  --   --  460*    < > = values in this interval not displayed. Recent Labs     07/15/22  0139      K 3.2*   *   CO2 20*   GLU 84   BUN 18   CREA 0.89   CA 8.4*   MG 1.8         Radiology:  No results found.       The chart, lab work, and radiological studies was reviewed by me on: July 15, 2022         Assessment/Plan:      Acute blood loss anemia POA requiring transfusions due to ongoing hematuria   Monitor H&H  Transfuse as needed  IV iron    Hypokalemia (7/14/2022)  Monitor and replace as needed       Body mass index is 34.97 kg/m².:  30.0 - 39.9 Obese      Risk of deterioration: high      Discussed:  Care Plan discussed with: Patient and Care Manager    Prophylaxis:  Lovenox and SCD's    Probable disposition:  Home with family    Total time: 28 Minutes **I personally saw and examined the patient during this time period**    Date of service:    7/15/2022                ___________________________________________________    Admitting Physician: Kishan Almonte MD

## 2022-07-15 NOTE — TELEPHONE ENCOUNTER
[address] at [facility]  [phone]    07/15/22- Discussed with Dr. Lizeth Arnold, it's too early for the CT scan. We were planning to have that done 3 months from his last CT. As for the biopsy, he's not stable for that right now with worsening anemia and requiring transfusions. If he's still in the hospital after the weekend, we can readdress. Explained this to patient's daughter. She verbalized understanding and was appreciative of the call.

## 2022-07-15 NOTE — PROGRESS NOTES
7/15/2022 9:42 AM  CM called and completed assessment with pt over the phone. Charted address and phone numbers confirmed. Reason for Admission:    1. Anemia, unspecified type    2. Gross hematuria                   RUR Score:   23%/HIGH         PCP: First and Last name:  Ji Christiansen MD     Name of Practice:   Are you a current patient: Yes/No: Yes   Approximate date of last visit: June 2022   Can you do a virtual visit with your PCP: No             Resources/supports as identified by patient/family:  Supportive daughter and additional family members    COVID vaccination status: Received 1 dose                Top Challenges facing patient (as identified by patient/family and CM): Finances/Medication cost?  None, pt has RX coverage and fills scripts at [facility]? No concerns, pt transports himself to appts, if he is unable family transports               Support system or lack thereof? No concerns, pt does live alone but has family support                       Living arrangements? No concerns, lives alone in a 1 story home in [place], there are 2 steps to enter. Self-care/ADLs/Cognition? No concerns, independent with adls and driving prior to admission. Pt uses a cane to assist with ambulation in the community. Current Advanced Directive/Advance Care Plan:  DNR      Healthcare Decision Maker:   Click here to complete HealthCare Decision Makers including selection of the Healthcare Decision Maker Relationship (ie \"Primary\")      Payor Source Payor: [facility] MEDICARE / Plan: Jeremy Nice / Product Type: Medicare /                             Plan for utilizing home health: Has no history                    Transition of Care Plan:   TBD at this time  PT and OT evals pending. CM will follow for needs. LARISSA Shah    Care Management Interventions  PCP Verified by CM:  Yes  Romeot Signup: No  Discharge Durable Medical Equipment: No  Physical Therapy Consult: Yes  Occupational Therapy Consult: Yes  Support Systems: Child(evelin),Other Family Member(s)  Confirm Follow Up Transport: Self  Discharge Location  Patient Expects to be Discharged to[de-identified] Home with family assistance

## 2022-07-15 NOTE — PROGRESS NOTES
Pt was received from ED on 7/14 around 2000. Pt has had an order of 1 unit Blood transfusion. Blood transfusion was completed around 01:24am. RN resent the blood sample for hemoglobin test and his hem came to 6. 3. Doctor renata notified and ordered for another1 unit of Blood Transfusion and advised to check his hem post 2 hrs of transfusion.

## 2022-07-16 NOTE — PROGRESS NOTES
Urology Progress Note    Subjective:     Daily Progress Note: 2022 7:28 AM    No acute events overnight. Urine toni   Objective:     Visit Vitals  /70 (BP 1 Location: Right lower arm, BP Patient Position: At rest)   Pulse 60   Temp 98.5 °F (36.9 °C)   Resp 17   Ht 5' 8\" (1.727 m)   Wt 104.3 kg (230 lb)   SpO2 98%   BMI 34.97 kg/m²        Temp (24hrs), Av.1 °F (37.3 °C), Min:98.2 °F (36.8 °C), Max:100.2 °F (37.9 °C)      Intake and Output:   1901 -  0700  In: 637.5   Out: 5881 [Urine:1725]  No intake/output data recorded.     Physical Exam:   gen nad, alert  abd soft, nt/nd  Spt draining toni urine     Data Review:    Lab Results   Component Value Date/Time    WBC 9.8 07/15/2022 01:39 AM    HCT 20.2 (L) 07/15/2022 01:39 AM    PLATELET 478 (H)  01:39 AM    Sodium 142 07/15/2022 01:39 AM    Potassium 3.2 (L) 07/15/2022 01:39 AM    Chloride 113 (H) 07/15/2022 01:39 AM    CO2 20 (L) 07/15/2022 01:39 AM    BUN 18 07/15/2022 01:39 AM    Creatinine 0.89 07/15/2022 01:39 AM    Glucose 84 07/15/2022 01:39 AM    Calcium 8.4 (L) 07/15/2022 01:39 AM    Magnesium 1.8 07/15/2022 01:39 AM    INR 1.1 07/15/2022 01:39 AM    Prostate Specific Ag 0.1 2022 11:29 AM       Lab Review:     Recent Labs     07/15/22  0935 07/15/22  01322  1531   WBC  --  9.8 10.8   HGB 7.9* 6.3* 6.5*   HCT  --  20.2* 20.9*   PLT  --  460* 531*     Recent Labs     07/15/22  01322  1531    143   K 3.2* 3.3*   * 114*   CO2 20* 21   GLU 84 128*   BUN 18 20   CREA 0.89 1.12   CA 8.4* 8.9   MG 1.8  --    INR 1.1  --      Lab Results   Component Value Date/Time    Glucose (POC) 120 (H) 07/15/2022 04:42 PM    Glucose (POC) 88 07/15/2022 08:54 AM    Glucose (POC) 163 (H) 2022 03:30 PM    Glucose (POC) 116 2022 09:52 AM    Glucose (POC) 65 2022 02:08 PM         Assessment/Plan:     Active Problems:    Hypokalemia (2022)      Acute blood loss anemia (2022)      Hematuria (7/14/2022)      Metastatic prostate cancer       Hgb stable 7.9 yesterday after 2U PRBC     Plan:  Gross hematuria in the presence of SPT, metastatic prostate cancer   Monitor Hgb for stability   No acute urologic interventions planned    Signed By: Marcial Miller MD                         July 16, 2022

## 2022-07-16 NOTE — PROGRESS NOTES
[facility]fred[facility] [address]  [address] [facility] [address]  [phone]       Hospitalist Admission Note      NAME: Eric Witt   :  1955   MRN:  [id number]     Date/Time:  2022     Patient PCP: Vahid Jennings MD    Emergency Contact:    Extended Emergency Contact Information  Primary Emergency Contact: [address] Phone: [phone]  Mobile Phone: [phone]  Relation: Daughter  Low vision? Yes   needed? No  Secondary Emergency Contact: Mary [address], [place] Phone: [phone]  Relation: Son      Code: DNR     Isolation Precautions: There are currently no Active Isolations        Subjective:     REASON FOR VISIT: Anemia    HPI & INTERVAL HISTORY:     Patient is a 78 y.o. male with medical h/o metastatic prostate cancer on outpatient chemotherapy, hormone therapy, also followed by urologist, on suprapubic catheter, who presented to [facility] ED with gross hematuria coming out of the suprapubic catheter, patient denies prior episodes before, complains of diffuse weakness, denies dysuria,. Patient is followed by [place] [facility] Dr. Molly Jacobson    : Patient seen and examined. He has no complaints. Hematuria is improving and possibly have stopped. Urine is starting to clear up. Hemoglobin pending. 7/15: Patient was seen and examined. He has received PRBC transfusions. He reports feeling better with much less MORROW posttransfusion. Denying chest pain or shortness of breath. Cruz in place still with hematuria. However, no need for CBI per urology note.       Allergies   Allergen Reactions    Erythromycin Other (comments)     Insomnia- felt like speed         ROS:    Gen:   fatigue  Resp:  negative  CVS:   denies palpitations, CP, dizziness, syncope, edema, PND, MORROW, orthopnea  GI:       negative and denies abd pain, nausea, vomit, diarrhea, constipation, GERD, melena, hematochezia  :     hematuria         Objective:      Visit Vitals  /71 (BP 1 Location: Right arm, BP Patient Position: At rest)   Pulse 60   Temp 97.9 °F (36.6 °C)   Resp 17   Ht 5' 8\" (1.727 m)   Wt 104.3 kg (230 lb)   SpO2 98%   BMI 34.97 kg/m²       Physical Exam:    General: NAD  Head: Normocephalic, without obvious abnormality, atraumatic  Eyes: anicteric sclerae and conjuntiva clear  ENT: Lips, mucosa, and tongue normal.   Neck: normal, supple and no tenderness  Lungs: clear to auscultation Y  Heart: S1, S2 normal, regular rate and regular rhythm  Abd: not distended, soft, nontender, BS present and normactive  Ext: no cyanosis and no edema  Skin: normal skin color, no rashes and texture normal  Neuro:  alert, oriented x 3, no defects noted in general exam.  Psych: not anxious, cooperative, appropriate affect      Medications:  Current Facility-Administered Medications   Medication Dose Route Frequency    0.9% sodium chloride infusion 250 mL  250 mL IntraVENous PRN    ferric gluconate (FERRLECIT) 125 mg in 0.9% sodium chloride 100 mL IVPB  125 mg IntraVENous Q24H    0.9% sodium chloride infusion 250 mL  250 mL IntraVENous PRN    atenoloL (TENORMIN) tablet 50 mg  50 mg Oral QHS    atorvastatin (LIPITOR) tablet 10 mg  10 mg Oral QHS    finasteride (PROSCAR) tablet 5 mg  5 mg Oral DAILY    alogliptin (NESINA) tablet 6.25 mg  6.25 mg Oral DAILY    insulin glargine (LANTUS) injection 15 Units  15 Units SubCUTAneous BID    pioglitazone (ACTOS) tablet 45 mg  45 mg Oral DAILY    predniSONE (DELTASONE) tablet 5 mg  5 mg Oral DAILY    tamsulosin (FLOMAX) capsule 0.4 mg  0.4 mg Oral BID    sodium chloride (NS) flush 5-40 mL  5-40 mL IntraVENous Q8H    sodium chloride (NS) flush 5-40 mL  5-40 mL IntraVENous PRN    acetaminophen (TYLENOL) tablet 650 mg  650 mg Oral Q6H PRN    Or    acetaminophen (TYLENOL) suppository 650 mg  650 mg Rectal Q6H PRN    polyethylene glycol (MIRALAX) packet 17 g  17 g Oral DAILY    senna-docusate (PERICOLACE) 8.6-50 mg per tablet 1 Tablet  1 Tablet Oral BID    magnesium hydroxide (MILK OF MAGNESIA) 400 mg/5 mL oral suspension 30 mL  30 mL Oral DAILY PRN    bisacodyL (DULCOLAX) suppository 10 mg  10 mg Rectal DAILY PRN    ondansetron (ZOFRAN ODT) tablet 4 mg  4 mg Oral Q8H PRN    Or    ondansetron (ZOFRAN) injection 4 mg  4 mg IntraVENous Q6H PRN    famotidine (PEPCID) tablet 20 mg  20 mg Oral BID    alum-mag hydroxide-simeth (MYLANTA) oral suspension 15 mL  15 mL Oral Q6H PRN    cefTRIAXone (ROCEPHIN) 2 g in 0.9% sodium chloride 20 mL IV syringe  2 g IntraVENous Q24H    0.9% sodium chloride infusion  75 mL/hr IntraVENous CONTINUOUS        Labs:    Recent Labs     07/15/22  0935 07/15/22  0139 07/15/22  0139   WBC  --   --  9.8   HGB 7.9*   < > 6.3*   HCT  --   --  20.2*   PLT  --   --  460*    < > = values in this interval not displayed. Recent Labs     07/15/22  0139      K 3.2*   *   CO2 20*   GLU 84   BUN 18   CREA 0.89   CA 8.4*   MG 1.8         Radiology:  No results found.       The chart, lab work, and radiological studies was reviewed by me on: July 16, 2022         Assessment/Plan:      Acute blood loss anemia POA requiring transfusions due to ongoing hematuria   Monitor H&H  Transfuse as needed if Hgb less than 7  Continue IV ferric gluconate day 2 of 3    Hypokalemia (7/14/2022)  Monitor and replace as needed    Body mass index is 34.97 kg/m².:  30.0 - 39.9 Obese      Risk of deterioration: high      Discussed:  Care Plan discussed with: Patient and Care Manager    Prophylaxis:  Lovenox and SCD's    Probable disposition:  Home with family    Total time: 28 Minutes **I personally saw and examined the patient during this time period**    Date of service:    7/16/2022                ___________________________________________________    Admitting Physician: Brie Kang MD

## 2022-07-16 NOTE — PROGRESS NOTES
Bedside and Verbal shift change report given to Alida Escalante (oncoming nurse) by Delia Martin (offgoing nurse). Report included the following information SBAR, Kardex, OR Summary, Procedure Summary, Intake/Output, MAR and Recent Results.

## 2022-07-17 NOTE — PROGRESS NOTES
Problem: Falls - Risk of  Goal: *Absence of Falls  Description: Document Leanna Gallegos Fall Risk and appropriate interventions in the flowsheet.   Outcome: Progressing Towards Goal  Note: Fall Risk Interventions:  Mobility Interventions: Bed/chair exit alarm,Strengthening exercises (ROM-active/passive),Utilize walker, cane, or other assistive device              Elimination Interventions: Bed/chair exit alarm,Call light in reach,Toileting schedule/hourly rounds

## 2022-07-17 NOTE — PROGRESS NOTES
Alfredo P[facility] [address]  [address] [facility] [address]  [phone]       Hospitalist Admission Note      NAME: Bailey Grover   :  1955   MRN:  [id number]     Date/Time:  2022     Patient PCP: Scotty Brock MD    Emergency Contact:    Extended Emergency Contact Information  Primary Emergency Contact: [address] Phone: [phone]  Mobile Phone: [phone]  Relation: Daughter  Low vision? Yes   needed? No  Secondary Emergency Contact: Mary [address], [place] Phone: [phone]  Relation: Son      Code: DNR     Isolation Precautions: There are currently no Active Isolations        Subjective:     REASON FOR VISIT: Anemia    HPI & INTERVAL HISTORY:     Patient is a 78 y.o. male with medical h/o metastatic prostate cancer on outpatient chemotherapy, hormone therapy, also followed by urologist, on suprapubic catheter, who presented to [facility] ED with gross hematuria coming out of the suprapubic catheter, patient denies prior episodes before, complains of diffuse weakness, denies dysuria,. Patient is followed by [place] [facility] Dr. Sreedhar Ferraro    : Patient resting comfortably with wife at bedside. No issues overnight no complaints. Hematuria continues to improve urine is now toni. No anel blood noted. : Patient seen and examined. He has no complaints. Hematuria is improving and possibly have stopped. Urine is starting to clear up. Hemoglobin pending. 7/15: Patient was seen and examined. He has received PRBC transfusions. He reports feeling better with much less MORROW posttransfusion. Denying chest pain or shortness of breath. Cruz in place still with hematuria. However, no need for CBI per urology note.       Allergies   Allergen Reactions    Erythromycin Other (comments)     Insomnia- felt like speed         ROS:    Gen:   Negative  Resp:  denies cough, shortness of breath, sputum, pleuritis, hemoptysis, wheezing  CVS: negative  GI:       negative and denies abd pain, nausea, vomit, diarrhea, constipation, GERD, melena, hematochezia  :     hematuria         Objective:      Visit Vitals  /69 (BP 1 Location: Left upper arm, BP Patient Position: At rest)   Pulse 69   Temp 98.4 °F (36.9 °C)   Resp 18   Ht 5' 8\" (1.727 m)   Wt 104.3 kg (230 lb)   SpO2 96%   BMI 34.97 kg/m²       Physical Exam:    General: NAD  Head: Normocephalic, without obvious abnormality, atraumatic  Eyes: anicteric sclerae and conjuntiva clear  ENT: Lips, mucosa, and tongue normal.   Neck: normal, supple and no tenderness  Lungs: clear to auscultation   Heart: S1, S2 normal, regular rate and regular rhythm  Abd: soft, nontender, BS present and normactive, Suprapubic catheter in place  Ext: no cyanosis and no edema  Skin: normal skin color, no rashes and texture normal  Neuro:  alert, oriented, no defects noted in general exam.  Psych: not anxious, cooperative, appropriate affect      Medications:  Current Facility-Administered Medications   Medication Dose Route Frequency    0.9% sodium chloride infusion 250 mL  250 mL IntraVENous PRN    ferric gluconate (FERRLECIT) 125 mg in 0.9% sodium chloride 100 mL IVPB  125 mg IntraVENous Q24H    0.9% sodium chloride infusion 250 mL  250 mL IntraVENous PRN    atenoloL (TENORMIN) tablet 50 mg  50 mg Oral QHS    atorvastatin (LIPITOR) tablet 10 mg  10 mg Oral QHS    finasteride (PROSCAR) tablet 5 mg  5 mg Oral DAILY    alogliptin (NESINA) tablet 6.25 mg  6.25 mg Oral DAILY    insulin glargine (LANTUS) injection 15 Units  15 Units SubCUTAneous BID    pioglitazone (ACTOS) tablet 45 mg  45 mg Oral DAILY    predniSONE (DELTASONE) tablet 5 mg  5 mg Oral DAILY    tamsulosin (FLOMAX) capsule 0.4 mg  0.4 mg Oral BID    sodium chloride (NS) flush 5-40 mL  5-40 mL IntraVENous Q8H    sodium chloride (NS) flush 5-40 mL  5-40 mL IntraVENous PRN    acetaminophen (TYLENOL) tablet 650 mg  650 mg Oral Q6H PRN    Or    acetaminophen (TYLENOL) suppository 650 mg  650 mg Rectal Q6H PRN    polyethylene glycol (MIRALAX) packet 17 g  17 g Oral DAILY    senna-docusate (PERICOLACE) 8.6-50 mg per tablet 1 Tablet  1 Tablet Oral BID    magnesium hydroxide (MILK OF MAGNESIA) 400 mg/5 mL oral suspension 30 mL  30 mL Oral DAILY PRN    bisacodyL (DULCOLAX) suppository 10 mg  10 mg Rectal DAILY PRN    ondansetron (ZOFRAN ODT) tablet 4 mg  4 mg Oral Q8H PRN    Or    ondansetron (ZOFRAN) injection 4 mg  4 mg IntraVENous Q6H PRN    famotidine (PEPCID) tablet 20 mg  20 mg Oral BID    alum-mag hydroxide-simeth (MYLANTA) oral suspension 15 mL  15 mL Oral Q6H PRN    cefTRIAXone (ROCEPHIN) 2 g in 0.9% sodium chloride 20 mL IV syringe  2 g IntraVENous Q24H    0.9% sodium chloride infusion  75 mL/hr IntraVENous CONTINUOUS        Labs:    Recent Labs     07/15/22  0935 07/15/22  0139 07/15/22  0139   WBC  --   --  9.8   HGB 7.9*   < > 6.3*   HCT  --   --  20.2*   PLT  --   --  460*    < > = values in this interval not displayed. Recent Labs     07/15/22  0139      K 3.2*   *   CO2 20*   GLU 84   BUN 18   CREA 0.89   CA 8.4*   MG 1.8         Radiology:  No results found.       The chart, lab work, and radiological studies was reviewed by me on: July 17, 2022         Assessment/Plan:      Acute blood loss anemia POA requiring transfusions due to ongoing hematuria   Labs pending monitor H&H/CBC  Transfuse as needed if Hgb less than 7  Continue IV ferric gluconate day 3 of 3 but may extend depending on lab results    Hypokalemia (7/14/2022)  Monitor and replace as needed    Body mass index is 34.97 kg/m².:  30.0 - 39.9 Obese      Risk of deterioration: high      Discussed:  Care Plan discussed with: Patient and Care Manager    Prophylaxis:  Lovenox and SCD's    Probable disposition:  Home with family    Total time: 28 Minutes **I personally saw and examined the patient during this time period**    Date of service:    7/17/2022 ___________________________________________________    Admitting Physician: Saran Caldera MD

## 2022-07-18 NOTE — PROGRESS NOTES
7/18/2022     Care Management Progress Note      ICD-10-CM ICD-9-CM    1. Anemia, unspecified type  D64.9 285.9    2. Gross hematuria  R31.0 599.71        1:01 PM Noted PT recommendation for home health. CM spoke with pt regarding home health recommendations. Pt stated, \"you tell them once this bleeding stops, I'll be ready to go in the ring with Jeff Shaw". Pt reported once he feels better he will not need any ongoing therapy. Pt politely declined home health therapy. CM requested pt please notify staff if he changes his mind. CM will follow. LARISSA Mccloud     RUR:  21%  Risk Level: []Low []Moderate [x]High  Value-based purchasing: [] Yes [x] No  Bundle patient: [] Yes [x] No   Specify:     Transition of care plan:  1. Ongoing medical management  2. Home with family at discharge, pt has declined home health   3. Outpatient follow-up.   4. Pt's family to transport

## 2022-07-18 NOTE — PROGRESS NOTES
Urology Progress Note    Patient: Debra Forrester MRN: [id number]  SSN: [id number]    YOB: 1955  Age: 78 y.o. Sex: male        Assessment:     Resting in bed, NAD   SPT in place, secured, toni urine noted in tubing   Leukocytosis noted over the weekend , afebrile, receiving rocephin   WBC 14.1 (16.5)   Hgb 7.7 (8.1)     Pt due later this week for SPT exchange in office. Given still hospitalized, I assisted nursing with replacement of 16fr catheter. Initial return of blood tinged urine. Discussed with nursing and pt blood tinged urine likely from placement of catheter and hopeful to improve. Will reassess later today. Nursing aware to perform manual irrigation if needed. Addendum 1428: Reassessed pt this evening. Resting in bed, asleep, family at bedside. Gross hematuria with brown tinge noted in tubing, clear, no clots, catheter draining without issue. CBC in AM to ensure hgb remains stable. 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 Discussed consideration of diverting with bilateral PCNs for palliative purposes if hematuria continues -Pt is NOT agreeable to PCNs. Worsening metastatic disease, followed by Dr. Lizeth Arnold. Only other real possibility would be consideration of diversion but at this disease stage I'm not sure that it's advisable.   -No indication for CBI at this time.  Manual irrigation PRN by nursing staff.    -Hgb stabilized  -UCX sent from new catheter, follow, adjust abx per final culture results   -Continue with q30 day catheter exchanges, next due 8/15/22     Reason For Visit:     Follow up for gross hematuria    ROS:     Denies fevers  Denies abdominal pain  Denies hematuria    Objective:     Visit Vitals  /68 (BP 1 Location: Left upper arm, BP Patient Position: At rest) Pulse 65   Temp 99.6 °F (37.6 °C)   Resp 18   Ht 5' 8\" (1.727 m)   Wt 104.3 kg (230 lb)   SpO2 98%   BMI 34.97 kg/m²         Intake/Output Summary (Last 24 hours) at 7/18/2022 0944  Last data filed at 7/17/2022 2200  Gross per 24 hour   Intake 1222 ml   Output 450 ml   Net 772 ml       Physical Exam  General: NAD  Skin: warm, dry   Respiratory: no distress, room air  Abdomen: soft, no distention  : SPT secured to statlock, in place and moves freely, with toni urine in tubing  Neuro: Appropriate, no focal neurological deficits    Labs reviewed, July 18, 2022  Recent Results (from the past 24 hour(s))   GLUCOSE, POC    Collection Time: 07/17/22  9:01 PM   Result Value Ref Range    Glucose (POC) 150 (H) 65 - 117 mg/dL    Performed by Wil Reyes    METABOLIC PANEL, BASIC    Collection Time: 07/18/22  4:19 AM   Result Value Ref Range    Sodium 140 136 - 145 mmol/L    Potassium 3.3 (L) 3.5 - 5.1 mmol/L    Chloride 112 (H) 97 - 108 mmol/L    CO2 21 21 - 32 mmol/L    Anion gap 7 5 - 15 mmol/L    Glucose 60 (L) 65 - 100 mg/dL    BUN 10 6 - 20 MG/DL    Creatinine 0.82 0.70 - 1.30 MG/DL    BUN/Creatinine ratio 12 12 - 20      GFR est AA >60 >60 ml/min/1.73m2    GFR est non-AA >60 >60 ml/min/1.73m2    Calcium 8.6 8.5 - 10.1 MG/DL   CBC WITH AUTOMATED DIFF    Collection Time: 07/18/22  4:19 AM   Result Value Ref Range    WBC 14.1 (H) 4.1 - 11.1 K/uL    RBC 3.23 (L) 4.10 - 5.70 M/uL    HGB 7.7 (L) 12.1 - 17.0 g/dL    HCT 24.5 (L) 36.6 - 50.3 %    MCV 75.9 (L) 80.0 - 99.0 FL    MCH 23.8 (L) 26.0 - 34.0 PG    MCHC 31.4 30.0 - 36.5 g/dL    RDW 17.7 (H) 11.5 - 14.5 %    PLATELET 520 (H) 663 - 400 K/uL    MPV 9.9 8.9 - 12.9 FL    NRBC 0.0 0  WBC    ABSOLUTE NRBC 0.00 0.00 - 0.01 K/uL    NEUTROPHILS 71 32 - 75 %    LYMPHOCYTES 11 (L) 12 - 49 %    MONOCYTES 12 5 - 13 %    EOSINOPHILS 4 0 - 7 %    BASOPHILS 1 0 - 1 %    IMMATURE GRANULOCYTES 1 (H) 0.0 - 0.5 %    ABS. NEUTROPHILS 9.9 (H) 1.8 - 8.0 K/UL    ABS. LYMPHOCYTES 1.6 0.8 - 3.5 K/UL    ABS. MONOCYTES 1.7 (H) 0.0 - 1.0 K/UL    ABS. EOSINOPHILS 0.6 (H) 0.0 - 0.4 K/UL    ABS. BASOPHILS 0.1 0.0 - 0.1 K/UL    ABS. IMM.  GRANS. 0.1 (H) 0.00 - 0.04 K/UL    DF AUTOMATED     GLUCOSE, POC    Collection Time: 07/18/22  6:04 AM   Result Value Ref Range    Glucose (POC) 58 (L) 65 - 117 mg/dL    Performed by Debra Multani    GLUCOSE, POC    Collection Time: 07/18/22  8:28 AM   Result Value Ref Range    Glucose (POC) 108 65 - 117 mg/dL    Performed by Olayinka Dyson        Imaging:  CT Results  (Last 48 hours)    None          Signed By: Johnnie Dickinson NP - July 18, 2022

## 2022-07-18 NOTE — PROGRESS NOTES
Problem: Falls - Risk of  Goal: *Absence of Falls  Description: Document Maddie Mcmillan Fall Risk and appropriate interventions in the flowsheet.   Outcome: Progressing Towards Goal  Note: Fall Risk Interventions:  Mobility Interventions: Bed/chair exit alarm,Communicate number of staff needed for ambulation/transfer,Patient to call before getting OOB,Utilize walker, cane, or other assistive device,Utilize gait belt for transfers/ambulation         Medication Interventions: Bed/chair exit alarm,Patient to call before getting OOB,Teach patient to arise slowly,Utilize gait belt for transfers/ambulation    Elimination Interventions: Bed/chair exit alarm,Call light in reach,Patient to call for help with toileting needs              Problem: Patient Education: Go to Patient Education Activity  Goal: Patient/Family Education  Outcome: Progressing Towards Goal     Problem: Patient Education: Go to Patient Education Activity  Goal: Patient/Family Education  Outcome: Progressing Towards Goal     Problem: Urinary Elimination - Impaired  Goal: *Absence/decrease in episodes of  urinary incontinence  Outcome: Progressing Towards Goal     Problem: Patient Education: Go to Patient Education Activity  Goal: Patient/Family Education  Outcome: Progressing Towards Goal     Problem: Hypertension  Goal: *Blood pressure within specified parameters  Outcome: Progressing Towards Goal  Goal: *Fluid volume balance  Outcome: Progressing Towards Goal  Goal: *Labs within defined limits  Outcome: Progressing Towards Goal     Problem: Patient Education: Go to Patient Education Activity  Goal: Patient/Family Education  Outcome: Progressing Towards Goal

## 2022-07-18 NOTE — PROGRESS NOTES
Alfredo Ponce [facility] [place] [address]  [address] [facility], [address] [place] [address]  [phone]      Medical Progress Note      NAME: Anabel Grullon   :  1955  MRM:  [id number]    Date/Time: 2022  2:30 PM         Subjective:     Chief Complaint:  \"I'm okay\"     Pt seen and examined. Suprapubic cath replaced this AM     ROS:  (bold if positive, if negative)           Objective:       Vitals:          Last 24hrs VS reviewed since prior progress note.  Most recent are:    Visit Vitals  /68 (BP 1 Location: Left upper arm, BP Patient Position: At rest)   Pulse 85   Temp 98.2 °F (36.8 °C)   Resp 18   Ht 5' 8\" (1.727 m)   Wt 104.3 kg (230 lb)   SpO2 94%   BMI 34.97 kg/m²     SpO2 Readings from Last 6 Encounters:   22 94%   22 100%   22 96%   22 100%   22 99%   22 99%            Intake/Output Summary (Last 24 hours) at 2022 1430  Last data filed at 2022 1340  Gross per 24 hour   Intake 702 ml   Output 450 ml   Net 252 ml          Exam:     Physical Exam:    Gen:  Well-developed, well-nourished, in no acute distress  HEENT:  Pink conjunctivae, PERRL, hearing intact to voice, moist mucous membranes  Neck:  Supple, without masses, thyroid non-tender  Resp:  No accessory muscle use, clear breath sounds without wheezes rales or rhonchi  Card:  No murmurs, normal S1, S2 without thrills, bruits or peripheral edema  Abd: Suprapubic cath in place   Musc:  No cyanosis or clubbing  Skin:  No rashes or ulcers, skin turgor is good  Neuro:  Cranial nerves 3-12 are grossly intact,  strength is 5/5 bilaterally and dorsi / plantarflexion is 5/5 bilaterally, follows commands appropriately  Psych:  Good insight, oriented to person, place and time, alert      Medications Reviewed: (see below)    Lab Data Reviewed: (see below)    ______________________________________________________________________    Medications:     Current Facility-Administered Medications   Medication Dose Route Frequency    insulin glargine (LANTUS) injection 7 Units  7 Units SubCUTAneous BID    0.9% sodium chloride infusion 250 mL  250 mL IntraVENous PRN    0.9% sodium chloride infusion 250 mL  250 mL IntraVENous PRN    atenoloL (TENORMIN) tablet 50 mg  50 mg Oral QHS    atorvastatin (LIPITOR) tablet 10 mg  10 mg Oral QHS    finasteride (PROSCAR) tablet 5 mg  5 mg Oral DAILY    [Held by provider] alogliptin (NESINA) tablet 6.25 mg  6.25 mg Oral DAILY    [Held by provider] pioglitazone (ACTOS) tablet 45 mg  45 mg Oral DAILY    predniSONE (DELTASONE) tablet 5 mg  5 mg Oral DAILY    tamsulosin (FLOMAX) capsule 0.4 mg  0.4 mg Oral BID    sodium chloride (NS) flush 5-40 mL  5-40 mL IntraVENous Q8H    sodium chloride (NS) flush 5-40 mL  5-40 mL IntraVENous PRN    acetaminophen (TYLENOL) tablet 650 mg  650 mg Oral Q6H PRN    Or    acetaminophen (TYLENOL) suppository 650 mg  650 mg Rectal Q6H PRN    polyethylene glycol (MIRALAX) packet 17 g  17 g Oral DAILY    senna-docusate (PERICOLACE) 8.6-50 mg per tablet 1 Tablet  1 Tablet Oral BID    magnesium hydroxide (MILK OF MAGNESIA) 400 mg/5 mL oral suspension 30 mL  30 mL Oral DAILY PRN    bisacodyL (DULCOLAX) suppository 10 mg  10 mg Rectal DAILY PRN    ondansetron (ZOFRAN ODT) tablet 4 mg  4 mg Oral Q8H PRN    Or    ondansetron (ZOFRAN) injection 4 mg  4 mg IntraVENous Q6H PRN    famotidine (PEPCID) tablet 20 mg  20 mg Oral BID    alum-mag hydroxide-simeth (MYLANTA) oral suspension 15 mL  15 mL Oral Q6H PRN    cefTRIAXone (ROCEPHIN) 2 g in 0.9% sodium chloride 20 mL IV syringe  2 g IntraVENous Q24H    0.9% sodium chloride infusion  75 mL/hr IntraVENous CONTINUOUS            Lab Review:     Recent Labs     07/18/22  0419 07/17/22  0909   WBC 14.1* 16.5*   HGB 7.7* 8.1*   HCT 24.5* 25.8*   * 468*     Recent Labs     07/18/22  0419 07/17/22  0909    141   K 3.3* 3.1*   * 111*   CO2 21 23   GLU 60* 150*   BUN 10 11   CREA 0.82 0.93 CA 8.6 8.6     No components found for: [place]         Assessment / Plan:   Acute blood loss anemia POA requiring transfusions due to ongoing hematuria   -transfuse for hemoglobin < 7  -IV iron  -definitive management of hematuria as per urology    Bacteruria - with not many epi's.  Cultures suggested recollection as likely contaminated sample  -repeat   -on Ceftriaxone      Hypokalemia (7/14/2022)  -replete     Body mass index is 34.97 kg/m².:  30.0 - 39.9 Obese    DM   -ISS   -BG checks   -hold Actos, Nesina while in house   -decrease Lantus as HYPOglycemic this AM     Hypertension   -on atenolol     Total time spent with patient: [address] discussed with: Patient and Family    Discussed:  Care Plan    Prophylaxis:  SCD's    Disposition:  Home w/Family           ___________________________________________________    Attending Physician: Igor Duarte MD

## 2022-07-18 NOTE — PROGRESS NOTES
Problem: Urinary Elimination - Impaired  Goal: *Absence/decrease in episodes of  urinary incontinence  Outcome: Progressing Towards Goal     Problem: Patient Education: Go to Patient Education Activity  Goal: Patient/Family Education  Outcome: Progressing Towards Goal     Problem: Hypertension  Goal: *Blood pressure within specified parameters  Outcome: Progressing Towards Goal  Goal: *Fluid volume balance  Outcome: Progressing Towards Goal  Goal: *Labs within defined limits  Outcome: Progressing Towards Goal     Problem: Patient Education: Go to Patient Education Activity  Goal: Patient/Family Education  Outcome: Progressing Towards Goal

## 2022-07-18 NOTE — PROGRESS NOTES
PHYSICAL THERAPY TREATMENT  Patient: Maggi Anand (34 y.o. male)  Date: 7/18/2022  Diagnosis: Acute blood loss anemia [D62]  Hematuria [R31.9]  Hypokalemia [E87.6] <principal problem not specified>       Precautions:    Chart, physical therapy assessment, plan of care and goals were reviewed. ASSESSMENT  Patient received in semi-supine, agreeable for PT treatment. Pt continues with skilled PT services and is progressing towards goals. Pt presents with stiffness L knee, mod I for bed mobility with additional time, participated in there ex's for b/l LE in unsupported sitting balance. Requires SBA for all functional transfers, able to ambulate - 150' with RW, SBA, demonstrates antalgic gait. Pt with increased ambulatory distance and improved ability to perform transfers and ambulation with decreased level of assist of SBA. Current Level of Function Impacting Discharge (mobility/balance): Pt requires SBA for transfers/mobility    Other factors to consider for discharge: family support, decline from functional baseline         PLAN :  Patient continues to benefit from skilled intervention to address the above impairments. Continue treatment per established plan of care. to address goals.     Recommendation for discharge: (in order for the patient to meet his/her long term goals)  Physical therapy at least 2 days/week in the home AND ensure assist and/or supervision for safety with functional mobility    This discharge recommendation:  Has been made in collaboration with the attending provider and/or case management    IF patient discharges home will need the following DME: none       SUBJECTIVE:   Patient stated  Today, my L knee hurts     OBJECTIVE DATA SUMMARY:   Critical Behavior:  Neurologic State: Alert  Orientation Level: Oriented X4  Cognition: Appropriate decision making,Appropriate for age attention/concentration,Follows commands,Appropriate safety awareness  Safety/Judgement: Awareness of environment,Fall prevention,Good awareness of safety precautions,Home safety,Insight into deficits  Functional Mobility Training:  Bed Mobility:  Rolling: Modified independent  Supine to Sit: Modified independent; Additional time     Scooting: Modified independent    Transfers:  Sit to Stand: Stand-by assistance  Stand to Sit: Stand-by assistance        Bed to Chair: Stand-by assistance    Balance:  Sitting: Intact; Without support  Standing: Impaired; Without support  Standing - Static: Good  Standing - Dynamic : Fair;Occasional  Ambulation/Gait Training:  Distance (ft): 150 Feet (ft)  Assistive Device: Walker, rolling;Gait belt  Ambulation - Level of Assistance: Stand-by assistance    Gait Abnormalities: Antalgic    Speed/Graciela: Slow    Therapeutic Exercises:   AP, LAQ, seated marches - 15 reps   Pain Ratin/10    Activity Tolerance:   Good    After treatment patient left in no apparent distress:   Sitting in chair, Call bell within reach, and Caregiver / family present    COMMUNICATION/COLLABORATION:   The patients plan of care was discussed with: Registered nurseAnish Mckenzie   Time Calculation: 32 mins

## 2022-07-19 NOTE — PROGRESS NOTES
Physical therapy note    Chart reviewed in preparation for physical therapy treatment. Initiated PT this morning at 9:41. Patient politely declining therapy services today secondary to significant fatigue. He notes that he has been active, up to the side of the bed for breakfast and repeatedly going to/from the bathroom. He notes interest in HHPT upon d/c. PT will continue to follow and re-attempt tomorrow.       Thank you,  Angélica Lin, PT, DPT  7 minutes

## 2022-07-19 NOTE — PROGRESS NOTES
7/19/2022    Care Management Progress Note         ICD-10-CM ICD-9-CM     1. Anemia, unspecified type  D64.9 285.9     2. Gross hematuria  R31.0 599.71        2:16 PM CM spoke with pt regarding home health. Pt reported he does not want home health. Pt reported he was interested in hospital bed. CM relayed a hospital bed would not be covered by insurance. Pt reported he plans to go buy a smaller bed at a furniture store and declined information on renting a hospital bed private pay. 12:26 PM  Spoke with treating PT, pt now agreeable to home health. CM called to pt, pt requested CM call back once he has finished his lunch. CM will follow up at a later time. LARISSA Chavez     RUR:  21%  Risk Level: []? Low []? Moderate [x]? High  Value-based purchasing: []? Yes [x]? No  Bundle patient: []? Yes [x]? No              Specify:      Transition of care plan:  1. Ongoing medical management  2. Home with family at discharge, pt has declined home health   3. Outpatient follow-up.   4. Pt's family to transport

## 2022-07-19 NOTE — PROGRESS NOTES
Urology Progress Note    Patient: Nhan Mann MRN: [id number]  SSN: [id number]    YOB: 1955  Age: 78 y.o. Sex: male        Assessment:     Asleep in bed, awakens easily, family at bedside, NAD, asking to go home  SPT site clean, dry. Urine toni in tubing   WBC 15.1 (14.1)   Hgb 7.3 (7.7)   UO 800ml overnight   Repeat UA from catheter exchange with nitrites, 1+bacteria, WBC >100, large leuk est., >100 RBC    Plan:     Gross hematuria in the presence of SPT, metastatic prostate cancer, UTI    -Metastatic prostate cancer with hypervascular disease at the bladder neck and metastatic deposits throughout the urethra. Discussed consideration of diverting with bilateral PCNs for palliative purposes if hematuria continues -Pt is NOT agreeable to PCNs. Worsening metastatic disease, followed by Dr. Roxann Scruggs. Only other real possibility would be consideration of diversion but at this disease stage I'm not sure that it's advisable.     -Hgb stabilized   -UA/UCX sent from new catheter, continue broad spectrum abx, adjust abx per final culture results   -Continue with q30 day catheter exchanges, next due 8/15/22     No further urologic intervention planned at this time. Urology signing off. Please call with questions or concerns.      Reason For Visit:     Follow up for gross hematuria     ROS:     Denies fevers  Denies abdominal pain  Denies issues with SPT overnight     Objective:     Visit Vitals  /64 (BP 1 Location: Left upper arm, BP Patient Position: At rest)   Pulse 65   Temp 98.3 °F (36.8 °C)   Resp 16   Ht 5' 8\" (1.727 m)   Wt 104.3 kg (230 lb)   SpO2 98%   BMI 34.97 kg/m²         Intake/Output Summary (Last 24 hours) at 7/19/2022 0732  Last data filed at 7/19/2022 0557  Gross per 24 hour   Intake 600 ml   Output 1775 ml   Net -1175 ml       Physical Exam  General: NAD  Respiratory: no distress, room air  Abdomen: soft, no distention  : SPT in place, secured, insertion site dry, toni urine noted in tubing   Neuro: Appropriate, no focal neurological deficits    Labs reviewed, July 19, 2022  Recent Results (from the past 24 hour(s))   GLUCOSE, POC    Collection Time: 07/18/22  8:28 AM   Result Value Ref Range    Glucose (POC) 108 65 - 117 mg/dL    Performed by Delia [facility], POC    Collection Time: 07/18/22 12:27 PM   Result Value Ref Range    Glucose (POC) 120 (H) 65 - 117 mg/dL    Performed by Julisa Barton W/ REFLEX CULTURE    Collection Time: 07/18/22  3:50 PM    Specimen: Miscellaneous sample; Urine    Urine specimen   Result Value Ref Range    Color RED      Appearance TURBID (A) CLEAR      Specific gravity 1.009 1.003 - 1.030      pH (UA) 5.5 5.0 - 8.0      Protein 100 (A) NEG mg/dL    Glucose Negative NEG mg/dL    Ketone Negative NEG mg/dL    Blood LARGE (A) NEG      Urobilinogen 0.2 0.2 - 1.0 EU/dL    Nitrites Positive (A) NEG      Leukocyte Esterase LARGE (A) NEG      WBC >100 (H) 0 - 4 /hpf    RBC >100 (H) 0 - 5 /hpf    Epithelial cells FEW FEW /lpf    Bacteria 1+ (A) NEG /hpf    UA:UC IF INDICATED URINE CULTURE ORDERED (A) CNI     BILIRUBIN, CONFIRM    Collection Time: 07/18/22  3:50 PM   Result Value Ref Range    Bilirubin UA, confirm Negative NEG     SAMPLES BEING HELD    Collection Time: 07/18/22  3:50 PM   Result Value Ref Range    SAMPLES BEING HELD  1YELLOW URINE     COMMENT        Add-on orders for these samples will be processed based on acceptable specimen integrity and analyte stability, which may vary by analyte.    GLUCOSE, POC    Collection Time: 07/18/22  4:33 PM   Result Value Ref Range    Glucose (POC) 182 (H) 65 - 117 mg/dL    Performed by Andrea Rivera, POC    Collection Time: 07/18/22  9:32 PM   Result Value Ref Range    Glucose (POC) 152 (H) 65 - 117 mg/dL    Performed by Carly Gomez [facility]    CBC WITH AUTOMATED DIFF    Collection Time: 07/19/22  5:30 AM   Result Value Ref Range    WBC 15.1 (H) 4.1 - 11.1 K/uL    RBC 3.05 (L) 4.10 - 5.70 M/uL    HGB 7.3 (L) 12.1 - 17.0 g/dL    HCT 23.1 (L) 36.6 - 50.3 %    MCV 75.7 (L) 80.0 - 99.0 FL    MCH 23.9 (L) 26.0 - 34.0 PG    MCHC 31.6 30.0 - 36.5 g/dL    RDW 17.9 (H) 11.5 - 14.5 %    PLATELET 242 (H) 686 - 400 K/uL    MPV 10.2 8.9 - 12.9 FL    NRBC 0.0 0  WBC    ABSOLUTE NRBC 0.00 0.00 - 0.01 K/uL    NEUTROPHILS 73 32 - 75 %    LYMPHOCYTES 10 (L) 12 - 49 %    MONOCYTES 10 5 - 13 %    EOSINOPHILS 5 0 - 7 %    BASOPHILS 1 0 - 1 %    IMMATURE GRANULOCYTES 1 (H) 0.0 - 0.5 %    ABS. NEUTROPHILS 11.0 (H) 1.8 - 8.0 K/UL    ABS. LYMPHOCYTES 1.6 0.8 - 3.5 K/UL    ABS. MONOCYTES 1.6 (H) 0.0 - 1.0 K/UL    ABS. EOSINOPHILS 0.8 (H) 0.0 - 0.4 K/UL    ABS. BASOPHILS 0.1 0.0 - 0.1 K/UL    ABS. IMM.  GRANS. 0.1 (H) 0.00 - 0.04 K/UL    DF AUTOMATED     METABOLIC PANEL, BASIC    Collection Time: 07/19/22  5:30 AM   Result Value Ref Range    Sodium 143 136 - 145 mmol/L    Potassium 3.7 3.5 - 5.1 mmol/L    Chloride 113 (H) 97 - 108 mmol/L    CO2 24 21 - 32 mmol/L    Anion gap 6 5 - 15 mmol/L    Glucose 102 (H) 65 - 100 mg/dL    BUN 12 6 - 20 MG/DL    Creatinine 0.85 0.70 - 1.30 MG/DL    BUN/Creatinine ratio 14 12 - 20      GFR est AA >60 >60 ml/min/1.73m2    GFR est non-AA >60 >60 ml/min/1.73m2    Calcium 8.5 8.5 - 10.1 MG/DL   MAGNESIUM    Collection Time: 07/19/22  5:30 AM   Result Value Ref Range    Magnesium 1.7 1.6 - 2.4 mg/dL   GLUCOSE, POC    Collection Time: 07/19/22  6:46 AM   Result Value Ref Range    Glucose (POC) 119 (H) 65 - 117 mg/dL    Performed by Trista Lundberg        Imaging:  CT Results  (Last 48 hours)    None          Signed By: Dustin Donohue NP - July 19, 2022

## 2022-07-19 NOTE — PROGRESS NOTES
Problem: Falls - Risk of  Goal: *Absence of Falls  Description: Document Shireen Serrano Fall Risk and appropriate interventions in the flowsheet.   Outcome: Progressing Towards Goal  Note: Fall Risk Interventions:  Mobility Interventions: Bed/chair exit alarm         Medication Interventions: Bed/chair exit alarm    Elimination Interventions: Call light in reach              Problem: Patient Education: Go to Patient Education Activity  Goal: Patient/Family Education  Outcome: Progressing Towards Goal     Problem: Patient Education: Go to Patient Education Activity  Goal: Patient/Family Education  Outcome: Progressing Towards Goal     Problem: Patient Education: Go to Patient Education Activity  Goal: Patient/Family Education  Outcome: Progressing Towards Goal     Problem: Urinary Elimination - Impaired  Goal: *Absence/decrease in episodes of  urinary incontinence  Outcome: Progressing Towards Goal     Problem: Patient Education: Go to Patient Education Activity  Goal: Patient/Family Education  Outcome: Progressing Towards Goal     Problem: Hypertension  Goal: *Blood pressure within specified parameters  Outcome: Progressing Towards Goal  Goal: *Fluid volume balance  Outcome: Progressing Towards Goal  Goal: *Labs within defined limits  Outcome: Progressing Towards Goal     Problem: Patient Education: Go to Patient Education Activity  Goal: Patient/Family Education  Outcome: Progressing Towards Goal

## 2022-07-19 NOTE — PROGRESS NOTES
Spiritual Care Assessment/Progress Note  [address]      NAME: Steve Smallwood      MRN: [id number]  AGE: 78 y.o. SEX: male  Restorationism Affiliation: No Holiness   Language: English     7/19/2022     Total Time (in minutes): 25     Spiritual Assessment begun in [facility] 4[facility] POST SURG ORT 2 through conversation with:         [x]Patient        [x] Family    [] Friend(s)        Reason for Consult: Initial/Spiritual assessment, patient floor,Family care     Spiritual beliefs: (Please include comment if needed)     [x] Identifies with a viola tradition: Uatsdin        [] Supported by a viola community:            [] Claims no spiritual orientation:           [] Seeking spiritual identity:                [] Adheres to an individual form of spirituality:           [] Not able to assess:                           Identified resources for coping:      [x] Prayer                               [] Music                  [] Guided Imagery     [x] Family/friends                 [] Pet visits     [] Devotional reading                         [] Unknown     [] Other:                                               Interventions offered during this visit: (See comments for more details)    Patient Interventions: Affirmation of emotions/emotional suffering,Coping skills reviewed/reinforced     Family/Friend(s):  Affirmation of emotions/emotional suffering,Coping skills reviewed/reinforced,Normalization of emotional/spiritual concerns,Life review/legacy,Initial Assessment     Plan of Care:     [x] Support spiritual and/or cultural needs    [] Support AMD and/or advance care planning process      [] Support grieving process   [] Coordinate Rites and/or Rituals    [] Coordination with community clergy   [] No spiritual needs identified at this time   [] Detailed Plan of Care below (See Comments)  [] Make referral to Music Therapy  [] Make referral to Pet Therapy     [] Make referral to Addiction services  [] Make referral to [facility]  [] Make referral to Spiritual Care Partner  [] No future visits requested        [] Contact Spiritual Care for further referrals     Comments: Visit for spiritual assessment in 425. Kristi Schafer appeared to be sleeping but awoke upon chaplains entrance into the room. He appeared very drowsy and sleepy. His significant other Estela was sitting in the lounge chair by his bedside. She was very welcoming. Kathrine Harris shared that she and Kristi Schafer met at work. 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 She went on to talk about [employer]or's Anabaptist viola beliefs. Due to his health he has not been able to attend like he use to. No spiritual needs noted Chart reviewed. Kathrine Harris verbally expressed her appreciation for the visit. Provided ministry of presence, prayer, empathic listening, words of encouragement; facilitated life review/storytelling and cultivated relationships of compassion, and support  Advised of  availability. Visited by: Lis Carrasquillo.  Maged Early, [address] [place] [address] paging Service [phone])

## 2022-07-19 NOTE — PROGRESS NOTES
Bedside shift change report given to Marilee (oncoming nurse) by Nick Almonte (offgoing nurse). Report included the following information SBAR, Kardex, Intake/Output, MAR and Recent Results.

## 2022-07-19 NOTE — PROGRESS NOTES
Problem: Falls - Risk of  Goal: *Absence of Falls  Description: Document Marina Augustine Fall Risk and appropriate interventions in the flowsheet.   Outcome: Progressing Towards Goal  Note: Fall Risk Interventions:  Mobility Interventions: Bed/chair exit alarm,Communicate number of staff needed for ambulation/transfer,Patient to call before getting OOB,Utilize walker, cane, or other assistive device,Utilize gait belt for transfers/ambulation         Medication Interventions: Bed/chair exit alarm,Patient to call before getting OOB,Teach patient to arise slowly,Utilize gait belt for transfers/ambulation    Elimination Interventions: Bed/chair exit alarm,Call light in reach,Patient to call for help with toileting needs              Problem: Patient Education: Go to Patient Education Activity  Goal: Patient/Family Education  Outcome: Progressing Towards Goal

## 2022-07-19 NOTE — PROGRESS NOTES
[facility] [address]  [address] [facility], [address] [place] [address]  [phone]      Medical Progress Note      NAME: Eric Witt   :  1955  MRM:  [id number]    Date/Time: 2022  2:30 PM         Subjective:     Chief Complaint:  \"I'm okay\"     Pt seen and examined. Suprapubic cath draining \"mildred aid\" colored urine. Pt was impacted this AM but per wife, enema helped     ROS:  (bold if positive, if negative)           Objective:       Vitals:          Last 24hrs VS reviewed since prior progress note.  Most recent are:    Visit Vitals  BP (!) 133/57 (BP 1 Location: Left upper arm, BP Patient Position: At rest)   Pulse 73   Temp 98 °F (36.7 °C)   Resp 18   Ht 5' 8\" (1.727 m)   Wt 104.3 kg (230 lb)   SpO2 97%   BMI 34.97 kg/m²     SpO2 Readings from Last 6 Encounters:   22 97%   22 100%   22 96%   22 100%   22 99%   22 99%            Intake/Output Summary (Last 24 hours) at 2022 1501  Last data filed at 2022 1434  Gross per 24 hour   Intake 1600 ml   Output 2000 ml   Net -400 ml          Exam:     Physical Exam:    Gen:  Well-developed, well-nourished, in no acute distress  HEENT:  Pink conjunctivae, PERRL, hearing intact to voice, moist mucous membranes  Neck:  Supple, without masses, thyroid non-tender  Resp:  No accessory muscle use, clear breath sounds without wheezes rales or rhonchi  Card:  No murmurs, normal S1, S2 without thrills, bruits or peripheral edema  Abd: Suprapubic cath in place   Musc:  No cyanosis or clubbing  Skin:  No rashes or ulcers, skin turgor is good  Neuro:  Cranial nerves 3-12 are grossly intact,  strength is 5/5 bilaterally and dorsi / plantarflexion is 5/5 bilaterally, follows commands appropriately  Psych:  Good insight, oriented to person, place and time, alert      Medications Reviewed: (see below)    Lab Data Reviewed: (see below)    ______________________________________________________________________    Medications:     Current Facility-Administered Medications   Medication Dose Route Frequency    insulin glargine (LANTUS) injection 7 Units  7 Units SubCUTAneous BID    cefepime (MAXIPIME) 2 g in 0.9% sodium chloride (MBP/ADV) 100 mL MBP  2 g IntraVENous Q8H    0.9% sodium chloride infusion 250 mL  250 mL IntraVENous PRN    0.9% sodium chloride infusion 250 mL  250 mL IntraVENous PRN    atenoloL (TENORMIN) tablet 50 mg  50 mg Oral QHS    atorvastatin (LIPITOR) tablet 10 mg  10 mg Oral QHS    finasteride (PROSCAR) tablet 5 mg  5 mg Oral DAILY    [Held by provider] alogliptin (NESINA) tablet 6.25 mg  6.25 mg Oral DAILY    [Held by provider] pioglitazone (ACTOS) tablet 45 mg  45 mg Oral DAILY    predniSONE (DELTASONE) tablet 5 mg  5 mg Oral DAILY    tamsulosin (FLOMAX) capsule 0.4 mg  0.4 mg Oral BID    sodium chloride (NS) flush 5-40 mL  5-40 mL IntraVENous Q8H    sodium chloride (NS) flush 5-40 mL  5-40 mL IntraVENous PRN    acetaminophen (TYLENOL) tablet 650 mg  650 mg Oral Q6H PRN    Or    acetaminophen (TYLENOL) suppository 650 mg  650 mg Rectal Q6H PRN    polyethylene glycol (MIRALAX) packet 17 g  17 g Oral DAILY    senna-docusate (PERICOLACE) 8.6-50 mg per tablet 1 Tablet  1 Tablet Oral BID    magnesium hydroxide (MILK OF MAGNESIA) 400 mg/5 mL oral suspension 30 mL  30 mL Oral DAILY PRN    bisacodyL (DULCOLAX) suppository 10 mg  10 mg Rectal DAILY PRN    ondansetron (ZOFRAN ODT) tablet 4 mg  4 mg Oral Q8H PRN    Or    ondansetron (ZOFRAN) injection 4 mg  4 mg IntraVENous Q6H PRN    famotidine (PEPCID) tablet 20 mg  20 mg Oral BID    alum-mag hydroxide-simeth (MYLANTA) oral suspension 15 mL  15 mL Oral Q6H PRN    0.9% sodium chloride infusion  75 mL/hr IntraVENous CONTINUOUS            Lab Review:     Recent Labs     07/19/22  0530 07/18/22  0419 07/17/22  0909   WBC 15.1* 14.1* 16.5*   HGB 7.3* 7.7* 8.1* HCT 23.1* 24.5* 25.8*   * 465* 468*     Recent Labs     07/19/22  0530 07/18/22  0419 07/17/22  0909    140 141   K 3.7 3.3* 3.1*   * 112* 111*   CO2 24 21 23   * 60* 150*   BUN 12 10 11   CREA 0.85 0.82 0.93   CA 8.5 8.6 8.6   MG 1.7  --   --      No components found for: [place]         Assessment / Plan:   Acute blood loss anemia POA requiring transfusions due to ongoing hematuria   -transfuse for hemoglobin < 7  -IV iron today   -definitive management of hematuria as per urology    Bacteruria - repeat UA from 7/18 does have few epi's but large leuk est, WBC's and 1+ bacteria   -antibiotics changed to cefepime   -f/u urine cultures     Hypokalemia (7/14/2022) - repleted     Body mass index is 34.97 kg/m².:  30.0 - 39.9 Obese    DM   -ISS   -BG checks   -hold Actos, Nesina while in house   -decreased Lantus as had hypoglycemia previously     Hypertension   -on atenolol     Total time spent with patient: 28 [address] [facility] discussed with: Patient and Family    Discussed:  Care Plan    Prophylaxis:  SCD's    Disposition:  Home w/Family           ___________________________________________________    Attending Physician: Alondra Mary MD

## 2022-07-19 NOTE — PROGRESS NOTES
Verbal shift change report given to Cliff Gottlieb RN (oncoming nurse) by Lisandra Chao RN (offgoing nurse). Report included the following information SBAR, Intake/Output, MAR and Recent Results.

## 2022-07-20 NOTE — PROGRESS NOTES
Problem: Falls - Risk of  Goal: *Absence of Falls  Description: Document Aditya Delgado Fall Risk and appropriate interventions in the flowsheet.   Outcome: Progressing Towards Goal  Note: Fall Risk Interventions:  Mobility Interventions: Bed/chair exit alarm         Medication Interventions: Bed/chair exit alarm    Elimination Interventions: Call light in reach              Problem: Patient Education: Go to Patient Education Activity  Goal: Patient/Family Education  Outcome: Progressing Towards Goal     Problem: Patient Education: Go to Patient Education Activity  Goal: Patient/Family Education  Outcome: Progressing Towards Goal     Problem: Urinary Elimination - Impaired  Goal: *Absence/decrease in episodes of  urinary incontinence  Outcome: Progressing Towards Goal     Problem: Patient Education: Go to Patient Education Activity  Goal: Patient/Family Education  Outcome: Progressing Towards Goal     Problem: Hypertension  Goal: *Blood pressure within specified parameters  Outcome: Progressing Towards Goal  Goal: *Fluid volume balance  Outcome: Progressing Towards Goal  Goal: *Labs within defined limits  Outcome: Progressing Towards Goal     Problem: Patient Education: Go to Patient Education Activity  Goal: Patient/Family Education  Outcome: Progressing Towards Goal

## 2022-07-20 NOTE — PROGRESS NOTES
07:30  Bedside and Verbal shift change report given to [address] [place], [place] [address]kenneth (oncoming nurse) by Claudeen Congress (offgoing nurse). Report included the following information SBAR, Intake/Output, MAR, and Recent Results.

## 2022-07-20 NOTE — PROGRESS NOTES
Problem: Falls - Risk of  Goal: *Absence of Falls  Description: Document Haydee Pulido Fall Risk and appropriate interventions in the flowsheet.   7/20/2022 1319 by Ajay Buckley RN  Outcome: Resolved/Met  Note: Fall Risk Interventions:  Mobility Interventions: Bed/chair exit alarm         Medication Interventions: Bed/chair exit alarm    Elimination Interventions: Call light in reach           7/20/2022 1109 by Ajay Buckley RN  Outcome: Progressing Towards Goal  Note: Fall Risk Interventions:  Mobility Interventions: Bed/chair exit alarm         Medication Interventions: Bed/chair exit alarm    Elimination Interventions: Call light in reach              Problem: Patient Education: Go to Patient Education Activity  Goal: Patient/Family Education  7/20/2022 1319 by Ajay Buckley RN  Outcome: Resolved/Met  7/20/2022 1109 by Ajay Buckley RN  Outcome: Progressing Towards Goal     Problem: Patient Education: Go to Patient Education Activity  Goal: Patient/Family Education  7/20/2022 1319 by Ajay Buckley RN  Outcome: Resolved/Met  7/20/2022 1109 by Ajay Buckley RN  Outcome: Progressing Towards Goal     Problem: Urinary Elimination - Impaired  Goal: *Absence/decrease in episodes of  urinary incontinence  7/20/2022 1319 by Ajay Buckley RN  Outcome: Resolved/Met  7/20/2022 1109 by Ajay Buckley RN  Outcome: Progressing Towards Goal     Problem: Patient Education: Go to Patient Education Activity  Goal: Patient/Family Education  7/20/2022 1319 by Ajay Buckley RN  Outcome: Resolved/Met  7/20/2022 1109 by Ajay Buckley RN  Outcome: Progressing Towards Goal     Problem: Hypertension  Goal: *Blood pressure within specified parameters  7/20/2022 1319 by Ajay Buckley RN  Outcome: Resolved/Met  7/20/2022 1109 by Ajay Buckley RN  Outcome: Progressing Towards Goal  Goal: *Fluid volume balance  7/20/2022 1319 by Ajay Buckley RN  Outcome: Resolved/Met  7/20/2022 1109 by Ajay Buckley RN  Outcome: Progressing Towards Goal  Goal: *Labs within defined limits  7/20/2022 1319 by Lenard Varela RN  Outcome: Resolved/Met  7/20/2022 1109 by Lenard Varela RN  Outcome: Progressing Towards Goal     Problem: Patient Education: Go to Patient Education Activity  Goal: Patient/Family Education  7/20/2022 1319 by Lenard Varela RN  Outcome: Resolved/Met  7/20/2022 1109 by Lenard Varela RN  Outcome: Progressing Towards Goal

## 2022-07-20 NOTE — DISCHARGE INSTRUCTIONS
HOSPITALIST DISCHARGE INSTRUCTIONS  NAME: Brett Che   :  1955   MRN:  [id number]     Date/Time:  2022 12:11 PM    ADMIT DATE: 2022     DISCHARGE DATE: 2022     ADMITTING DIAGNOSIS:  Acute blood loss anemia from hematuria    DISCHARGE DIAGNOSIS:  As above     MEDICATIONS:     It is important that you take the medication exactly as they are prescribed. Keep your medication in the bottles provided by the pharmacist and keep a list of the medication names, dosages, and times to be taken in your wallet. Do not take other medications without consulting your doctor. Pain Management: per above medications    What to do at Home    Recommended diet:  Diabetic Diet    Recommended activity: Activity as tolerated    If you experience any of the following symptoms then please call your primary care physician or return to the emergency room if you cannot get hold of your doctor:  Fever, chills, nausea, vomiting, diarrhea, change in mentation, falling, bleeding, shortness of breath, chest pain     [facility] Post Hospital/ED Visit Follow-Up Instructions/Information    You may have an in home follow up visit set up with [facility] or may wish to contact [facility] to set-up a visit:    What are we? [facility] is an in-home urgent care service staffed with emergency trained medical teams. We come to your home in a vehicle stocked with medical supplies and technology. An ER physician is always available if needed. When? As a part of your hospital follow-up, an appointment has been/ or can be set up for us to come see you. Usually, this will be 24-72 hours after you leave the hospital or as needed. [employer] [facility] is open 7am-9pm, 7 days a week, 365 days a year, including holidays. Why? We know that you cannot always get to your doctor after being in the hospital and that your doctor is not always available when you need them.  Once your workup is complete, we'll call in your prescriptions, update your family doctor, and handle billing with your insurance so you can focus on feeling better, faster without leaving home. How much? We accept most major health insurance plans, including Medicaid, Medicare, and Medicare Advantage [address] [facility], Cristy Agudelo, and [employer]. We also accept: credit, debit, health savings account (HSA), health reimbursement account (HRA) and flexible spending account (FSA) payments. [employer] prices compare to conventional urgent care facilities, but we bring the care to you. How to reach us? Getting care is easy- use our mobile florecita ([employer]), website ([email or web address]) or call us [phone]. Information obtained by :  I understand that if any problems occur once I am at home I am to contact my physician. I understand and acknowledge receipt of the instructions indicated above.                                                                                                                                            Physician's or R.N.'s Signature                                                                  Date/Time                                                                                                                                              Patient or Representative Signature                                                          Date/Time

## 2022-07-20 NOTE — PROGRESS NOTES
07/20/22    Medicare pt has received, reviewed, and signed 2nd IM letter informing them of their right to appeal the discharge. Signed copied has been placed on pt bedside chart.

## 2022-07-20 NOTE — DISCHARGE SUMMARY
Physician Discharge Summary     Patient ID:  Andra Waters  [id number]  30 y.o.  1955    Admit date: 7/14/2022    Discharge date and time: 7/20/2022    Admission Diagnoses: Acute blood loss anemia [D62]  Hematuria [R31.9]  Hypokalemia [E87.6]    Discharge Diagnoses/Hospital Course   Acute blood loss anemia POA requiring transfusions due to hematuria. Now improved. Also required IV iron. Evaluated by urology and per urology,  \"Metastatic prostate cancer with hypervascular disease at the bladder neck and metastatic deposits throughout the urethra. Discussed consideration of diverting with bilateral PCNs for palliative purposes if hematuria continues -Pt is NOT agreeable to PCNs. Worsening metastatic disease, followed by Dr. Samara Espinosa. Only other real possibility would be consideration of diversion\"   Improved off of ASA. Will need to continue with Dr. Samara Espinosa heme/onc and continue with 30 day catheter exchanges (last exchange was on 7/18) in house      Bacteruria - urine cultures from 7/18 without growth. Antibiotics stopped    Leukocytosis - unclear etiology. ?reactive. CXR clear. Urine cultures not c/w UTI. No e/o suprapubic cath site infection. No fevers/chills      Hypokalemia (7/14/2022) - improved     Body mass index is 34.97 kg/m².:  30.0 - 39.9 Obese     DM - BG levels low at times in house. Lantus held.  Suspect pt is much less complaint with PO intake at home      Hypertension - on atenolol     PCP: Neil Rosen MD     Consults: Urology    Discharge Exam:   Visit Vitals  BP (!) 143/60 (BP 1 Location: Left upper arm, BP Patient Position: At rest)   Pulse (!) 59   Temp 98.7 °F (37.1 °C)   Resp 16   Ht 5' 8\" (1.727 m)   Wt 104.3 kg (230 lb)   SpO2 100%   BMI 34.97 kg/m²        Gen:  Well-developed, well-nourished, in no acute distress  HEENT:  Pink conjunctivae, PERRL, hearing intact to voice, moist mucous membranes  Neck:  Supple, without masses, thyroid non-tender  Resp:  No accessory muscle use, clear breath sounds without wheezes rales or rhonchi  Card:  No murmurs, normal S1, S2 without thrills, bruits or peripheral edema  Abd: Suprapubic cath in place. No pus or erythema  Musc:  No cyanosis or clubbing  Skin:  No rashes or ulcers, skin turgor is good  Neuro:  Cranial nerves 3-12 are grossly intact,  strength is 5/5 bilaterally and dorsi / plantarflexion is 5/5 bilaterally, follows commands appropriately  Psych:  Good insight, oriented to person, place and time, alert     Disposition: home    Patient Instructions:   Discharge Medication List as of 7/20/2022  1:06 PM        CONTINUE these medications which have NOT CHANGED    Details   docusate sodium (Stool Softener) 100 mg capsule Take 100 mg by mouth two (2) times daily as needed for Constipation. , Historical Med      finasteride (PROSCAR) 5 mg tablet Take 5 mg by mouth daily. , Historical Med      tamsulosin (FLOMAX) 0.4 mg capsule Take 0.4 mg by mouth two (2) times a day., Historical Med      Lantus U-100 Insulin 100 unit/mL injection 23 Units by SubCUTAneous route two (2) times a day., Historical Med, [place]      HumaLOG U-100 Insulin 100 unit/mL injection by SubCUTAneous route four (4) times daily as needed. Sliding scale, Historical Med, [place]      Januvia 100 mg tablet Take 100 mg by mouth daily. , Historical Med, [place]      abiraterone (Zytiga) 250 mg tab Take 4 Tablets by mouth daily. , Normal, Disp-120 Tablet, R-5      predniSONE (DELTASONE) 5 mg tablet Take 1 Tab by mouth daily. , Normal, Disp-30 Tab, R-5      atenoloL (TENORMIN) 50 mg tablet Take 50 mg by mouth nightly., Historical Med      atorvastatin (LIPITOR) 10 mg tablet Take 10 mg by mouth nightly., Historical Med      benazepriL (LOTENSIN) 40 mg tablet Take 40 mg by mouth daily. , Historical Med      !! metFORMIN (GLUCOPHAGE) 500 mg tablet Take 1,500 mg by mouth daily (with breakfast). , Historical Med      !! metFORMIN (GLUCOPHAGE) 500 mg tablet Take 1,000 mg by mouth daily (with dinner). , Historical Med      pioglitazone (Actos) 45 mg tablet Take 45 mg by mouth daily. , Historical Med       !! - Potential duplicate medications found. Please discuss with provider.         STOP taking these medications       amoxicillin-clavulanate (AUGMENTIN) 500-125 mg per tablet Comments:   Reason for Stopping:         acetaminophen (TYLENOL) 325 mg tablet Comments:   Reason for Stopping:         aspirin 81 mg chewable tablet Comments:   Reason for Stopping:                Activity: Activity as tolerated  Diet: Resume previous diet  Wound Care: None needed    Follow-up with Scotty Brock MD   Please follow-up with urology as advised     Approximate time spent in patient care on day of discharge: 35 minutes     Signed:  Reilly Angeles MD  7/20/2022  3:51 PM

## 2022-07-20 NOTE — PROGRESS NOTES
7/20/2022 1:18 PM     Care Management Progress Note      ICD-10-CM ICD-9-CM    1. Anemia, unspecified type  D64.9 285.9       2. Gross hematuria  R31.0 599.71           RUR:  22%  Risk Level: []Low []Moderate [x]High  Value-based purchasing: [] Yes [x] No  Bundle patient: [] Yes [x] No   Specify:     Transition of care plan:  Discharge home today  Pt to discharge home with family support  Pt declined home health  Outpatient follow-up. Pt's family to transport  Care Management Interventions  PCP Verified by CM:  Yes  MyChart Signup: No  Discharge Durable Medical Equipment: No  Physical Therapy Consult: Yes  Occupational Therapy Consult: Yes  Support Systems: Other Family Member(s)  Confirm Follow Up Transport: Self  Discharge Location  Patient Expects to be Discharged to[de-identified] Home with family assistance

## 2022-07-20 NOTE — PROGRESS NOTES
Nurse handed patient a copy of discharge instructions which have been read and explained to patient. New medications read and explained. Patient verbalized understanding. Patient aware that prescriptions have been electronically sent to pharmacy. Opportunity for questions and clarification offered. Removed patients IV access with no complications, VS stable, and patient sent with all belongings.

## 2022-07-21 NOTE — PROGRESS NOTES
Physician Progress Note      PATIENT:               Pilar Kuo  CSN #:                  [id number]  :                       1955  ADMIT DATE:       2022 2:22 PM  [address] DATE:        2022 1:15 PM  RESPONDING  PROVIDER #:        Mary Beth Hernandez MD          QUERY TEXT:    Good morning  Pt admitted with gross hematuria. Pt noted to have abnormal UA. If possible, please document in the progress notes and discharge summary if you are evaluating and/or treating any of the following: The medical record reflects the following:  Risk Factors: gross hematuria, Hx BPH, ABLA, Hx stage IV metastatic prostate cancer  Clinical Indicators: UA- bloody, trace ketones, large blood, large LE, few epis, 2+ bacteria  Treatment: urology consult, UA/UC, IV rocephin, SPT exchanged    Thank you  Laura Das RN [facility]  [phone]  Options provided:  -- Urinary Tract Infection (UTI)  -- Urinary Tract Infection (UTI) not present  -- Bacteriuria  -- Other - I will add my own diagnosis  -- Disagree - Not applicable / Not valid  -- Disagree - Clinically unable to determine / Unknown  -- Refer to Clinical Documentation Reviewer    PROVIDER RESPONSE TEXT:    This patient does not have a UTI.     Query created by: Amandeep Boyd on 2022 11:24 AM      Electronically signed by:  Mary Beth Hernandez MD 2022 11:30 AM

## 2022-07-27 PROBLEM — E87.6 HYPOKALEMIA: Status: RESOLVED | Noted: 2022-01-01 | Resolved: 2022-01-01

## 2022-07-27 PROBLEM — J01.90 ACUTE SINUSITIS: Status: RESOLVED | Noted: 2021-04-16 | Resolved: 2022-01-01

## 2022-07-27 PROBLEM — E86.0 DEHYDRATION: Status: ACTIVE | Noted: 2022-01-01

## 2022-07-27 PROBLEM — E11.65 SEVERE HYPERGLYCEMIA DUE TO DIABETES MELLITUS (HCC): Status: RESOLVED | Noted: 2021-04-16 | Resolved: 2022-01-01

## 2022-07-27 PROBLEM — R74.8 ELEVATED ALKALINE PHOSPHATASE LEVEL: Status: RESOLVED | Noted: 2021-04-16 | Resolved: 2022-01-01

## 2022-07-27 PROBLEM — D72.829 LEUKOCYTOSIS: Status: ACTIVE | Noted: 2022-01-01

## 2022-07-27 PROBLEM — D64.9 ANEMIA: Status: ACTIVE | Noted: 2022-01-01

## 2022-07-27 NOTE — ACP (ADVANCE CARE PLANNING)
Primary Decision MakerDede Jacques - Shel[place] - [phone]  Primary Decision Maker: Navdeep Conroy - [phone]  Advance Care Planning 7/27/2022   Patient's Healthcare Decision Maker Legal Next of [address] [place] [address] Name Marline and [address] Directive Not on file      Pt does not have AMD on file. In absence of verified Medical POA, adult children are legal NOK/surrogate decision makers.

## 2022-07-27 NOTE — ED PROVIDER NOTES
65G w/ hx metastatic prostate cancer, DM, HTN p/w gross hematuria. Pt recently admitted for hematuria (suprapubic cath) requiring transfusions of PRBC and Iron. Was seen by urology who changed catheter and discontinued asa (now no anticoagulation). Per urology notes: \"Metastatic prostate cancer with hypervascular disease at the bladder neck and metastatic deposits throughout the urethra. Discussed consideration of diverting with bilateral PCNs for palliative purposes if hematuria continues -Pt is NOT agreeable to PCNs. \"    Today, pt reports gross hematuria w/o clot, changed bag once at home since started a few hrs ago. Denies dizziness, syncope, F/C, dyspnea, abd pain. No N/V/D. Denies bleeding from anywhere else.        Past Medical History:   Diagnosis Date    BPH (benign prostatic hyperplasia)     COVID-19 vaccine series completed     Diabetes ([facility] [address]     Cruz catheter in place 11/2022    High cholesterol     Hypertension     No blood products     Urinary retention        Past Surgical History:   Procedure Laterality Date    IR ASP BLADDER SUPRA CATH  4/22/2022    IR ASP BLADDER SUPRA CATH  6/22/2022    IR KYPHOPLASTY THORACIC  4/20/2021         Family History:   Problem Relation Age of Onset    Anesth Problems Neg Hx     Clotting Disorder Neg Hx        Social History     Socioeconomic History    Marital status:      Spouse name: Not on file    Number of children: Not on file    Years of education: Not on file    Highest education level: Not on file   Occupational History    Not on file   Tobacco Use    Smoking status: Former     Types: Cigarettes    Smokeless tobacco: Never   Vaping Use    Vaping Use: Unknown   Substance and Sexual Activity    Alcohol use: Never    Drug use: Never    Sexual activity: Not Currently   Other Topics Concern    Not on file   Social History Narrative    Not on file     Social Determinants of Health     Financial Resource Strain: Not on file   Food Insecurity: Not on file Transportation Needs: Not on file   Physical Activity: Not on file   Stress: Not on file   Social Connections: Not on file   Intimate Partner Violence: Not on file   Housing Stability: Not on file         ALLERGIES: Cefepime and Erythromycin    Review of Systems   Constitutional:  Negative for chills, diaphoresis and fever. HENT:  Negative for facial swelling, mouth sores, nosebleeds, trouble swallowing and voice change. Eyes:  Negative for pain and visual disturbance. Respiratory:  Negative for apnea, cough, shortness of breath, wheezing and stridor. Cardiovascular:  Negative for chest pain, palpitations and leg swelling. Gastrointestinal:  Negative for abdominal distention, abdominal pain, blood in stool, diarrhea, nausea and vomiting. Genitourinary:  Positive for hematuria. Negative for difficulty urinating, dysuria, flank pain, scrotal swelling and testicular pain. Musculoskeletal:  Negative for joint swelling. Skin:  Negative for color change and rash. Allergic/Immunologic: Negative for immunocompromised state. Neurological:  Negative for dizziness, syncope and light-headedness. Hematological:  Does not bruise/bleed easily. Psychiatric/Behavioral:  Negative for agitation and behavioral problems. Vitals:    07/27/22 1525 07/27/22 1533 07/27/22 1545 07/27/22 1649   BP: (!) 167/74 (!) 167/74 (!) 160/77 (!) 157/78   Pulse: 73 71 78 74   Resp:  18 19 18   Temp:  97.3 °F (36.3 °C) 97.3 °F (36.3 °C) 97.8 °F (36.6 °C)   SpO2:  97% 97% 97%   Weight:       Height:                Physical Exam  Vitals and nursing note reviewed. Constitutional:       General: He is not in acute distress. Appearance: Normal appearance. He is not ill-appearing or toxic-appearing. HENT:      Head: Normocephalic and atraumatic.       Right Ear: External ear normal.      Left Ear: External ear normal.      Nose: Nose normal.      Mouth/Throat:      Mouth: Mucous membranes are moist.      Pharynx: Oropharynx is clear. No oropharyngeal exudate or posterior oropharyngeal erythema. Eyes:      General: No scleral icterus. Extraocular Movements: Extraocular movements intact. Conjunctiva/sclera: Conjunctivae normal.      Pupils: Pupils are equal, round, and reactive to light. Cardiovascular:      Rate and Rhythm: Normal rate and regular rhythm. Pulses: Normal pulses. Heart sounds: No murmur heard. No friction rub. No gallop. Pulmonary:      Effort: Pulmonary effort is normal. No respiratory distress. Breath sounds: Normal breath sounds. No stridor. No wheezing, rhonchi or rales. Abdominal:      General: Bowel sounds are normal. There is no distension. Palpations: Abdomen is soft. Tenderness: There is no abdominal tenderness. There is no guarding or rebound. Genitourinary:     Comments: Suprapubic catheter w/o bleeding or drainage around insertion site  Red blood in urine bag w/o clot  Musculoskeletal:         General: No deformity. Normal range of motion. Cervical back: Normal range of motion and neck supple. No rigidity. Right lower leg: No edema. Left lower leg: No edema. Skin:     General: Skin is warm. Capillary Refill: Capillary refill takes less than 2 seconds. Coloration: Skin is not jaundiced. Neurological:      General: No focal deficit present. Mental Status: He is alert. Cranial Nerves: No cranial nerve deficit. Sensory: No sensory deficit. Motor: No weakness. Coordination: Coordination normal.   Psychiatric:         Mood and Affect: Mood normal.         Behavior: Behavior normal.         Thought Content: Thought content normal.         Judgment: Judgment normal.        MDM  Number of Diagnoses or Management Options  Chronic anemia  Gross hematuria  Leukocytosis, unspecified type  Diagnosis management comments: 26E w/ hx metastatic prostate cancer, DM, HTN p/w gross hematuria via suprapubic cath.  Pt nontoxic appearing, afebrile, hemodynamically stable w/o resp distress. Per urology, pt has \"Metastatic prostate cancer with hypervascular disease at the bladder neck and metastatic deposits throughout the urethra. \" This likely explains gross hematuria. We attempted to manually irrigated bladder w/o success and unable to pull back or flush suggesting large clot burden. Labs show stable Hgb 7 at his baseline but uptrending WBCs so possible infection. No longer anticoagulated, would not transfuse at this time. Ordered cefepime. Plan to admit to medical team w/ urology consult for gross hematuria    0700 Spoke w/ urology (Dr Micah Nolen) who will see an evaluate shortly. Called to bedside by RN as pt was c/o tongue swelling and difficulty opening his jaw. Pt found to be mildly uncomfortable w/o stridor, drooling, trismus. Very mild tongue edema w/ clear posterior oropharynx. Only med given was cefepime. Pt given IV solumedrol, pepcid, benadryl.  I updated admitting team. Allergy added to list.       Amount and/or Complexity of Data Reviewed  Clinical lab tests: ordered and reviewed  Tests in the radiology section of CPT®: ordered and reviewed  Tests in the medicine section of CPT®: reviewed and ordered  Decide to obtain previous medical records or to obtain history from someone other than the patient: yes  Obtain history from someone other than the patient: yes  Review and summarize past medical records: yes  Discuss the patient with other providers: yes  Independent visualization of images, tracings, or specimens: yes    Risk of Complications, Morbidity, and/or Mortality  Presenting problems: moderate  Diagnostic procedures: moderate  Management options: moderate           Procedures      LABORATORY TESTS:  Admission on 07/27/2022   Component Date Value Ref Range Status    WBC 07/27/2022 23.6 (A) 4.1 - 11.1 K/uL Final    RBC 07/27/2022 3.08 (A) 4.10 - 5.70 M/uL Final    HGB 07/27/2022 7.3 (A) 12.1 - 17.0 g/dL Final    HCT 07/27/2022 23.4 (A) 36.6 - 50.3 % Final    MCV 07/27/2022 76.0 (A) 80.0 - 99.0 FL Final    MCH 07/27/2022 23.7 (A) 26.0 - 34.0 PG Final    MCHC 07/27/2022 31.2  30.0 - 36.5 g/dL Final    RDW 07/27/2022 17.8 (A) 11.5 - 14.5 % Final    PLATELET 51/04/1455 916 (A) 150 - 400 K/uL Final    MPV 07/27/2022 10.0  8.9 - 12.9 FL Final    NRBC 07/27/2022 0.0  0  WBC Final    ABSOLUTE NRBC 07/27/2022 0.00  0.00 - 0.01 K/uL Final    NEUTROPHILS 07/27/2022 83 (A) 32 - 75 % Final    LYMPHOCYTES 07/27/2022 7 (A) 12 - 49 % Final    MONOCYTES 07/27/2022 7  5 - 13 % Final    EOSINOPHILS 07/27/2022 1  0 - 7 % Final    BASOPHILS 07/27/2022 1  0 - 1 % Final    IMMATURE GRANULOCYTES 07/27/2022 1 (A) 0.0 - 0.5 % Final    ABS. NEUTROPHILS 07/27/2022 19.8 (A) 1.8 - 8.0 K/UL Final    ABS. LYMPHOCYTES 07/27/2022 1.5  0.8 - 3.5 K/UL Final    ABS. MONOCYTES 07/27/2022 1.6 (A) 0.0 - 1.0 K/UL Final    ABS. EOSINOPHILS 07/27/2022 0.3  0.0 - 0.4 K/UL Final    ABS. BASOPHILS 07/27/2022 0.1  0.0 - 0.1 K/UL Final    ABS. IMM. GRANS. 07/27/2022 0.2 (A) 0.00 - 0.04 K/UL Final    DF 07/27/2022 AUTOMATED    Final    Sodium 07/27/2022 140  136 - 145 mmol/L Final    Potassium 07/27/2022 4.1  3.5 - 5.1 mmol/L Final    Chloride 07/27/2022 107  97 - 108 mmol/L Final    CO2 07/27/2022 25  21 - 32 mmol/L Final    Anion gap 07/27/2022 8  5 - 15 mmol/L Final    Glucose 07/27/2022 193 (A) 65 - 100 mg/dL Final    BUN 07/27/2022 17  6 - 20 MG/DL Final    Creatinine 07/27/2022 1.18  0.70 - 1.30 MG/DL Final    BUN/Creatinine ratio 07/27/2022 14  12 - 20   Final    GFR est AA 07/27/2022 >60  >60 ml/min/1.73m2 Final    GFR est non-AA 07/27/2022 >60  >60 ml/min/1.73m2 Final    Estimated GFR is calculated using the IDMS-traceable Modification of Diet in Renal Disease (MDRD) Study equation, reported for both  Americans (GFRAA) and non- Americans (GFRNA), and normalized to 1.73m2 body surface area.  The physician must decide which value applies to the patient. Calcium 07/27/2022 9.4  8.5 - 10.1 MG/DL Final    Crossmatch Expiration 07/27/2022 07/30/2022,2359   Final    ABO/Rh(D) 07/27/2022 O POSITIVE   Final    Antibody screen 07/27/2022 NEG   Final    Color 07/27/2022 BLOODY    Final    Color Reference Range: Straw, Yellow or Dark Yellow    Appearance 07/27/2022 CLOUDY (A) CLEAR   Final    Specific gravity 07/27/2022 1.020  1.003 - 1.030   Final    Macroscopic performed on spun urine due to gross blood  or mucus    pH (UA) 07/27/2022 7.0  5.0 - 8.0   Final    Macroscopic performed on spun urine due to gross blood  or mucus    Protein 07/27/2022 >300 (A) NEG mg/dL Final    Macroscopic performed on spun urine due to gross blood  or mucus    Glucose 07/27/2022 Negative  NEG mg/dL Final    Macroscopic performed on spun urine due to gross blood  or mucus    Ketone 07/27/2022 Negative  NEG mg/dL Final    Macroscopic performed on spun urine due to gross blood  or mucus    Bilirubin 07/27/2022 Negative  NEG   Final    Macroscopic performed on spun urine due to gross blood  or mucus    Blood 07/27/2022 LARGE (A) NEG   Final    Macroscopic performed on spun urine due to gross blood  or mucus    Urobilinogen 07/27/2022 0.2  0.2 - 1.0 EU/dL Final    Macroscopic performed on spun urine due to gross blood  or mucus    Nitrites 07/27/2022 Negative  NEG   Final    Macroscopic performed on spun urine due to gross blood  or mucus    Leukocyte Esterase 07/27/2022 MODERATE (A) NEG   Final    Macroscopic performed on spun urine due to gross blood  or mucus    WBC 07/27/2022   0 - 4 /hpf Final    RBC 07/27/2022 >100 (A) 0 - 5 /hpf Final    Epithelial cells 07/27/2022 FEW  FEW /lpf Final    Epithelial cell category consists of squamous cells and /or transitional urothelial cells. Renal tubular cells, if present, are separately identified as such. Bacteria 07/27/2022 1+ (A) NEG /hpf Final    Urine culture hold 07/27/2022 Urine on hold in Microbiology dept for 2 days.   If unpreserved urine is submitted, it cannot be used for addtional testing after 24 hours, recollection will be required. Final    SAMPLES BEING HELD 07/27/2022 1SST,1RED,1BLU   Final    COMMENT 07/27/2022 Add-on orders for these samples will be processed based on acceptable specimen integrity and analyte stability, which may vary by analyte. Final    Lactic Acid (POC) 07/27/2022 1.01  0.40 - 2.00 mmol/L Final    AMPHETAMINES 07/27/2022 Negative  NEG   Final    BARBITURATES 07/27/2022 Negative  NEG   Final    BENZODIAZEPINES 07/27/2022 Negative  NEG   Final    COCAINE 07/27/2022 Negative  NEG   Final    METHADONE 07/27/2022 Negative  NEG   Final    OPIATES 07/27/2022 Negative  NEG   Final    PCP(PHENCYCLIDINE) 07/27/2022 Negative  NEG   Final    THC (TH-CANNABINOL) 07/27/2022 Negative  NEG   Final    Drug screen comment 07/27/2022 (NOTE)   Final    Comment: This test is a screen for drugs of abuse in a medical setting only   (i.e., they are unconfirmed results and as such must not be used for   non-medical purposes e.g., employment testing, legal testing). Due to   its inherent nature, false positive (FP) and false negative (FN)   results may be obtained. Therefore, if necessary for medical care,   recommend confirmation of positive findings by GC/MS. The cutoff   values (i.e., the level at which this screening test becomes positive   for a given drug group) are:    Amphetamine/Methamphetamine: 300 ng/mL  Barbiturates:                200 ng/mL  Benzodiazepines:             200 ng/mL  Cocaine:                     150 ng/mL  Methadone:                   300 ng/mL  Opiates:                     300 ng/mL   Phencyclidine, PCP:           25 ng/mL  Marijuana, THC:               50 ng/mL    This screening test can identify the presence of the following drugs   when above the cutoff value; see list posted on the intranet.  It can   be viewed by sonal                           ecting in sequence the following from the IRIS home   page: [employer]; [address] [employer] , Resources; [facility], Physician Resources Q to Z; \"UDS (Urine Drug Screen   Automated) List of Detectable Drugs. \"     Or use web address:   [email or web address]/[email or web address]/[email or web address]/  UDS%20List%20of%20Detectable%20Drugs. pdf      ALCOHOL(ETHYL),SERUM 07/27/2022 <10  <10 MG/DL Final    Procalcitonin 07/27/2022 0.51  ng/mL Final    Comment:      Suspected Sepsis:  <0.50 ng/mL     Low likelihood of sepsis. 0.50-2.00 ng/mL    Increased likelihood of sepsis. Antibiotics encouraged. >2.00 ng/mL  High risk of sepsis/shock. Antibiotics strongly encouraged. Suspected Lower Resp Tract Infections:  <0.24 ng/mL    Low likelihood of bacterial infection. >0.24 ng/mL    Increased likelihood of bacterial infection. Antibiotics encouraged. With successful antibiotic therapy, PCT levels should decrease rapidly. (Half-life of 24 to 36 hours)       Procalcitonin values from samples collected within the first 6 hours of systemic infection may still be low. Retesting may be indicated. Values from day 1 and day 4 can be entered into the Change in Procalcitonin Calculator (www.[employer]-pct-calculator. [employer]) to determine the patient's Mortality Risk Prognosis. In healthy neonates, plasma Procalcitonin (PCT) concentrations increase gradually after birth, reaching peak values at about 24 hours of age then decrease to normal valu                           es below 0.5 ng/mL by 48-72 hours of age. HGB 07/27/2022 8.0 (A) 12.1 - 17.0 g/dL Final    HCT 07/27/2022 25.6 (A) 36.6 - 50.3 % Final    Glucose (POC) 07/27/2022 266 (A) 65 - 117 mg/dL Final    Comment: (NOTE)  The FDA has indicated that no capillary point of care blood glucose  monitoring systems are approved for use in \"critically ill\" patients,  however they have not defined this population.  The College of  American Pathologists has recommended that these devices should not  be used in cases such as severe hypotension, dehydration, shock, and  hyperglycemic-hyperosmolar state, amongst others. Venous or arterial  collection is the recommended specimen for testing these patients. Performed by 07/27/2022 Michelle Gurrola   Final    Glucose (POC) 07/27/2022 363 (A) 65 - 117 mg/dL Final    Comment: Will repeat test per nursing protocol  (NOTE)  The FDA has indicated that no capillary point of care blood glucose  monitoring systems are approved for use in \"critically ill\" patients,  however they have not defined this population. The College of  American Pathologists has recommended that these devices should not  be used in cases such as severe hypotension, dehydration, shock, and  hyperglycemic-hyperosmolar state, amongst others. Venous or arterial  collection is the recommended specimen for testing these patients. Performed by 07/27/2022 PIEDAD IVORY   Final    Glucose (POC) 07/27/2022 362 (A) 65 - 117 mg/dL Final    Comment: Notified RN or MD immediately by   (NOTE)  The FDA has indicated that no capillary point of care blood glucose  monitoring systems are approved for use in \"critically ill\" patients,  however they have not defined this population. The College of  American Pathologists has recommended that these devices should not  be used in cases such as severe hypotension, dehydration, shock, and  hyperglycemic-hyperosmolar state, amongst others. Venous or arterial  collection is the recommended specimen for testing these patients.       Performed by 07/27/2022 PIEDAD IVORY   Final       IMAGING RESULTS:  IR OCCL TXCATH ORGAN W SI    (Results Pending)       MEDICATIONS GIVEN:  Medications   acetaminophen (TYLENOL) tablet 650 mg (has no administration in time range)   polyethylene glycol (MIRALAX) packet 17 g (has no administration in time range)   ondansetron (ZOFRAN) injection 4 mg (has no administration in time range)   0.9% sodium chloride infusion (75 mL/hr IntraVENous New Bag 7/27/22 1528)   atenoloL (TENORMIN) tablet 25 mg (has no administration in time range)   ferrous sulfate tablet 325 mg (0 mg Oral Held 7/27/22 0730)   docusate sodium (COLACE) capsule 100 mg (has no administration in time range)   finasteride (PROSCAR) tablet 5 mg (0 mg Oral Held 7/27/22 0900)   insulin glargine (LANTUS) injection 14 Units (0 Units SubCUTAneous Held 7/27/22 0900)   glucose chewable tablet 16 g (has no administration in time range)   glucagon (GLUCAGEN) injection 1 mg (has no administration in time range)   dextrose 10% infusion 0-250 mL (has no administration in time range)   insulin lispro (HUMALOG) injection (12 Units SubCUTAneous Given 7/27/22 1759)   gelatin sponge,absorb-porcine (GELFOAM) 100 sponge 1 Each (1 Each Topical Given by Provider 7/27/22 1244)   cefepime (MAXIPIME) 1 g in 0.9% sodium chloride (MBP/ADV) 50 mL MBP (0 g IntraVENous IV Completed 7/27/22 0630)   methylPREDNISolone (PF) (Solu-MEDROL) injection 125 mg (125 mg IntraVENous Given 7/27/22 0728)   diphenhydrAMINE (BENADRYL) injection 50 mg (50 mg IntraVENous Given 7/27/22 0728)   famotidine (PF) (PEPCID) injection 20 mg (20 mg IntraVENous Given 7/27/22 0728)   iopamidoL (ISOVUE 300) 61 % contrast injection  mL (300 mL IntraarTERial Given by Provider 7/27/22 1349)   lidocaine (XYLOCAINE) 20 mg/mL (2 %) injection 300 mg (10 mL IntraDERMal Given by Provider 7/27/22 1138)       PROGRESS NOTE:   0700 Pt developed mild tongue swelling after cefepime and treated w/ steroids/pepcid/beneadry. Continue to closely monitor. CONSULTS:  Urology (Dr Elena Amaral) 0700    IMPRESSION:  1. Gross hematuria    2. Chronic anemia    3. Leukocytosis, unspecified type        PLAN:  - Admit to hospitalist    Andrew Alba MD    Perfect Serve Consult for Admission  5:46 AM    ED Room Number: 127/97  Patient Name and age:  Raynald Dance 78 y.o.  male  Working Diagnosis:   1. Gross hematuria    2. Chronic anemia    3.  Leukocytosis, unspecified type        COVID-19 Suspicion:  no  Sepsis present:  no  Reassessment needed: no  Code Status:  Full Code  Readmission: yes  Isolation Requirements:  no  Recommended Level of Care:  med/surg  Department:[facility] - [phone]

## 2022-07-27 NOTE — PROGRESS NOTES
7/27/2022  10:45 AM  Case Management note    Reason for Readmission:     hematuria    7/14/2022 -7/20/2022 Acute Blood Loss  Patient refused [place] on last admission. Patient returned today with blood in srivastava. Patient lives alone, drives and uses walker or cane if needed. Patient has history of BPH, DM, HLD and HTN. Received hospice consult. Spoke with patient and is amendable to this plan. [facility]             RUR Score/Risk Level:     24%    PCP: First and Last name:  Cara Hager   Name of Practice:    Are you a current patient: Yes/No: yes   Approximate date of last visit: 2 months   Can you participate in a virtual visit with your PCP:     Is a Care Conference indicated: goals of care      Did you attend your follow up appointment (s): If not, why not:stated they did not need to go to urology as took care while in hospital         Resources/supports as identified by patient/family:   family       Top Challenges facing patient (as identified by patient/family and CM): Finances/Medication cost?     Medicare/ [employer]      self & family  Support system or lack thereof? S/O and children  Living arrangements? Lives alone with family asupport    Self-care/ADLs/Cognition? Limited self care/ poor health cognition       Current Advanced Directive/Advance Care Plan:   AD not on file           Plan for utilizing home health:   Referral [facility] to [facility]             Transition of Care Plan:    Based on readmission, the patient's previous Plan of Care   has been evaluated and/or modified. The current Transition of Care Plan is:           Home with family assistance  PCP follow up  Hospice referral  AD planning  CM to follow for discharge needs    Care Management Interventions  PCP Verified by CM:  Yes (Cara Hager MD)  Support Systems: Spouse/Significant Other, Child(evelin)  Confirm Follow Up Transport: Family  The Plan for Transition of Care is Related to the Following Treatment Goals : hematuria  Discharge Location  Patient Expects to be Discharged to[de-identified] Home with hospice    Readmission Assessment  Number of days since last admission?: 1-7 days  Previous disposition: Home with Family  Who is being interviewed?: Patient, Caregiver  What was the patient's/caregiver's perception as to why they think they needed to return back to the hospital?: Other (Comment) (blood in urine)  Did you visit your Primary Care Physician after you left the hospital, before you returned this time?: No  Why weren't you able to visit your PCP?: Other (Comment) (felt he did not need to)  Did you see a specialist, such as Cardiac, Pulmonary, Orthopedic Physician, etc. after you left the hospital?: No (felt follow up was taken care of in hospital)  Who advised the patient to return to the hospital?: Self-referral  Does the patient report anything that got in the way of taking their medications?: No  Cortez MENON

## 2022-07-27 NOTE — PROGRESS NOTES
Spiritual Care Assessment/Progress Note  [address]      NAME: Hong Johnson      MRN: [id number]  AGE: 78 y.o. SEX: male  Taoism Affiliation: No Jew   Language: English     7/27/2022     Total Time (in minutes): 15     Spiritual Assessment begun in [facility] EMERGENCY DEPT through conversation with:         []Patient        [] Family    [x] Friend(s)        Reason for Consult: Palliative Care, Initial/Spiritual Assessment     Spiritual beliefs: (Please include comment if needed)     [x] Identifies with a viola tradition: Moshe        [] Supported by a viola community:            [] Claims no spiritual orientation:           [] Seeking spiritual identity:                [] Adheres to an individual form of spirituality:           [] Not able to assess:                           Identified resources for coping:      [x] Prayer                               [] Music                  [] Guided Imagery     [x] Family/friends                 [] Pet visits     [] Devotional reading                         [] Unknown     [] Other:                                               Interventions offered during this visit: (See comments for more details)          Family/Friend(s):  Affirmation of emotions/emotional suffering, Affirmation of viola, Catharsis/review of pertinent events in supportive environment, Coping skills reviewed/reinforced, Iconic (affirming the presence of God/Higher Power), Prayer (assurance of), Normalization of emotional/spiritual concerns     Plan of Care:     [] Support spiritual and/or cultural needs    [] Support AMD and/or advance care planning process      [] Support grieving process   [] Coordinate Rites and/or Rituals    [] Coordination with community clergy   [] No spiritual needs identified at this time   [] Detailed Plan of Care below (See Comments)  [] Make referral to Music Therapy  [] Make referral to Pet Therapy     [] Make referral to Addiction services  [] Make referral to [facility]  [] Make referral to Spiritual Care Partner  [] No future visits requested        [x] Follow up visits as needed     Visited patient for palliative initial spiritual assessment. Patient seemed to be sleeping for the duration of visit. Sue Aguilar, who identified the patient as \"my son's father\" was at bedside. Speaking softly so as not to disturb him she gave voice to her concerns over his current condition and fears about his future. He is considering a hospice consult. He is a person of [place] viola and derives a sense of support and hope from his viola.    Chaplain Dwain, MDiv, MS, [facility]

## 2022-07-27 NOTE — H&P
Interventional and Vascular Radiology History and Physical    Patient: Sarah Man 78 y.o. male       Chief Complaint: Urinary Catheter Problem      History of Present Illness: prostate cancer, hematuria     History:    Past Medical History:   Diagnosis Date    BPH (benign prostatic hyperplasia)     COVID-19 vaccine series completed     Diabetes ([facility] [address]     Cruz catheter in place 11/2022    High cholesterol     Hypertension     No blood products     Urinary retention      Family History   Problem Relation Age of Onset    Anesth Problems Neg Hx     Clotting Disorder Neg Hx      Social History     Socioeconomic History    Marital status:      Spouse name: Not on file    Number of children: Not on file    Years of education: Not on file    Highest education level: Not on file   Occupational History    Not on file   Tobacco Use    Smoking status: Former     Types: Cigarettes    Smokeless tobacco: Never   Vaping Use    Vaping Use: Unknown   Substance and Sexual Activity    Alcohol use: Never    Drug use: Never    Sexual activity: Not Currently   Other Topics Concern    Not on file   Social History Narrative    Not on file     Social Determinants of Health     Financial Resource Strain: Not on file   Food Insecurity: Not on file   Transportation Needs: Not on file   Physical Activity: Not on file   Stress: Not on file   Social Connections: Not on file   Intimate Partner Violence: Not on file   Housing Stability: Not on file       Allergies:    Allergies   Allergen Reactions    Cefepime Angioedema    Erythromycin Other (comments)     Insomnia- felt like speed       Current Medications:  Current Facility-Administered Medications   Medication Dose Route Frequency    acetaminophen (TYLENOL) tablet 650 mg  650 mg Oral Q6H PRN    polyethylene glycol (MIRALAX) packet 17 g  17 g Oral DAILY PRN    ondansetron (ZOFRAN) injection 4 mg  4 mg IntraVENous Q6H PRN    0.9% sodium chloride infusion  75 mL/hr IntraVENous CONTINUOUS atenoloL (TENORMIN) tablet 25 mg  25 mg Oral QHS    ferrous sulfate tablet 325 mg  325 mg Oral ACB    docusate sodium (COLACE) capsule 100 mg  100 mg Oral BID PRN    finasteride (PROSCAR) tablet 5 mg  5 mg Oral DAILY    insulin glargine (LANTUS) injection 14 Units  14 Units SubCUTAneous DAILY    glucose chewable tablet 16 g  4 Tablet Oral PRN    glucagon (GLUCAGEN) injection 1 mg  1 mg IntraMUSCular PRN    dextrose 10% infusion 0-250 mL  0-250 mL IntraVENous PRN    insulin lispro (HUMALOG) injection   SubCUTAneous AC&HS    fentaNYL citrate (PF) injection  mcg   mcg IntraVENous Multiple    midazolam (VERSED) injection 0.5-10 mg  0.5-10 mg IntraVENous Multiple    gelatin sponge,absorb-porcine (GELFOAM) 100 sponge 1 Each  1 Each Topical PRN        Physical Exam:  Blood pressure (!) 143/66, pulse 75, temperature 98.8 °F (37.1 °C), resp. rate 19, height 5' 8\" (1.727 m), weight 104.3 kg (230 lb), SpO2 98 %. LUNG: clear to auscultation bilaterally, HEART: regular rate and rhythm, S1, S2 normal, no murmur, click, rub or gallop      Alerts:    Hospital Problems  Date Reviewed: 7/27/2022            Codes Class Noted POA    Anemia ICD-10-CM: D64.9  ICD-9-CM: 285.9  7/27/2022 Yes        Leukocytosis ICD-10-CM: D72.829  ICD-9-CM: 288.60  7/27/2022 Yes        Dehydration ICD-10-CM: E86.0  ICD-9-CM: 276.51  7/27/2022 Yes        Acute blood loss anemia ICD-10-CM: D62  ICD-9-CM: 285.1  7/14/2022 Yes        * (Principal) Hematuria ICD-10-CM: R31.9  ICD-9-CM: 599.70  7/14/2022 Yes        Metastasis to bone [facility]) ICD-10-CM: C79.51  ICD-9-CM: 198.5  4/27/2021 Yes        Prostate cancer ([facility] [address] ICD-10-CM: C61  ICD-9-CM: 961  4/21/2021 Yes        Pyuria ICD-10-CM: R82.81  ICD-9-CM: 791.9  4/16/2021 Yes        Diabetes ([facility] ICD-10-CM: E11.9  ICD-9-CM: 250.00  Unknown Yes        Hypertension ICD-10-CM: I10  ICD-9-CM: 401.9  Unknown Yes        MAMADOU (acute kidney injury) ([facility] ICD-10-CM: N17.9  ICD-9-CM: 519. 9  Unknown Yes HLD (hyperlipidemia) ICD-10-CM: E78.5  ICD-9-CM: 272.4  Unknown Yes           Laboratory:      Recent Labs     07/27/22  0503   HGB 7.3*   HCT 23.4*   WBC 23.6*   *   BUN 17   CREA 1.18   K 4.1         Plan of Care/Planned Procedure:  Risks, benefits, and alternatives reviewed with patient and he agrees to proceed with the procedure. Conscious sedation will be performed with IV fentanyl and versed.  Plan is for bilateral prostate artery embolization       Bryce Berrios MD     pros

## 2022-07-27 NOTE — PROGRESS NOTES
Occupational therapy note:  Orders received,  chart reviewed. Patient with bloody drainage from suprapubic catheter. Drainage soaking gown and dressings at this time. Patient with plan for procedure later today and noted hospice consult. Will hold OT evaluation at this time and follow up. Akua Nance MS OTR/L

## 2022-07-27 NOTE — PROGRESS NOTES
1358: TRANSFER - IN REPORT:    Verbal report received from [facility]) on Hong Johnson  being received from angio lab(unit) for routine post - op      Report consisted of patients Situation, Background, Assessment and   Recommendations(SBAR). Information from the following report(s) Procedure Summary was reviewed with the receiving nurse. Opportunity for questions and clarification was provided. Assessment completed upon patients arrival to unit and care assumed. 1358: Patient received from angio lab. Patient with quikclot to right groin site. Site clean, dry and intact. No hematoma. Pulses palpable. VS stable. Patient with no complaints at this time. HOB flat    1415: H&H sent    0065: Patient transported to [facility]. Site check with [facility]. CDI. No hematoma. TRANSFER - OUT REPORT:    Verbal report given to [facility] (name) on Hong Johnson  being transferred to [facility](unit) for routine progression of care       Report consisted of patients Situation, Background, Assessment and   Recommendations(SBAR). Information from the following report(s) SBAR, Procedure Summary, Intake/Output, MAR, Recent Results, and Cardiac Rhythm NSR  was reviewed with the receiving nurse. Lines:   Peripheral IV 07/27/22 Left Wrist (Active)   Site Assessment Clean, dry, & intact 07/27/22 0800   Phlebitis Assessment 0 07/27/22 0800   Infiltration Assessment 0 07/27/22 0800   Dressing Status Clean, dry, & intact 07/27/22 0800   Dressing Type Transparent 07/27/22 0800   Hub Color/Line Status Pink;Flushed 07/27/22 0800   Alcohol Cap Used Yes 07/27/22 0800        Opportunity for questions and clarification was provided. Patient transported with:   Registered Nurse    1: Patient HOB elevated 30 degrees. Patient with quikclot to right groin site. Site clean, dry and intact. No hematoma. Pulses palpable. VS stable. Patient with no complaints at this time.

## 2022-07-27 NOTE — CONSULTS
Urology Consult    Patient: Andra Waters MRN: [id number]  SSN: [id number]    YOB: 1955  Age: 78 y.o. Sex: male          Date of Consultation:  July 27, 2022  Requesting Physician: Lynda Hurt MD  Reason for Consultation:  gross hematuria         History of Present Illness:  Andra Waters is a 78 y.o. male admitted 7/27/2022 to the hospital for Hematuria [R31.9]. He is a 78 y.o. male with a history HTN, HLD, DM, BPH, metastatic prostate CA with hx of retention/incomplete bladder emptying, recently scheduled with [facility] for a TURP that was aborted due to extensive urethral involvement with metastatic deposits presumably from metastatic prostate cancer. Based on these findings, decision was made for SPT and it was placed by IR 4/22/22. Wbc 23.6  Hgb 7.3  Cr 1.18    Pt known to [facility] followed by Dr Dimitrios Post for metastatic prostate cancer . Pt recently seen in ED with obstructed SPT , changed in ED . 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 Multiple discussion with pt about considering cb PCN palliative for hematuria . Pt seen in bed , resting , SO states to has reaction to abx and received benadryl , which explains lethargy . Discussed with pt gross hematuria  again re iterated findings on cysto with Dr. Dimitrios Post in April , need for SPT , recurrent gross hematuria . Discussed with patient previously need for RBCs related to gross hematuria and possibility of needing RBCs should his hemoglobin drop below 7. Patient again stated he did not want bilateral perc tubes . Attempted to irrigate SP tube unable to retract any fluid or clots flushed easily. Patient then became uncomfortable and urine was noted to be leaking around the SP tube. No further irrigation attempts were made discussed with patient possibility of changing catheter. Discussed case with Dr. Lucia Solomon in regards to options for management of hematuria.   Patient was seen by  discussions was had considering options of cystoscopy either through the urethra or SP tract, and then of course bilateral percutaneous drains to divert urine from the bladder. I then had discussion with the patient again in regards to palliative treatment and hospice. Patient agreed to percutaneous drains at this time and stated that this would be the last time he would come into the hospital.  I asked if I could place a consult for hospice and patient agreed. Past Medical History:   Diagnosis Date    BPH (benign prostatic hyperplasia)     COVID-19 vaccine series completed     Diabetes ([facility] [address]     Cruz catheter in place 11/2022    High cholesterol     Hypertension     No blood products     Urinary retention         Past Surgical History:   Procedure Laterality Date    IR ASP BLADDER SUPRA CATH  4/22/2022    IR ASP BLADDER SUPRA CATH  6/22/2022    IR KYPHOPLASTY THORACIC  4/20/2021       Allergies   Allergen Reactions    Cefepime Angioedema    Erythromycin Other (comments)     Insomnia- felt like speed        Prior to Admission medications    Medication Sig Start Date End Date Taking? Authorizing Provider   ferrous sulfate 325 mg (65 mg iron) tablet Take 1 Tablet by mouth Daily (before breakfast) for 30 days. 7/20/22 8/19/22  Santa Wong MD   docusate sodium (Stool Softener) 100 mg capsule Take 100 mg by mouth two (2) times daily as needed for Constipation. Provider, Historical   finasteride (PROSCAR) 5 mg tablet Take 5 mg by mouth daily. 12/27/21   Provider, Historical   tamsulosin (FLOMAX) 0.4 mg capsule Take 0.4 mg by mouth two (2) times a day. 12/27/21   Provider, Historical   Lantus U-100 Insulin 100 unit/mL injection 23 Units by SubCUTAneous route two (2) times a day. 10/28/21   Provider, Historical   HumaLOG U-100 Insulin 100 unit/mL injection by SubCUTAneous route four (4) times daily as needed.  Sliding scale 10/28/21   Provider, Historical   Januvia 100 mg tablet Take 100 mg by mouth daily. 11/15/21   Provider, Historical   abiraterone (Zytiga) 250 mg tab Take 4 Tablets by mouth daily. 10/21/21   Esther NIETO NP   predniSONE (DELTASONE) 5 mg tablet Take 1 Tab by mouth daily. 21   Marcela Sanchez MD   atenoloL (TENORMIN) 50 mg tablet Take 50 mg by mouth nightly. Provider, Historical   atorvastatin (LIPITOR) 10 mg tablet Take 10 mg by mouth nightly. Provider, Historical   benazepriL (LOTENSIN) 40 mg tablet Take 40 mg by mouth daily. Provider, Historical   metFORMIN (GLUCOPHAGE) 500 mg tablet Take 1,500 mg by mouth daily (with breakfast). Provider, Historical   metFORMIN (GLUCOPHAGE) 500 mg tablet Take 1,000 mg by mouth daily (with dinner). Provider, Historical   pioglitazone (Actos) 45 mg tablet Take 45 mg by mouth daily. Provider, Historical       Social History     Tobacco Use    Smoking status: Former     Types: Cigarettes    Smokeless tobacco: Never   Substance Use Topics    Alcohol use: Never        Family History   Problem Relation Age of Onset    Anesth Problems Neg Hx     Clotting Disorder Neg Hx         ROS:  Admission ROS from 2022 was reviewed and changes (other than per HPI) include: none. Physical Exam:    Vital Signs:  Temp (24hrs), Av.4 °F (36.9 °C), Min:97.9 °F (36.6 °C), Max:98.8 °F (37.1 °C)   Blood pressure 137/61, pulse 94, temperature 98.8 °F (37.1 °C), resp. rate 18, height 5' 8\" (1.727 m), weight 104.3 kg (230 lb), SpO2 96 %. I&O's:  No intake/output data recorded.     Appearance: well-developed NAD   Neck: supple   Respiratory Effort: breathing easily   Lower Extremity: no edema   Abdomen/Flank: soft non-tender without masses; no CVA tenderness  SPT draining  Liver/Spleen: no organomegaly   Hernia: no ventral hernia   Anus/Perineum: deferred  Rectal: deferred  Hemoccult: not indicated   Scrotum: without lesions   Testes: bilaterally non-tender, no masses   Epididymes: bilaterally no masses palpated, non-tender Penis: no plaques; no lesions   Meatus: patent   Prostate: deferred   SV's: non-palpable   Lymph: no adenopathy   Skin Inspection: warm and dry   Mood/Affect: normal      Lab Review:     CBC   Recent Labs     07/27/22  0503   WBC 23.6*   HGB 7.3*   HCT 23.4*   *     CMP   Recent Labs     07/27/22  0503      K 4.1      CO2 25   *   BUN 17   CREA 1.18   CA 9.4       Other No results for input(s): INR, PSA, INREXT in the last 72 hours. No lab exists for component: PTT    UA:   Lab Results   Component Value Date/Time    Color BLOODY 07/27/2022 06:55 AM    Appearance CLOUDY (A) 07/27/2022 06:55 AM    Specific gravity 1.020 07/27/2022 06:55 AM    Specific gravity 1.009 07/18/2022 03:50 PM    pH (UA) 7.0 07/27/2022 06:55 AM    Protein >300 (A) 07/27/2022 06:55 AM    Glucose Negative 07/27/2022 06:55 AM    Ketone Negative 07/27/2022 06:55 AM    Bilirubin Negative 07/27/2022 06:55 AM    Urobilinogen 0.2 07/27/2022 06:55 AM    Nitrites Negative 07/27/2022 06:55 AM    Leukocyte Esterase MODERATE (A) 07/27/2022 06:55 AM    Epithelial cells FEW 07/27/2022 06:55 AM    Bacteria 1+ (A) 07/27/2022 06:55 AM    WBC  07/27/2022 06:55 AM    RBC >100 (H) 07/27/2022 06:55 AM       Cultures:  Ucx     Imaging:  NA     Assessment:     Principal Problem:    Hematuria (7/14/2022)    Active Problems:    Pyuria (4/16/2021)      Diabetes ([facility] [address] ()      Hypertension ()      MAMADOU (acute kidney injury) ([facility] [address] ()      HLD (hyperlipidemia) ()      Prostate cancer ([facility] [address] (4/21/2021)      Metastasis to bone ([facility] [address] (4/27/2021)      Acute blood loss anemia (7/14/2022)      Anemia (7/27/2022)      Leukocytosis (7/27/2022)      Dehydration (7/27/2022)          Plan:     1.  Gross hematuria  recurrent related to hypervascular bladder neck    - keep NPO   - arrange Perc tubes with IR   - treat UTI   - Consult to hospice  - once perc drains in can clamp off SPT then eventually remove   - urology will follow       Addendum 1038 - spoke with Dr Supriya Harrison in IR , does not recommend cb percs but prostate embolization instead to control bleeding . Dr Lucia Solomon in agreement     Case discussed with Dr Nirmala Fish By: Rebecca Calvo.  Roxana Joy NP  - July 27, 2022

## 2022-07-27 NOTE — H&P
[address]  [phone]    Hospitalist Admission Note      NAME:  Carl Deleon   :   1955   MRN:  [id number]     PCP:  Mary Love MD     Date/Time of service:  2022 7:07 AM          Subjective:     CHIEF COMPLAINT: hematuria     HISTORY OF PRESENT ILLNESS:     Mr. Josh Frazier is a 78 y.o.  male who presented to the Emergency Department complaining of hematuria. Recurrent. Discharge 7 days ago for same issue. Due to cancer. Has suprapubic catheter. Does not want nephrotomy tubes. He has moderate anemia on labs. We will admit him for management. Past Medical History:   Diagnosis Date    BPH (benign prostatic hyperplasia)     COVID-19 vaccine series completed     Diabetes ([facility] [address]     Cruz catheter in place 2022    High cholesterol     Hypertension     No blood products     Urinary retention         Past Surgical History:   Procedure Laterality Date    IR ASP BLADDER SUPRA CATH  2022    IR ASP BLADDER SUPRA CATH  2022    IR KYPHOPLASTY THORACIC  2021       Social History     Tobacco Use    Smoking status: Former     Types: Cigarettes    Smokeless tobacco: Never   Substance Use Topics    Alcohol use: Never        Family History   Problem Relation Age of Onset    Anesth Problems Neg Hx     Clotting Disorder Neg Hx       Family hx cannot be fully assessed, since the patient cannot provide information    Allergies   Allergen Reactions    Erythromycin Other (comments)     Insomnia- felt like speed        Prior to Admission medications    Medication Sig Start Date End Date Taking? Authorizing Provider   ferrous sulfate 325 mg (65 mg iron) tablet Take 1 Tablet by mouth Daily (before breakfast) for 30 days. 22  Joan West MD   docusate sodium (Stool Softener) 100 mg capsule Take 100 mg by mouth two (2) times daily as needed for Constipation.     Provider, Historical   finasteride (PROSCAR) 5 mg tablet Take 5 mg by mouth daily. 12/27/21   Provider, Historical   tamsulosin (FLOMAX) 0.4 mg capsule Take 0.4 mg by mouth two (2) times a day. 12/27/21   Provider, Historical   Lantus U-100 Insulin 100 unit/mL injection 23 Units by SubCUTAneous route two (2) times a day. 10/28/21   Provider, Historical   HumaLOG U-100 Insulin 100 unit/mL injection by SubCUTAneous route four (4) times daily as needed. Sliding scale 10/28/21   Provider, Historical   Januvia 100 mg tablet Take 100 mg by mouth daily. 11/15/21   Provider, Historical   abiraterone (Zytiga) 250 mg tab Take 4 Tablets by mouth daily. 10/21/21   Lion NIETO NP   predniSONE (DELTASONE) 5 mg tablet Take 1 Tab by mouth daily. 4/27/21   Loi Sanchez MD   atenoloL (TENORMIN) 50 mg tablet Take 50 mg by mouth nightly. Provider, Historical   atorvastatin (LIPITOR) 10 mg tablet Take 10 mg by mouth nightly. Provider, Historical   benazepriL (LOTENSIN) 40 mg tablet Take 40 mg by mouth daily. Provider, Historical   metFORMIN (GLUCOPHAGE) 500 mg tablet Take 1,500 mg by mouth daily (with breakfast). Provider, Historical   metFORMIN (GLUCOPHAGE) 500 mg tablet Take 1,000 mg by mouth daily (with dinner). Provider, Historical   pioglitazone (Actos) 45 mg tablet Take 45 mg by mouth daily.     Provider, Historical       Review of Systems:  (bold if positive, if negative)    Gen:  Eyes:  ENT:  CVS:  Pulm:  GI:  :  MS:  Skin:  Psych:  Endo:  Hem:  Renal:  Neuro:        Objective:      VITALS:    Vital signs reviewed; most recent are:    Visit Vitals  /72 (BP Patient Position: At rest;Reclining)   Pulse 95   Temp 98.5 °F (36.9 °C)   Resp 16   Ht 5' 8\" (1.727 m)   Wt 104.3 kg (230 lb)   SpO2 98%   BMI 34.97 kg/m²     SpO2 Readings from Last 6 Encounters:   07/27/22 98%   07/20/22 100%   07/13/22 100%   06/23/22 96%   06/22/22 100%   06/20/22 99%          Intake/Output Summary (Last 24 hours) at 7/27/2022 7049  Last data filed at 7/27/2022 0630  Gross per 24 hour   Intake 50 ml   Output --   Net 50 ml        Exam:     Physical Exam:    Gen:  Well-developed, well-nourished, in no acute distress  HEENT:  Pink conjunctivae, PERRL, hearing intact to voice, moist mucous membranes  Neck:  Supple, without masses, thyroid non-tender  Resp:  No accessory muscle use, clear breath sounds without wheezes rales or rhonchi  Card:  No murmurs, normal S1, S2 without thrills, bruits or peripheral edema  Abd:  Soft, non-tender, non-distended, normoactive bowel sounds are present, no mass  Lymph:  No cervical or inguinal adenopathy  Musc:  No cyanosis or clubbing  Skin:  No rashes or ulcers, skin turgor is good  Neuro:  Cranial nerves are grossly intact, no focal motor weakness, follows commands appropriately  Psych:  Good insight, oriented to person, place and time, alert     Labs:    Recent Labs     07/27/22  0503   WBC 23.6*   HGB 7.3*   HCT 23.4*   *     Recent Labs     07/27/22  0503      K 4.1      CO2 25   *   BUN 17   CREA 1.18   CA 9.4     Lab Results   Component Value Date/Time    Glucose (POC) 132 (H) 07/20/2022 11:28 AM    Glucose (POC) 82 07/20/2022 06:28 AM     No results for input(s): PH, PCO2, PO2, HCO3, FIO2 in the last 72 hours. No results for input(s): INR, INREXT in the last 72 hours. All Micro Results       Procedure Component Value Units Date/Time    CULTURE, URINE [[id number]]     Order Status: Sent Specimen: Cath Urine     URINE CULTURE HOLD SAMPLE [[id number]]     Order Status: Sent Specimen: Urine             I have reviewed previous records       Assessment and Plan:      Hematuria / Pyuria / Prostate cancer with metastasis to bone / Leukocytosis - POA, recurrent. Consult urology. Unclear if UTI. Recent Cx negative, steroids may account for WBC. Check procalcitonin. ER gave cefepime. Monitor Cx. Continue proscar and flomax.   Has been on Zytiga    MAMADOU (acute kidney injury) / Dehydration - POA, presumed due to poor PO intake. Hydrate and follow. Acute and chronic blood loss anemia / Iron deficiency Anemia / Anemia of chronic disease - Mildly low iron on serologies. Monitor. Likely to worsen with hydration. Transfuse to keep Hb>7. Continue PO iron      Diabetes type 2 without complications - No reason to test or treat aggressively in setting of stage 4 cancer. Diabetic diet and counseling. SSI per protocol. Continue home Lantus, cutting dose to 14. A1c useless during bleeding. Stop metformin, actos and Januvia. Hypertension - Reduce treatment in setting of stage 4 cancer. Stop benazapril. Cut atenolol to 25 daily    HLD (hyperlipidemia) - No hx of CAD or CVA. No reason to test or treat in setting of stage 4 cancer    Obese - No reason to advise weight loss in setting of stage 4 cancer      Telemetry reviewed:   normal sinus rhythm    Risk of deterioration: high      Total time spent with patient: 48 Minutes I personally reviewed chart, notes, data and current medications in the medical record. I have personally examined and treated the patient at bedside during this period. To assist coordination of care and communication with nursing and staff, this note may be preliminary early in the day, but finalized by end of the day.                  Care Plan discussed with: Patient, Nursing Staff, Consultant/Specialist, and >50% of time spent in counseling and coordination of care    Discussed:  Care Plan and D/C Planning       ___________________________________________________    Attending Physician: Prince Dugan MD

## 2022-07-27 NOTE — PROCEDURES
Interventional Radiology  Procedure Note        7/27/2022 2:46 PM    Patient: Carl Deleon     Informed consent obtained    Diagnosis: Hematuria, prostate cancer     Procedure(s): Bilateral prostate artery small particle embolization performed. Hopefully this will improve hematuria. Continue supportive care and medical management.      Specimens removed:  None     Complications: None    Primary Physician: Yariel Villafuerte MD    Recomendations: N/A    Discharge Disposition: Floor     Full dictated report to follow    Signed By: Yariel Villafuerte MD

## 2022-07-27 NOTE — ED TRIAGE NOTES
Pt reports he is bleeding from his pubic catheter, it started about 2.5 hours ago.      Denies any chest  pain or SOB

## 2022-07-27 NOTE — PROGRESS NOTES
Physical Therapy  7/27/2022    Orders received and acknowledged. Chart reviewed and spoke with RN. Pt with bloody drainage from SP catheter tubing saturating gown and dressings at this time. Plan for procedure this AM. Also noted hospice consult. Will defer at this time and follow up as able and appropriate.     Thank Tyson Benitez, PT, DPT

## 2022-07-27 NOTE — PROGRESS NOTES
TRANSFER - OUT REPORT:    Verbal report given to isma(name) on Carl Sox being transferred to clpo(unit) for routine post - op       Report consisted of patient's Situation, Background, Assessment and   Recommendations(SBAR). Information from the following report(s) SBAR was reviewed with the receiving nurse. Opportunity for questions and clarification was provided.       Patient transported with:   Registered Nurse

## 2022-07-27 NOTE — ED NOTES
Patient reports sudden onset of jaw discomfort (both). Physician made aware and will stop by to assess his condition. Patient and family were updated.

## 2022-07-27 NOTE — ED NOTES
Verbal report given to [facility], RN. Information from the following report(s) SBAR, ED Summary and Recent Results was reviewed with the receiving nurse. Opportunity for questions and clarification was provided.

## 2022-07-28 NOTE — CONSENT
Informed Consent for Blood Component Transfusion Note    I have discussed with the patient the rationale for blood component transfusion; its benefits in treating or preventing fatigue, organ damage, or death; and its risk which includes mild transfusion reactions, rare risk of blood borne infection, or more serious but rare reactions. I have discussed the alternatives to transfusion, including the risk and consequences of not receiving transfusion. The patient had an opportunity to ask questions and had agreed to proceed with transfusion of blood components.     Electronically signed by Bronson Weiss MD on 7/28/22 at 11:10 AM

## 2022-07-28 NOTE — PROGRESS NOTES
Occupational Therapy  Order received and chart reviewed, patient reporting ability to [facility] care of himself\" ambulating to and from bathroom, said he would \"take a walker or cane if free\" however declines need to use at this time. Noted hospice consult and patient interested in talking with them about what they can offer him. No further OT needs at this time. Will sign off.     Dawn Whitehead, OTR/L

## 2022-07-28 NOTE — PROGRESS NOTES
Urology Progress Note    Patient: Diane Aburto MRN: [id number]  SSN: [id number]    YOB: 1955  Age: 78 y.o. Sex: male        Assessment:     Resting in bed, NAD   WBC 25.8 (23.6)   Hgb 7 (8)   Cr 1.08 (1.18)   UO overnight 800ml  SPT draining dark red blood upon my arrival. I manually irrigated with ease, removing several small clots. Irrigated until clear/ no further clots, urine brown tinged upon my departure from room. Plan:     Gross hematuria, recurrent    -See original consult note. Pt was agreeable to bilateral PCNs yesterday to divert urine. IR was consulted and recommended against bilateral PCN and instead recommended prostate embolization to control bleeding. Pt currently sp bilateral prostate artery small particle embolization 7/27/22. -Manual irrigation by nursing staff PRN to ensure if increased urine leaking around SPT, worsening hematuria, clots noted in tubing, or pt with complaints of bladder discomfort/fullness.   -H&H ordered, transfuse per protocol   -UA unreliable in the setting of chronic SPT. [address] [place] 7/18 with mixed richie. Follow UCX and treat based on results.   -Hydrate   -Hospice consult pending today     Will continue to follow along     Addendum 1400: Reassessed pt alongside Dr. Valles Cancer. Continue with current plan above. Gross hematuria noted but appears to be improving from this morning. Pt receiving 1unit PRBC for Hgb of 6.7.  Will reassess in AM .     Reason For Visit:     Follow up for gross hematuria     ROS:     Denies fevers  Denies abdominal pain  Reports hematuria    Objective:     Visit Vitals  BP (!) 156/74 (BP 1 Location: Left upper arm, BP Patient Position: At rest)   Pulse 64   Temp 97.8 °F (36.6 °C)   Resp 18   Ht 5' 8\" (1.727 m)   Wt 104.3 kg (230 lb)   SpO2 96%   BMI 34.97 kg/m²         Intake/Output Summary (Last 24 hours) at 7/28/2022 0918  Last data filed at 7/28/2022 0410  Gross per 24 hour   Intake 300 ml Output 1150 ml   Net -850 ml       Physical Exam  General: NAD  Skin: warm, dry   Respiratory: no distress, room air  Abdomen: soft, no distention  : no CVA tenderness, SPT draining dark red blood   Neuro: Appropriate, no focal neurological deficits    Labs reviewed, July 28, 2022  Recent Results (from the past 24 hour(s))   HGB & HCT    Collection Time: 07/27/22  2:08 PM   Result Value Ref Range    HGB 8.0 (L) 12.1 - 17.0 g/dL    HCT 25.6 (L) 36.6 - 50.3 %   GLUCOSE, POC    Collection Time: 07/27/22  2:12 PM   Result Value Ref Range    Glucose (POC) 266 (H) 65 - 117 mg/dL    Performed by Joann Sequeira    GLUCOSE, POC    Collection Time: 07/27/22  5:11 PM   Result Value Ref Range    Glucose (POC) 363 (H) 65 - 117 mg/dL    Performed by [facility] CACHORRO    GLUCOSE, POC    Collection Time: 07/27/22  5:12 PM   Result Value Ref Range    Glucose (POC) 362 (H) 65 - 117 mg/dL    Performed by [facility] CACHORRO    GLUCOSE, POC    Collection Time: 07/27/22  9:13 PM   Result Value Ref Range    Glucose (POC) 273 (H) 65 - 117 mg/dL    Performed by Shine Mahan  CON    METABOLIC PANEL, COMPREHENSIVE    Collection Time: 07/28/22 12:42 AM   Result Value Ref Range    Sodium 139 136 - 145 mmol/L    Potassium 4.5 3.5 - 5.1 mmol/L    Chloride 109 (H) 97 - 108 mmol/L    CO2 24 21 - 32 mmol/L    Anion gap 6 5 - 15 mmol/L    Glucose 233 (H) 65 - 100 mg/dL    BUN 19 6 - 20 MG/DL    Creatinine 1.08 0.70 - 1.30 MG/DL    BUN/Creatinine ratio 18 12 - 20      GFR est AA >60 >60 ml/min/1.73m2    GFR est non-AA >60 >60 ml/min/1.73m2    Calcium 9.0 8.5 - 10.1 MG/DL    Bilirubin, total 0.2 0.2 - 1.0 MG/DL    ALT (SGPT) 13 12 - 78 U/L    AST (SGOT) 24 15 - 37 U/L    Alk.  phosphatase 171 (H) 45 - 117 U/L    Protein, total 7.2 6.4 - 8.2 g/dL    Albumin 1.9 (L) 3.5 - 5.0 g/dL    Globulin 5.3 (H) 2.0 - 4.0 g/dL    A-G Ratio 0.4 (L) 1.1 - 2.2     MAGNESIUM    Collection Time: 07/28/22 12:42 AM   Result Value Ref Range    Magnesium 2.0 1.6 - 2.4 mg/dL   CBC W/O DIFF    Collection Time: 07/28/22 12:42 AM   Result Value Ref Range    WBC 25.8 (H) 4.1 - 11.1 K/uL    RBC 2.95 (L) 4.10 - 5.70 M/uL    HGB 7.0 (L) 12.1 - 17.0 g/dL    HCT 22.6 (L) 36.6 - 50.3 %    MCV 76.6 (L) 80.0 - 99.0 FL    MCH 23.7 (L) 26.0 - 34.0 PG    MCHC 31.0 30.0 - 36.5 g/dL    RDW 17.9 (H) 11.5 - 14.5 %    PLATELET 189 (H) 483 - 400 K/uL    MPV 10.5 8.9 - 12.9 FL    NRBC 0.0 0  WBC    ABSOLUTE NRBC 0.00 0.00 - 0.01 K/uL   GLUCOSE, POC    Collection Time: 07/28/22  8:11 AM   Result Value Ref Range    Glucose (POC) 206 (H) 65 - 117 mg/dL    Performed by Papito Lamar (MANDY)        Imaging:  CT Results  (Last 48 hours)      None            Signed By: Alena Barraza, ASA - July 28, 2022

## 2022-07-28 NOTE — PROGRESS NOTES
Bedside shift change report given to Landy (oncoming nurse) by Alda Lazar (offgoing nurse). Report included the following information SBAR, Kardex, Procedure Summary, Intake/Output, MAR, Recent Results, and Med Rec Status.

## 2022-07-28 NOTE — PROGRESS NOTES
7/28/2022  Case Management Progress Note    4:52 PM  Spoke with John Babin from [facility]. Patient is agreeable to hospice service and they can be ready for him to come home anytime after 1pm tomorrow afternoon if medically cleared. ROLAND Wyatt    9:51 AM  Patient is 78year old male admitted 7/27 with hematuria  Patient's RUR is 26% red/high risk for readmission  Covid test: none this admission  Chart reviewed  Per chart patient has a hospice [facility] is following and will touch base with patient this morning as they tried yesterday but patient was in a procedure and unavailable. No other needs noted at this time, will continue to follow and assist with discharge planning pending results of hospice info session.      Transition of Care Plan   Continue medical management/treatment  Home with hospice vs TBD  [facility] following   Transportation tbd pending progress  CM will continue to follow    ROLAND Wyatt

## 2022-07-28 NOTE — PROGRESS NOTES
Physical Therapy  7/28/2022    Chart reviewed and cleared by RN. Spoke with patient and treating OT. Pt reports he is mobilizing just fine and does not need therapy at this time. Pt has been up ad lindsey since admission without AD. Noted pt is interested in home with hospice. Awaiting consult at this time. Hospice agency can take care of any needed DME upon discharge. Will sign off at this time.     Thank Peter Pleitez, PT, DPT

## 2022-07-28 NOTE — PROGRESS NOTES
Bedside shift change report given to Martita (oncoming nurse) by Walker Serra (offgoing nurse). Report included the following information SBAR, Kardex, Intake/Output, MAR, and Recent Results.

## 2022-07-28 NOTE — PROGRESS NOTES
Problem: Falls - Risk of  Goal: *Absence of Falls  Description: Document [place] Fall Risk and appropriate interventions in the flowsheet. Outcome: Progressing Towards Goal  Note: Fall Risk Interventions:  Mobility Interventions: Bed/chair exit alarm         Medication Interventions: Bed/chair exit alarm    Elimination Interventions: Bed/chair exit alarm, Call light in reach, Patient to call for help with toileting needs              Problem: Patient Education: Go to Patient Education Activity  Goal: Patient/Family Education  Outcome: Progressing Towards Goal     Problem: Pressure Injury - Risk of  Goal: *Prevention of pressure injury  Description: Document Dilan Scale and appropriate interventions in the flowsheet. Outcome: Progressing Towards Goal  Note: Pressure Injury Interventions:  Sensory Interventions: Assess changes in LOC    Moisture Interventions: Absorbent underpads    Activity Interventions: Increase time out of bed    Mobility Interventions: Turn and reposition approx. every two hours(pillow and wedges)    Nutrition Interventions: Document food/fluid/supplement intake    Friction and Shear Interventions: HOB 30 degrees or less                Problem: Patient Education: Go to Patient Education Activity  Goal: Patient/Family Education  Outcome: Progressing Towards Goal     Problem: Falls - Risk of  Goal: *Absence of Falls  Description: Document [place] Fall Risk and appropriate interventions in the flowsheet.   Outcome: Progressing Towards Goal  Note: Fall Risk Interventions:  Mobility Interventions: Bed/chair exit alarm         Medication Interventions: Bed/chair exit alarm    Elimination Interventions: Bed/chair exit alarm, Call light in reach, Patient to call for help with toileting needs              Problem: Patient Education: Go to Patient Education Activity  Goal: Patient/Family Education  Outcome: Progressing Towards Goal     Problem: Pressure Injury - Risk of  Goal: *Prevention of pressure injury  Description: Document Dilan Scale and appropriate interventions in the flowsheet. Outcome: Progressing Towards Goal  Note: Pressure Injury Interventions:  Sensory Interventions: Assess changes in LOC    Moisture Interventions: Absorbent underpads    Activity Interventions: Increase time out of bed    Mobility Interventions: Turn and reposition approx. every two hours(pillow and wedges)    Nutrition Interventions: Document food/fluid/supplement intake    Friction and Shear Interventions: HOB 30 degrees or less                Problem: Patient Education: Go to Patient Education Activity  Goal: Patient/Family Education  Outcome: Progressing Towards Goal     Problem: Pressure Injury - Risk of  Goal: *Prevention of pressure injury  Description: Document Dilan Scale and appropriate interventions in the flowsheet. Outcome: Progressing Towards Goal  Note: Pressure Injury Interventions:  Sensory Interventions: Assess changes in LOC    Moisture Interventions: Absorbent underpads    Activity Interventions: Increase time out of bed    Mobility Interventions: Turn and reposition approx.  every two hours(pillow and wedges)    Nutrition Interventions: Document food/fluid/supplement intake    Friction and Shear Interventions: HOB 30 degrees or less                Problem: Patient Education: Go to Patient Education Activity  Goal: Patient/Family Education  Outcome: Progressing Towards Goal

## 2022-07-28 NOTE — TELEPHONE ENCOUNTER
[address] at [facility]  [phone]    07/28/22- Discussed with Gaurang Castro, patient is not able to receive lupron in the hospital.  She recommends rescheduling [facility] and office visit to next week, to discuss next steps. Patient's daughter notified and agreeable. Call transferred to [facility] to schedule appointments.

## 2022-07-28 NOTE — PROGRESS NOTES
[address]  [phone]    Medical Progress Note      NAME: Steve Smallwood   :  1955  MRM:  [id number]    Date/Time of service 2022  7:56 AM          Assessment and Plan:     Hematuria / Pyuria / Prostate cancer with metastasis to bone / Leukocytosis - POA, recurrent. Consulted urology. They consulted IR who did prostate embolization. Unclear if UTI. Recent Cx negative, steroids may account for WBC. Slightly elevated procalcitonin. ER gave cefepime. Monitor Cx. Continue proscar and flomax. Has been on Zytiga. He is interested in hospice     MAMADOU (acute kidney injury) / Dehydration - POA, presumed due to poor PO intake. Hydrated and improved. Acute and chronic blood loss anemia / Iron deficiency Anemia / Anemia of chronic disease - Mildly low iron on recent serologies. Monitor. As expected it worsened with hydration. Transfuse to keep Hb>7. Continue PO iron      Diabetes type 2 without complications - No reason to test or treat aggressively in setting of stage 4 cancer. Diabetic diet and counseling. SSI per protocol. Continue home Lantus, resume dose to 14 BID. A1c useless during bleeding. Stop metformin, actos and Januvia. Hypertension - Reduce treatment in setting of stage 4 cancer. Stop benazapril. Cut atenolol to 25 daily     HLD (hyperlipidemia) - No hx of CAD or CVA. No reason to test or treat in setting of stage 4 cancer     Obese - No reason to advise weight loss in setting of stage 4 cancer       Subjective:     Chief Complaint:  tolerated IR procedure    ROS:  (bold if positive, if negative)    Tolerating some PT  Tolerating Diet        Objective:     Last 24hrs VS reviewed since prior progress note.  Most recent are:    Visit Vitals  BP (!) 156/74 (BP 1 Location: Left upper arm, BP Patient Position: At rest)   Pulse 64   Temp 97.8 °F (36.6 °C)   Resp 18   Ht 5' 8\" (1.727 m)   Wt 104.3 kg (230 lb)   SpO2 96%   BMI 34.97 kg/m²     SpO2 Readings from Last 6 Encounters:   07/28/22 96%   07/20/22 100%   07/13/22 100%   06/23/22 96%   06/22/22 100%   06/20/22 99%          Intake/Output Summary (Last 24 hours) at 7/28/2022 0756  Last data filed at 7/28/2022 0410  Gross per 24 hour   Intake 300 ml   Output 1150 ml   Net -850 ml        Physical Exam:    Gen:  Obese, in no acute distress  HEENT:  Pale conjunctivae, PERRL, hearing intact to voice, moist mucous membranes  Neck:  Supple, without masses, thyroid non-tender  Resp:  No accessory muscle use, clear breath sounds without wheezes rales or rhonchi  Card:  No murmurs, normal S1, S2 without thrills, bruits or peripheral edema  Abd:  Soft, non-tender, non-distended, normoactive bowel sounds are present, no mass  Lymph:  No cervical or inguinal adenopathy  Musc:  No cyanosis or clubbing  Skin:  No rashes or ulcers, skin turgor is good  Neuro:  Cranial nerves are grossly intact, general motor weakness, follows commands   Psych:  Good insight, oriented to person, place and time, alert    Telemetry reviewed:   normal sinus rhythm  __________________________________________________________________  Medications Reviewed: (see below)  Medications:     Current Facility-Administered Medications   Medication Dose Route Frequency    acetaminophen (TYLENOL) tablet 650 mg  650 mg Oral Q6H PRN    polyethylene glycol (MIRALAX) packet 17 g  17 g Oral DAILY PRN    ondansetron (ZOFRAN) injection 4 mg  4 mg IntraVENous Q6H PRN    0.9% sodium chloride infusion  75 mL/hr IntraVENous CONTINUOUS    atenoloL (TENORMIN) tablet 25 mg  25 mg Oral QHS    ferrous sulfate tablet 325 mg  325 mg Oral ACB    docusate sodium (COLACE) capsule 100 mg  100 mg Oral BID PRN    finasteride (PROSCAR) tablet 5 mg  5 mg Oral DAILY    insulin glargine (LANTUS) injection 14 Units  14 Units SubCUTAneous DAILY    glucose chewable tablet 16 g  4 Tablet Oral PRN    glucagon (GLUCAGEN) injection 1 mg  1 mg IntraMUSCular PRN    dextrose 10% infusion 0-250 mL  0-250 mL IntraVENous PRN    insulin lispro (HUMALOG) injection   SubCUTAneous AC&HS    gelatin sponge,absorb-porcine (GELFOAM) 100 sponge 1 Each  1 Each Topical PRN        Lab Data Reviewed: (see below)  Lab Review:     Recent Labs     07/28/22  0042 07/27/22  1408 07/27/22  0503   WBC 25.8*  --  23.6*   HGB 7.0* 8.0* 7.3*   HCT 22.6* 25.6* 23.4*   *  --  569*     Recent Labs     07/28/22  0042 07/27/22  0503    140   K 4.5 4.1   * 107   CO2 24 25   * 193*   BUN 19 17   CREA 1.08 1.18   CA 9.0 9.4   MG 2.0  --    ALB 1.9*  --    TBILI 0.2  --    ALT 13  --      Lab Results   Component Value Date/Time    Glucose (POC) 273 (H) 07/27/2022 09:13 PM    Glucose (POC) 362 (H) 07/27/2022 05:12 PM    Glucose (POC) 363 (H) 07/27/2022 05:11 PM    Glucose (POC) 266 (H) 07/27/2022 02:12 PM    Glucose (POC) 132 (H) 07/20/2022 11:28 AM     No results for input(s): PH, PCO2, PO2, HCO3, FIO2 in the last 72 hours. No results for input(s): INR, INREXT in the last 72 hours. All Micro Results       Procedure Component Value Units Date/Time    CULTURE, URINE [[id number]] Collected: 07/27/22 0655    Order Status: Sent Specimen: Cath Urine Updated: 07/27/22 1605    URINE CULTURE HOLD SAMPLE [[id number]] Collected: 07/27/22 0655    Order Status: Completed Specimen: Urine from Serum Updated: 07/27/22 0708     Urine culture hold       Urine on hold in Microbiology dept for 2 days. If unpreserved urine is submitted, it cannot be used for addtional testing after 24 hours, recollection will be required. Other pertinent lab: none    Total time spent with patient: 30 Minutes I personally reviewed chart, notes, data and current medications in the medical record. I have personally examined and treated the patient at bedside during this period.  To assist coordination of care and communication with nursing and staff, this note may be preliminary early in the day, but finalized by end of the day.                  Care Plan discussed with: Patient, Family, Care Manager, Nursing Staff, Consultant/Specialist, and >50% of time spent in counseling and coordination of care    Discussed:  Care Plan and D/C Planning    Prophylaxis:  SCD's and H2B/PPI    Disposition:  Home w/Family           ___________________________________________________    Attending Physician: Christos Dixon MD

## 2022-07-28 NOTE — TELEPHONE ENCOUNTER
Patients daughter called and stated this patient had a lupron injection tomorrow and he is currently in the hospital at [facility]. The daughter would like to know if they can give the patient the shot in the hospital. Please advise and call daughter back.     # [phone]

## 2022-07-29 NOTE — PROGRESS NOTES
07/29/22    Medicare pt has received, reviewed, and signed 2nd IM letter informing them of their right to appeal the discharge. Signed copied has been placed on pt bedside chart.

## 2022-07-29 NOTE — PROGRESS NOTES
Bedside and Verbal shift change report given to [address] and [address] (oncoming nurse) by Kamar Sellers (offgoing nurse). Report included the following information SBAR, Kardex, Intake/Output, MAR, and Recent Results.

## 2022-07-29 NOTE — PROGRESS NOTES
[address]  [phone]    Medical Progress Note      NAME: Sarah Man   :  1955  MRM:  [id number]    Date/Time of service 2022  11:23 AM          Assessment and Plan:     Hematuria / Pyuria / Prostate cancer with metastasis to bone / Leukocytosis - POA, recurrent. Consulted urology. They consulted IR who did prostate embolization. Unclear if UTI. Recent Cx negative, steroids may account for WBC. Slightly elevated procalcitonin. ER gave cefepime. NGTD on Cx. Continue proscar and flomax. Has been on Zytiga. He has chosen to enroll in hospice once he is discharged     MAMADOU (acute kidney injury) / Dehydration - POA, presumed due to poor PO intake. Hydrated and improved. Acute and chronic blood loss anemia / Iron deficiency Anemia / Anemia of chronic disease - Mildly low iron on recent serologies. Monitor. As expected it worsened with hydration. Transfuse to keep Hb>7. Continue PO iron      Diabetes type 2 without complications - No reason to test or treat aggressively in setting of stage 4 cancer. Diabetic diet and counseling. SSI per protocol. Continue home Lantus, resume dose to 14 BID. A1c useless during bleeding. Stop metformin, actos and Januvia. Hypertension - Reduce treatment in setting of stage 4 cancer. Stop benazapril. Cut atenolol to 25 daily     HLD (hyperlipidemia) - No hx of CAD or CVA. No reason to test or treat in setting of stage 4 cancer     Obese - No reason to advise weight loss in setting of stage 4 cancer       Subjective:     Chief Complaint:  still dark red urine    ROS:  (bold if positive, if negative)    Tolerating some PT  Tolerating Diet        Objective:     Last 24hrs VS reviewed since prior progress note.  Most recent are:    Visit Vitals  BP (!) 144/70 (BP 1 Location: Left upper arm, BP Patient Position: At rest)   Pulse 85   Temp 98.8 °F (37.1 °C)   Resp 16   Ht 5' 8\" (1.727 m) Wt 104.3 kg (230 lb)   SpO2 97%   BMI 34.97 kg/m²     SpO2 Readings from Last 6 Encounters:   07/29/22 97%   07/20/22 100%   07/13/22 100%   06/23/22 96%   06/22/22 100%   06/20/22 99%          Intake/Output Summary (Last 24 hours) at 7/29/2022 1123  Last data filed at 7/29/2022 1112  Gross per 24 hour   Intake 626.25 ml   Output 1705 ml   Net -1078.75 ml          Physical Exam:    Gen:  Obese, in no acute distress  HEENT:  Pale conjunctivae, PERRL, hearing intact to voice, moist mucous membranes  Neck:  Supple, without masses, thyroid non-tender  Resp:  No accessory muscle use, clear breath sounds without wheezes rales or rhonchi  Card:  No murmurs, normal S1, S2 without thrills, bruits or peripheral edema  Abd:  Soft, non-tender, non-distended, normoactive bowel sounds are present, no mass  Lymph:  No cervical or inguinal adenopathy  Musc:  No cyanosis or clubbing  Skin:  No rashes or ulcers, skin turgor is good  Neuro:  Cranial nerves are grossly intact, general motor weakness, follows commands   Psych:  Good insight, oriented to person, place and time, alert    Telemetry reviewed:   normal sinus rhythm  __________________________________________________________________  Medications Reviewed: (see below)  Medications:     Current Facility-Administered Medications   Medication Dose Route Frequency    docusate sodium (COLACE) capsule 100 mg  100 mg Oral BID    mineral oil (FLEET) enema   Rectal PRN    insulin glargine (LANTUS) injection 14 Units  14 Units SubCUTAneous BID    0.9% sodium chloride infusion 250 mL  250 mL IntraVENous PRN    simethicone (MYLICON) tablet 80 mg  80 mg Oral QID PRN    acetaminophen (TYLENOL) tablet 650 mg  650 mg Oral Q6H PRN    polyethylene glycol (MIRALAX) packet 17 g  17 g Oral DAILY PRN    ondansetron (ZOFRAN) injection 4 mg  4 mg IntraVENous Q6H PRN    0.9% sodium chloride infusion  75 mL/hr IntraVENous CONTINUOUS    atenoloL (TENORMIN) tablet 25 mg  25 mg Oral QHS    ferrous sulfate tablet 325 mg  325 mg Oral ACB    finasteride (PROSCAR) tablet 5 mg  5 mg Oral DAILY    glucose chewable tablet 16 g  4 Tablet Oral PRN    glucagon (GLUCAGEN) injection 1 mg  1 mg IntraMUSCular PRN    dextrose 10% infusion 0-250 mL  0-250 mL IntraVENous PRN    insulin lispro (HUMALOG) injection   SubCUTAneous AC&HS    gelatin sponge,absorb-porcine (GELFOAM) 100 sponge 1 Each  1 Each Topical PRN        Lab Data Reviewed: (see below)  Lab Review:     Recent Labs     07/29/22  0044 07/28/22  0947 07/28/22  0042 07/27/22  1408 07/27/22  0503   WBC 22.8*  --  25.8*  --  23.6*   HGB 7.3* 6.7* 7.0*   < > 7.3*   HCT 22.9* 21.6* 22.6*   < > 23.4*   *  --  532*  --  569*    < > = values in this interval not displayed. Recent Labs     07/28/22  0042 07/27/22  0503    140   K 4.5 4.1   * 107   CO2 24 25   * 193*   BUN 19 17   CREA 1.08 1.18   CA 9.0 9.4   MG 2.0  --    ALB 1.9*  --    TBILI 0.2  --    ALT 13  --        Lab Results   Component Value Date/Time    Glucose (POC) 248 (H) 07/29/2022 11:06 AM    Glucose (POC) 142 (H) 07/29/2022 07:40 AM    Glucose (POC) 96 07/28/2022 10:25 PM    Glucose (POC) 166 (H) 07/28/2022 04:48 PM    Glucose (POC) 207 (H) 07/28/2022 11:13 AM     No results for input(s): PH, PCO2, PO2, HCO3, FIO2 in the last 72 hours. No results for input(s): INR, INREXT, INREXT in the last 72 hours. All Micro Results       Procedure Component Value Units Date/Time    CULTURE, URINE [[id number]] Collected: 07/27/22 0655    Order Status: Completed Specimen: Cath Urine Updated: 07/28/22 1740     Special Requests: NO SPECIAL REQUESTS        Culture result:       No significant growth, <10,000 CFU/mL          URINE CULTURE HOLD SAMPLE [[id number]] Collected: 07/27/22 0655    Order Status: Completed Specimen: Urine from Serum Updated: 07/27/22 0708     Urine culture hold       Urine on hold in Microbiology dept for 2 days.   If unpreserved urine is submitted, it cannot be used for addtional testing after 24 hours, recollection will be required. Other pertinent lab: none    Total time spent with patient: 30 Minutes I personally reviewed chart, notes, data and current medications in the medical record. I have personally examined and treated the patient at bedside during this period. To assist coordination of care and communication with nursing and staff, this note may be preliminary early in the day, but finalized by end of the day.                  Care Plan discussed with: Patient, Family, Care Manager, Nursing Staff, Consultant/Specialist, and >50% of time spent in counseling and coordination of care    Discussed:  Care Plan and D/C Planning    Prophylaxis:  SCD's and H2B/PPI    Disposition:  Home w/Family           ___________________________________________________    Attending Physician: Milan Cid MD

## 2022-07-29 NOTE — PROGRESS NOTES
Urology Progress Note    Patient: Andra Waters MRN: [id number]  SSN: [id number]    YOB: 1955  Age: 78 y.o. Sex: male        Assessment:     Resting in bed, NAD   SPT draining with clear, red urine with brown tinge   TMAX this morning 100.2  Hgb 7.3, sp 1 unit prbcs yesterday   Good UO overnight     Plan:     Gross hematuria, recurrent    -See original consult note. Metastatic prostate cancer. Pt was agreeable to bilateral PCNs during initial consult to divert urine. IR was consulted and recommended against bilateral PCN and instead recommended prostate embolization to control bleeding. Pt currently sp bilateral prostate artery small particle embolization 7/27/22. Received 1 unit prbcs 7/28/22. Still with gross hematuria, appears to be improving. Hgb this AM 7.3. If hematuria worsens, consider bilateral PCNs if pt agreeable. -Manual irrigation by nursing staff PRN to ensure if increased urine leaking around SPT, worsening hematuria, clots noted in tubing, or pt with complaints of bladder discomfort/fullness.  -Leukocytosis: UCX without significant growth. TMAX this morning 100.2. Prednisone usage could contribute to leukocytosis.  Could consider blood cultures.   -Hydrate  -Awaiting palliative/hospice consult  -CBC in AM   -Will continue to follow along     Reason For Visit:     Follow up for gross hematuria    ROS:     Denies fevers  Denies abdominal pain  Reports hematuria    Objective:     Visit Vitals  BP (!) 147/70 (BP 1 Location: Left upper arm, BP Patient Position: At rest)   Pulse 67   Temp 98.3 °F (36.8 °C)   Resp 17   Ht 5' 8\" (1.727 m)   Wt 104.3 kg (230 lb)   SpO2 98%   BMI 34.97 kg/m²         Intake/Output Summary (Last 24 hours) at 7/29/2022 0834  Last data filed at 7/29/2022 0636  Gross per 24 hour   Intake 566.25 ml   Output 1780 ml   Net -1213.75 ml       Physical Exam  General: NAD  Respiratory: no distress, room air  Abdomen: soft, no distention  : SPT draining well without clots in tubing, clear red urine with brown tinge  Neuro: Appropriate, no focal neurological deficits    Labs reviewed, July 29, 2022  Recent Results (from the past 24 hour(s))   HGB & HCT    Collection Time: 07/28/22  9:47 AM   Result Value Ref Range    HGB 6.7 (L) 12.1 - 17.0 g/dL    HCT 21.6 (L) 36.6 - 50.3 %   GLUCOSE, POC    Collection Time: 07/28/22 11:13 AM   Result Value Ref Range    Glucose (POC) 207 (H) 65 - 117 mg/dL    Performed by Papito Lamar (CON)    RBC, ALLOCATE    Collection Time: 07/28/22 11:15 AM   Result Value Ref Range    HISTORY CHECKED? Historical check performed    GLUCOSE, POC    Collection Time: 07/28/22  4:48 PM   Result Value Ref Range    Glucose (POC) 166 (H) 65 - 117 mg/dL    Performed by Agustín Sanchez (CON)    GLUCOSE, POC    Collection Time: 07/28/22 10:25 PM   Result Value Ref Range    Glucose (POC) 96 65 - 117 mg/dL    Performed by Shanika Anders    CBC WITH AUTOMATED DIFF    Collection Time: 07/29/22 12:44 AM   Result Value Ref Range    WBC 22.8 (H) 4.1 - 11.1 K/uL    RBC 3.03 (L) 4.10 - 5.70 M/uL    HGB 7.3 (L) 12.1 - 17.0 g/dL    HCT 22.9 (L) 36.6 - 50.3 %    MCV 75.6 (L) 80.0 - 99.0 FL    MCH 24.1 (L) 26.0 - 34.0 PG    MCHC 31.9 30.0 - 36.5 g/dL    RDW 17.9 (H) 11.5 - 14.5 %    PLATELET 013 (H) 478 - 400 K/uL    MPV 10.1 8.9 - 12.9 FL    NRBC 0.1 (H) 0  WBC    ABSOLUTE NRBC 0.02 (H) 0.00 - 0.01 K/uL    NEUTROPHILS 83 (H) 32 - 75 %    LYMPHOCYTES 5 (L) 12 - 49 %    MONOCYTES 9 5 - 13 %    EOSINOPHILS 0 0 - 7 %    BASOPHILS 0 0 - 1 %    IMMATURE GRANULOCYTES 1 (H) 0.0 - 0.5 %    ABS. NEUTROPHILS 19.0 (H) 1.8 - 8.0 K/UL    ABS. LYMPHOCYTES 1.2 0.8 - 3.5 K/UL    ABS. MONOCYTES 2.2 (H) 0.0 - 1.0 K/UL    ABS. EOSINOPHILS 0.1 0.0 - 0.4 K/UL    ABS. BASOPHILS 0.1 0.0 - 0.1 K/UL    ABS. IMM.  GRANS. 0.3 (H) 0.00 - 0.04 K/UL    DF AUTOMATED     SAMPLES BEING HELD    Collection Time: 07/29/22 12:44 AM   Result Value Ref Range SAMPLES BEING HELD 1PST     COMMENT        Add-on orders for these samples will be processed based on acceptable specimen integrity and analyte stability, which may vary by analyte.    GLUCOSE, POC    Collection Time: 07/29/22  7:40 AM   Result Value Ref Range    Glucose (POC) 142 (H) 65 - 117 mg/dL    Performed by Rosa Cancino        Imaging:  CT Results  (Last 48 hours)      None            Signed By: Adrienne Vázquez NP - July 29, 2022

## 2022-07-29 NOTE — DISCHARGE SUMMARY
Physician Discharge Summary     Patient ID:  Cammie Tobar  [id number]  14 y.o.  1955    Admit date: 7/27/2022    Discharge date of service and time: 7/29/2022    Admission Diagnoses: Hematuria [R31.9]    Discharge Diagnoses:    Principal Diagnosis   Hematuria                                             Hospital Course and other diagnoses  Hematuria / Pyuria / Prostate cancer with metastasis to bone / Leukocytosis - POA, recurrent. Consulted urology. They consulted IR who did prostate embolization. Unclear if UTI. Recent Cx negative, steroids may account for WBC. Slightly elevated procalcitonin. ER gave cefepime. NGTD on Cx. Continue proscar. Urology plans outpatient PCN on 8/8. Has been on Zytiga. He has chosen to enroll in hospice once he is discharged     MAMADOU (acute kidney injury) / Dehydration - POA, presumed due to poor PO intake. Hydrated and improved. Acute and chronic blood loss anemia / Iron deficiency Anemia / Anemia of chronic disease - Mildly low iron on recent serologies. Monitor. As expected it worsened with hydration. Transfuse to keep Hb>7. Continue PO iron      Diabetes type 2 without complications - No reason to test or treat aggressively in setting of stage 4 cancer. Diabetic diet and counseling. SSI per protocol. Continue home Lantus, resume dose to 14 BID. A1c useless during bleeding. Stop metformin, actos and Januvia. Hypertension - Reduce treatment in setting of stage 4 cancer. Stop benazapril. Cut atenolol to 25 daily     HLD (hyperlipidemia) - No hx of CAD or CVA. No reason to test or treat in setting of stage 4 cancer     Obese - No reason to advise weight loss in setting of stage 4 cancer     PCP: Sorin Begum MD    Consults: Urology, Palliative Care, and Hospice    Significant Diagnostic Studies: See Hospital Course    Discharged home in stable condition.     Discharge Exam:  BP (!) 144/70 (BP 1 Location: Left upper arm, BP Patient Position: At rest)   Pulse 85 Temp 98.8 °F (37.1 °C)   Resp 16   Ht 5' 8\" (1.727 m)   Wt 104.3 kg (230 lb)   SpO2 97%   BMI 34.97 kg/m²      Gen:  Obese, in no acute distress  HEENT:  Pale conjunctivae, PERRL, hearing intact to voice, moist mucous membranes  Neck:  Supple, without masses, thyroid non-tender  Resp:  No accessory muscle use, clear breath sounds without wheezes rales or rhonchi  Card:  No murmurs, normal S1, S2 without thrills, bruits or peripheral edema  Abd:  Soft, non-tender, non-distended, normoactive bowel sounds are present, no mass  Lymph:  No cervical or inguinal adenopathy  Musc:  No cyanosis or clubbing  Skin:  No rashes or ulcers, skin turgor is good  Neuro:  Cranial nerves are grossly intact, general motor weakness, follows commands  Psych:  Good insight, oriented to person, place and time, alert    Patient Instructions:   Current Discharge Medication List        CONTINUE these medications which have CHANGED    Details   Lantus U-100 Insulin 100 unit/mL injection 15 Units by SubCUTAneous route two (2) times a day. Qty: 1 mL, Refills: 0           CONTINUE these medications which have NOT CHANGED    Details   finasteride (PROSCAR) 5 mg tablet Take 5 mg by mouth daily. atenoloL (TENORMIN) 50 mg tablet Take 50 mg by mouth nightly. ferrous sulfate 325 mg (65 mg iron) tablet Take 1 Tablet by mouth Daily (before breakfast) for 30 days. Qty: 30 Tablet, Refills: 0      docusate sodium (Stool Softener) 100 mg capsule Take 100 mg by mouth two (2) times daily as needed for Constipation. tamsulosin (FLOMAX) 0.4 mg capsule Take 0.4 mg by mouth two (2) times a day. HumaLOG U-100 Insulin 100 unit/mL injection by SubCUTAneous route four (4) times daily as needed.  Sliding scale           STOP taking these medications       Januvia 100 mg tablet Comments:   Reason for Stopping:         abiraterone (Zytiga) 250 mg tab Comments:   Reason for Stopping:         atorvastatin (LIPITOR) 10 mg tablet Comments:   Reason for Stopping:         benazepriL (LOTENSIN) 40 mg tablet Comments:   Reason for Stopping:         metFORMIN (GLUCOPHAGE) 500 mg tablet Comments:   Reason for Stopping:         metFORMIN (GLUCOPHAGE) 500 mg tablet Comments:   Reason for Stopping:         pioglitazone (ACTOS) 45 mg tablet Comments:   Reason for Stopping:         predniSONE (DELTASONE) 5 mg tablet Comments:   Reason for Stopping:             Activity: Activity as tolerated  Diet: Regular Diet and Comfort feeding  Wound Care: None needed    Follow-up with your PCP And urology in a few weeks.   Follow-up tests/labs - none    Signed:  Rodger Rob MD  7/29/2022  2:25 PM

## 2022-07-29 NOTE — PROGRESS NOTES
Palliative note:    Patient choice for hospice noted. The Palliative team will sign off at this time. Please re-consult if our team can be of assistance. Thank you for asking our team to participate in the care of Sarah Vivi

## 2022-07-29 NOTE — DISCHARGE INSTRUCTIONS
Patient Discharge Instructions    Scottie Henriquez / [id number] : 1955    Admitted 2022 Discharged: 2022     Primary Diagnoses  @Rprob@    Take Home Medications     It is important that you take the medication exactly as they are prescribed. Keep your medication in the bottles provided by the pharmacist and keep a list of the medication names, dosages, and times to be taken in your wallet. Do not take other medications without consulting your doctor. What to do at Home    Recommended diet: Regular Diet and Comfort feeding    Recommended activity: Activity as tolerated    If you experience worse symptoms, please follow up with hospice. Follow-up with your PCP in a few weeks    [unfilled]     Information obtained by :  I understand that if any problems occur once I am at home I am to contact my physician. I understand and acknowledge receipt of the instructions indicated above.                                                                                                                                            Physician's or R.N.'s Signature                                                                  Date/Time                                                                                                                                              Patient or Representative Signature                                                          Date/Time

## 2022-07-29 NOTE — PROGRESS NOTES
Problem: Falls - Risk of  Goal: *Absence of Falls  Description: Document Amber Arriaga Fall Risk and appropriate interventions in the flowsheet. Outcome: Progressing Towards Goal  Note: Fall Risk Interventions:  Mobility Interventions: Bed/chair exit alarm, Patient to call before getting OOB         Medication Interventions: Bed/chair exit alarm, Patient to call before getting OOB, Teach patient to arise slowly    Elimination Interventions: Bed/chair exit alarm, Call light in reach, Patient to call for help with toileting needs              Problem: Patient Education: Go to Patient Education Activity  Goal: Patient/Family Education  Outcome: Progressing Towards Goal     Problem: Pressure Injury - Risk of  Goal: *Prevention of pressure injury  Description: Document Dilan Scale and appropriate interventions in the flowsheet.   Outcome: Progressing Towards Goal  Note: Pressure Injury Interventions:  Sensory Interventions: Assess changes in LOC, Keep linens dry and wrinkle-free, Minimize linen layers    Moisture Interventions: Absorbent underpads, Apply protective barrier, creams and emollients, Internal/External urinary devices, Minimize layers    Activity Interventions: Increase time out of bed    Mobility Interventions: HOB 30 degrees or less    Nutrition Interventions: Document food/fluid/supplement intake, Offer support with meals,snacks and hydration    Friction and Shear Interventions: HOB 30 degrees or less, Minimize layers                Problem: Patient Education: Go to Patient Education Activity  Goal: Patient/Family Education  Outcome: Progressing Towards Goal

## 2022-07-29 NOTE — PROGRESS NOTES
Discharge instructions given. Pt. verbalized an understanding and documents signed. Docs. Placed in paper chart. Discharge per MD order. Patient being driven home by his Daughter. Pt. Stable and in no apparent distress at time of discharge.

## 2022-07-29 NOTE — PROGRESS NOTES
7/29/2022  Case Management Progress Note    2:39 PM  Noted discharge order this [facility] know that patient is headed home and confirmed with patient that family is coming to pick him up around ROLAND Hunter    10:19 AM  Patient is 78year old male admitted 7/27 with hematuria  Patient's RUR is 25% red/high risk for readmission  Covid test: none this admission  Chart reviewed  Patient has been accepted by [facility] and they will be ready for discharge home this afternoon if medically cleared. No other needs noted at this time, patient has been up ad lindsey and will likely go home with family transport. Will continue to follow and assist with discharge planning.      Transition of Care Plan   Continue medical management/treatment  Home with [facility] hospice once cleared  Family transport as able  CM will continue to follow    ROLAND Schuster

## 2022-07-29 NOTE — PROGRESS NOTES
Received message from hospitalist earlier today that pt was requesting bilateral PCN. Pt has also decided upon hospice. Currently sp prostate artery embolization 7/27, still with gross hematuria although appears to have improved to some degree. Spoke with IR in regard to scheduling bilateral PCNs for urinary diversion. No doctors scheduled with IR next week here at [facility] who could place PCNs per IR. First available would be OP appt 8/8/22 at 9AM here at [facility]. Discussed with IR pt possibly having done sooner at [facility], but pt declines and wishes to have done here at [facility]. Pt states he is ready to transition to hospice care. Does not wish for further interventions for this admission and states he is ready to go home. Please call if urology can be of further assistance.     Plan discussed with Dr. Mai Liu, NP

## 2022-08-01 NOTE — TELEPHONE ENCOUNTER
Call to patient's daughter to check in post hospital discharge. Patient relayed wishes to enroll in hospice. Need to follow up to ensure this remains his wishes or he would like to proceed with office visit/Lupron scheduled for Thursday. Left message requesting return call.

## 2022-08-01 NOTE — TELEPHONE ENCOUNTER
[address] at [facility]  [phone]    91/42/82- Spoke to Fortino Roberts with [facility], explained that patient is scheduled for office visit/Lupron this Thursday and would like to receive treatment. She was appreciative of the call and will touch base with patient/family regarding this. Stated she could tell the other day more education about hospice services would be needed. Encouraged her to contact our office with any questions or concerns. 35/33/50- Spoke to Fortino Roberts, she confirmed patient would like to continue receiving Lupron and will sign paperwork to un-enroll in hospice tomorrow.

## 2022-08-01 NOTE — TELEPHONE ENCOUNTER
Patient's daughter returned call. States they have signed on with [facility]. They were not aware that patient would not continue treatment with Lupron and Zytiga under hospice services. Discussed that when patient's sign on with hospice it is typically with the understanding that they will no longer be actively treating the cancer. Patient's daughter stated hospice advised them they could continue with all upcoming office visits. Will need to clarify that hospice services will likely cover office visits but will likely not cover Lupron and Zytiga as this is active cancer therapy instead of supportive care. Patient's daughter stated hospice case manager will be meeting with patient and her cousin today at 1pm. I asked them to clarify with hospice whether patient would continue Lupron/Zytiga under hospice services. Patient currently is clear he wants to continue with active treatment of his disease. He does not wish to have further hospital visits for his suprapubic catheter. For now, will keep office visit as scheduled on Thursday. They will communicate with hospice case manager and our office will reach out to [facility] as well to ensure they are aware patient is scheduled for Lupron on Thursday. Patient may have to un-enroll in hospice for the visit on Thursday so that we can discuss his wishes in more detail and decide about next steps. For now, will keep visit on Thursday as scheduled.

## 2022-08-04 NOTE — PROGRESS NOTES
Addie Tidwell is a 78 y.o. male follow up for prostate cancer. 1. Have you been to the ER, urgent care clinic since your last visit? Hospitalized since your last visit?no     2. Have you seen or consulted any other health care providers outside of the [address] [place] [address] since your last visit? Include any pap smears or colon screening.  No    Vitals 8/4/2022   Blood Pressure 126/67   Pulse 67   Temp 98   Resp 18   Height 5' 8\"   Weight 194 lb 1.6 oz   SpO2 97   BSA 2.05 m2   BMI 29.51 kg/m2

## 2022-08-04 NOTE — PROGRESS NOTES
[facility] Progress Note    Date: 2022    Name: Nhan Mann    MRN: [id number]         : 1955    Mr. Anna Enriquez Arrived in wheelchair and in no distress for C6 Lupron Injection. Assessment was completed, no acute issues at this time, no new complaints voiced. Cruz present. Patient reports pain 5/10. Labs drawn and sent for processing. Mr. Curtis Cassidy vitals were reviewed. Visit Vitals  /67   Pulse 67   Temp 98 °F (36.7 °C)   Resp 18   Ht 5' 8\" (1.727 m)   Wt 88 kg (194 lb 1.6 oz)   SpO2 97%   BMI 29.51 kg/m²         Medications:  Medications Administered       leuprolide depot (LUPRON 3 MONTH) injection sykt 22.5 mg       Admin Date  2022 Action  Given Dose  22.5 mg Route  IntraMUSCular Administered By  [place], [place]                     Mr. Anna Enriquez tolerated treatment well and was discharged from [place] [address] in stable condition. He is to return on  at 1100 for his next appointment.     [facility]  2022

## 2022-08-04 NOTE — PROGRESS NOTES
[facility] at [address] [facility]: [phone]  F: [phone]      Reason for Visit:   Mar Barnett is a 78 y.o. male who is seen for follow up of prostate cancer. Hematology/Oncology Treatment History:   CT C/A/P 4/19/2021: Sclerosis and lysis in the left 2nd rib. Sclerosis and lysis with expansion of the anterior, right 6th rib with an associated pathologic fracture laterally. Metastatic disease in T10 with slight compression and extension into the anterior, superior endplate of E23. There is soft tissue attenuation in the fat surrounding the T10 vertebral body concerning for soft tissue extension. Lysis in the inferior endplate of P98 is concerning for metastatic disease. Adenopathy in the retroperitoneum, adjacent to the aorta and in the mesentery in the left lower quadrant. Prostatomegaly with some heterogeneity. Bone scan 4/19/2021: Osseous metastatic disease   Biopsy, ablation, and kyphoplasty of T10/11 4/20/2021: metastatic carcinoma, compatible with metastatic adenocarcinoma of prostatic primary  Stage IV Prostate Cancer  Firmagon on 4/27/2021 x 1 dose  Radiation with Dr. Dionne Snellen completed 5/7/2021, x 5 fractions  Initiated Amita Daleling with Prednisone on 5/10/2021  Initiated Lupron on 5/26/2021    History of Present Illness:   Hospitalized since last visit (7/27-7/29) due to hematuria. Had embolization of prostate on 7/27/2022 with resolution of hematuria since discharge. Urine draining via suprapubic catheter. Some soreness to site to IR procedure. Pelvic pressure and pain. Does not have pain medication on hand. Reports low appetite. Significant weight loss. Drinking fluids without issue. Constipation while admitted, this is resolving. Using wheelchair for transport outside the home. Review of systems was obtained and pertinent findings reviewed above.  Past medical history, social history, family history, medications, and allergies are located in the electronic medical record. Physical Exam:     Visit Vitals  /67   Pulse 67   Temp 98 °F (36.7 °C)   Resp 18   Ht 5' 8\" (1.727 m)   Wt 194 lb (88 kg)   SpO2 97%   BMI 29.50 kg/m²     ECOG PS: 1  General: no distress  Eyes: anicteric sclerae  HENT: oropharynx clear  Neck: supple  Lymphatic: no cervical, supraclavicular adenopathy  Respiratory: normal respiratory effort  CV: no peripheral edema  GI: soft, nontender, nondistended, no masses  : Suprapubic catheter draining clear, yellow urine  Skin: no rashes, no ecchymoses, no petechiae    Results:     Lab Results   Component Value Date/Time    WBC 19.3 (H) 08/04/2022 11:05 AM    HGB 7.6 (L) 08/04/2022 11:05 AM    HCT 23.9 (L) 08/04/2022 11:05 AM    PLATELET 554 (H) 68/96/8477 11:05 AM    MCV 74.9 (L) 08/04/2022 11:05 AM    ABS. NEUTROPHILS 15.9 (H) 08/04/2022 11:05 AM     Lab Results   Component Value Date/Time    Sodium 136 08/04/2022 11:05 AM    Potassium 4.0 08/04/2022 11:05 AM    Chloride 106 08/04/2022 11:05 AM    CO2 24 08/04/2022 11:05 AM    Glucose 315 (H) 08/04/2022 11:05 AM    BUN 13 08/04/2022 11:05 AM    Creatinine 1.07 08/04/2022 11:05 AM    GFR est AA >60 08/04/2022 11:05 AM    GFR est non-AA >60 08/04/2022 11:05 AM    Calcium 9.1 08/04/2022 11:05 AM    Glucose (POC) 228 (H) 07/29/2022 03:19 PM     Lab Results   Component Value Date/Time    Bilirubin, total 0.3 08/04/2022 11:05 AM    ALT (SGPT) 36 08/04/2022 11:05 AM    Alk.  phosphatase 389 (H) 08/04/2022 11:05 AM    Protein, total 7.9 08/04/2022 11:05 AM    Albumin 1.8 (L) 08/04/2022 11:05 AM    Globulin 6.1 (H) 08/04/2022 11:05 AM     Lab Results   Component Value Date/Time    Reticulocyte count 0.7 05/06/2022 11:29 AM    Iron % saturation 11 (L) 05/06/2022 11:29 AM    TIBC 208 (L) 05/06/2022 11:29 AM    Ferritin 129 05/06/2022 11:29 AM    Vitamin B12 1,356 (H) 04/20/2021 05:45 AM    Folate 16.8 04/20/2021 05:45 AM    Haptoglobin 499 (H) 05/06/2022 11:29 AM     05/06/2022 11:29 AM     Recent Labs     08/04/22  1105 05/06/22  1129 03/16/22  1131 02/11/22  1136 11/19/21  1040 10/08/21  1200   PSA 0.1 0.1 0.2 0.2 0.5 0.5       Date  PSA  08/04/2022 0.1  05/06/2022 0.1  03/16/2022 0.2  02/11/2022 0.2  11/19/2021 0.5  10/08/2021 0.5  08/25/2021 0.6  07/21/2021 0.9  06/23/2021 1.6  05/26/2021 5.3 Lupron started  05/10/2021  Initiated Chani Oak[place] and Prednisone  04/27/2021 104 Firmagon given  04/17/2021 153      5/26/2021:  Hemoglobin fractionation: Hgb G-Nolan    DEXA 6/23/2021: This patient is osteopenic using the World Health Organization criteria  10 year probability of major osteoporotic fracture:  8.8%  10 year probability of hip fracture:  1.1%    CT C/A/P 5/20/2022:  Imaging findings are consistent with interval progression of disease. There are scattered; more than 5 pulmonary nodules identified. Percutaneous sampling is feasible if clinically warranted. Osseous metastatic disease burden is not significantly changed status post kyphoplasty at T10 and T11. No acute intraperitoneal/intrathoracic process is identified. Bone scan 5/20/2022:  Bone metastases shows stable number and location, with decreased  activity. Assessment:   1) Metastatic castrate sensitive prostate cancer  He has extensive osseous metastatic disease. Pathology results from bone biopsy, along with elevated PSA, confirm a diagnosis of metastatic prostate cancer. His cancer is not curable and management is with palliative intent. He is currently on ADT along with upfront Abiraterone and Prednisone. He is tolerating therapy well with manageable toxicity. PSA responding well to therapy. Patient was recently admitted for hematuria and during admission patient stated he did not wish to go back to the hospital for concerns related to suprapubic catheter.  Patient was placed under hospice services, but reports today he did not understand this would limit treatment options for him for the prostate cancer. Patient is clear today he wants to remain treatment-focused and wishes to remain on Zytiga, Prednisone and Lupron. He has un-enrolled in hospice services. This is appropriate as his prognosis is currently >1 -2 years. The patient will be seen monthly with each cycle of therapy, and labs will be monitored to assess for toxicity from therapy. [id number] to assess for somatic mutations resulted in multiple mutations including MSI-high. [employer] testing for germline mutations negative. 2) Osseous metastases, Thoracic cord compression  S/p ablation and kyphoplasty of T10/11 on 4/20/2021. Completed radiation with Dr. Grace Rinne. Consider Xgeva when he becomes castrate resistant. Continue vitamin D. He gets calcium through his diet. DEXA with osteopenia, repeat in 6/2023    3) Cancer pain  Improved after kyphoplasty/ablation and radiation. Worsening pain now r/t suprapubic catheter/pelvic pain. He has had good success with Oxycodone in the past, but family is requesting something \"milder\". Will initiate Tramadol. 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 Worsening now due to recent hematuria, will add on iron studies today. May benefit from IV iron. 5) DM  Managed by his PCP. Of note, abiraterone interacts with his Actos, needs to monitor glucose closely. 6) Emotional Well Being  No psychosocial concerns identified today. Patient has adequate support. He is getting financial assistance for [employer] 7) Urinary retention  Under treatment with with urology-Dr. Noah Dash. Had TURP on 4/11/2022, underwent suprapubic catheter placement on 4/26/2022. Has had multiple ED visits/hospitalizations for hematuria or problems related to suprapubic catheter.      Will now undergo nephrostomy tube placement on 8/8/2022     Family to call to set up follow up with urology. 8) Lung nodules  Multiple on CT imaging. Uncertain etiology. PSA is responding to current therapy, so may not be his prostate cancer. Recommended biopsy to further evaluate. He initially declined, but is now agreeable. He wants to wait and do this at the same time as his next CT, scheduled on 9/9/2022    9) weight loss  Significant s/p hospitalization. Patient reports drinking well, appetite is down. Encouraged small, frequent meals    Plan:     Continue abiraterone 1000mg PO daily  Continue prednisone 5mg PO daily  Continue Lupron every 12 weeks, given today  Labs monthly: CBC, CMP, PSA  Follow up with urology - family to set up appointment  CT chest September  CT guided lung biopsy  Refer to home health/PT  Nephrostomy tube placement 8/8/2022  Return to see me in 6 weeks    I personally saw and evaluated the patient and performed the key components of medical decision making. The history, physical exam, and documentation were performed by Celeste Judge NP. I reviewed and verified the above documentation and modified it as needed. He has decided to un-enroll from hospice services after learning that he cannot continue on cancer-directed therapy. His prostate cancer remains under good control based on PSA, we will continue with therapy. Biopsy lung lesions next month and follow up with me after. Signed By: Parminder Malloy MD       Addendum:   Will initiate script for wheelchair. Patient is unable to safely ambulate over 75 feet due to diagnosis of metastatic prostate cancer, anemia causing MORROW and generalized weakness s/p recent hospitalization. Patient is unable to safely ambulate with a rolling walker or single point cane and is high fall risk.

## 2022-08-08 NOTE — TELEPHONE ENCOUNTER
Patient daughter called and stated she would like the team to put in a referral for this patient at [facility]. She stated she would like the referral for home health and physical therapy. Please advise.     Cb# [phone]    [facility]  Phone #  [phone]  Fax # [phone]

## 2022-08-08 NOTE — TELEPHONE ENCOUNTER
[employer] at [facility]  [phone]    08/08/22-Home health referral faxed to [facility] at [phone], confirmation received. Patient's daughter notified and verbalized understanding. Stated she cancelled patient's nephrostomy tube placement today because things seems to be getting better. Patient has follow up with Dr. Amber Wynn tomorrow to discuss. No further questions or concerns at this time.

## 2022-08-08 NOTE — TELEPHONE ENCOUNTER
Patients daughter called and stated she would like to know if the team can place an order to [address] for the following items. ..   Hospital bed with regular mattress  Bed side table  Shower chair  Wheel chair    # [phone]    [address]   Phone # [phone]  Fax # [phone]

## 2022-08-08 NOTE — TELEPHONE ENCOUNTER
[address] at [place]  [phone]    08/08/22- Call placed to [facility], spoke to representative who confirmed Medicare will not cover hospital bed, bedside table, or shower chair. Hospital bed can be rented for about $90/month. [employer] doesn't have any in stock right now. Shower chairs can be purchased on [facility] or at [facility] for about $32-35. We are able to order a wheelchair for patient. Will need to document medical necessity and fax to [facility]alj[facility] Toshia with a prescription. Patient's daughter notified of the above and verbalized understanding. 08/09/22- Wheelchair prescription and office note faxed to [facility]aljit Cedillos at [phone], confirmation received.

## 2022-08-10 NOTE — TELEPHONE ENCOUNTER
Queen Stalker with [facility] called and stated that he would like to have approval for OT and have a  also come out.      CB# [phone]

## 2022-08-10 NOTE — TELEPHONE ENCOUNTER
[address] at [facility]  [phone]    08/10/22- Dion to add OT to home health services, per Guido Stokes. Spoke to [place] with [facility], gave him a verbal order. No further needs at this time.

## 2022-08-19 NOTE — TELEPHONE ENCOUNTER
[address] at [facility]  [phone]    08/19/22- Fax received from [facility] hospice needing certification for hospice forms to be signed from original admission to hospice on 7/30/22. Per  he did not order hospice and can not sign orders. Phone call placed to [facility] spoke to [place]- informed her of this. She will follow up with their coordinator. No further actions needed from our office.

## 2022-08-20 PROBLEM — K65.1 INTRA-ABDOMINAL ABSCESS (HCC): Status: ACTIVE | Noted: 2022-01-01

## 2022-08-20 NOTE — ED PROVIDER NOTES
HPI   79-year-old man with a past medical history of diabetes, hypertension, metastatic prostate cancer on oral chemotherapy with a suprapubic catheter in place, never recent admission for hematuria presents to the emergency department due to generalized weakness, perineal pain, as well as periods of confusion. For the last 2 weeks the patient has been having worsening pain around his perineum and his penis. He is attributing this to prostate pain. He has become progressively weak with a decreased appetite. Over the last few days he has been slightly more confused and has been sent stating abnormal things to his family. Apparently his blood sugar was very elevated yesterday into the 400s but his family gave him his diabetic medications and his last blood sugar today was 107. Patient denies any fevers or chills. No chest pain or shortness of breath. No nausea vomiting or diarrhea.   His niece states his urine in his suprapubic catheter bag has appeared abnormal.  Past Medical History:   Diagnosis Date    BPH (benign prostatic hyperplasia)     COVID-19 vaccine series completed     Diabetes ([address]     Cruz catheter in place 11/2022    High cholesterol     Hypertension     No blood products     Urinary retention        Past Surgical History:   Procedure Laterality Date    IR ASP BLADDER SUPRA CATH  4/22/2022    IR ASP BLADDER SUPRA CATH  6/22/2022    IR KYPHOPLASTY THORACIC  4/20/2021    IR OCCL TXCATH ORGAN W SI  7/27/2022         Family History:   Problem Relation Age of Onset    Anesth Problems Neg Hx     Clotting Disorder Neg Hx        Social History     Socioeconomic History    Marital status:      Spouse name: Not on file    Number of children: Not on file    Years of education: Not on file    Highest education level: Not on file   Occupational History    Not on file   Tobacco Use    Smoking status: Former     Types: Cigarettes    Smokeless tobacco: Never   Vaping Use    Vaping Use: Unknown Substance and Sexual Activity    Alcohol use: Never    Drug use: Never    Sexual activity: Not Currently   Other Topics Concern    Not on file   Social History Narrative    Not on file     Social Determinants of Health     Financial Resource Strain: Not on file   Food Insecurity: Not on file   Transportation Needs: Not on file   Physical Activity: Not on file   Stress: Not on file   Social Connections: Not on file   Intimate Partner Violence: Not on file   Housing Stability: Not on file         ALLERGIES: Cefepime and Erythromycin    Review of Systems  A complete review of systems was performed and all systems reviewed are negative unless otherwise documented in the HPI    Vitals:    08/20/22 1338   BP: 102/66   Pulse: 81   Resp: 18   Temp: 98.3 °F (36.8 °C)   SpO2: 97%   Weight: 88 kg (194 lb)   Height: 5' 8\" (1.727 m)            Physical Exam  Constitutional:       Comments: Chronically ill but nontoxic   HENT:      Mouth/Throat:      Comments: Moist mucous membranes  Eyes:      General: No scleral icterus. Extraocular Movements: Extraocular movements intact. Neck:      Comments: Trachea midline  Cardiovascular:      Comments: Regular rate and rhythm. Normal capillary refill  Pulmonary:      Effort: Pulmonary effort is normal. No respiratory distress. Breath sounds: Normal breath sounds. No wheezing or rales. Abdominal:      Comments: Soft and nontender. Suprapubic catheter in place just to the right of the midline with minimal yellowish drainage at the insertion site but no erythema or tenderness in the area   Genitourinary:     Comments: Nodularity and tenderness in the perineal region as well as the penis as well as tenderness of the scrotum with no palpable masses there. No crepitus or erythema in these areas. Musculoskeletal:      Cervical back: Normal range of motion. Comments: No gross deformities   Skin:     General: Skin is warm and dry.    Neurological:      Comments: Alert and oriented to person place and time. No focal neurologic deficits. GCS 15        MDM  21-year-old man who presents with the above chief complaint. He has stable vital signs. He appears chronically ill but nontoxic. He has nodularity and some tenderness in his perineum as well as his scrotum and his penis. It does not appear to be Gopal's gangrene. He has some mild drainage coming from his suprapubic catheter. CT of the abdomen and pelvis will be ordered to better characterize this nodularity. CT of the head will be ordered given his reported confusion. Labs including lactate and a urinalysis will be obtained. Procedures    Perfect Serve Consult for Admission  5:48 PM    ED Room Number: MY49/06  Patient Name and age:  Trudi Nelson 78 y.o.  male  Working Diagnosis:   1. Complicated UTI (urinary tract infection)        COVID-19 Suspicion:  no  Sepsis present:  no  Reassessment needed: yes  Code Status:  Full Code  Readmission: yes  Isolation Requirements:  no  Recommended Level of Care:  telemetry  Department: [address] [place] [address] ED - [phone]      Other: Patient presenting with 2 weeks of perineal/scrotal/penile pain. History of metastatic prostate cancer on oral chemotherapy. Has a complicated UTI. CT shows progression of his metastatic disease and potentially abscess in the penis/perineal area. White count of 17 but no other sepsis criteria. I plan on exchanging the suprapubic catheter. Antibiotics ordered.

## 2022-08-20 NOTE — H&P
Admission History and Physical        Patient: Steve Smallwood               Sex: male          DOA: 8/20/2022       YOB: 1955      Age:  78 y.o.        LOS:  LOS: 0 days      HPI:     CHIEF COMPLAINT:   Generalized body weakness  Confusion  Decreased appetite    HISTORY OF PRESENT ILLNESS:     Mr. wJ Patiño is a 78 y.o.  male with history significant for diabetes mellitus, hypertension, high cholesterol, enlarged prostate with metastatic prostate cancer mets to the lungs status post chemoradiation, status post suprapubic catheter placement in April 2022 who presents with discharge coming out of the suprapubic catheter with concern for worsening infection. History from the patient is limited, niece and friend at the bedside who helps with obtaining history. In the emergency department CT of the abdomen and pelvis shows compatible with progressive rapid progression of carotid prostatic neoplasm into the base of the penis and/or secondary extensive infection and abscess formation. And progressive pulmonary metastases. Due to this finding on CT abdomen and pelvis urology consult obtained patient started on IV antibiotics and admitted to the hospital for further management.   He denies any nausea no vomiting no diarrhea he did complain of pain of 8 out of 10 in the penile area    Past Medical History:   Diagnosis Date    BPH (benign prostatic hyperplasia)     COVID-19 vaccine series completed     Diabetes ([address]     Cruz catheter in place 11/2022    High cholesterol     Hypertension     No blood products     Urinary retention       Past Surgical History:   Procedure Laterality Date    IR ASP BLADDER SUPRA CATH  4/22/2022    IR ASP BLADDER SUPRA CATH  6/22/2022    IR KYPHOPLASTY THORACIC  4/20/2021    IR OCCL TXCATH ORGAN W SI  7/27/2022       Social History     Tobacco Use    Smoking status: Former     Types: Cigarettes    Smokeless tobacco: Never   Substance Use Topics    Alcohol use: Never        Family History   Problem Relation Age of Onset    Anesth Problems Neg Hx     Clotting Disorder Neg Hx         Allergies   Allergen Reactions    Cefepime Angioedema    Erythromycin Other (comments)     Insomnia- felt like speed        Prior to Admission medications    Medication Sig Start Date End Date Taking? Authorizing Provider   Lantus U-100 Insulin 100 unit/mL injection 15 Units by SubCUTAneous route two (2) times a day. 7/29/22   Cody Babin MD   ferrous sulfate 325 mg (65 mg iron) tablet Take 1 Tablet by mouth Daily (before breakfast) for 30 days. 7/20/22 8/19/22  Kirstin Martin MD   docusate sodium (COLACE) 100 mg capsule Take 100 mg by mouth two (2) times daily as needed for Constipation. Provider, Historical   finasteride (PROSCAR) 5 mg tablet Take 5 mg by mouth daily. 12/27/21   Provider, Historical   tamsulosin (FLOMAX) 0.4 mg capsule Take 0.4 mg by mouth two (2) times a day. 12/27/21   Provider, Historical   HumaLOG U-100 Insulin 100 unit/mL injection by SubCUTAneous route four (4) times daily as needed. Sliding scale 10/28/21   Provider, Historical   atenoloL (TENORMIN) 50 mg tablet Take 50 mg by mouth nightly.     Provider, Historical       REVIEW OF SYSTEMS:     General: negative for fever, chills, sweats, weakness  Eyes: negative for blurred vision, eye pain, loss of vision, diplopia  Ear Nose and Throat: negative for rhinorrhea, pharyngitis, otalgia, tinnitus, speech or swallowing difficulties  Respiratory:  negative for cough, sputum production, SOB, wheezing, MORROW, pleuritic pain  Cardiology:  negative for chest pain, palpitations, orthopnea, PND, edema, syncope   Gastrointestinal: negative for abdominal pain, N/V, dysphagia, change in bowel habits, bleeding  Genitourinary: supra pubic cath with discharge   Muskuloskeletal : negative for arthralgia, myalgia  Hematology: negative for easy bruising, bleeding, lymphadenopathy  Dermatological: negative for rash, ulceration, mole change, new lesion  Endocrine: negative for hot flashes or polydipsia  Neurological: negative for headache, dizziness, confusion, focal weakness, paresthesia, memory loss, gait disturbance      Physical Exam:     VITALS:    Vital signs reviewed; most recent are:    Visit Vitals  /63   Pulse 65   Temp 98.3 °F (36.8 °C)   Resp 11   Ht 5' 8\" (1.727 m)   Wt 88 kg (194 lb)   SpO2 100%   BMI 29.50 kg/m²     SpO2 Readings from Last 6 Encounters:   08/20/22 100%   08/04/22 97%   08/04/22 97%   07/29/22 98%   07/20/22 100%   07/13/22 100%        No intake or output data in the 24 hours ending 08/20/22 1955     Physical Exam:   General:  Alert, cooperative, no distress, appears stated age. Eyes:  Conjunctivae/corneas clear. PERRL, EOMs intact. Fundi benign   Ears:  Normal TMs and external ear canals both ears. Nose: Nares normal. Septum midline. Mucosa normal. No drainage or sinus tenderness. Mouth/Throat: Lips, mucosa, and tongue normal. Teeth and gums normal.   Neck: Supple, symmetrical, trachea midline, no adenopathy, thyroid: no enlargement/tenderness/nodules, no carotid bruit and no JVD. Back:   Symmetric, no curvature. ROM normal. No CVA tenderness. Lungs:   Clear to auscultation bilaterally. Heart:  Regular rate and rhythm, S1, S2 normal, no murmur, click, rub or gallop. Abdomen:   Soft, non-tender. Bowel sounds normal. No masses,  No organomegaly. Extremities: Extremities normal, atraumatic, no cyanosis or edema. Pulses: 2+ and symmetric all extremities. Skin: Skin color, texture, turgor normal. No rashes or lesions  Gu - suprapubic cath in place    Lymph nodes: Cervical, supraclavicular, and axillary nodes normal.   Neurologic: CNII-XII intact. Normal strength, sensation and reflexes throughout.        Labs:  BMP:   Lab Results   Component Value Date/Time     08/20/2022 03:19 PM    K 4.4 08/20/2022 03:19 PM     08/20/2022 03:19 PM    CO2 23 08/20/2022 03:19 PM    AGAP 7 08/20/2022 03:19 PM     (H) 08/20/2022 03:19 PM    BUN 23 (H) 08/20/2022 03:19 PM    CREA 1.46 (H) 08/20/2022 03:19 PM    GFRAA 59 (L) 08/20/2022 03:19 PM    GFRNA 48 (L) 08/20/2022 03:19 PM     CBC:   Lab Results   Component Value Date/Time    WBC 17.4 (H) 08/20/2022 03:19 PM    HGB 8.7 (L) 08/20/2022 03:19 PM    HCT 28.4 (L) 08/20/2022 03:19 PM     (H) 08/20/2022 03:19 PM     All Cardiac Markers in the last 24 hours: No results found for: CPK, CK, CKMMB, CKMB, RCK3, CKMBT, CKNDX, CKND1, HANK, TROPT, TROIQ, KJ, TROPT, TNIPOC, BNP, BNPP  Recent Glucose Results:   Lab Results   Component Value Date/Time     (H) 08/20/2022 03:19 PM     ABG: No results found for: PH, PHI, PCO2, PCO2I, PO2, PO2I, HCO3, HCO3I, FIO2, FIO2I  COAGS: No results found for: APTT, PTP, INR, INREXT, INREXT  Liver Panel:   Lab Results   Component Value Date/Time    ALB 1.9 (L) 08/20/2022 03:19 PM    TP 8.9 (H) 08/20/2022 03:19 PM    GLOB 7.0 (H) 08/20/2022 03:19 PM    AGRAT 0.3 (L) 08/20/2022 03:19 PM    ALT 18 08/20/2022 03:19 PM     (H) 08/20/2022 03:19 PM       Telemetry reviewed:   normal sinus rhythm  Tubes:   Cruz  X-Ray:    CT of the abdomen and pelvis  1. CT appearance compatible with progressive rapid progression of carotid prostatic neoplasm into the base of the penis and/or secondary extensive  infection and abscess formation. 2. Progressive pulmonary metastases. 2. 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 Procedures:  None     Assessment:      Active Problems:    Intra-abdominal abscess ([address] (8/20/2022)    In the patient with metastatic prostate cancer  CT abdominal pelvis shows compatible with progressive rapid progression of carotid prostatic neoplasm into the base of the penis and/or secondary extensive  infection and abscess formation. Started on IV antibiotics with clindamycin, status post vancomycin in the emergency department.   Allergy to cefepime  Urology consulted in the emergency department will see the patient  Pain management with morphine.   Will also consult interventional radiology for possible drainage of the abscess    Prostate cancer with mets to lungs and surrounding structure  Will consult medical oncology  Urology following  Status post chemoradiation  Defer further management to above    Leukocytosis  Secondary to above  White count of 17,000  Continue to monitor    Hypertension  Controlled  Continue atenolol  Continue to monitor blood pressure closely    Acute kidney injury  Secondary to above  Creatinine of 1.46  Change to IV fluid rehydration  Avoid nephrotoxic medication  Continue to monitor closely    Diabetes mellitus type 2  Check hemoglobin A1c  Patient takes Lantus 20 mg home  Patient placed on sliding scale of insulin, to monitor blood sugar closely  Plan:      Risk of deterioration: high      Total time spent with patient: [phone] [address] discussed with: Patient, Family, Nursing Staff, Consultant/Specialist, and >50% of time spent in counseling and coordination of care    Discussed:  Code Status, Care Plan, and D/C Planning    Prophylaxis:  Lovenox    Disposition:  Home w/Family           ___________________________________________________    Attending Physician: Richa Jones MD

## 2022-08-20 NOTE — CONSULTS
Requesting Provider: Jaylin Bowen MD - Reason for Consultation: prostate/penile mass, metastatic CaP  Pre-existing [facility] Patient:   No                Patient: Ronen Roberts MRN: [id number]  SSN: [id number]    YOB: 1955  Age: 78 y.o. Sex: male     Location: [facility]/       Code Status: Prior   PCP: Leeroy Martinez MD  - [phone]   Emergency Contact:  Primary Emergency Contact: Abbieapril Chavez, [place] Phone: [phone]   Race/Yazdanism/Language: Donice Raider / Squire Loser / [place]olph[place] Level: Payor: Lexi Frazier / Plan: Jennifer Hatfield / Product Type: Medicare /    Prior Admission Data: 7/29/22 [facility] 5M1 MED SURG 1 Lyndon Malcolm   Hospitalized:  Hospital Day: 1 - Admitted 8/20/2022  1:39 PM   POD # * No surgery found *  by * Surgery not found * - Blood Loss: * No surgery found * * Surgery not found *     CONSULTANTS  IP CONSULT TO UROLOGY   ADMISSION DIAGNOSES    ICD-10-CM ICD-9-CM   1. Complicated UTI (urinary tract infection)  N39.0 599.0         Assessment/Plan:       Metastatic prostate cancer with significant local advancement involving perineum and base of penis  Pelvic pain    CT findings are more consistent with locally advanced tumor and exam reveals firm mass more suggestive of tumor than abscess. Afebrile, leukocytosis improved since last admission. Discussed with family that I&D is not currently recommended as mass more likely represents locally advanced tumor with necrosis which would not resolve with drainage. Very high risk of poor wound healing due to advanced tumor, and possible necessity for penectomy if I&D were performed. Could consider IR aspiration as less invasive option, but family agrees to defer for now. - no acute urologic intervention  - continue analgesics  - consider palliative medicine consult  - will follow     CC:  Altered mental status, Decreased Appetite, and Fatigue   HPI: He is a 78 y.o. male h/o metastatic prostate cancer on abiraterone/prednisode and lupron, recently admitted last month with complaint of prostatic and urethral bleeding underwent PAE on 7/27. Returns to ED today with complaint of perineal pain. SPT last exchanged 8/9. Underwent aborted turp 4/2022 due to finding of extensive tumor deposits in urethra    CT demonstrates enhancing irregular prostate mass 8x8x9.5cm and new extensive irregular enhancing tissue with central necrosis extending into the base of the penis measuring 6.4x11.7cm. WBC 17.4 (historic values persistently elevated 16.5-25 since July). Afebrile. Cr 1.46    Wife at daughter at bedside provide additional history. He has had a rapid decline in function over the last few months with decreased activity and PO intake. 40+lb wt loss. SP tube has been draining ok with some intermittent blood and debris. He is most bothered by perineal pain, worsening over the last couple of weeks. He shares that he is \"at the end of my rope\". Last OV 69:  78-year-old male with metastatic prostate cancer, recurrent gross hematuria, retention returns to review plan of care. He was scheduled for bilateral PCN placement with IR yesterday but that was canceled. Recently hospitalized again for recurrent gross hematuria and underwent PAE 7/28/22 with resolution of hematuria. I discussed the patient yesterday on the phone with Dr. Shine Amaya. Patient has a good PSA response but increasing lung nodules and is scheduled for a biopsy one of those in a few weeks. Initially elected for hospice after last hospital stay but went back on that I think not fully understanding hospice. Has had a bunch of SPT problems, hematuria, clogging, SP falling out. Seems to be doing better after PAE. Initial SPT placement 4/22/22 in setting of metastatic prostate cancer with urethral involvement and retention. S/p cysto with complicated Cruz catheter placement 4/11/22.   He was scheduled for TURP that was aborted due to extensive urethral involvement with metastatic deposits presumably from metastatic prostate cancer with nodular indurated urethra on exam. Has not received radiation to prostate. 98g prostate on estimate on 4/2021 CT. Renal US 1/24/22 showed no hydro. Repeat CT 6/22/22 showed abnormal prostate with intravesical bulging. Followed by Dr. Elsi Sumner for known bony mets, RP adenopathy. Biopsy April 2021 T10/11 kyphoplasty consistent with metastatic prostate cancer. Started [facility] April 2021. Received radiation with Dr. Veto Beckett 5/7/21 to T spine T9-12. Started Keenan Pilsner with prednisone 5/10/21 and Lupron 5/26/21. PSA was 153 on 4/17/21. PSA 5/6/22 was 0.1. History DM, HTN. Last SPT change 7/18/22. On schedule 8/17 for SPT change and visit with me, we exchanged SPT instead today. Continued urethral bleeding, clot passage but urine itself has relatively cleared. Here with his daughter. Prostate pain. Using ring to sit on. Walking with a cane. Defers meds. IMPRESSION:    1. Gross hematuria (FFU04-Z14.0)    2. URINARY RETENTION (NNJ11-H52.9)    3. ADENOCARCINOMA - PROSTATE - METASTATIC (AMD24-I17)    PLAN: - SPT exchanged today. 30d SPT exchanges with home health from here on  - Leg bags provided to patient  - His urine is relatively clear in the suprapubic catheter. We discussed bilateral percutaneous nephrostomy tubes during his last hospitalization but deferred that as PAE has cleared the urine  - Urethral bleeding still a concern. I think this is going to be difficult to manage and may very well be persistent. We discussed the low likelihood of a concomitant urothelial bladder/urethral component to his cancer. I think this is still prostatic metastatic deposits in the urethra that are bleeding. I do not think his functional status nor degree of metastatic disease allow for consideration of cystoprostatectomy and urethrectomy, which would be a major procedure for this patient.   He knows Dr. Veto Beckett from radiation to the spine. I will asked Dr. Daisy Sheets if there is any utility in seeing the patient to consider radiation to the prostate and possibly the proximal urethra  - If gross hematuria is a concern again in the future with suprapubic catheter issues I would favor bilateral PCNs as the next step  - See me in 3 months to touch base  - No symptoms suggestive of UTI. Offered urine culture in setting of ongoing urethral bleeding but we agreed to defer. Offered pain medications (NSAIDs, narcotics) for pain but patient deferred. - For now continue finasteride. No need to take Flomax. - Scheduled for lung nodule biopsy 22. cc: Rainer Blevins MD  Today's Services  Cystostomy Tube Change (80878), E&M Service    Upcoming Orders  Return Office Visit - with Rodolfo Reis MD MS in 3 months  UA    Temp (24hrs), Av.3 °F (36.8 °C), Min:98.3 °F (36.8 °C), Max:98.3 °F (36.8 °C)    Urinary Status: Suprapubic cath  Creatinine   Date/Time Value Ref Range Status   2022 03:19 PM 1.46 (H) 0.70 - 1.30 MG/DL Final   2022 11:05 AM 1.07 0.70 - 1.30 MG/DL Final   2022 12:42 AM 1.08 0.70 - 1.30 MG/DL Final   2022 05:03 AM 1.18 0.70 - 1.30 MG/DL Final   2022 05:30 AM 0.85 0.70 - 1.30 MG/DL Final     Current Antimicrobial Therapy (168h ago, onward)       Ordered     Start Stop    22 1747  vancomycin (VANCOCIN) 2250 mg in  mL infusion  2,250 mg,   IntraVENous,   ONCE        References:    Lexicomp    22 1800 22 0559    22 1734  levoFLOXacin (LEVAQUIN) 750 mg in D5W IVPB  750 mg,   IntraVENous,   ONCE        References:    Lexicomp    22 1735 22 0534                  Key Anti-Platelet Anticoagulant Meds       The patient is on no antiplatelet meds or anticoagulants.           Diet: No diet orders on file -       Labs     Lab Results   Component Value Date/Time    Lactic acid 1.5 2022 03:19 PM    WBC 17.4 (H) 2022 03:19 PM    HCT 28.4 (L) 2022 03:19 PM PLATELET 441 (H) 21/45/2488 03:19 PM    Sodium 138 08/20/2022 03:19 PM    Potassium 4.4 08/20/2022 03:19 PM    Chloride 108 08/20/2022 03:19 PM    CO2 23 08/20/2022 03:19 PM    BUN 23 (H) 08/20/2022 03:19 PM    Creatinine 1.46 (H) 08/20/2022 03:19 PM    Glucose 152 (H) 08/20/2022 03:19 PM    Calcium 9.7 08/20/2022 03:19 PM    Magnesium 2.0 07/28/2022 12:42 AM    INR 1.1 07/15/2022 01:39 AM    Prostate Specific Ag 0.1 08/04/2022 11:05 AM     UA:   Lab Results   Component Value Date/Time    Color ORANGE 08/20/2022 04:56 PM    Appearance TURBID (A) 08/20/2022 04:56 PM    Specific gravity 1.024 08/20/2022 04:56 PM    Specific gravity 1.020 07/27/2022 06:55 AM    pH (UA) 8.0 08/20/2022 04:56 PM    Protein >300 (A) 08/20/2022 04:56 PM    Glucose Negative 08/20/2022 04:56 PM    Ketone Negative 08/20/2022 04:56 PM    Bilirubin Negative 07/27/2022 06:55 AM    Urobilinogen 1.0 08/20/2022 04:56 PM    Nitrites Positive (A) 08/20/2022 04:56 PM    Leukocyte Esterase LARGE (A) 08/20/2022 04:56 PM    Epithelial cells MANY (A) 08/20/2022 04:56 PM    Bacteria 4+ (A) 08/20/2022 04:56 PM    WBC >100 (H) 08/20/2022 04:56 PM    RBC >100 (H) 08/20/2022 04:56 PM     Imaging     Results for orders placed during the hospital encounter of 08/20/22    CT HEAD WO CONT    Narrative  EXAM: CT HEAD WO CONT    INDICATION: altered mental status    COMPARISON: 4/16/2021. CONTRAST: None. TECHNIQUE: Unenhanced CT of the head was performed using 5 mm images. Brain and  bone windows were generated. Coronal and sagittal reformats. CT dose reduction  was achieved through use of a standardized protocol tailored for this  examination and automatic exposure control for dose modulation. FINDINGS:  The ventricles and sulci are normal in size, shape and configuration. . There is  no significant white matter disease. There is no intracranial hemorrhage,  extra-axial collection, or mass effect. The basilar cisterns are open.  No CT  evidence of acute infarct. The bone windows demonstrate no abnormalities. The visualized portions of the  paranasal sinuses and mastoid air cells are clear. Impression  No acute abnormality    EXAM: CT ABD PELV W CONT     INDICATION: Nodularity and pain in the perineal area. Patient with a history of  prostate cancer. Also reported drainage from suprapubic catheter site. Concern  for possible underlying abscess in that area     COMPARISON: CT 7/14/2022. CONTRAST: 100 mL of Isovue-370. TECHNIQUE:   Following the uneventful intravenous administration of contrast, thin axial  images were obtained through the abdomen and pelvis. Coronal and sagittal  reconstructions were generated. Oral contrast was not administered. CT dose  reduction was achieved through use of a standardized protocol tailored for this  examination and automatic exposure control for dose modulation. FINDINGS:   LOWER THORAX: Interval increase in size of pulmonary masses with lingular mass  measuring 1.7 x 2.1 cm, previously 1.1 x 1.5 cm, right middle lobe mass  measuring 8 x 9 mm, previously 5 x 6 mm, and subpleural left lower lobe masses  measuring 6 x 10 mm and 9 x 16 mm, previously 5 x 7 mm and 11 x 12 mm. LIVER: No mass. BILIARY TREE: Gallbladder is unremarkable. CBD is not dilated. SPLEEN: Unremarkable. PANCREAS: No mass or ductal dilatation. ADRENALS: Unremarkable. KIDNEYS: Bilateral renal cysts for which no follow-up is required. At the  inferior pole of the right kidney there is an exophytic 13 mm lesion with no  thickening and internal septation mural With no calculus, hydronephrosis, or  enhancing mass, calculus, or hydronephrosis. STOMACH: Unremarkable. SMALL BOWEL: No dilatation or wall thickening. COLON: No dilatation or wall thickening. APPENDIX: Unremarkable. PERITONEUM: No ascites or pneumoperitoneum. RETROPERITONEUM: No lymphadenopathy or aortic aneurysm.   REPRODUCTIVE ORGANS: Prostate is essentially replaced by heterogeneously  enhancing mass increased in size from prior measuring 8.8 x 9.5 cm, not  significant change from prior, however there is extensive heterogeneous  irregular enhancing tissue with central necrosis extending into the base of the  penis measuring 6.4 x 11.7 cm. URINARY BLADDER: Collapsed around suprapubic catheter balloon. BONES: Augmentation cement compression fracture deformity is redemonstrated at  T10 and T11. Degenerative spine change. No acute fracture or aggressive lesion. ABDOMINAL WALL: No mass or hernia. ADDITIONAL COMMENTS: N/A     IMPRESSION     1. CT appearance compatible with progressive rapid progression of carotid  prostatic neoplasm into the base of the penis and/or secondary extensive  infection and abscess formation. 2. Progressive pulmonary metastases. 2. 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 Cultures     All Micro Results       Procedure Component Value Units Date/Time    CULTURE, BLOOD [[id number]] Collected: 08/20/22 1740    Order Status: Sent Specimen: Blood Updated: 08/20/22 1854    CULTURE, BLOOD [[id number]]     Order Status: Sent Specimen: Blood     CULTURE, URINE [[id number]] Collected: 08/20/22 1752    Order Status: Sent Specimen: Cath Urine Updated: 08/20/22 1803    CULTURE, BLOOD, PAIRED [[id number]]     Order Status: Canceled Specimen: Blood              Past History: (Complete 2+/3 areas)     Allergies   Allergen Reactions    Cefepime Angioedema    Erythromycin Other (comments)     Insomnia- felt like speed      Current Facility-Administered Medications   Medication Dose Route Frequency    levoFLOXacin (LEVAQUIN) 750 mg in D5W IVPB  750 mg IntraVENous ONCE    vancomycin (VANCOCIN) 2250 mg in  mL infusion  2,250 mg IntraVENous ONCE     Current Outpatient Medications   Medication Sig    Lantus U-100 Insulin 100 unit/mL injection 15 Units by SubCUTAneous route two (2) times a day.     docusate sodium (COLACE) 100 mg capsule Take 100 mg by mouth two (2) times daily as needed for Constipation. finasteride (PROSCAR) 5 mg tablet Take 5 mg by mouth daily. tamsulosin (FLOMAX) 0.4 mg capsule Take 0.4 mg by mouth two (2) times a day. HumaLOG U-100 Insulin 100 unit/mL injection by SubCUTAneous route four (4) times daily as needed. Sliding scale    atenoloL (TENORMIN) 50 mg tablet Take 50 mg by mouth nightly. Prior to Admission medications    Medication Sig Start Date End Date Taking? Authorizing Provider   Lantus U-100 Insulin 100 unit/mL injection 15 Units by SubCUTAneous route two (2) times a day. 7/29/22   Shiva Sandoval MD   ferrous sulfate 325 mg (65 mg iron) tablet Take 1 Tablet by mouth Daily (before breakfast) for 30 days. 7/20/22 8/19/22  Ji Davis MD   docusate sodium (COLACE) 100 mg capsule Take 100 mg by mouth two (2) times daily as needed for Constipation. Provider, Historical   finasteride (PROSCAR) 5 mg tablet Take 5 mg by mouth daily. 12/27/21   Provider, Historical   tamsulosin (FLOMAX) 0.4 mg capsule Take 0.4 mg by mouth two (2) times a day. 12/27/21   Provider, Historical   HumaLOG U-100 Insulin 100 unit/mL injection by SubCUTAneous route four (4) times daily as needed. Sliding scale 10/28/21   Provider, Historical   atenoloL (TENORMIN) 50 mg tablet Take 50 mg by mouth nightly. Provider, Historical        PMHx:  has a past medical history of BPH (benign prostatic hyperplasia), COVID-19 vaccine series completed, Diabetes ([facility] [address] Cruz catheter in place (11/2022), High cholesterol, Hypertension, No blood products, and Urinary retention. PSurgHx:  has a past surgical history that includes ir kyphoplasty thoracic (4/20/2021); ir asp bladder supra cath (4/22/2022); ir asp bladder supra cath (6/22/2022); and ir occl txcath organ w si (7/27/2022). PSocHx:  reports that he has quit smoking. His smoking use included cigarettes.  He has never used smokeless tobacco. He reports that he does not drink alcohol and does not use drugs. ROS:  (Complete - 10 systems) -   [x] Weight Loss (Constitutional)  [] Dry Mouth (ENMT)  [] Palpitations (CV)                         [] SOB (Respiratory)  [] Flatulance (GI)  [] Major Focal Weakness (MS)  [] Pallor (Skin)                            [] TIA Sx (Neuro)  [] Hallicinations (Psych)                [] Easy Bleeding (Heme)       Physical Exam: (Comprehesive - 8+ 1995 Systems)     (1) Constitutional:  FIO2:   on SpO2: O2 Sat (%): 97 %  O2 Device: None (Room air)    Patient Vitals for the past 24 hrs:   BP Temp Pulse Resp SpO2 Height Weight   08/20/22 1338 102/66 98.3 °F (36.8 °C) 81 18 97 % 5' 8\" (1.727 m) 88 kg (194 lb)     Somnolent. NAD.     (2) ENMT:  D Dry mucous membranes   (3) Respiratory:  breathing easily   (4) GI:  no abdominal masses, or tenderness   (5) :   SPT draining yellow urine. Uncirc phallis with firm palpable tumor replacing the proximal penile shaft without fluctuances. Testes atrophic. There is firm tissue at the base of the penis extending and tracking along the perineum. There is moderate tenderness. No palpable fluctuance.     (6) Lymphatic:  no adenopathy   (7) Muscloskeletal:  no gross deformity   (8) Skin:  no rash   (9) Neuro:  no focal deficits      Signed By: Marshall Dinero MD  - August 20, 2022

## 2022-08-20 NOTE — ED NOTES
Verbal shift change report given to Jono Pinon RN (oncoming nurse) by June Abarca  (offgoing nurse). Report included the following information SBAR, Kardex, ED Summary, Intake/Output, MAR, and Recent Results.

## 2022-08-20 NOTE — ED TRIAGE NOTES
Patient arrives with c/o generalized weakness, decreased appetite, intermittent confusion. Patient has hx of prostate cancer, with suprapubic catheter placed. Patient's niece states the family noticed green drainage around suprapubic catheter site. Patient is alert to self, disoriented to time. Patient denies any pain, fever, N/V. States symptoms present for past week.

## 2022-08-21 NOTE — PROGRESS NOTES
3:40 PM  Referral sent to [facility] via [employer]- awaiting response. Marivel Cortes    8/21/22  3:24 PM    CM reviewd EMR and spoke to MD and patients julien regarding hospice- both expressed patient is ready for hospice and no longer wants to pursue treatment. CM met with patient, patients niece- Ya Zhang ([phone]) and patients life partner, Sumit Chamberlain ([phone]) to discuss restarting hospice with [facility]- all are in agreement and would like a referral placed with [facility]. They requested all equipment to be set back up prior to patients return home. Reason for Readmission:    Intra-abdominal abscess          RUR Score/Risk Level:   26%- HIGH      PCP: First and Last name:  Yumiko Post   Name of Practice:    Are you a current patient: Yes/No: Yes   Approximate date of last visit: had an appointment on 8/5/22 but cancelled   Can you participate in a virtual visit with your PCP:     Is a Care Conference indicated:   Not at this time    Did you attend your follow up appointment (s): If not, why not:  Yes and No  Dr. Anna Osorio- yet  PCP: No       Resources/supports as identified by patient/family:   Good family support       Top Challenges facing patient (as identified by patient/family and CM): Finances/Medication cost?   Has insurance to cover medications    Transportation      Family will transport at [facility] or lack thereof? Good support system   Living arrangements? Lives alone but family spends a lot of time with him at his house  Self-care/ADLs/Cognition? Had been independent with adl's.  Uses a RW and Cane depending on how he is feeling        Current Advanced Directive/Advance Care Plan:  DNR-  needs a new form for hospice           Plan for utilizing home health:   was open with [facility] in July but rescinded             Transition of Care Plan:    Based on readmission, the patient's previous Plan of Care   has been evaluated and/or modified. The current Transition of Care Plan is:            1). Patient admitted for emergent care  2). Oncology consulted- consult for hospice  3). Referral placed with [facility]  4). Family transport at [address] [place] [address] Management Interventions  PCP Verified by CM: Yes  Mode of Transport at Discharge:  Other (see comment) (Family will transport at [facility])  Transition of Care Consult (CM Consult): Discharge Planning, [place]: No  Reason Outside [place]: Patient already serviced by other home care/hospice agency  Discharge Durable Medical Equipment: No  Physical Therapy Consult: No  Occupational Therapy Consult: No  Speech Therapy Consult: No  Support Systems: Spouse/Significant Other, Child(evelin), Other Family Member(s)  Confirm Follow Up Transport: Family  Discharge Location  Patient Expects to be Discharged to[de-identified] Home with hospice     Readmission Assessment  Number of days since last admission?: 8-30 days  Previous disposition: Other (comment) (Home with hospice)  Who is being interviewed?: Patient, Caregiver  What was the patient's/caregiver's perception as to why they think they needed to return back to the hospital?: Other (Comment)  Did you visit your Primary Care Physician after you left the hospital, before you returned this time?: No  Why weren't you able to visit your PCP?: Other (Comment) (Decided to not go to the appointment)  Did you see a specialist, such as Cardiac, Pulmonary, Orthopedic Physician, etc. after you left the hospital?: Yes (saw Dr. Franklin Lama on 8/4/22)  Who advised the patient to return to the hospital?: Self-referral  Does the patient report anything that got in the way of taking their medications?: No  In our efforts to provide the best possible care to you and others like you, can you think of anything that we could have done to help you after you left the hospital the first time, so that you might not have needed to return so soon?: Other (Comment)

## 2022-08-21 NOTE — PROGRESS NOTES
Urology Progress Note    Tha Acevedo is 78 y.o. male admitted for pelvic pain, metastatic CaP    Subjective:  No acute events  Peristent pelvic pain  SPT draining clear yellow        Objective:  Vitals:       Temp:  [97.3 °F (36.3 °C)-98.5 °F (36.9 °C)]   Pulse (Heart Rate):  [62-81]   BP: (102-137)/(59-85)   Resp Rate:  [11-24]   O2 Sat (%):  [95 %-100 %]   Weight:  [88 kg (194 lb)]     I/O:    Intake/Output Summary (Last 24 hours) at 8/21/2022 1010  Last data filed at 8/21/2022 0834  Gross per 24 hour   Intake 80 ml   Output 950 ml   Net -870 ml       Labs:    Recent Labs     08/20/22 2325 08/20/22  1519   WBC 16.7* 17.4*   HGB 7.6* 8.7*   HCT 25.1* 28.4*   * 573*     Recent Labs     08/20/22 2325 08/20/22  1519    138   K 4.1 4.4   * 108   CO2 20* 23   GLU 84 152*   BUN 21* 23*   CREA 1.19 1.46*   CA 9.0 9.7     Cx: All Micro Results       Procedure Component Value Units Date/Time    CULTURE, BLOOD [[id number]] Collected: 08/20/22 1740    Order Status: Completed Specimen: Blood Updated: 08/21/22 0640     Special Requests: NO SPECIAL REQUESTS        Culture result: NO GROWTH AFTER 11 HOURS       CULTURE, URINE [[id number]] Collected: 08/20/22 1752    Order Status: Sent Specimen: Cath Urine Updated: 08/21/22 0034    CULTURE, BLOOD [[id number]]     Order Status: Sent Specimen: Blood     CULTURE, BLOOD, PAIRED [[id number]]     Order Status: Canceled Specimen: Blood               Exam:  Gen: uncomfortable, in bed  Abdomen: soft ntnd  : SPT draining clear yellow. Uncirc phallus with firm tumor in proximal shaft. Testes atrophic. Firm induration in scrotum and perineum with no overlying skin changes or palpable fluctuance      Assessment:  Metastatic CaP  Pelvic pain- secondary to locally advanced tumor    Reviewed images with colleague who agrees draining is not recommended and would likely worsen sx.  Findings are consistent with necrotic tumor with no palpable or easily drainable abscess on exam    PLAN:  -agree with onc/ palliative consults  - analgesics prn  - no urologic intervention

## 2022-08-21 NOTE — CONSULTS
[facility] at [address] [facility] 100[address] [facility]  Rhonda Mix: [phone]  F: [phone]      Reason for Visit:   Jaspreet Martines is a 78 y.o. male who is seen for evaluation of prostate cancer. Hematology/Oncology Treatment History:   CT C/A/P 4/19/2021: Sclerosis and lysis in the left 2nd rib. Sclerosis and lysis with expansion of the anterior, right 6th rib with an associated pathologic fracture laterally. Metastatic disease in T10 with slight compression and extension into the anterior, superior endplate of L74. There is soft tissue attenuation in the fat surrounding the T10 vertebral body concerning for soft tissue extension. Lysis in the inferior endplate of O34 is concerning for metastatic disease. Adenopathy in the retroperitoneum, adjacent to the aorta and in the mesentery in the left lower quadrant. Prostatomegaly with some heterogeneity. Bone scan 4/19/2021: Osseous metastatic disease   Biopsy, ablation, and kyphoplasty of T10/11 4/20/2021: metastatic carcinoma, compatible with metastatic adenocarcinoma of prostatic primary  Stage IV Prostate Cancer  Firmagon on 4/27/2021 x 1 dose  Radiation for thoracic cord compression with Dr. Barbara Garduno completed 5/7/2021, x 5 fractions  Initiated Rosalita Cecelia with Prednisone on 5/10/2021  Initiated Lupron on 5/26/2021    History of Present Illness:   Jaspreet Martines is a pleasant 78 y.o. male who was admitted on 8/20/2022 for possible purulent drainage from suprapubic catheter. He is well known to me for management of his prostate cancer, outlined above. He was diagnosed in 4/2021 after presenting with thoracic cord compression, started on ADT and Abiraterone with an excellent response by PSA, dropping from 153 to 0.1. However, subsequent imaging has noted some progressive lung nodules, and he has recently been having issues with urinary obstruction and hematuria requiring a suprapubic catheter placement.   He had previously declined biopsy of the lung nodules, but more recently changed his mind and is scheduled for this on 9/9. After his recent hospital stay for hematuria, he said he never wanted to return to the hospital again and he enrolled in hospice. However, he quickly unenrolled when he learned that he could no longer continue treatment of his prostate cancer with hospice. He presented to the hospital on 8/20 complaining of discharge coming from his suprapubic catheter, concerning for infection. A CT was completed with question of abscess vs progression of his prostate cancer. He was started on IV abx. Urology was consulted and felt this more likely to be his cancer, recommended against drainage of the fluid collection. The patient reports feeling poorly. He has had a very difficult week. Weak, fatigued, dehydrated, poor PO intake. He is accompanied by family members during my visit.       Past Medical History:   Diagnosis Date    BPH (benign prostatic hyperplasia)     COVID-19 vaccine series completed     Diabetes ([facility] [address]     Cruz catheter in place 11/2022    High cholesterol     Hypertension     No blood products     Urinary retention        Past Surgical History:   Procedure Laterality Date    IR ASP BLADDER SUPRA CATH  4/22/2022    IR ASP BLADDER SUPRA CATH  6/22/2022    IR KYPHOPLASTY THORACIC  4/20/2021    IR OCCL TXCATH ORGAN W SI  7/27/2022       Social History     Socioeconomic History    Marital status:      Spouse name: Not on file    Number of children: Not on file    Years of education: Not on file    Highest education level: Not on file   Occupational History    Not on file   Tobacco Use    Smoking status: Former     Types: Cigarettes    Smokeless tobacco: Never   Vaping Use    Vaping Use: Unknown   Substance and Sexual Activity    Alcohol use: Never    Drug use: Never    Sexual activity: Not Currently   Other Topics Concern    Not on file   Social History Narrative    Not on file     Social Determinants of Health     Financial Resource Strain: Not on file   Food Insecurity: Not on file   Transportation Needs: Not on file   Physical Activity: Not on file   Stress: Not on file   Social Connections: Not on file   Intimate Partner Violence: Not on file   Housing Stability: Not on file       Family History   Problem Relation Age of Onset    Anesth Problems Neg Hx     Clotting Disorder Neg Hx        Current Facility-Administered Medications   Medication Dose Route Frequency    insulin glargine (LANTUS) injection 10 Units  10 Units SubCUTAneous QHS    atenoloL (TENORMIN) tablet 50 mg  50 mg Oral QHS    finasteride (PROSCAR) tablet 5 mg  5 mg Oral DAILY    sodium chloride (NS) flush 5-40 mL  5-40 mL IntraVENous Q8H    sodium chloride (NS) flush 5-40 mL  5-40 mL IntraVENous PRN    acetaminophen (TYLENOL) tablet 650 mg  650 mg Oral Q6H PRN    Or    acetaminophen (TYLENOL) suppository 650 mg  650 mg Rectal Q6H PRN    polyethylene glycol (MIRALAX) packet 17 g  17 g Oral DAILY PRN    ondansetron (ZOFRAN ODT) tablet 4 mg  4 mg Oral Q8H PRN    Or    ondansetron (ZOFRAN) injection 4 mg  4 mg IntraVENous Q6H PRN    enoxaparin (LOVENOX) injection 40 mg  40 mg SubCUTAneous DAILY    0.9% sodium chloride infusion  100 mL/hr IntraVENous CONTINUOUS    morphine injection 2 mg  2 mg IntraVENous Q4H PRN    insulin regular (NOVOLIN R, HUMULIN R) injection   SubCUTAneous AC&HS    glucose chewable tablet 16 g  4 Tablet Oral PRN    glucagon (GLUCAGEN) injection 1 mg  1 mg IntraMUSCular PRN    dextrose 10% infusion 0-250 mL  0-250 mL IntraVENous PRN    piperacillin-tazobactam (ZOSYN) 3.375 g in 0.9% sodium chloride (MBP/ADV) 100 mL MBP  3.375 g IntraVENous Q8H       Allergies   Allergen Reactions    Cefepime Angioedema    Erythromycin Other (comments)     Insomnia- felt like speed          Review of Systems: A complete review of systems was obtained, reviewed. Pertinent findings reviewed above.     Physical Exam:   Visit Vitals  BP 137/64 (BP 1 Location: Left upper arm, BP Patient Position: At rest)   Pulse 91   Temp 99.6 °F (37.6 °C)   Resp 18   Ht 5' 8\" (1.727 m)   Wt 194 lb (88 kg)   SpO2 97%   BMI 29.50 kg/m²     ECOG PS: 4  General: no distress, frail and aill appearing  Eyes: PERRLA, anicteric sclerae  HENT: atraumatic, oropharynx clear  Neck: supple  Lymphatic: no cervical, supraclavicular, or inguinal adenopathy  Respiratory: CTAB, normal respiratory effort  CV: normal rate, regular rhythm, no murmurs, no peripheral edema  GI: soft, nontender, nondistended, no masses, no hepatomegaly, no splenomegaly  MS: digits without clubbing or cyanosis. Skin: no rashes, no ecchymoses, no petechia. normal temperature, turgor, and texture. Psych: alert, oriented, appropriate affect, normal judgment/insight    Results:     Lab Results   Component Value Date/Time    WBC 16.7 (H) 08/20/2022 11:25 PM    HGB 7.6 (L) 08/20/2022 11:25 PM    HCT 25.1 (L) 08/20/2022 11:25 PM    PLATELET 406 (H) 14/18/1798 11:25 PM    MCV 77.0 (L) 08/20/2022 11:25 PM    ABS. NEUTROPHILS 15.2 (H) 08/20/2022 03:19 PM     Lab Results   Component Value Date/Time    Sodium 140 08/20/2022 11:25 PM    Potassium 4.1 08/20/2022 11:25 PM    Chloride 112 (H) 08/20/2022 11:25 PM    CO2 20 (L) 08/20/2022 11:25 PM    Glucose 84 08/20/2022 11:25 PM    BUN 21 (H) 08/20/2022 11:25 PM    Creatinine 1.19 08/20/2022 11:25 PM    GFR est AA >60 08/20/2022 11:25 PM    GFR est non-AA >60 08/20/2022 11:25 PM    Calcium 9.0 08/20/2022 11:25 PM    Glucose (POC) 90 08/21/2022 11:57 AM     Lab Results   Component Value Date/Time    Bilirubin, total 0.4 08/20/2022 03:19 PM    ALT (SGPT) 18 08/20/2022 03:19 PM    Alk.  phosphatase 268 (H) 08/20/2022 03:19 PM    Protein, total 8.9 (H) 08/20/2022 03:19 PM    Albumin 1.9 (L) 08/20/2022 03:19 PM    Globulin 7.0 (H) 08/20/2022 03:19 PM     Lab Results   Component Value Date/Time    Reticulocyte count 0.7 05/06/2022 11:29 AM    Iron % saturation 18 (L) 08/04/2022 11:05 AM    TIBC 160 (L) 08/04/2022 11:05 AM    Ferritin 700 (H) 08/04/2022 11:05 AM    Vitamin B12 1,356 (H) 04/20/2021 05:45 AM    Folate 16.8 04/20/2021 05:45 AM    Haptoglobin 499 (H) 05/06/2022 11:29 AM     05/06/2022 11:29 AM       Lab Results   Component Value Date/Time    INR 1.1 07/15/2022 01:39 AM       Prostate Specific Ag (ng/mL)   Date Value   08/04/2022 0.1   05/06/2022 0.1   03/16/2022 0.2   02/11/2022 0.2   11/19/2021 0.5   10/08/2021 0.5   08/25/2021 0.6   07/21/2021 0.9   06/23/2021 1.6   05/26/2021 5.3 (H)   04/27/2021 104.0 (H)   04/17/2021 153.0 (H)       Date                 PSA  08/04/2022      0.1  05/06/2022      0.1  03/16/2022      0.2  02/11/2022      0.2  11/19/2021      0.5  10/08/2021      0.5  08/25/2021      0.6  07/21/2021      0.9  06/23/2021      1.6  05/26/2021      5.3       Lupron started  05/10/2021                  Initiated Tammy Rodriguez and Prednisone  04/27/2021      104      [place] given  04/17/2021      153      CT A/P 8/20/2022:  1. CT appearance compatible with progressive rapid progression of prostatic neoplasm into the base of the penis and/or secondary extensive infection and abscess formation. 2. Progressive pulmonary metastases. 3. Complex exophytic cystic neoplasm of the inferior right kidney (Bosniak 2F/3). Records from ED reviewed and summarized above. Test results above have been reviewed. Assessment/Recommendations:   1) Metastatic prostate cancer  Currently on Lupron and Abiraterone/Prednisone with an excellent response by PSA, dropping from 153 to 0.1. However, he now has possible local progression as well as increasing pulmonary nodules, concerning for either progression of his prostate cancer with discordant PSA, or a second primary cancer. We discussed options, reviewing goals of care.   Should he desire aggressive management, then my recommendation would be to get his CT Chest and lung biopsy completed ASAP while here in the hospital.  Depending on the pathology, we can consider treatment with chemotherapy as an outpatient. We can also discuss his case with radiation oncology to determine if radiation to his prostate mass may be beneficial.    Alternatively, we can shift focus towards supportive care and symptom management, and consider re-enrollment with hospice. He is clear that he does not want chemotherapy or other additional cancer-directed therapy. He would like to re-enroll with home hospice services. His family at bedside are agreeable. Consult place and discussed with . I will d/c the palliative care consult. 2) Lung lesions  Progressing on serial chest imaging. Not a typical location for prostate cancer metastases. He declines further workup, as discussed above. 3) Abnormal imaging of prostate  Urology consulted, believes this is more consistent with progressive prostate cancer rather than abscess. Unusual to see prostate cancer progressing while PSA is falling, but we can rarely see discordant disease. I previously discussed with Dr. Destini Doyle, patient's urologist, and he did not think cytoscopy for biopsy would be helpful. Shifting focus towards comfort measures, as discussed above. 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 Worsening now in the setting of hematuria and anemia of chronic disease.   Recent ferritin level of 700, iron replacement not likely to be beneficial.        Signed By: Milind Gonzalez MD

## 2022-08-21 NOTE — PROGRESS NOTES
Hospitalist Progress Note                               Fernie Boss MD                                     Answering service: [phone]                               OR 36 from in house phone                                         Date of Service:  2022  NAME:  Anabel Grullon  :  1955  MRN:  [id number]      Admission Summary:   Mr. Toña Gamez is a 78 y.o.  male with history significant for diabetes mellitus, hypertension, high cholesterol, enlarged prostate with metastatic prostate cancer mets to the lungs status post chemoradiation, status post suprapubic catheter placement in 2022 who presents with discharge coming out of the suprapubic catheter with concern for worsening infection. History from the patient is limited, niece and friend at the bedside who helps with obtaining history. In the emergency department CT of the abdomen and pelvis shows compatible with progressive rapid progression of carotid prostatic neoplasm into the base of the penis and/or secondary extensive infection and abscess formation. And progressive pulmonary metastases. Reason for follow up:     Metastatic prostate Ca     Assessment & Plan:     Metastatic prostate cancer with local advancement involving perineum and base of Penis. Urology on case. Urology thinks it is more of locally advanced tumor rather than abscess. Started empirically on Antibiotics. Incision and Drainage  not recommended. Urology will decide if IR aspiration is needed . Wife tells me he is DNR. At one time was evaluated by hospice but was un enrolled from hospice. I have consulted Oncology as well as there was a plan to biopsy his lung lesion. If needed can be done while he is here. His cancer is not curable and management is with palliative intent. Ct done showed  1.  CT appearance compatible with progressive rapid progression of carotid   prostatic neoplasm into the base of the penis and/or secondary extensive   infection and abscess formation. 2. Progressive pulmonary metastases. 2. Complex exophytic cystic neoplasm of the inferior right kidney (Bosniak   2F/3). Osseous metastases, Thoracic cord compression. Ct head was un remarkable. S/p ablation and kyphoplasty of T10/11 on 4/20/2021. Completed radiation with Dr. Iram Lara. 3. Pain control : adequate can use Tramadol. 4.Anemia with Hemoglobin G-[place]  Hemoglobin fractionation is consistent with HGB G-[place], likely the cause of his microcytic anemia. May benefit from IV iron. 5. DM: Not compliant with home BID . Sugars ok Start on lower dose of Lantus and monitor BS. 6.Had TURP on 4/11/2022, underwent suprapubic catheter placement on 4/26/2022              Diet:Regular  Code status: DNR  DVT prophylaxis: Lovenox  Care Plan discussed with: wife and patient  Patient has given Verbal permission to discuss medical care with   persons present in the room and and also with contact as listed on face sheet. Discharge planning/disposition:TBD    Hospital Problems  Date Reviewed: 8/4/2022            Codes Class Noted POA    Intra-abdominal abscess [facility]) ICD-10-CM: K65.1  ICD-9-CM: 567.22  8/20/2022 Unknown             Review of Systems:   Pertinent items are noted in HPI. Physical Examination:      Last 24hrs VS reviewed since prior progress note. Most recent are:  Visit Vitals  /85 (BP 1 Location: Left upper arm, BP Patient Position: At rest)   Pulse 74   Temp 98.4 °F (36.9 °C)   Resp 16   Ht 5' 8\" (1.727 m)   Wt 88 kg (194 lb)   SpO2 98%   BMI 29.50 kg/m²           Constitutional:  No acute distress, cooperative, pleasant    HEENT: Head is a traumatic,  Un icteric sclera. Pink conjunctiva,no erythema or discharge. Oral mucous moist, oropharynx benign. Neck supple,    Resp:  CTA bilaterally. No wheezing/rhonchi/rales.  No accessory muscle use   CV:  Regular rhythm, normal rate, no murmurs, gallops, rubs GI:  Soft, non distended, non tender. normoactive bowel sounds, no hepatosplenomegaly    :  No CVA or suprapubic tenderness   Skin  :  No erythema,rash,bullae,dipigmentation     Musculoskeletal:  No edema, warm, 2+ pulses throughout    Neurologic:  AAOx3, CN II-XII reviewed. Moves all extremities. Skin:  Good turgor, no rashes or ulcers       Intake/Output Summary (Last 24 hours) at 8/21/2022 0846  Last data filed at 8/21/2022 0834  Gross per 24 hour   Intake 80 ml   Output 950 ml   Net -870 ml          Data Review:    Review and/or order of clinical lab test      Labs:     Recent Labs     08/20/22 2325 08/20/22  1519   WBC 16.7* 17.4*   HGB 7.6* 8.7*   HCT 25.1* 28.4*   * 573*     Recent Labs     08/20/22 2325 08/20/22  1519    138   K 4.1 4.4   * 108   CO2 20* 23   BUN 21* 23*   CREA 1.19 1.46*   GLU 84 152*   CA 9.0 9.7     Recent Labs     08/20/22  1519   ALT 18   *   TBILI 0.4   TP 8.9*   ALB 1.9*   GLOB 7.0*     No results for input(s): INR, PTP, APTT, INREXT in the last 72 hours. No results for input(s): FE, TIBC, PSAT, FERR in the last 72 hours. Lab Results   Component Value Date/Time    Folate 16.8 04/20/2021 05:45 AM      No results for input(s): PH, PCO2, PO2 in the last 72 hours. No results for input(s): CPK, CKNDX, TROIQ in the last 72 hours.     No lab exists for component: CPKMB  No results found for: CHOL, CHOLX, CHLST, CHOLV, HDL, HDLP, LDL, LDLC, DLDLP, TGLX, TRIGL, TRIGP, CHHD, [facility]  Lab Results   Component Value Date/Time    Glucose (POC) 101 08/20/2022 10:11 PM    Glucose (POC) 129 (H) 08/20/2022 04:03 PM    Glucose (POC) 228 (H) 07/29/2022 03:19 PM    Glucose (POC) 248 (H) 07/29/2022 11:06 AM    Glucose (POC) 142 (H) 07/29/2022 07:40 AM     Lab Results   Component Value Date/Time    Color ORANGE 08/20/2022 04:56 PM    Appearance TURBID (A) 08/20/2022 04:56 PM    Specific gravity 1.024 08/20/2022 04:56 PM    Specific gravity 1.020 07/27/2022 06:55 AM    pH (UA) 8.0 08/20/2022 04:56 PM    Protein >300 (A) 08/20/2022 04:56 PM    Glucose Negative 08/20/2022 04:56 PM    Ketone Negative 08/20/2022 04:56 PM    Bilirubin Negative 07/27/2022 06:55 AM    Urobilinogen 1.0 08/20/2022 04:56 PM    Nitrites Positive (A) 08/20/2022 04:56 PM    Leukocyte Esterase LARGE (A) 08/20/2022 04:56 PM    Epithelial cells MANY (A) 08/20/2022 04:56 PM    Bacteria 4+ (A) 08/20/2022 04:56 PM    WBC >100 (H) 08/20/2022 04:56 PM    RBC >100 (H) 08/20/2022 04:56 PM         Medications Reviewed:     Current Facility-Administered Medications   Medication Dose Route Frequency    insulin glargine (LANTUS) injection 10 Units  10 Units SubCUTAneous QHS    atenoloL (TENORMIN) tablet 50 mg  50 mg Oral QHS    finasteride (PROSCAR) tablet 5 mg  5 mg Oral DAILY    sodium chloride (NS) flush 5-40 mL  5-40 mL IntraVENous Q8H    sodium chloride (NS) flush 5-40 mL  5-40 mL IntraVENous PRN    acetaminophen (TYLENOL) tablet 650 mg  650 mg Oral Q6H PRN    Or    acetaminophen (TYLENOL) suppository 650 mg  650 mg Rectal Q6H PRN    polyethylene glycol (MIRALAX) packet 17 g  17 g Oral DAILY PRN    ondansetron (ZOFRAN ODT) tablet 4 mg  4 mg Oral Q8H PRN    Or    ondansetron (ZOFRAN) injection 4 mg  4 mg IntraVENous Q6H PRN    enoxaparin (LOVENOX) injection 40 mg  40 mg SubCUTAneous DAILY    0.9% sodium chloride infusion  100 mL/hr IntraVENous CONTINUOUS    morphine injection 2 mg  2 mg IntraVENous Q4H PRN    insulin regular (NOVOLIN R, HUMULIN R) injection   SubCUTAneous AC&HS    glucose chewable tablet 16 g  4 Tablet Oral PRN    glucagon (GLUCAGEN) injection 1 mg  1 mg IntraMUSCular PRN    dextrose 10% infusion 0-250 mL  0-250 mL IntraVENous PRN    piperacillin-tazobactam (ZOSYN) 3.375 g in 0.9% sodium chloride (MBP/ADV) 100 mL MBP  3.375 g IntraVENous Q8H     ______________________________________________________________________  EXPECTED LENGTH OF STAY: - - -  ACTUAL LENGTH OF STAY:          1                 Munira Bipin Landa MD

## 2022-08-21 NOTE — PROGRESS NOTES
Problem: Falls - Risk of  Goal: *Absence of Falls  Description: Document Maddie Deyanira Fall Risk and appropriate interventions in the flowsheet.   Outcome: Progressing Towards Goal  Note: Fall Risk Interventions:                                Problem: Patient Education: Go to Patient Education Activity  Goal: Patient/Family Education  Outcome: Progressing Towards Goal

## 2022-08-22 NOTE — CONSULTS
[facility] [place] [address] [address] [facility]  Pérez Karla: [phone]  F: [phone]      Reason for Visit:   Marline Nguyen is a 78 y.o. male who is seen for evaluation of prostate cancer. Hematology/Oncology Treatment History:   CT C/A/P 4/19/2021: Sclerosis and lysis in the left 2nd rib. Sclerosis and lysis with expansion of the anterior, right 6th rib with an associated pathologic fracture laterally. Metastatic disease in T10 with slight compression and extension into the anterior, superior endplate of Q96. There is soft tissue attenuation in the fat surrounding the T10 vertebral body concerning for soft tissue extension. Lysis in the inferior endplate of L92 is concerning for metastatic disease. Adenopathy in the retroperitoneum, adjacent to the aorta and in the mesentery in the left lower quadrant. Prostatomegaly with some heterogeneity. Bone scan 4/19/2021: Osseous metastatic disease   Biopsy, ablation, and kyphoplasty of T10/11 4/20/2021: metastatic carcinoma, compatible with metastatic adenocarcinoma of prostatic primary  Stage IV Prostate Cancer  Firmagon on 4/27/2021 x 1 dose  Radiation for thoracic cord compression with Dr. Neptali Alan completed 5/7/2021, x 5 fractions  Initiated Floretta Raker with Prednisone on 5/10/2021  Initiated Lupron on 5/26/2021        Interval History:   He slept well. Pain controlled. No new complaints. He tells me he is having a Martini party in his room around 4pm.        PAST HISTORY: The following sections were reviewed and updated in the EMR as appropriate: PMH, SH, FH, Medications, Allergies. Allergies   Allergen Reactions    Cefepime Angioedema    Erythromycin Other (comments)     Insomnia- felt like speed      Review of Systems: A complete review of systems was obtained, reviewed. Pertinent findings reviewed above. Physical Exam:   Visit Vitals  BP (!) 144/77 (BP 1 Location: Left upper arm, BP Patient Position:  At rest)   Pulse 72   Temp 98.3 °F (36.8 °C)   Resp 18   Ht 5' 8\" (1.727 m)   Wt 194 lb (88 kg)   SpO2 97%   BMI 29.50 kg/m²     ECOG PS: 4  General: no distress, frail and ill appearing  Respiratory: normal respiratory effort  CV: bilateral LE edema  Skin: no rashes; no ecchymoses; no petechiae  Psych: alert, oriented, normal mood/affect      Results:     Lab Results   Component Value Date/Time    WBC 16.2 (H) 08/22/2022 03:54 AM    HGB 7.3 (L) 08/22/2022 03:54 AM    HCT 23.5 (L) 08/22/2022 03:54 AM    PLATELET 591 (H) 69/59/6123 03:54 AM    MCV 73.4 (L) 08/22/2022 03:54 AM    ABS. NEUTROPHILS 15.2 (H) 08/20/2022 03:19 PM     Lab Results   Component Value Date/Time    Sodium 143 08/22/2022 03:54 AM    Potassium 3.0 (L) 08/22/2022 03:54 AM    Chloride 113 (H) 08/22/2022 03:54 AM    CO2 21 08/22/2022 03:54 AM    Glucose 95 08/22/2022 03:54 AM    BUN 14 08/22/2022 03:54 AM    Creatinine 0.99 08/22/2022 03:54 AM    GFR est AA >60 08/22/2022 03:54 AM    GFR est non-AA >60 08/22/2022 03:54 AM    Calcium 8.5 08/22/2022 03:54 AM    Glucose (POC) 114 08/21/2022 10:24 PM     Lab Results   Component Value Date/Time    Bilirubin, total 0.6 08/22/2022 03:54 AM    ALT (SGPT) 12 08/22/2022 03:54 AM    Alk.  phosphatase 211 (H) 08/22/2022 03:54 AM    Protein, total 7.2 08/22/2022 03:54 AM    Albumin 1.5 (L) 08/22/2022 03:54 AM    Globulin 5.7 (H) 08/22/2022 03:54 AM     Lab Results   Component Value Date/Time    Reticulocyte count 0.7 05/06/2022 11:29 AM    Iron % saturation 18 (L) 08/04/2022 11:05 AM    TIBC 160 (L) 08/04/2022 11:05 AM    Ferritin 700 (H) 08/04/2022 11:05 AM    Vitamin B12 1,356 (H) 04/20/2021 05:45 AM    Folate 16.8 04/20/2021 05:45 AM    Haptoglobin 499 (H) 05/06/2022 11:29 AM     05/06/2022 11:29 AM       Lab Results   Component Value Date/Time    INR 1.1 07/15/2022 01:39 AM       Prostate Specific Ag (ng/mL)   Date Value   08/04/2022 0.1   05/06/2022 0.1   03/16/2022 0.2   02/11/2022 0.2   11/19/2021 0.5 10/08/2021 0.5   08/25/2021 0.6   07/21/2021 0.9   06/23/2021 1.6   05/26/2021 5.3 (H)   04/27/2021 104.0 (H)   04/17/2021 153.0 (H)       Date                 PSA  08/04/2022      0.1  05/06/2022      0.1  03/16/2022      0.2  02/11/2022      0.2  11/19/2021      0.5  10/08/2021      0.5  08/25/2021      0.6  07/21/2021      0.9  06/23/2021      1.6  05/26/2021      5.3       Lupron started  05/10/2021                  Initiated Destiny Ly and Prednisone  04/27/2021      104      Lova Jeremie given  04/17/2021      153      CT A/P 8/20/2022:  1. CT appearance compatible with progressive rapid progression of prostatic neoplasm into the base of the penis and/or secondary extensive infection and abscess formation. 2. Progressive pulmonary metastases. 3. Complex exophytic cystic neoplasm of the inferior right kidney (Bosniak 2F/3). Records from ED reviewed and summarized above. Test results above have been reviewed. Assessment/Recommendations:   1) Metastatic prostate cancer  Currently on Lupron and Abiraterone/Prednisone with an excellent response by PSA, dropping from 153 to 0.1. However, he now has possible local progression as well as increasing pulmonary nodules, concerning for either progression of his prostate cancer with discordant PSA, or a second primary cancer. Goals of care discussion held on 8/21/2022, see documentation. Patient and family are interested in hospice. Discussed with  yesterday and she has contacted [facility] to re-enroll him. I can sign hospice admission paperwork and orders. 2) Lung lesions  Progressing on serial chest imaging. Not a typical location for prostate cancer metastases. He declines further workup, as discussed above. 3) Abnormal imaging of prostate  Urology consulted, believes this is more consistent with progressive prostate cancer rather than abscess.   Unusual to see prostate cancer progressing while PSA is falling, but we can rarely see discordant disease. Shifting focus towards comfort measures, as discussed above. 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 Worsening now in the setting of hematuria and anemia of chronic disease.   Recent ferritin level of 700, iron replacement not likely to be beneficial.        Signed By: Shan Prabhakar MD

## 2022-08-22 NOTE — PROGRESS NOTES
@4240 PT. Blood sugar was 70mg/dl. RN gave orange juice to the patient and rechecked @6788 and it was 114mg/dl.

## 2022-08-22 NOTE — PROGRESS NOTES
/  /  Urology Progress Note    Patient: Mar Barnett MRN: [id number]  SSN: [id number]    YOB: 1955  Age: 78 y.o. Sex: male        Assessment:     Resting in bed, eating breakfast. Flat affect with short responses, does not appear interested in conversation. Note reviewed from Dr. Frank Cleaning yesterday, pt electing re-enrollment with hospice. Discussed with pt who states plan for hospice is accurate and does not wish for further interventions aside from pain control at this time. Discussed this with wife as well who states pt has become easily angered at home, declining interventions from home health, and does not wish to pursue further interventions. Plan:     Pelvic pain, metastatic CaP   -Pelvic pain secondary to locally advanced tumor. VU surgeons have reviewed imaging, do not recommend draining as it would likely worsen sx. Findings were consistent with necrotic tumor with no palpable or easily drainable abscess on exam.   -Oncology has evaluated. Plan to re-enroll in hospice services. Pt does not wish to move forward with chemotherapy or other additional cancer-directed therapy. Discussed palliative radiation to pelvis with pt and wife. Pt disinterested, wife states he had an appt to discuss but chose not to go and does not want to further explore this option.   -Cont q 30 day SPT exchanges with hospice, next due 9/6/22. No urologic interventions planned. Goal of comfort measures at this time. Awaiting hospice evaluation, CM. Please call if urology can be of any assistance.     Reason For Visit:     Follow up for pelvic pain, metastatic CaP     ROS:     Denies fevers  Denies abdominal pain  Reports penile pain     Objective:     Visit Vitals  BP (!) 144/77 (BP 1 Location: Left upper arm, BP Patient Position: At rest)   Pulse 72   Temp 98.3 °F (36.8 °C)   Resp 18   Ht 5' 8\" (1.727 m)   Wt 88 kg (194 lb)   SpO2 97%   BMI 29.50 kg/m² Intake/Output Summary (Last 24 hours) at 8/22/2022 0810  Last data filed at 8/22/2022 0210  Gross per 24 hour   Intake 440 ml   Output 2000 ml   Net -1560 ml       Physical Exam  General: NAD  Respiratory: no distress, room air  Abdomen: soft, no distention  : SPT draining toni urine with moderate sediment in tubing   Neuro: Appropriate, no focal neurological deficits  Mood: flat affect, appears disinterested in discussing his care     Labs reviewed, August 22, 2022  Recent Results (from the past 24 hour(s))   GLUCOSE, POC    Collection Time: 08/21/22  9:40 AM   Result Value Ref Range    Glucose (POC) 86 65 - 117 mg/dL    Performed by Teresa Kim ( PCT)    GLUCOSE, POC    Collection Time: 08/21/22 11:57 AM   Result Value Ref Range    Glucose (POC) 90 65 - 117 mg/dL    Performed by Teresa Kim ( PCT)    GLUCOSE, POC    Collection Time: 08/21/22  4:16 PM   Result Value Ref Range    Glucose (POC) 212 (H) 65 - 117 mg/dL    Performed by Teresa Kim ( PCT)    GLUCOSE, POC    Collection Time: 08/21/22  9:33 PM   Result Value Ref Range    Glucose (POC) 70 65 - 117 mg/dL    Performed by VICTOR HUGONICO Hunt    GLUCOSE, POC    Collection Time: 08/21/22 10:24 PM   Result Value Ref Range    Glucose (POC) 114 65 - 117 mg/dL    Performed by VICTOR HUGO Marilee    CBC W/O DIFF    Collection Time: 08/22/22  3:54 AM   Result Value Ref Range    WBC 16.2 (H) 4.1 - 11.1 K/uL    RBC 3.20 (L) 4.10 - 5.70 M/uL    HGB 7.3 (L) 12.1 - 17.0 g/dL    HCT 23.5 (L) 36.6 - 50.3 %    MCV 73.4 (L) 80.0 - 99.0 FL    MCH 22.8 (L) 26.0 - 34.0 PG    MCHC 31.1 30.0 - 36.5 g/dL    RDW 17.5 (H) 11.5 - 14.5 %    PLATELET 234 (H) 753 - 400 K/uL    MPV 10.3 8.9 - 12.9 FL    NRBC 0.0 0  WBC    ABSOLUTE NRBC 0.00 0.00 - 5.19 K/uL   METABOLIC PANEL, COMPREHENSIVE    Collection Time: 08/22/22  3:54 AM   Result Value Ref Range    Sodium 143 136 - 145 mmol/L    Potassium 3.0 (L) 3.5 - 5.1 mmol/L    Chloride 113 (H) 97 - 108 mmol/L    CO2 21 21 - 32 mmol/L Anion gap 9 5 - 15 mmol/L    Glucose 95 65 - 100 mg/dL    BUN 14 6 - 20 MG/DL    Creatinine 0.99 0.70 - 1.30 MG/DL    BUN/Creatinine ratio 14 12 - 20      GFR est AA >60 >60 ml/min/1.73m2    GFR est non-AA >60 >60 ml/min/1.73m2    Calcium 8.5 8.5 - 10.1 MG/DL    Bilirubin, total 0.6 0.2 - 1.0 MG/DL    ALT (SGPT) 12 12 - 78 U/L    AST (SGOT) 33 15 - 37 U/L    Alk. phosphatase 211 (H) 45 - 117 U/L    Protein, total 7.2 6.4 - 8.2 g/dL    Albumin 1.5 (L) 3.5 - 5.0 g/dL    Globulin 5.7 (H) 2.0 - 4.0 g/dL    A-G Ratio 0.3 (L) 1.1 - 2.2         Imaging:  CT Results  (Last 48 hours)                 08/20/22 1639  CT ABD PELV W CONT Final result    Impression:      1. CT appearance compatible with progressive rapid progression of carotid   prostatic neoplasm into the base of the penis and/or secondary extensive   infection and abscess formation. 2. Progressive pulmonary metastases. 2. Complex exophytic cystic neoplasm of the inferior right kidney (Bosniak   2F/3). Narrative:  EXAM: CT ABD PELV W CONT       INDICATION: Nodularity and pain in the perineal area. Patient with a history of   prostate cancer. Also reported drainage from suprapubic catheter site. Concern   for possible underlying abscess in that area       COMPARISON: CT 7/14/2022. CONTRAST: 100 mL of Isovue-370. TECHNIQUE:    Following the uneventful intravenous administration of contrast, thin axial   images were obtained through the abdomen and pelvis. Coronal and sagittal   reconstructions were generated. Oral contrast was not administered. CT dose   reduction was achieved through use of a standardized protocol tailored for this   examination and automatic exposure control for dose modulation.        FINDINGS:    LOWER THORAX: Interval increase in size of pulmonary masses with lingular mass   measuring 1.7 x 2.1 cm, previously 1.1 x 1.5 cm, right middle lobe mass   measuring 8 x 9 mm, previously 5 x 6 mm, and subpleural left lower lobe masses   measuring 6 x 10 mm and 9 x 16 mm, previously 5 x 7 mm and 11 x 12 mm. LIVER: No mass. BILIARY TREE: Gallbladder is unremarkable. CBD is not dilated. SPLEEN: Unremarkable. PANCREAS: No mass or ductal dilatation. ADRENALS: Unremarkable. KIDNEYS: Bilateral renal cysts for which no follow-up is required. At the   inferior pole of the right kidney there is an exophytic 13 mm lesion with no   thickening and internal septation mural With no calculus, hydronephrosis, or   enhancing mass, calculus, or hydronephrosis. STOMACH: Unremarkable. SMALL BOWEL: No dilatation or wall thickening. COLON: No dilatation or wall thickening. APPENDIX: Unremarkable. PERITONEUM: No ascites or pneumoperitoneum. RETROPERITONEUM: No lymphadenopathy or aortic aneurysm. REPRODUCTIVE ORGANS: Prostate is essentially replaced by heterogeneously   enhancing mass increased in size from prior measuring 8.8 x 9.5 cm, not   significant change from prior, however there is extensive heterogeneous   irregular enhancing tissue with central necrosis extending into the base of the   penis measuring 6.4 x 11.7 cm. URINARY BLADDER: Collapsed around suprapubic catheter balloon. BONES: Augmentation cement compression fracture deformity is redemonstrated at   T10 and T11. Degenerative spine change. No acute fracture or aggressive lesion. ABDOMINAL WALL: No mass or hernia. ADDITIONAL COMMENTS: N/A           08/20/22 0432  CT HEAD WO CONT Final result    Impression:          No acute abnormality       Narrative:  EXAM: CT HEAD WO CONT       INDICATION: altered mental status       COMPARISON: 4/16/2021. CONTRAST: None. TECHNIQUE: Unenhanced CT of the head was performed using 5 mm images. Brain and   bone windows were generated. Coronal and sagittal reformats.  CT dose reduction   was achieved through use of a standardized protocol tailored for this   examination and automatic exposure control for dose modulation. FINDINGS:   The ventricles and sulci are normal in size, shape and configuration. . There is   no significant white matter disease. There is no intracranial hemorrhage,   extra-axial collection, or mass effect. The basilar cisterns are open. No CT   evidence of acute infarct. The bone windows demonstrate no abnormalities. The visualized portions of the   paranasal sinuses and mastoid air cells are clear.                    Signed By: Jian Toscano NP - August 22, 2022

## 2022-08-22 NOTE — PROGRESS NOTES
Problem: Falls - Risk of  Goal: *Absence of Falls  Description: Document Macariomon Zach Fall Risk and appropriate interventions in the flowsheet.   Outcome: Progressing Towards Goal  Note: Fall Risk Interventions:            Medication Interventions: Patient to call before getting OOB, Teach patient to arise slowly    Elimination Interventions: Call light in reach, Toileting schedule/hourly rounds              Problem: Patient Education: Go to Patient Education Activity  Goal: Patient/Family Education  Outcome: Progressing Towards Goal

## 2022-08-22 NOTE — PROGRESS NOTES
[facility]  [address], [facility] [address]  [phone]    Medical Progress Note      NAME: Steve Smallwood   :  1955  MRM:  [id number]    Date/Time of service 2022  12:54 PM          Assessment and Plan:     Metastatic prostate cancer - POA. Involves perineum and base of penis, osseous metastases, pulmonary metastases, exophytic cystic neoplasm or R kidney. Urology and Oncology consulted. Had TURP on 2022, underwent suprapubic catheter placement on 2022. S/p ablation and kyphoplasty of T10/11 on 2021. Completed radiation with Dr. Royanne Klinefelter. No plan for further evaluation or treatment. DC home with hospice today. Continue proscar    Anemia / HbG - No further testing or treatment    Diabetes type 2 without complications - No testing or treatment needed on hospice. Stop lantus, metformin    Hypertension - BP and pulse fine. Stop atenolol    Intraabdominal abscess - Dx on admit is reject by Urology after review. Stop zosyn       Subjective:     Chief Complaint:  weak, pain    ROS:  (bold if positive, if negative)    Tolerating some PT  Tolerating some Diet        Objective:     Last 24hrs VS reviewed since prior progress note.  Most recent are:    Visit Vitals  BP (!) 144/77 (BP 1 Location: Left upper arm, BP Patient Position: At rest)   Pulse 72   Temp 98.3 °F (36.8 °C)   Resp 18   Ht 5' 8\" (1.727 m)   Wt 88 kg (194 lb)   SpO2 97%   BMI 29.50 kg/m²     SpO2 Readings from Last 6 Encounters:   22 97%   22 97%   22 97%   22 98%   22 100%   22 100%          Intake/Output Summary (Last 24 hours) at 2022 1254  Last data filed at 2022 0210  Gross per 24 hour   Intake 80 ml   Output 750 ml   Net -670 ml        Physical Exam:    Gen:  Frail, in no acute distress  HEENT:  Pink conjunctivae, PERRL, hearing intact to voice, moist mucous membranes  Neck:  Supple, without masses, thyroid non-tender  Resp:  No accessory muscle use, clear breath sounds without wheezes rales or rhonchi  Card:  No murmurs, normal S1, S2 without thrills, bruits or peripheral edema  Abd:  Soft, non-tender, non-distended, normoactive bowel sounds are present, no mass  Lymph:  No cervical or inguinal adenopathy  Musc:  No cyanosis or clubbing  Skin:  No rashes or ulcers, skin turgor is good  Neuro:  Cranial nerves are grossly intact, general motor weakness, follows commands   Psych:   Moderate insight, oriented to person, place and time, alert    Telemetry reviewed:   normal sinus rhythm  __________________________________________________________________  Medications Reviewed: (see below)  Medications:     Current Facility-Administered Medications   Medication Dose Route Frequency    insulin glargine (LANTUS) injection 10 Units  10 Units SubCUTAneous QHS    atenoloL (TENORMIN) tablet 50 mg  50 mg Oral QHS    finasteride (PROSCAR) tablet 5 mg  5 mg Oral DAILY    sodium chloride (NS) flush 5-40 mL  5-40 mL IntraVENous Q8H    sodium chloride (NS) flush 5-40 mL  5-40 mL IntraVENous PRN    acetaminophen (TYLENOL) tablet 650 mg  650 mg Oral Q6H PRN    Or    acetaminophen (TYLENOL) suppository 650 mg  650 mg Rectal Q6H PRN    polyethylene glycol (MIRALAX) packet 17 g  17 g Oral DAILY PRN    ondansetron (ZOFRAN ODT) tablet 4 mg  4 mg Oral Q8H PRN    Or    ondansetron (ZOFRAN) injection 4 mg  4 mg IntraVENous Q6H PRN    enoxaparin (LOVENOX) injection 40 mg  40 mg SubCUTAneous DAILY    0.9% sodium chloride infusion  100 mL/hr IntraVENous CONTINUOUS    morphine injection 2 mg  2 mg IntraVENous Q4H PRN    insulin regular (NOVOLIN R, HUMULIN R) injection   SubCUTAneous AC&HS    glucose chewable tablet 16 g  4 Tablet Oral PRN    glucagon (GLUCAGEN) injection 1 mg  1 mg IntraMUSCular PRN    dextrose 10% infusion 0-250 mL  0-250 mL IntraVENous PRN    piperacillin-tazobactam (ZOSYN) 3.375 g in 0.9% sodium chloride (MBP/ADV) 100 mL MBP  3.375 g IntraVENous Q8H        Lab Data Reviewed: (see below)  Lab Review:     Recent Labs     08/22/22  0354 08/20/22  2325 08/20/22  1519   WBC 16.2* 16.7* 17.4*   HGB 7.3* 7.6* 8.7*   HCT 23.5* 25.1* 28.4*   * 469* 573*     Recent Labs     08/22/22  0354 08/20/22  2325 08/20/22  1519    140 138   K 3.0* 4.1 4.4   * 112* 108   CO2 21 20* 23   GLU 95 84 152*   BUN 14 21* 23*   CREA 0.99 1.19 1.46*   CA 8.5 9.0 9.7   ALB 1.5*  --  1.9*   TBILI 0.6  --  0.4   ALT 12  --  18     Lab Results   Component Value Date/Time    Glucose (POC) 114 08/21/2022 10:24 PM    Glucose (POC) 70 08/21/2022 09:33 PM    Glucose (POC) 212 (H) 08/21/2022 04:16 PM    Glucose (POC) 90 08/21/2022 11:57 AM    Glucose (POC) 86 08/21/2022 09:40 AM     No results for input(s): PH, PCO2, PO2, HCO3, FIO2 in the last 72 hours. No results for input(s): INR, INREXT in the last 72 hours. All Micro Results       Procedure Component Value Units Date/Time    MICRO TRACKING [[id number]] Collected: 08/20/22 1519    Order Status: No result Updated: 08/22/22 1140    CULTURE, BLOOD [[id number]] Collected: 08/20/22 1519    Order Status: Completed Specimen: Blood Updated: 08/22/22 1138     Special Requests: NO SPECIAL REQUESTS        Culture result:       ONE OF TWO BOTTLES HAS BEEN FLAGGED POSITIVE BY INSTRUMENT. BOTTLE HAS BEEN SENT TO [facility] TO ASSESS FOR POSSIBLE GROWTH.                   REMAINING BOTTLE(S) HAS/HAVE NO GROWTH SO FAR          CULTURE, URINE [[id number]]  (Abnormal) Collected: 08/20/22 1752    Order Status: Completed Specimen: Cath Urine Updated: 08/22/22 0733     Special Requests: NO SPECIAL REQUESTS        [place] Count --        >100,000  COLONIES/mL       Culture result: GRAM NEGATIVE RODS               CHECKING FOR POSSIBLE 2ND GRAM NEGATIVE ALEX          CULTURE, BLOOD [[id number]] Collected: 08/20/22 1740    Order Status: Completed Specimen: Blood Updated: 08/22/22 0623     Special Requests: NO SPECIAL REQUESTS        Culture result: NO GROWTH 2 DAYS       CULTURE, BLOOD, PAIRED [[id number]]     Order Status: Canceled Specimen: Blood             Other pertinent lab: none    Total time spent with patient: 30 Minutes I personally reviewed chart, notes, data and current medications in the medical record. I have personally examined and treated the patient at bedside during this period. To assist coordination of care and communication with nursing and staff, this note may be preliminary early in the day, but finalized by end of the day.                  Care Plan discussed with: Patient, Family, Care Manager, Nursing Staff, Consultant/Specialist, and >50% of time spent in counseling and coordination of care    Discussed:  Care Plan and D/C Planning    Prophylaxis:  H2B/PPI    Disposition:  Home w/Family           ___________________________________________________    Attending Physician: Milan Cid MD

## 2022-08-22 NOTE — DISCHARGE INSTRUCTIONS
Patient Discharge Instructions    Caty Jessica / [id number] : 1955    Admitted 2022 Discharged: 2022     Primary Diagnoses  @Rprob@    Take Home Medications     It is important that you take the medication exactly as they are prescribed. Keep your medication in the bottles provided by the pharmacist and keep a list of the medication names, dosages, and times to be taken in your wallet. Do not take other medications without consulting your doctor. What to do at [address]: Comfort feeding    Recommended activity: Activity as tolerated    If you experience worse symptoms, please follow up with hospice. Follow-up with your PCP in a few weeks    [unfilled]     Information obtained by :  I understand that if any problems occur once I am at home I am to contact my physician. I understand and acknowledge receipt of the instructions indicated above.                                                                                                                                            Physician's or R.N.'s Signature                                                                  Date/Time                                                                                                                                              Patient or Representative Signature                                                          Date/Time

## 2022-08-22 NOTE — DISCHARGE SUMMARY
Physician Discharge Summary     Patient ID:  Isra Stephenson  [id number]  40 y.o.  1955    Admit date: 8/20/2022    Discharge date of service and time: 8/22/2022  Greater than 30 minutes were spent providing discharge related services for this patient    Admission Diagnoses: Intra-abdominal abscess [facility]) [K65.1]    Discharge Diagnoses:    Principal Diagnosis     Metastatic prostate cancer                                              Hospital Course and other diagnoses  Metastatic prostate cancer - POA. Involves perineum and base of penis, osseous metastases, pulmonary metastases, exophytic cystic neoplasm or R kidney. Urology and Oncology consulted. Had TURP on 4/11/2022, underwent suprapubic catheter placement on 4/26/2022. S/p ablation and kyphoplasty of T10/11 on 4/20/2021. Completed radiation with Dr. Kathryn Santana. No plan for further evaluation or treatment. DC home with hospice today. Continue proscar     Anemia / HbG - No further testing or treatment     Diabetes type 2 without complications - No testing or treatment needed on hospice. Stop lantus, metformin     Hypertension - BP and pulse fine. Stop atenolol     Intraabdominal abscess - Dx on admit is reject by Urology after review. Stop shayan     PCP: Rivka Vences MD    Consults: Hematology/Oncology and Urology    Significant Diagnostic Studies: See Hospital Course    Discharged home in improved condition.     Discharge Exam:  BP (!) 144/77 (BP 1 Location: Left upper arm, BP Patient Position: At rest)   Pulse 72   Temp 98.3 °F (36.8 °C)   Resp 18   Ht 5' 8\" (1.727 m)   Wt 88 kg (194 lb)   SpO2 97%   BMI 29.50 kg/m²      Gen:  Frail, in no acute distress  HEENT:  Pink conjunctivae, PERRL, hearing intact to voice, moist mucous membranes  Neck:  Supple, without masses, thyroid non-tender  Resp:  No accessory muscle use, clear breath sounds without wheezes rales or rhonchi  Card:  No murmurs, normal S1, S2 without thrills, bruits or peripheral edema  Abd: Soft, non-tender, non-distended, normoactive bowel sounds are present, no mass  Lymph:  No cervical or inguinal adenopathy  Musc:  No cyanosis or clubbing  Skin:  No rashes or ulcers, skin turgor is good  Neuro:  Cranial nerves are grossly intact, general motor weakness, follows commands   Psych: Moderate insight, oriented to person, place and time, alert    Patient Instructions:   Current Discharge Medication List        CONTINUE these medications which have NOT CHANGED    Details   docusate sodium (COLACE) 100 mg capsule Take 100 mg by mouth two (2) times daily as needed for Constipation. finasteride (PROSCAR) 5 mg tablet Take 5 mg by mouth daily. tamsulosin (FLOMAX) 0.4 mg capsule Take 0.4 mg by mouth two (2) times a day. STOP taking these medications       Lantus U-100 Insulin 100 unit/mL injection Comments:   Reason for Stopping:         HumaLOG U-100 Insulin 100 unit/mL injection Comments:   Reason for Stopping:         atenoloL (TENORMIN) 50 mg tablet Comments:   Reason for Stopping:         ferrous sulfate 325 mg (65 mg iron) tablet Comments:   Reason for Stopping:             Activity: Activity as tolerated  Diet: Comfort feeding  Wound Care: None needed    Follow-up with your PCP and hospice in a few days.   Follow-up tests/labs - none    Signed:  Prashanth Lin MD  8/22/2022  1:03 PM

## 2022-08-22 NOTE — PROGRESS NOTES
8/22/2022 2:05 PM Spoke with Yash Wilder with [facility], he has confirmed discharge with pt's daughter, Gala Sanchez. Pt's daughter will be to [facility] around 4PM to transport pt home. Pt's DME will be delivered to pt's son by 2:30PM today. 8/22/2022 12:37 PM CM called back to pt's daughter, Gala Sanchez, no answer. CM will follow up.     8/22/2022 11:29 AM EMR reviewed, planning for pt to discharge home with hospice through [facility]. CM spoke with [facility] liaison, Yash Wilder who confirmed they can accept pt at home today. Yash Wilder reported he has contacted family and arranged for DME to be delivered to pt's home today. CM called and lvm with pt's daughter, Gala Sanchez at [phone] to confirm discharge and transport. AVS sent to [facility] via [employer]. CM will follow up. LARISSA Williamson     Care Management Interventions  PCP Verified by CM: Yes  Mode of Transport at Discharge:  Other (see comment) (Family will transport at [employer])  Transition of Care Consult (CM Consult): Discharge PlanningEliel: No  Reason Outside [place]: Patient already serviced by other home care/hospice agency  Discharge Durable Medical Equipment: No  Physical Therapy Consult: No  Occupational Therapy Consult: No  Speech Therapy Consult: No  Support Systems: Spouse/Significant Other, Child(evelin), Other Family Member(s)  Confirm Follow Up Transport: Family  Discharge Location  Patient Expects to be Discharged to[de-identified] Home with hospice

## 2022-08-24 NOTE — PROGRESS NOTES
Noted microbiology  MRSA in blood considered contaminant. Multiple species in urine likely contaminants. Patient is on hospice.   No further treatment at this time

## 2022-08-24 NOTE — PROGRESS NOTES
Physician Progress Note      PATIENT:               Mariaa Cerrato  CSN #:                  [id number]  :                       1955  ADMIT DATE:       2022 1:39 PM  Fazal Smith DATE:        2022 4:00 PM  RESPONDING  PROVIDER #:        Garret Dash MD          QUERY TEXT:    Good morning. Pt admitted with confusion and generalized weakness and  UTI. Pt noted to have chronic suprapubic catheter. If possible, please document in the progress notes and discharge summary if you are evaluating and/or treating any of the following: The medical record reflects the following:  Risk Factors: Metastatic prostate cancer, suprapubic cath placement in 2022, DM, HTN, BPH  Clinical Indicators: Per H&P-\"Mr. Nahomi Obrien is a 78 y.o.  male with history significant for diabetes mellitus, hypertension, high cholesterol, enlarged prostate with metastatic prostate cancer mets to the lungs status post chemoradiation, status post suprapubic catheter placement in 2022 who presents with discharge coming out of the suprapubic catheter with concern for worsening infection\"; Final urine cx- morganella morganii; WBC 17.4, 16.7, 16.2  Treatment: urology consult, u/a, ucx, IV Levaquin, IV Zosyn, IV Vancomycin, labs, vital signs per unit protocol    Thank you,  Brent Paulson RN, CDI  [phone]  Options provided:  -- UTI due to suprapubic catheter  -- UTI not due to suprapubic catheter  -- Other - I will add my own diagnosis  -- Disagree - Not applicable / Not valid  -- Disagree - Clinically unable to determine / Unknown  -- Refer to Clinical Documentation Reviewer    PROVIDER RESPONSE TEXT:    UTI is due to suprapubic catheter.     Query created by: Breanna Holloway on 2022 8:42 AM      Electronically signed by:  Garret Dash MD 2022 9:25 AM

## 2022-08-24 NOTE — PROGRESS NOTES
Physician Progress Note      PATIENT:               Viji Velazquez  CSN #:                  [id number]  :                       1955  ADMIT DATE:       2022 1:39 PM  [address] [place] DATE:        2022 4:00 PM  RESPONDING  PROVIDER #:        Juju Argueta MD          QUERY TEXT:    Good afternoon. Pt admitted with metastatic prostate cancer. Pt noted to have preliminary urine culture with gram negative rods >100, 000 colonies/ml. If possible, please document in the progress notes and discharge summary if you are evaluating and/or treating any of the following: The medical record reflects the following:  Risk Factors: Metastatic prostate cancer with involvement of perineum and base of penis, osseous metastases, pulmonary metastases chronic suprapubic cath, DM  Clinical Indicators: preliminary urine cx--gram negative kvng >100, 000 colonies/ml; u/a--turbid, positive nitrites, large LE, WBC >100, bacteria +4, yeast; WBC 17.4, 16.7, 16.2  Treatment: u/a, urine cx, urology consult, labs, vital signs; IV fluids      Thank you,  Kapil Jordan RN, [facility]  [phone]  Options provided:  -- Urinary Tract Infection (UTI)  -- Bacteriuria  -- Other - I will add my own diagnosis  -- Disagree - Not applicable / Not valid  -- Disagree - Clinically unable to determine / Unknown  -- Refer to Clinical Documentation Reviewer    PROVIDER RESPONSE TEXT:    This patient has a UTI.     Query created by: Hansa Michelle on 2022 5:27 PM      Electronically signed by:  Juju Argueta MD 2022 7:14 AM

## 2022-09-12 NOTE — TELEPHONE ENCOUNTER
Hospice called and stated that patient passed this morning. If we have any questions to call back.   #[phone]

## 2022-10-12 ENCOUNTER — TELEPHONE (OUTPATIENT)
Dept: ONCOLOGY | Age: 67
End: 2022-10-12

## 2022-10-12 NOTE — TELEPHONE ENCOUNTER
Daughter states medicine is still coming to house for her Dad and is being delivered by [facility] and her Father been  over a month

## 2022-10-12 NOTE — TELEPHONE ENCOUNTER
[address] [facility]  [phone]    10/12/22- Patient's daughter stated they're still receiving Zytiga shipments. Will message [facility] to cancel prescription. Patient's daughter denies further needs at this time.

## 2022-10-21 ENCOUNTER — APPOINTMENT (OUTPATIENT)
Dept: INFUSION THERAPY | Age: 67
End: 2022-10-21

## (undated) DEVICE — SOLUTION IRRIG 3000ML 0.9% SOD CHL USP UROMATIC PLAS CONT

## (undated) DEVICE — Y-TYPE TUR IRRIGATION SET

## (undated) DEVICE — KIT SURG PREP POVIDONE IOD PRESATURATED PAINT WET FOR UNIV

## (undated) DEVICE — CONTAINER,SPECIMEN,4OZ,OR STRL: Brand: MEDLINE

## (undated) DEVICE — CYSTO-SFMC: Brand: MEDLINE INDUSTRIES, INC.

## (undated) DEVICE — GOWN,SIRUS,FABRNF,XL,20/CS: Brand: MEDLINE

## (undated) DEVICE — GDWIRE UROL STR 150CM FLX TP -- BX/5 SENSOR

## (undated) DEVICE — DRAINBAG,ANTI-REFLUX TOWER,L/F,2000ML,LL: Brand: MEDLINE

## (undated) DEVICE — SOLUTION IRRIG 1000ML STRL H2O USP PLAS POUR BTL

## (undated) DEVICE — SYR 10ML LUER LOK 1/5ML GRAD --

## (undated) DEVICE — TUBING, SUCTION, 1/4" X 12', STRAIGHT: Brand: MEDLINE

## (undated) DEVICE — GLOVE ORANGE PI 7 1/2   MSG9075

## (undated) DEVICE — DEVICE SECUREMENT 1/32IN POLYETH FOAM F ANCHR URIN CATH

## (undated) DEVICE — CATH URETH FOL 2W SH 20FRX5ML --

## (undated) DEVICE — SOL IRRIGATION INJ NACL 0.9% 500ML BTL

## (undated) DEVICE — 4-PORT MANIFOLD: Brand: NEPTUNE 2

## (undated) DEVICE — SYRINGE,TOOMEY,IRRIGATION,70CC,STERILE: Brand: MEDLINE

## (undated) DEVICE — CUTTING ELECTRODE BIPO 24FR 12/30°  FOR RESECTOSCOPES, TELESCOPE Ø 4MM, FOR SHEATHS, INTERMITTENT/CONTINUOUS IRRIGATION, 24/26, FR, LOOP: ROUND, WIRE Ø 0.3MM, FORK COLOR BLUE, STEM COLOR BLUE, PACK=3 PCS, FOR SHARK AND S-LINE RESECTOSCOPES, STERILE, FOR SINGLE USE: Brand: SHARK/S-LINE

## (undated) DEVICE — MARKER,SKIN,WI/RULER AND LABELS: Brand: MEDLINE